# Patient Record
Sex: MALE | Race: WHITE | Employment: OTHER
[De-identification: names, ages, dates, MRNs, and addresses within clinical notes are randomized per-mention and may not be internally consistent; named-entity substitution may affect disease eponyms.]

---

## 2017-02-03 ENCOUNTER — CARE COORDINATION (OUTPATIENT)
Dept: CARE COORDINATION | Facility: CLINIC | Age: 47
End: 2017-02-03

## 2017-02-14 PROBLEM — H72.91 PERFORATION OF RIGHT TYMPANIC MEMBRANE: Status: ACTIVE | Noted: 2017-02-14

## 2017-03-22 ENCOUNTER — CARE COORDINATION (OUTPATIENT)
Dept: CARE COORDINATION | Age: 47
End: 2017-03-22

## 2017-05-02 ENCOUNTER — CARE COORDINATION (OUTPATIENT)
Dept: CARE COORDINATION | Age: 47
End: 2017-05-02

## 2017-05-23 ENCOUNTER — HOSPITAL ENCOUNTER (INPATIENT)
Age: 47
LOS: 2 days | Discharge: HOME | DRG: 639 | End: 2017-05-25
Attending: EMERGENCY MEDICINE | Admitting: FAMILY MEDICINE
Payer: COMMERCIAL

## 2017-05-23 ENCOUNTER — APPOINTMENT (OUTPATIENT)
Dept: GENERAL RADIOLOGY | Age: 47
DRG: 639 | End: 2017-05-23
Payer: COMMERCIAL

## 2017-05-23 ENCOUNTER — APPOINTMENT (OUTPATIENT)
Dept: CT IMAGING | Age: 47
DRG: 639 | End: 2017-05-23
Payer: COMMERCIAL

## 2017-05-23 DIAGNOSIS — E10.10 DIABETIC KETOACIDOSIS WITHOUT COMA ASSOCIATED WITH TYPE 1 DIABETES MELLITUS (HCC): Primary | ICD-10-CM

## 2017-05-23 DIAGNOSIS — K92.2 GASTROINTESTINAL HEMORRHAGE, UNSPECIFIED GASTROINTESTINAL HEMORRHAGE TYPE: ICD-10-CM

## 2017-05-23 LAB
ABO/RH: NORMAL
ABSOLUTE EOS #: 0.15 K/UL (ref 0–0.4)
ABSOLUTE LYMPH #: 0.45 K/UL (ref 1–4.8)
ABSOLUTE MONO #: 0 K/UL (ref 0.1–1.3)
ALBUMIN SERPL-MCNC: 4.1 G/DL (ref 3.5–5.2)
ALBUMIN/GLOBULIN RATIO: ABNORMAL (ref 1–2.5)
ALLEN TEST: ABNORMAL
ALP BLD-CCNC: 100 U/L (ref 40–129)
ALT SERPL-CCNC: 17 U/L (ref 5–41)
AMPHETAMINE SCREEN URINE: NEGATIVE
ANION GAP SERPL CALCULATED.3IONS-SCNC: 17 MMOL/L (ref 9–17)
ANION GAP SERPL CALCULATED.3IONS-SCNC: 27 MMOL/L (ref 9–17)
ANTIBODY SCREEN: NEGATIVE
ARM BAND NUMBER: NORMAL
AST SERPL-CCNC: 19 U/L
BARBITURATE SCREEN URINE: NEGATIVE
BASOPHILS # BLD: 0 %
BASOPHILS ABSOLUTE: 0 K/UL (ref 0–0.2)
BENZODIAZEPINE SCREEN, URINE: NEGATIVE
BILIRUB SERPL-MCNC: 0.77 MG/DL (ref 0.3–1.2)
BILIRUBIN URINE: NEGATIVE
BUN BLDV-MCNC: 12 MG/DL (ref 6–20)
BUN BLDV-MCNC: 12 MG/DL (ref 6–20)
BUN/CREAT BLD: ABNORMAL (ref 9–20)
BUN/CREAT BLD: ABNORMAL (ref 9–20)
BUPRENORPHINE URINE: ABNORMAL
C-REACTIVE PROTEIN: 0.5 MG/L (ref 0–5)
CALCIUM SERPL-MCNC: 8.3 MG/DL (ref 8.6–10.4)
CALCIUM SERPL-MCNC: 9.2 MG/DL (ref 8.6–10.4)
CANNABINOID SCREEN URINE: NEGATIVE
CARBOXYHEMOGLOBIN: 4.5 % (ref 0–5)
CHLORIDE BLD-SCNC: 103 MMOL/L (ref 98–107)
CHLORIDE BLD-SCNC: 94 MMOL/L (ref 98–107)
CO2: 15 MMOL/L (ref 20–31)
CO2: 16 MMOL/L (ref 20–31)
COCAINE METABOLITE, URINE: NEGATIVE
COLOR: YELLOW
COMMENT UA: ABNORMAL
CREAT SERPL-MCNC: 0.74 MG/DL (ref 0.7–1.2)
CREAT SERPL-MCNC: 0.83 MG/DL (ref 0.7–1.2)
DIFFERENTIAL TYPE: ABNORMAL
EOSINOPHILS RELATIVE PERCENT: 1 %
EXPIRATION DATE: NORMAL
FIO2: ABNORMAL
GFR AFRICAN AMERICAN: >60 ML/MIN
GFR AFRICAN AMERICAN: >60 ML/MIN
GFR NON-AFRICAN AMERICAN: >60 ML/MIN
GFR NON-AFRICAN AMERICAN: >60 ML/MIN
GFR SERPL CREATININE-BSD FRML MDRD: ABNORMAL ML/MIN/{1.73_M2}
GLUCOSE BLD-MCNC: 174 MG/DL (ref 75–110)
GLUCOSE BLD-MCNC: 191 MG/DL (ref 70–99)
GLUCOSE BLD-MCNC: 191 MG/DL (ref 75–110)
GLUCOSE BLD-MCNC: 196 MG/DL (ref 75–110)
GLUCOSE BLD-MCNC: 208 MG/DL (ref 75–110)
GLUCOSE BLD-MCNC: 271 MG/DL (ref 75–110)
GLUCOSE BLD-MCNC: 365 MG/DL (ref 75–110)
GLUCOSE BLD-MCNC: 419 MG/DL (ref 75–110)
GLUCOSE BLD-MCNC: 434 MG/DL (ref 70–99)
GLUCOSE URINE: ABNORMAL
HCO3 VENOUS: 15.4 MMOL/L (ref 24–30)
HCT VFR BLD CALC: 33.9 % (ref 41–53)
HEMOGLOBIN: 11.4 G/DL (ref 13.5–17.5)
KETONES, URINE: ABNORMAL
LACTIC ACID: 2.7 MMOL/L (ref 0.5–2.2)
LACTIC ACID: 4.5 MMOL/L (ref 0.5–2.2)
LEUKOCYTE ESTERASE, URINE: NEGATIVE
LIPASE: 8 U/L (ref 13–60)
LYMPHOCYTES # BLD: 3 %
MAGNESIUM: 1.8 MG/DL (ref 1.6–2.6)
MCH RBC QN AUTO: 31.8 PG (ref 26–34)
MCHC RBC AUTO-ENTMCNC: 33.7 G/DL (ref 31–37)
MCV RBC AUTO: 94.5 FL (ref 80–100)
MDMA URINE: ABNORMAL
METHADONE SCREEN, URINE: NEGATIVE
METHAMPHETAMINE, URINE: ABNORMAL
METHEMOGLOBIN: 0.4 % (ref 0–1.9)
MODE: ABNORMAL
MONOCYTES # BLD: 0 %
MORPHOLOGY: ABNORMAL
NEGATIVE BASE EXCESS, VEN: 10.2 MMOL/L (ref 0–2)
NITRITE, URINE: NEGATIVE
NOTIFICATION TIME: ABNORMAL
NOTIFICATION: ABNORMAL
O2 DEVICE/FLOW/%: ABNORMAL
O2 SAT, VEN: 53.4 % (ref 60–85)
OPIATES, URINE: POSITIVE
OXYCODONE SCREEN URINE: POSITIVE
OXYHEMOGLOBIN: ABNORMAL % (ref 95–98)
PATIENT TEMP: 37
PCO2, VEN, TEMP ADJ: ABNORMAL MMHG (ref 39–55)
PCO2, VEN: 28 (ref 39–55)
PDW BLD-RTO: 13.5 % (ref 11.5–14.9)
PEEP/CPAP: ABNORMAL
PH UA: 5.5 (ref 5–8)
PH VENOUS: 7.35 (ref 7.32–7.42)
PH, VEN, TEMP ADJ: ABNORMAL (ref 7.32–7.42)
PHENCYCLIDINE, URINE: NEGATIVE
PHOSPHORUS: 2.3 MG/DL (ref 2.5–4.5)
PLATELET # BLD: 239 K/UL (ref 150–450)
PLATELET ESTIMATE: ABNORMAL
PMV BLD AUTO: 8.2 FL (ref 6–12)
PO2, VEN, TEMP ADJ: ABNORMAL MMHG (ref 30–50)
PO2, VEN: 27.9 (ref 30–50)
POSITIVE BASE EXCESS, VEN: ABNORMAL MMOL/L (ref 0–2)
POTASSIUM SERPL-SCNC: 3.9 MMOL/L (ref 3.7–5.3)
POTASSIUM SERPL-SCNC: 4.2 MMOL/L (ref 3.7–5.3)
PROPOXYPHENE, URINE: ABNORMAL
PROTEIN UA: NEGATIVE
PSV: ABNORMAL
PT. POSITION: ABNORMAL
RBC # BLD: 3.59 M/UL (ref 4.5–5.9)
RBC # BLD: ABNORMAL 10*6/UL
RESPIRATORY RATE: ABNORMAL
SAMPLE SITE: ABNORMAL
SEG NEUTROPHILS: 96 %
SEGMENTED NEUTROPHILS ABSOLUTE COUNT: 14.5 K/UL (ref 1.3–9.1)
SET RATE: ABNORMAL
SODIUM BLD-SCNC: 136 MMOL/L (ref 135–144)
SODIUM BLD-SCNC: 136 MMOL/L (ref 135–144)
SPECIFIC GRAVITY UA: 1.03 (ref 1–1.03)
TEST INFORMATION: ABNORMAL
TEXT FOR RESPIRATORY: ABNORMAL
TOTAL HB: ABNORMAL G/DL (ref 12–16)
TOTAL PROTEIN: 6.4 G/DL (ref 6.4–8.3)
TOTAL RATE: ABNORMAL
TRICYCLIC ANTIDEPRESSANTS, UR: ABNORMAL
TURBIDITY: CLEAR
URINE HGB: NEGATIVE
UROBILINOGEN, URINE: NORMAL
VT: ABNORMAL
WBC # BLD: 15.1 K/UL (ref 3.5–11)
WBC # BLD: ABNORMAL 10*3/UL

## 2017-05-23 PROCEDURE — 86900 BLOOD TYPING SEROLOGIC ABO: CPT

## 2017-05-23 PROCEDURE — 82947 ASSAY GLUCOSE BLOOD QUANT: CPT

## 2017-05-23 PROCEDURE — 86901 BLOOD TYPING SEROLOGIC RH(D): CPT

## 2017-05-23 PROCEDURE — C9113 INJ PANTOPRAZOLE SODIUM, VIA: HCPCS | Performed by: EMERGENCY MEDICINE

## 2017-05-23 PROCEDURE — 80048 BASIC METABOLIC PNL TOTAL CA: CPT

## 2017-05-23 PROCEDURE — 2580000003 HC RX 258: Performed by: FAMILY MEDICINE

## 2017-05-23 PROCEDURE — 74000 XR ABDOMEN LIMITED (KUB): CPT

## 2017-05-23 PROCEDURE — 83735 ASSAY OF MAGNESIUM: CPT

## 2017-05-23 PROCEDURE — 96376 TX/PRO/DX INJ SAME DRUG ADON: CPT

## 2017-05-23 PROCEDURE — 36415 COLL VENOUS BLD VENIPUNCTURE: CPT

## 2017-05-23 PROCEDURE — 85025 COMPLETE CBC W/AUTO DIFF WBC: CPT

## 2017-05-23 PROCEDURE — 6360000004 HC RX CONTRAST MEDICATION: Performed by: EMERGENCY MEDICINE

## 2017-05-23 PROCEDURE — 6360000002 HC RX W HCPCS: Performed by: FAMILY MEDICINE

## 2017-05-23 PROCEDURE — 96366 THER/PROPH/DIAG IV INF ADDON: CPT

## 2017-05-23 PROCEDURE — 96375 TX/PRO/DX INJ NEW DRUG ADDON: CPT

## 2017-05-23 PROCEDURE — 81003 URINALYSIS AUTO W/O SCOPE: CPT

## 2017-05-23 PROCEDURE — 6370000000 HC RX 637 (ALT 250 FOR IP): Performed by: FAMILY MEDICINE

## 2017-05-23 PROCEDURE — 2580000003 HC RX 258: Performed by: EMERGENCY MEDICINE

## 2017-05-23 PROCEDURE — 86140 C-REACTIVE PROTEIN: CPT

## 2017-05-23 PROCEDURE — 74177 CT ABD & PELVIS W/CONTRAST: CPT

## 2017-05-23 PROCEDURE — 83605 ASSAY OF LACTIC ACID: CPT

## 2017-05-23 PROCEDURE — 6370000000 HC RX 637 (ALT 250 FOR IP): Performed by: EMERGENCY MEDICINE

## 2017-05-23 PROCEDURE — 2500000003 HC RX 250 WO HCPCS: Performed by: FAMILY MEDICINE

## 2017-05-23 PROCEDURE — 99285 EMERGENCY DEPT VISIT HI MDM: CPT

## 2017-05-23 PROCEDURE — 83690 ASSAY OF LIPASE: CPT

## 2017-05-23 PROCEDURE — 80307 DRUG TEST PRSMV CHEM ANLYZR: CPT

## 2017-05-23 PROCEDURE — 86850 RBC ANTIBODY SCREEN: CPT

## 2017-05-23 PROCEDURE — 6360000002 HC RX W HCPCS: Performed by: EMERGENCY MEDICINE

## 2017-05-23 PROCEDURE — 80053 COMPREHEN METABOLIC PANEL: CPT

## 2017-05-23 PROCEDURE — 82800 BLOOD PH: CPT

## 2017-05-23 PROCEDURE — 96365 THER/PROPH/DIAG IV INF INIT: CPT

## 2017-05-23 PROCEDURE — 2060000000 HC ICU INTERMEDIATE R&B

## 2017-05-23 PROCEDURE — 82805 BLOOD GASES W/O2 SATURATION: CPT

## 2017-05-23 PROCEDURE — 84100 ASSAY OF PHOSPHORUS: CPT

## 2017-05-23 RX ORDER — DEXTROSE MONOHYDRATE 50 MG/ML
100 INJECTION, SOLUTION INTRAVENOUS PRN
Status: DISCONTINUED | OUTPATIENT
Start: 2017-05-23 | End: 2017-05-23

## 2017-05-23 RX ORDER — PANTOPRAZOLE SODIUM 40 MG/10ML
40 INJECTION, POWDER, LYOPHILIZED, FOR SOLUTION INTRAVENOUS ONCE
Status: COMPLETED | OUTPATIENT
Start: 2017-05-23 | End: 2017-05-23

## 2017-05-23 RX ORDER — ONDANSETRON 2 MG/ML
4 INJECTION INTRAMUSCULAR; INTRAVENOUS EVERY 6 HOURS PRN
Status: DISCONTINUED | OUTPATIENT
Start: 2017-05-23 | End: 2017-05-23

## 2017-05-23 RX ORDER — OXYCODONE AND ACETAMINOPHEN 10; 325 MG/1; MG/1
1 TABLET ORAL EVERY 6 HOURS PRN
Status: DISCONTINUED | OUTPATIENT
Start: 2017-05-23 | End: 2017-05-23 | Stop reason: CLARIF

## 2017-05-23 RX ORDER — LISINOPRIL 5 MG/1
5 TABLET ORAL DAILY
Status: DISCONTINUED | OUTPATIENT
Start: 2017-05-23 | End: 2017-05-25 | Stop reason: HOSPADM

## 2017-05-23 RX ORDER — NICOTINE POLACRILEX 4 MG
15 LOZENGE BUCCAL PRN
Status: DISCONTINUED | OUTPATIENT
Start: 2017-05-23 | End: 2017-05-24 | Stop reason: ALTCHOICE

## 2017-05-23 RX ORDER — ACETAMINOPHEN 325 MG/1
650 TABLET ORAL EVERY 4 HOURS PRN
Status: DISCONTINUED | OUTPATIENT
Start: 2017-05-23 | End: 2017-05-23

## 2017-05-23 RX ORDER — SODIUM CHLORIDE 9 MG/ML
INJECTION, SOLUTION INTRAVENOUS CONTINUOUS
Status: DISCONTINUED | OUTPATIENT
Start: 2017-05-23 | End: 2017-05-23

## 2017-05-23 RX ORDER — POTASSIUM CHLORIDE 7.45 MG/ML
10 INJECTION INTRAVENOUS PRN
Status: DISCONTINUED | OUTPATIENT
Start: 2017-05-23 | End: 2017-05-24 | Stop reason: ALTCHOICE

## 2017-05-23 RX ORDER — DEXTROSE AND SODIUM CHLORIDE 5; .45 G/100ML; G/100ML
INJECTION, SOLUTION INTRAVENOUS CONTINUOUS PRN
Status: DISCONTINUED | OUTPATIENT
Start: 2017-05-23 | End: 2017-05-24 | Stop reason: ALTCHOICE

## 2017-05-23 RX ORDER — OXYCODONE HYDROCHLORIDE AND ACETAMINOPHEN 5; 325 MG/1; MG/1
1 TABLET ORAL EVERY 6 HOURS PRN
Status: DISCONTINUED | OUTPATIENT
Start: 2017-05-23 | End: 2017-05-25 | Stop reason: HOSPADM

## 2017-05-23 RX ORDER — DEXTROSE MONOHYDRATE 25 G/50ML
12.5 INJECTION, SOLUTION INTRAVENOUS PRN
Status: DISCONTINUED | OUTPATIENT
Start: 2017-05-23 | End: 2017-05-24 | Stop reason: ALTCHOICE

## 2017-05-23 RX ORDER — SODIUM CHLORIDE 0.9 % (FLUSH) 0.9 %
10 SYRINGE (ML) INJECTION PRN
Status: DISCONTINUED | OUTPATIENT
Start: 2017-05-23 | End: 2017-05-25 | Stop reason: HOSPADM

## 2017-05-23 RX ORDER — 0.9 % SODIUM CHLORIDE 0.9 %
1000 INTRAVENOUS SOLUTION INTRAVENOUS ONCE
Status: COMPLETED | OUTPATIENT
Start: 2017-05-23 | End: 2017-05-23

## 2017-05-23 RX ORDER — DEXTROSE MONOHYDRATE 25 G/50ML
12.5 INJECTION, SOLUTION INTRAVENOUS PRN
Status: DISCONTINUED | OUTPATIENT
Start: 2017-05-23 | End: 2017-05-23

## 2017-05-23 RX ORDER — ACETAMINOPHEN 325 MG/1
650 TABLET ORAL EVERY 4 HOURS PRN
Status: DISCONTINUED | OUTPATIENT
Start: 2017-05-23 | End: 2017-05-25 | Stop reason: HOSPADM

## 2017-05-23 RX ORDER — DEXTROSE AND SODIUM CHLORIDE 5; .45 G/100ML; G/100ML
INJECTION, SOLUTION INTRAVENOUS CONTINUOUS
Status: DISCONTINUED | OUTPATIENT
Start: 2017-05-23 | End: 2017-05-24 | Stop reason: ALTCHOICE

## 2017-05-23 RX ORDER — DEXTROSE MONOHYDRATE 25 G/50ML
12.5 INJECTION, SOLUTION INTRAVENOUS PRN
Status: DISCONTINUED | OUTPATIENT
Start: 2017-05-23 | End: 2017-05-23 | Stop reason: SDUPTHER

## 2017-05-23 RX ORDER — PRAVASTATIN SODIUM 20 MG
20 TABLET ORAL DAILY
Status: DISCONTINUED | OUTPATIENT
Start: 2017-05-23 | End: 2017-05-25 | Stop reason: HOSPADM

## 2017-05-23 RX ORDER — MORPHINE SULFATE 2 MG/ML
2 INJECTION, SOLUTION INTRAMUSCULAR; INTRAVENOUS
Status: DISCONTINUED | OUTPATIENT
Start: 2017-05-23 | End: 2017-05-23

## 2017-05-23 RX ORDER — ONDANSETRON 4 MG/1
4 TABLET, FILM COATED ORAL EVERY 6 HOURS PRN
Status: DISCONTINUED | OUTPATIENT
Start: 2017-05-23 | End: 2017-05-25 | Stop reason: HOSPADM

## 2017-05-23 RX ORDER — MAGNESIUM SULFATE 1 G/100ML
1 INJECTION INTRAVENOUS PRN
Status: DISCONTINUED | OUTPATIENT
Start: 2017-05-23 | End: 2017-05-24 | Stop reason: ALTCHOICE

## 2017-05-23 RX ORDER — 0.9 % SODIUM CHLORIDE 0.9 %
15 INTRAVENOUS SOLUTION INTRAVENOUS ONCE
Status: DISCONTINUED | OUTPATIENT
Start: 2017-05-23 | End: 2017-05-23

## 2017-05-23 RX ORDER — NICOTINE POLACRILEX 4 MG
15 LOZENGE BUCCAL PRN
Status: DISCONTINUED | OUTPATIENT
Start: 2017-05-23 | End: 2017-05-23

## 2017-05-23 RX ORDER — SODIUM CHLORIDE 0.9 % (FLUSH) 0.9 %
10 SYRINGE (ML) INJECTION PRN
Status: DISCONTINUED | OUTPATIENT
Start: 2017-05-23 | End: 2017-05-23

## 2017-05-23 RX ORDER — DEXTROSE MONOHYDRATE 50 MG/ML
100 INJECTION, SOLUTION INTRAVENOUS PRN
Status: DISCONTINUED | OUTPATIENT
Start: 2017-05-23 | End: 2017-05-24 | Stop reason: ALTCHOICE

## 2017-05-23 RX ORDER — ISOSORBIDE MONONITRATE 30 MG/1
30 TABLET, EXTENDED RELEASE ORAL DAILY
Status: DISCONTINUED | OUTPATIENT
Start: 2017-05-23 | End: 2017-05-25 | Stop reason: HOSPADM

## 2017-05-23 RX ORDER — ONDANSETRON 2 MG/ML
4 INJECTION INTRAMUSCULAR; INTRAVENOUS EVERY 30 MIN PRN
Status: COMPLETED | OUTPATIENT
Start: 2017-05-23 | End: 2017-05-23

## 2017-05-23 RX ORDER — PROPRANOLOL HYDROCHLORIDE 20 MG/1
20 TABLET ORAL 3 TIMES DAILY
Status: DISCONTINUED | OUTPATIENT
Start: 2017-05-23 | End: 2017-05-25 | Stop reason: HOSPADM

## 2017-05-23 RX ORDER — SODIUM CHLORIDE, SODIUM LACTATE, POTASSIUM CHLORIDE, AND CALCIUM CHLORIDE .6; .31; .03; .02 G/100ML; G/100ML; G/100ML; G/100ML
1000 INJECTION, SOLUTION INTRAVENOUS ONCE
Status: COMPLETED | OUTPATIENT
Start: 2017-05-23 | End: 2017-05-23

## 2017-05-23 RX ORDER — 0.9 % SODIUM CHLORIDE 0.9 %
100 INTRAVENOUS SOLUTION INTRAVENOUS ONCE
Status: COMPLETED | OUTPATIENT
Start: 2017-05-23 | End: 2017-05-23

## 2017-05-23 RX ORDER — OXYCODONE HYDROCHLORIDE 5 MG/1
5 TABLET ORAL EVERY 6 HOURS PRN
Status: DISCONTINUED | OUTPATIENT
Start: 2017-05-23 | End: 2017-05-25 | Stop reason: HOSPADM

## 2017-05-23 RX ORDER — SERTRALINE HYDROCHLORIDE 100 MG/1
100 TABLET, FILM COATED ORAL DAILY
Status: DISCONTINUED | OUTPATIENT
Start: 2017-05-23 | End: 2017-05-25 | Stop reason: HOSPADM

## 2017-05-23 RX ORDER — MORPHINE SULFATE 4 MG/ML
4 INJECTION, SOLUTION INTRAMUSCULAR; INTRAVENOUS
Status: DISCONTINUED | OUTPATIENT
Start: 2017-05-23 | End: 2017-05-23

## 2017-05-23 RX ORDER — SODIUM CHLORIDE 0.9 % (FLUSH) 0.9 %
10 SYRINGE (ML) INJECTION EVERY 12 HOURS SCHEDULED
Status: DISCONTINUED | OUTPATIENT
Start: 2017-05-23 | End: 2017-05-25 | Stop reason: HOSPADM

## 2017-05-23 RX ORDER — OXYCODONE HYDROCHLORIDE 5 MG/1
10 TABLET ORAL EVERY 6 HOURS PRN
Status: DISCONTINUED | OUTPATIENT
Start: 2017-05-23 | End: 2017-05-25 | Stop reason: HOSPADM

## 2017-05-23 RX ORDER — NITROGLYCERIN 0.4 MG/1
0.4 TABLET SUBLINGUAL EVERY 5 MIN PRN
Status: DISCONTINUED | OUTPATIENT
Start: 2017-05-23 | End: 2017-05-25 | Stop reason: HOSPADM

## 2017-05-23 RX ADMIN — SODIUM CHLORIDE 0.1 UNITS/KG/HR: 9 INJECTION, SOLUTION INTRAVENOUS at 15:06

## 2017-05-23 RX ADMIN — OXYCODONE AND ACETAMINOPHEN 1 TABLET: 5; 325 TABLET ORAL at 21:11

## 2017-05-23 RX ADMIN — Medication 10 ML: at 13:28

## 2017-05-23 RX ADMIN — IOVERSOL 130 ML: 741 INJECTION INTRA-ARTERIAL; INTRAVENOUS at 13:27

## 2017-05-23 RX ADMIN — SODIUM CHLORIDE: 9 INJECTION, SOLUTION INTRAVENOUS at 15:08

## 2017-05-23 RX ADMIN — ONDANSETRON 4 MG: 2 INJECTION INTRAMUSCULAR; INTRAVENOUS at 13:43

## 2017-05-23 RX ADMIN — MORPHINE SULFATE 4 MG: 4 INJECTION, SOLUTION INTRAMUSCULAR; INTRAVENOUS at 11:45

## 2017-05-23 RX ADMIN — Medication 4.08 UNITS/HR: at 18:37

## 2017-05-23 RX ADMIN — LISINOPRIL 5 MG: 5 TABLET ORAL at 21:08

## 2017-05-23 RX ADMIN — POTASSIUM CHLORIDE 10 MEQ: 7.46 INJECTION, SOLUTION INTRAVENOUS at 22:16

## 2017-05-23 RX ADMIN — IOHEXOL 50 ML: 240 INJECTION, SOLUTION INTRATHECAL; INTRAVASCULAR; INTRAVENOUS; ORAL at 13:28

## 2017-05-23 RX ADMIN — SODIUM CHLORIDE 100 ML: 9 INJECTION, SOLUTION INTRAVENOUS at 13:28

## 2017-05-23 RX ADMIN — ISOSORBIDE MONONITRATE 30 MG: 30 TABLET, EXTENDED RELEASE ORAL at 21:08

## 2017-05-23 RX ADMIN — PROPRANOLOL HYDROCHLORIDE 20 MG: 20 TABLET ORAL at 21:08

## 2017-05-23 RX ADMIN — SODIUM CHLORIDE 8 MG/HR: 9 INJECTION, SOLUTION INTRAVENOUS at 17:06

## 2017-05-23 RX ADMIN — SODIUM CHLORIDE 1000 ML: 9 INJECTION, SOLUTION INTRAVENOUS at 11:45

## 2017-05-23 RX ADMIN — SERTRALINE HYDROCHLORIDE 100 MG: 100 TABLET, FILM COATED ORAL at 21:08

## 2017-05-23 RX ADMIN — ONDANSETRON 4 MG: 2 INJECTION INTRAMUSCULAR; INTRAVENOUS at 11:45

## 2017-05-23 RX ADMIN — PANTOPRAZOLE SODIUM 40 MG: 40 INJECTION, POWDER, FOR SOLUTION INTRAVENOUS at 11:45

## 2017-05-23 RX ADMIN — POTASSIUM CHLORIDE 10 MEQ: 7.46 INJECTION, SOLUTION INTRAVENOUS at 23:17

## 2017-05-23 RX ADMIN — OXYCODONE HYDROCHLORIDE 5 MG: 5 TABLET ORAL at 21:15

## 2017-05-23 RX ADMIN — PRAVASTATIN SODIUM 20 MG: 20 TABLET ORAL at 21:07

## 2017-05-23 RX ADMIN — SODIUM PHOSPHATE, MONOBASIC, MONOHYDRATE AND SODIUM PHOSPHATE, DIBASIC, ANHYDROUS 10 MMOL: 276; 142 INJECTION, SOLUTION INTRAVENOUS at 21:58

## 2017-05-23 RX ADMIN — SODIUM CHLORIDE, POTASSIUM CHLORIDE, SODIUM LACTATE AND CALCIUM CHLORIDE 1000 ML: 600; 310; 30; 20 INJECTION, SOLUTION INTRAVENOUS at 13:42

## 2017-05-23 RX ADMIN — SODIUM CHLORIDE: 9 INJECTION, SOLUTION INTRAVENOUS at 18:33

## 2017-05-23 RX ADMIN — POTASSIUM CHLORIDE 10 MEQ: 7.46 INJECTION, SOLUTION INTRAVENOUS at 21:15

## 2017-05-23 RX ADMIN — DEXTROSE AND SODIUM CHLORIDE: 5; 450 INJECTION, SOLUTION INTRAVENOUS at 18:42

## 2017-05-23 ASSESSMENT — PAIN DESCRIPTION - FREQUENCY: FREQUENCY: CONTINUOUS

## 2017-05-23 ASSESSMENT — PAIN DESCRIPTION - LOCATION: LOCATION: SHOULDER

## 2017-05-23 ASSESSMENT — PAIN DESCRIPTION - ONSET: ONSET: ON-GOING

## 2017-05-23 ASSESSMENT — PAIN DESCRIPTION - DESCRIPTORS: DESCRIPTORS: ACHING

## 2017-05-23 ASSESSMENT — PAIN SCALES - GENERAL
PAINLEVEL_OUTOF10: 9
PAINLEVEL_OUTOF10: 9
PAINLEVEL_OUTOF10: 0
PAINLEVEL_OUTOF10: 7

## 2017-05-23 ASSESSMENT — PAIN DESCRIPTION - PAIN TYPE: TYPE: CHRONIC PAIN

## 2017-05-24 ENCOUNTER — ANESTHESIA (OUTPATIENT)
Dept: OPERATING ROOM | Age: 47
DRG: 639 | End: 2017-05-24
Payer: COMMERCIAL

## 2017-05-24 ENCOUNTER — ANESTHESIA EVENT (OUTPATIENT)
Dept: OPERATING ROOM | Age: 47
DRG: 639 | End: 2017-05-24
Payer: COMMERCIAL

## 2017-05-24 VITALS — SYSTOLIC BLOOD PRESSURE: 93 MMHG | DIASTOLIC BLOOD PRESSURE: 55 MMHG | OXYGEN SATURATION: 100 %

## 2017-05-24 LAB
ANION GAP SERPL CALCULATED.3IONS-SCNC: 11 MMOL/L (ref 9–17)
ANION GAP SERPL CALCULATED.3IONS-SCNC: 12 MMOL/L (ref 9–17)
ANION GAP SERPL CALCULATED.3IONS-SCNC: 12 MMOL/L (ref 9–17)
BUN BLDV-MCNC: 10 MG/DL (ref 6–20)
BUN BLDV-MCNC: 7 MG/DL (ref 6–20)
BUN BLDV-MCNC: 8 MG/DL (ref 6–20)
BUN/CREAT BLD: ABNORMAL (ref 9–20)
CALCIUM SERPL-MCNC: 8 MG/DL (ref 8.6–10.4)
CALCIUM SERPL-MCNC: 8.1 MG/DL (ref 8.6–10.4)
CALCIUM SERPL-MCNC: 8.1 MG/DL (ref 8.6–10.4)
CHLORIDE BLD-SCNC: 104 MMOL/L (ref 98–107)
CHLORIDE BLD-SCNC: 105 MMOL/L (ref 98–107)
CHLORIDE BLD-SCNC: 107 MMOL/L (ref 98–107)
CO2: 18 MMOL/L (ref 20–31)
CO2: 19 MMOL/L (ref 20–31)
CO2: 20 MMOL/L (ref 20–31)
CREAT SERPL-MCNC: 0.55 MG/DL (ref 0.7–1.2)
CREAT SERPL-MCNC: 0.55 MG/DL (ref 0.7–1.2)
CREAT SERPL-MCNC: 0.59 MG/DL (ref 0.7–1.2)
GFR AFRICAN AMERICAN: >60 ML/MIN
GFR NON-AFRICAN AMERICAN: >60 ML/MIN
GFR SERPL CREATININE-BSD FRML MDRD: ABNORMAL ML/MIN/{1.73_M2}
GLUCOSE BLD-MCNC: 117 MG/DL (ref 75–110)
GLUCOSE BLD-MCNC: 125 MG/DL (ref 75–110)
GLUCOSE BLD-MCNC: 135 MG/DL (ref 70–99)
GLUCOSE BLD-MCNC: 135 MG/DL (ref 75–110)
GLUCOSE BLD-MCNC: 139 MG/DL (ref 75–110)
GLUCOSE BLD-MCNC: 141 MG/DL (ref 75–110)
GLUCOSE BLD-MCNC: 145 MG/DL (ref 75–110)
GLUCOSE BLD-MCNC: 155 MG/DL (ref 75–110)
GLUCOSE BLD-MCNC: 158 MG/DL (ref 70–99)
GLUCOSE BLD-MCNC: 160 MG/DL (ref 75–110)
GLUCOSE BLD-MCNC: 160 MG/DL (ref 75–110)
GLUCOSE BLD-MCNC: 171 MG/DL (ref 75–110)
GLUCOSE BLD-MCNC: 174 MG/DL (ref 70–99)
GLUCOSE BLD-MCNC: 181 MG/DL (ref 75–110)
GLUCOSE BLD-MCNC: 208 MG/DL (ref 75–110)
GLUCOSE BLD-MCNC: 300 MG/DL (ref 75–110)
GLUCOSE BLD-MCNC: 98 MG/DL (ref 75–110)
HCT VFR BLD CALC: 25.8 % (ref 41–53)
HEMOGLOBIN: 8.8 G/DL (ref 13.5–17.5)
MAGNESIUM: 1.7 MG/DL (ref 1.6–2.6)
MAGNESIUM: 1.8 MG/DL (ref 1.6–2.6)
MAGNESIUM: 1.9 MG/DL (ref 1.6–2.6)
MCH RBC QN AUTO: 31.5 PG (ref 26–34)
MCHC RBC AUTO-ENTMCNC: 34 G/DL (ref 31–37)
MCV RBC AUTO: 92.4 FL (ref 80–100)
PDW BLD-RTO: 14.1 % (ref 11.5–14.9)
PHOSPHORUS: 2.3 MG/DL (ref 2.5–4.5)
PHOSPHORUS: 3.3 MG/DL (ref 2.5–4.5)
PHOSPHORUS: 3.6 MG/DL (ref 2.5–4.5)
PLATELET # BLD: 188 K/UL (ref 150–450)
PMV BLD AUTO: 7.9 FL (ref 6–12)
POTASSIUM SERPL-SCNC: 3.7 MMOL/L (ref 3.7–5.3)
POTASSIUM SERPL-SCNC: 4.2 MMOL/L (ref 3.7–5.3)
POTASSIUM SERPL-SCNC: 4.3 MMOL/L (ref 3.7–5.3)
RBC # BLD: 2.79 M/UL (ref 4.5–5.9)
SODIUM BLD-SCNC: 134 MMOL/L (ref 135–144)
SODIUM BLD-SCNC: 137 MMOL/L (ref 135–144)
SODIUM BLD-SCNC: 137 MMOL/L (ref 135–144)
WBC # BLD: 15.8 K/UL (ref 3.5–11)

## 2017-05-24 PROCEDURE — 2580000003 HC RX 258: Performed by: FAMILY MEDICINE

## 2017-05-24 PROCEDURE — 6370000000 HC RX 637 (ALT 250 FOR IP): Performed by: INTERNAL MEDICINE

## 2017-05-24 PROCEDURE — 85027 COMPLETE CBC AUTOMATED: CPT

## 2017-05-24 PROCEDURE — 3700000001 HC ADD 15 MINUTES (ANESTHESIA): Performed by: INTERNAL MEDICINE

## 2017-05-24 PROCEDURE — 6370000000 HC RX 637 (ALT 250 FOR IP): Performed by: FAMILY MEDICINE

## 2017-05-24 PROCEDURE — 2500000003 HC RX 250 WO HCPCS: Performed by: NURSE ANESTHETIST, CERTIFIED REGISTERED

## 2017-05-24 PROCEDURE — 2580000003 HC RX 258: Performed by: INTERNAL MEDICINE

## 2017-05-24 PROCEDURE — 80048 BASIC METABOLIC PNL TOTAL CA: CPT

## 2017-05-24 PROCEDURE — 82947 ASSAY GLUCOSE BLOOD QUANT: CPT

## 2017-05-24 PROCEDURE — 6360000002 HC RX W HCPCS: Performed by: NURSE ANESTHETIST, CERTIFIED REGISTERED

## 2017-05-24 PROCEDURE — 0DJ08ZZ INSPECTION OF UPPER INTESTINAL TRACT, VIA NATURAL OR ARTIFICIAL OPENING ENDOSCOPIC: ICD-10-PCS | Performed by: INTERNAL MEDICINE

## 2017-05-24 PROCEDURE — 3609017100 HC EGD: Performed by: INTERNAL MEDICINE

## 2017-05-24 PROCEDURE — 2060000000 HC ICU INTERMEDIATE R&B

## 2017-05-24 PROCEDURE — 6360000002 HC RX W HCPCS: Performed by: FAMILY MEDICINE

## 2017-05-24 PROCEDURE — 83735 ASSAY OF MAGNESIUM: CPT

## 2017-05-24 PROCEDURE — 6360000002 HC RX W HCPCS: Performed by: EMERGENCY MEDICINE

## 2017-05-24 PROCEDURE — C9113 INJ PANTOPRAZOLE SODIUM, VIA: HCPCS | Performed by: EMERGENCY MEDICINE

## 2017-05-24 PROCEDURE — 3700000000 HC ANESTHESIA ATTENDED CARE: Performed by: INTERNAL MEDICINE

## 2017-05-24 PROCEDURE — 99222 1ST HOSP IP/OBS MODERATE 55: CPT | Performed by: INTERNAL MEDICINE

## 2017-05-24 PROCEDURE — 84100 ASSAY OF PHOSPHORUS: CPT

## 2017-05-24 PROCEDURE — 36415 COLL VENOUS BLD VENIPUNCTURE: CPT

## 2017-05-24 PROCEDURE — 2580000003 HC RX 258: Performed by: EMERGENCY MEDICINE

## 2017-05-24 RX ORDER — FENTANYL CITRATE 50 UG/ML
25 INJECTION, SOLUTION INTRAMUSCULAR; INTRAVENOUS EVERY 5 MIN PRN
Status: DISCONTINUED | OUTPATIENT
Start: 2017-05-24 | End: 2017-05-24 | Stop reason: HOSPADM

## 2017-05-24 RX ORDER — LORAZEPAM 2 MG/ML
0.5 INJECTION INTRAMUSCULAR ONCE
Status: DISCONTINUED | OUTPATIENT
Start: 2017-05-24 | End: 2017-05-24 | Stop reason: HOSPADM

## 2017-05-24 RX ORDER — PANTOPRAZOLE SODIUM 40 MG/1
40 TABLET, DELAYED RELEASE ORAL
Status: DISCONTINUED | OUTPATIENT
Start: 2017-05-25 | End: 2017-05-25 | Stop reason: HOSPADM

## 2017-05-24 RX ORDER — LIDOCAINE HYDROCHLORIDE 20 MG/ML
INJECTION, SOLUTION EPIDURAL; INFILTRATION; INTRACAUDAL; PERINEURAL PRN
Status: DISCONTINUED | OUTPATIENT
Start: 2017-05-24 | End: 2017-05-24 | Stop reason: SDUPTHER

## 2017-05-24 RX ORDER — METOCLOPRAMIDE HYDROCHLORIDE 5 MG/ML
10 INJECTION INTRAMUSCULAR; INTRAVENOUS
Status: DISCONTINUED | OUTPATIENT
Start: 2017-05-24 | End: 2017-05-24 | Stop reason: HOSPADM

## 2017-05-24 RX ORDER — FENTANYL CITRATE 50 UG/ML
INJECTION, SOLUTION INTRAMUSCULAR; INTRAVENOUS PRN
Status: DISCONTINUED | OUTPATIENT
Start: 2017-05-24 | End: 2017-05-24 | Stop reason: SDUPTHER

## 2017-05-24 RX ORDER — NICOTINE 21 MG/24HR
1 PATCH, TRANSDERMAL 24 HOURS TRANSDERMAL DAILY
Status: DISCONTINUED | OUTPATIENT
Start: 2017-05-24 | End: 2017-05-25 | Stop reason: HOSPADM

## 2017-05-24 RX ORDER — MIDAZOLAM HYDROCHLORIDE 1 MG/ML
INJECTION INTRAMUSCULAR; INTRAVENOUS PRN
Status: DISCONTINUED | OUTPATIENT
Start: 2017-05-24 | End: 2017-05-24 | Stop reason: SDUPTHER

## 2017-05-24 RX ORDER — PROPOFOL 10 MG/ML
INJECTION, EMULSION INTRAVENOUS PRN
Status: DISCONTINUED | OUTPATIENT
Start: 2017-05-24 | End: 2017-05-24 | Stop reason: SDUPTHER

## 2017-05-24 RX ORDER — MEPERIDINE HYDROCHLORIDE 25 MG/ML
12.5 INJECTION INTRAMUSCULAR; INTRAVENOUS; SUBCUTANEOUS EVERY 5 MIN PRN
Status: DISCONTINUED | OUTPATIENT
Start: 2017-05-24 | End: 2017-05-24 | Stop reason: HOSPADM

## 2017-05-24 RX ORDER — DIPHENHYDRAMINE HYDROCHLORIDE 50 MG/ML
12.5 INJECTION INTRAMUSCULAR; INTRAVENOUS
Status: DISCONTINUED | OUTPATIENT
Start: 2017-05-24 | End: 2017-05-24 | Stop reason: HOSPADM

## 2017-05-24 RX ORDER — SODIUM CHLORIDE, SODIUM LACTATE, POTASSIUM CHLORIDE, CALCIUM CHLORIDE 600; 310; 30; 20 MG/100ML; MG/100ML; MG/100ML; MG/100ML
INJECTION, SOLUTION INTRAVENOUS CONTINUOUS
Status: DISCONTINUED | OUTPATIENT
Start: 2017-05-24 | End: 2017-05-24

## 2017-05-24 RX ORDER — LABETALOL HYDROCHLORIDE 5 MG/ML
5 INJECTION, SOLUTION INTRAVENOUS EVERY 10 MIN PRN
Status: DISCONTINUED | OUTPATIENT
Start: 2017-05-24 | End: 2017-05-24 | Stop reason: HOSPADM

## 2017-05-24 RX ORDER — LORAZEPAM 0.5 MG/1
0.5 TABLET ORAL EVERY 4 HOURS PRN
Status: DISCONTINUED | OUTPATIENT
Start: 2017-05-24 | End: 2017-05-25 | Stop reason: HOSPADM

## 2017-05-24 RX ORDER — ONDANSETRON 2 MG/ML
4 INJECTION INTRAMUSCULAR; INTRAVENOUS
Status: DISCONTINUED | OUTPATIENT
Start: 2017-05-24 | End: 2017-05-24 | Stop reason: HOSPADM

## 2017-05-24 RX ORDER — FENTANYL CITRATE 50 UG/ML
50 INJECTION, SOLUTION INTRAMUSCULAR; INTRAVENOUS EVERY 5 MIN PRN
Status: DISCONTINUED | OUTPATIENT
Start: 2017-05-24 | End: 2017-05-24 | Stop reason: HOSPADM

## 2017-05-24 RX ORDER — HYDRALAZINE HYDROCHLORIDE 20 MG/ML
5 INJECTION INTRAMUSCULAR; INTRAVENOUS EVERY 10 MIN PRN
Status: DISCONTINUED | OUTPATIENT
Start: 2017-05-24 | End: 2017-05-24 | Stop reason: HOSPADM

## 2017-05-24 RX ADMIN — FENTANYL CITRATE 50 MCG: 50 INJECTION, SOLUTION INTRAMUSCULAR; INTRAVENOUS at 12:13

## 2017-05-24 RX ADMIN — OXYCODONE HYDROCHLORIDE 5 MG: 5 TABLET ORAL at 04:32

## 2017-05-24 RX ADMIN — Medication 1.16 UNITS/HR: at 03:45

## 2017-05-24 RX ADMIN — DEXTROSE AND SODIUM CHLORIDE: 5; 450 INJECTION, SOLUTION INTRAVENOUS at 06:46

## 2017-05-24 RX ADMIN — OXYCODONE AND ACETAMINOPHEN 1 TABLET: 5; 325 TABLET ORAL at 17:19

## 2017-05-24 RX ADMIN — OXYCODONE HYDROCHLORIDE 5 MG: 5 TABLET ORAL at 10:47

## 2017-05-24 RX ADMIN — OXYCODONE HYDROCHLORIDE 10 MG: 5 TABLET ORAL at 22:54

## 2017-05-24 RX ADMIN — OXYCODONE HYDROCHLORIDE 5 MG: 5 TABLET ORAL at 17:19

## 2017-05-24 RX ADMIN — LIDOCAINE HYDROCHLORIDE 10 MG: 20 INJECTION, SOLUTION EPIDURAL; INFILTRATION; INTRACAUDAL; PERINEURAL at 12:12

## 2017-05-24 RX ADMIN — PROPOFOL 30 MG: 10 INJECTION, EMULSION INTRAVENOUS at 12:13

## 2017-05-24 RX ADMIN — INSULIN LISPRO 3 UNITS: 100 INJECTION, SOLUTION INTRAVENOUS; SUBCUTANEOUS at 19:50

## 2017-05-24 RX ADMIN — SODIUM CHLORIDE 8 MG/HR: 9 INJECTION, SOLUTION INTRAVENOUS at 00:41

## 2017-05-24 RX ADMIN — SODIUM CHLORIDE 8 MG/HR: 9 INJECTION, SOLUTION INTRAVENOUS at 11:19

## 2017-05-24 RX ADMIN — PROPOFOL 30 MG: 10 INJECTION, EMULSION INTRAVENOUS at 12:15

## 2017-05-24 RX ADMIN — SODIUM CHLORIDE, POTASSIUM CHLORIDE, SODIUM LACTATE AND CALCIUM CHLORIDE: 600; 310; 30; 20 INJECTION, SOLUTION INTRAVENOUS at 12:01

## 2017-05-24 RX ADMIN — OXYCODONE AND ACETAMINOPHEN 1 TABLET: 5; 325 TABLET ORAL at 10:47

## 2017-05-24 RX ADMIN — PROPOFOL 20 MG: 10 INJECTION, EMULSION INTRAVENOUS at 12:09

## 2017-05-24 RX ADMIN — PROPOFOL 20 MG: 10 INJECTION, EMULSION INTRAVENOUS at 12:14

## 2017-05-24 RX ADMIN — INSULIN LISPRO 12 UNITS: 100 INJECTION, SOLUTION INTRAVENOUS; SUBCUTANEOUS at 18:22

## 2017-05-24 RX ADMIN — PROPRANOLOL HYDROCHLORIDE 20 MG: 20 TABLET ORAL at 19:47

## 2017-05-24 RX ADMIN — LIDOCAINE HYDROCHLORIDE 10 MG: 20 INJECTION, SOLUTION EPIDURAL; INFILTRATION; INTRACAUDAL; PERINEURAL at 12:10

## 2017-05-24 RX ADMIN — POTASSIUM CHLORIDE 10 MEQ: 7.46 INJECTION, SOLUTION INTRAVENOUS at 04:51

## 2017-05-24 RX ADMIN — MIDAZOLAM HYDROCHLORIDE 2 MG: 1 INJECTION, SOLUTION INTRAMUSCULAR; INTRAVENOUS at 12:05

## 2017-05-24 RX ADMIN — POTASSIUM CHLORIDE 10 MEQ: 7.46 INJECTION, SOLUTION INTRAVENOUS at 02:51

## 2017-05-24 RX ADMIN — SODIUM CHLORIDE, POTASSIUM CHLORIDE, SODIUM LACTATE AND CALCIUM CHLORIDE: 600; 310; 30; 20 INJECTION, SOLUTION INTRAVENOUS at 12:05

## 2017-05-24 RX ADMIN — DEXTROSE AND SODIUM CHLORIDE: 5; 450 INJECTION, SOLUTION INTRAVENOUS at 00:40

## 2017-05-24 RX ADMIN — POTASSIUM CHLORIDE 10 MEQ: 7.46 INJECTION, SOLUTION INTRAVENOUS at 06:44

## 2017-05-24 RX ADMIN — POTASSIUM CHLORIDE 10 MEQ: 7.46 INJECTION, SOLUTION INTRAVENOUS at 07:46

## 2017-05-24 RX ADMIN — SERTRALINE HYDROCHLORIDE 100 MG: 100 TABLET, FILM COATED ORAL at 09:41

## 2017-05-24 RX ADMIN — LORAZEPAM 0.5 MG: 0.5 TABLET ORAL at 18:19

## 2017-05-24 RX ADMIN — POTASSIUM CHLORIDE 10 MEQ: 7.46 INJECTION, SOLUTION INTRAVENOUS at 03:52

## 2017-05-24 RX ADMIN — LIDOCAINE HYDROCHLORIDE 10 MG: 20 INJECTION, SOLUTION EPIDURAL; INFILTRATION; INTRACAUDAL; PERINEURAL at 12:09

## 2017-05-24 RX ADMIN — LIDOCAINE HYDROCHLORIDE 10 MG: 20 INJECTION, SOLUTION EPIDURAL; INFILTRATION; INTRACAUDAL; PERINEURAL at 12:11

## 2017-05-24 RX ADMIN — Medication 10 ML: at 09:41

## 2017-05-24 RX ADMIN — PROPRANOLOL HYDROCHLORIDE 20 MG: 20 TABLET ORAL at 14:23

## 2017-05-24 RX ADMIN — POLYETHYLENE GLYCOL-3350 AND ELECTROLYTES 4000 ML: 236; 6.74; 5.86; 2.97; 22.74 POWDER, FOR SOLUTION ORAL at 19:08

## 2017-05-24 RX ADMIN — OXYCODONE AND ACETAMINOPHEN 1 TABLET: 5; 325 TABLET ORAL at 04:32

## 2017-05-24 RX ADMIN — Medication 10 ML: at 19:47

## 2017-05-24 RX ADMIN — POTASSIUM CHLORIDE 10 MEQ: 7.46 INJECTION, SOLUTION INTRAVENOUS at 08:47

## 2017-05-24 ASSESSMENT — PAIN SCALES - GENERAL
PAINLEVEL_OUTOF10: 8
PAINLEVEL_OUTOF10: 0
PAINLEVEL_OUTOF10: 7
PAINLEVEL_OUTOF10: 0
PAINLEVEL_OUTOF10: 0
PAINLEVEL_OUTOF10: 7
PAINLEVEL_OUTOF10: 0
PAINLEVEL_OUTOF10: 0
PAINLEVEL_OUTOF10: 7
PAINLEVEL_OUTOF10: 10
PAINLEVEL_OUTOF10: 0
PAINLEVEL_OUTOF10: 0

## 2017-05-24 ASSESSMENT — PAIN DESCRIPTION - DESCRIPTORS: DESCRIPTORS: ACHING;DISCOMFORT

## 2017-05-24 ASSESSMENT — PAIN DESCRIPTION - ONSET: ONSET: ON-GOING

## 2017-05-24 ASSESSMENT — PAIN DESCRIPTION - PAIN TYPE
TYPE: CHRONIC PAIN

## 2017-05-24 ASSESSMENT — PAIN DESCRIPTION - LOCATION
LOCATION: BACK;SHOULDER
LOCATION: GENERALIZED
LOCATION: GENERALIZED

## 2017-05-24 ASSESSMENT — PAIN DESCRIPTION - FREQUENCY: FREQUENCY: CONTINUOUS

## 2017-05-25 ENCOUNTER — ANESTHESIA (OUTPATIENT)
Dept: OPERATING ROOM | Age: 47
DRG: 639 | End: 2017-05-25
Payer: COMMERCIAL

## 2017-05-25 VITALS
OXYGEN SATURATION: 97 % | WEIGHT: 140 LBS | HEIGHT: 68 IN | DIASTOLIC BLOOD PRESSURE: 61 MMHG | SYSTOLIC BLOOD PRESSURE: 96 MMHG | HEART RATE: 83 BPM | RESPIRATION RATE: 18 BRPM | BODY MASS INDEX: 21.22 KG/M2 | TEMPERATURE: 97.7 F

## 2017-05-25 VITALS — TEMPERATURE: 96.8 F | SYSTOLIC BLOOD PRESSURE: 74 MMHG | OXYGEN SATURATION: 100 % | DIASTOLIC BLOOD PRESSURE: 39 MMHG

## 2017-05-25 PROBLEM — E10.10 DIABETIC KETOACIDOSIS WITHOUT COMA ASSOCIATED WITH TYPE 1 DIABETES MELLITUS (HCC): Status: ACTIVE | Noted: 2017-05-25

## 2017-05-25 LAB
GLUCOSE BLD-MCNC: 168 MG/DL (ref 75–110)
GLUCOSE BLD-MCNC: 262 MG/DL (ref 75–110)
GLUCOSE BLD-MCNC: 367 MG/DL (ref 75–110)
HCT VFR BLD CALC: 27.9 % (ref 41–53)
HEMOGLOBIN: 9.4 G/DL (ref 13.5–17.5)
MCH RBC QN AUTO: 31.9 PG (ref 26–34)
MCHC RBC AUTO-ENTMCNC: 33.8 G/DL (ref 31–37)
MCV RBC AUTO: 94.3 FL (ref 80–100)
PDW BLD-RTO: 14.5 % (ref 11.5–14.9)
PLATELET # BLD: 184 K/UL (ref 150–450)
PMV BLD AUTO: 8.7 FL (ref 6–12)
RBC # BLD: 2.96 M/UL (ref 4.5–5.9)
WBC # BLD: 9.7 K/UL (ref 3.5–11)

## 2017-05-25 PROCEDURE — 85027 COMPLETE CBC AUTOMATED: CPT

## 2017-05-25 PROCEDURE — 6370000000 HC RX 637 (ALT 250 FOR IP): Performed by: FAMILY MEDICINE

## 2017-05-25 PROCEDURE — 82947 ASSAY GLUCOSE BLOOD QUANT: CPT

## 2017-05-25 PROCEDURE — 3609027000 HC COLONOSCOPY: Performed by: INTERNAL MEDICINE

## 2017-05-25 PROCEDURE — 99238 HOSP IP/OBS DSCHRG MGMT 30/<: CPT | Performed by: FAMILY MEDICINE

## 2017-05-25 PROCEDURE — 3700000000 HC ANESTHESIA ATTENDED CARE: Performed by: INTERNAL MEDICINE

## 2017-05-25 PROCEDURE — 2580000003 HC RX 258: Performed by: FAMILY MEDICINE

## 2017-05-25 PROCEDURE — 2500000003 HC RX 250 WO HCPCS: Performed by: NURSE ANESTHETIST, CERTIFIED REGISTERED

## 2017-05-25 PROCEDURE — 7100000001 HC PACU RECOVERY - ADDTL 15 MIN: Performed by: INTERNAL MEDICINE

## 2017-05-25 PROCEDURE — 3700000001 HC ADD 15 MINUTES (ANESTHESIA): Performed by: INTERNAL MEDICINE

## 2017-05-25 PROCEDURE — 2580000003 HC RX 258: Performed by: NURSE ANESTHETIST, CERTIFIED REGISTERED

## 2017-05-25 PROCEDURE — 6360000002 HC RX W HCPCS: Performed by: NURSE ANESTHETIST, CERTIFIED REGISTERED

## 2017-05-25 PROCEDURE — 7100000000 HC PACU RECOVERY - FIRST 15 MIN: Performed by: INTERNAL MEDICINE

## 2017-05-25 PROCEDURE — 6360000002 HC RX W HCPCS: Performed by: FAMILY MEDICINE

## 2017-05-25 PROCEDURE — 0DJD8ZZ INSPECTION OF LOWER INTESTINAL TRACT, VIA NATURAL OR ARTIFICIAL OPENING ENDOSCOPIC: ICD-10-PCS | Performed by: INTERNAL MEDICINE

## 2017-05-25 PROCEDURE — 36415 COLL VENOUS BLD VENIPUNCTURE: CPT

## 2017-05-25 PROCEDURE — 2580000003 HC RX 258: Performed by: INTERNAL MEDICINE

## 2017-05-25 RX ORDER — SODIUM CHLORIDE, SODIUM LACTATE, POTASSIUM CHLORIDE, CALCIUM CHLORIDE 600; 310; 30; 20 MG/100ML; MG/100ML; MG/100ML; MG/100ML
INJECTION, SOLUTION INTRAVENOUS CONTINUOUS PRN
Status: DISCONTINUED | OUTPATIENT
Start: 2017-05-25 | End: 2017-05-25 | Stop reason: SDUPTHER

## 2017-05-25 RX ORDER — LIDOCAINE HYDROCHLORIDE 10 MG/ML
INJECTION, SOLUTION EPIDURAL; INFILTRATION; INTRACAUDAL; PERINEURAL PRN
Status: DISCONTINUED | OUTPATIENT
Start: 2017-05-25 | End: 2017-05-25 | Stop reason: SDUPTHER

## 2017-05-25 RX ORDER — MIDAZOLAM HYDROCHLORIDE 1 MG/ML
INJECTION INTRAMUSCULAR; INTRAVENOUS PRN
Status: DISCONTINUED | OUTPATIENT
Start: 2017-05-25 | End: 2017-05-25 | Stop reason: SDUPTHER

## 2017-05-25 RX ORDER — PROPOFOL 10 MG/ML
INJECTION, EMULSION INTRAVENOUS PRN
Status: DISCONTINUED | OUTPATIENT
Start: 2017-05-25 | End: 2017-05-25 | Stop reason: SDUPTHER

## 2017-05-25 RX ORDER — FENTANYL CITRATE 50 UG/ML
INJECTION, SOLUTION INTRAMUSCULAR; INTRAVENOUS PRN
Status: DISCONTINUED | OUTPATIENT
Start: 2017-05-25 | End: 2017-05-25 | Stop reason: SDUPTHER

## 2017-05-25 RX ORDER — PROPOFOL 10 MG/ML
INJECTION, EMULSION INTRAVENOUS CONTINUOUS PRN
Status: DISCONTINUED | OUTPATIENT
Start: 2017-05-25 | End: 2017-05-25 | Stop reason: SDUPTHER

## 2017-05-25 RX ORDER — SODIUM CHLORIDE, SODIUM LACTATE, POTASSIUM CHLORIDE, CALCIUM CHLORIDE 600; 310; 30; 20 MG/100ML; MG/100ML; MG/100ML; MG/100ML
INJECTION, SOLUTION INTRAVENOUS CONTINUOUS
Status: DISCONTINUED | OUTPATIENT
Start: 2017-05-25 | End: 2017-05-25 | Stop reason: HOSPADM

## 2017-05-25 RX ADMIN — SODIUM CHLORIDE, POTASSIUM CHLORIDE, SODIUM LACTATE AND CALCIUM CHLORIDE: 600; 310; 30; 20 INJECTION, SOLUTION INTRAVENOUS at 10:11

## 2017-05-25 RX ADMIN — ONDANSETRON HYDROCHLORIDE 4 MG: 4 TABLET, FILM COATED ORAL at 08:26

## 2017-05-25 RX ADMIN — ISOSORBIDE MONONITRATE 30 MG: 30 TABLET, EXTENDED RELEASE ORAL at 08:07

## 2017-05-25 RX ADMIN — SODIUM CHLORIDE, POTASSIUM CHLORIDE, SODIUM LACTATE AND CALCIUM CHLORIDE: 600; 310; 30; 20 INJECTION, SOLUTION INTRAVENOUS at 10:52

## 2017-05-25 RX ADMIN — OXYCODONE HYDROCHLORIDE 10 MG: 5 TABLET ORAL at 14:22

## 2017-05-25 RX ADMIN — OXYCODONE HYDROCHLORIDE 10 MG: 5 TABLET ORAL at 08:07

## 2017-05-25 RX ADMIN — FENTANYL CITRATE 100 MCG: 50 INJECTION, SOLUTION INTRAMUSCULAR; INTRAVENOUS at 10:56

## 2017-05-25 RX ADMIN — LIDOCAINE HYDROCHLORIDE 50 MG: 10 INJECTION, SOLUTION EPIDURAL; INFILTRATION; INTRACAUDAL; PERINEURAL at 10:56

## 2017-05-25 RX ADMIN — SERTRALINE HYDROCHLORIDE 100 MG: 100 TABLET, FILM COATED ORAL at 08:07

## 2017-05-25 RX ADMIN — PROPOFOL 50 MG: 10 INJECTION, EMULSION INTRAVENOUS at 10:56

## 2017-05-25 RX ADMIN — INSULIN LISPRO 15 UNITS: 100 INJECTION, SOLUTION INTRAVENOUS; SUBCUTANEOUS at 08:10

## 2017-05-25 RX ADMIN — OXYCODONE HYDROCHLORIDE 5 MG: 5 TABLET ORAL at 02:20

## 2017-05-25 RX ADMIN — MIDAZOLAM HYDROCHLORIDE 2 MG: 1 INJECTION, SOLUTION INTRAMUSCULAR; INTRAVENOUS at 10:52

## 2017-05-25 RX ADMIN — LORAZEPAM 0.5 MG: 0.5 TABLET ORAL at 12:47

## 2017-05-25 RX ADMIN — Medication 10 ML: at 09:00

## 2017-05-25 RX ADMIN — LISINOPRIL 5 MG: 5 TABLET ORAL at 08:07

## 2017-05-25 RX ADMIN — PRAVASTATIN SODIUM 20 MG: 20 TABLET ORAL at 08:08

## 2017-05-25 RX ADMIN — LORAZEPAM 0.5 MG: 0.5 TABLET ORAL at 02:20

## 2017-05-25 RX ADMIN — PROPRANOLOL HYDROCHLORIDE 20 MG: 20 TABLET ORAL at 08:08

## 2017-05-25 RX ADMIN — OXYCODONE AND ACETAMINOPHEN 1 TABLET: 5; 325 TABLET ORAL at 02:20

## 2017-05-25 RX ADMIN — INSULIN LISPRO 3 UNITS: 100 INJECTION, SOLUTION INTRAVENOUS; SUBCUTANEOUS at 12:00

## 2017-05-25 RX ADMIN — PROPOFOL 100 MCG/KG/MIN: 10 INJECTION, EMULSION INTRAVENOUS at 11:00

## 2017-05-25 ASSESSMENT — PAIN DESCRIPTION - DESCRIPTORS: DESCRIPTORS: ACHING

## 2017-05-25 ASSESSMENT — PAIN SCALES - GENERAL
PAINLEVEL_OUTOF10: 8
PAINLEVEL_OUTOF10: 0
PAINLEVEL_OUTOF10: 4
PAINLEVEL_OUTOF10: 6
PAINLEVEL_OUTOF10: 7

## 2017-05-25 ASSESSMENT — PAIN DESCRIPTION - PAIN TYPE: TYPE: CHRONIC PAIN

## 2017-05-25 ASSESSMENT — PAIN - FUNCTIONAL ASSESSMENT: PAIN_FUNCTIONAL_ASSESSMENT: 0-10

## 2017-05-31 PROBLEM — R11.2 NON-INTRACTABLE VOMITING WITH NAUSEA: Status: ACTIVE | Noted: 2017-05-31

## 2017-06-07 ENCOUNTER — CARE COORDINATION (OUTPATIENT)
Dept: CARE COORDINATION | Age: 47
End: 2017-06-07

## 2017-07-07 ENCOUNTER — CARE COORDINATION (OUTPATIENT)
Dept: CARE COORDINATION | Age: 47
End: 2017-07-07

## 2017-07-11 ENCOUNTER — CARE COORDINATION (OUTPATIENT)
Dept: CARE COORDINATION | Age: 47
End: 2017-07-11

## 2017-08-01 ENCOUNTER — CARE COORDINATION (OUTPATIENT)
Dept: CARE COORDINATION | Age: 47
End: 2017-08-01

## 2017-08-02 ENCOUNTER — CARE COORDINATION (OUTPATIENT)
Dept: CARE COORDINATION | Age: 47
End: 2017-08-02

## 2017-08-16 ENCOUNTER — CARE COORDINATION (OUTPATIENT)
Dept: CARE COORDINATION | Age: 47
End: 2017-08-16

## 2017-08-29 ENCOUNTER — CARE COORDINATION (OUTPATIENT)
Dept: CARE COORDINATION | Age: 47
End: 2017-08-29

## 2017-09-13 ENCOUNTER — CARE COORDINATOR VISIT (OUTPATIENT)
Dept: CARE COORDINATION | Age: 47
End: 2017-09-13

## 2017-09-13 ENCOUNTER — CARE COORDINATION (OUTPATIENT)
Dept: CARE COORDINATION | Age: 47
End: 2017-09-13

## 2017-09-13 PROBLEM — G89.29 CHRONIC PAIN: Status: ACTIVE | Noted: 2017-09-13

## 2017-09-28 ENCOUNTER — CARE COORDINATION (OUTPATIENT)
Dept: CARE COORDINATION | Age: 47
End: 2017-09-28

## 2017-10-23 ENCOUNTER — CARE COORDINATION (OUTPATIENT)
Dept: CARE COORDINATION | Age: 47
End: 2017-10-23

## 2017-10-23 NOTE — CARE COORDINATION
Ambulatory Care Coordination Note  10/23/2017  CM Risk Score: 6  Matilda Mortality Risk Score:      ACC: Serena Alvarez RN    Summary Note: He had recent overnight stay at [facility] for DKA. His blood sugar was over 600, he had n/v/d, he called the squad himself. His a1c was 11.8. He is eating ok now, denied nausea. He believes some symptoms were related to withdrawal from his pain medication because he had been out of it for a couple of days prior. Has no teeth or dentures and has to eat soft food. He was given information about dental clinics to call but he hasn't yet, has no money for dentures right now since is getting a new insulin pump. He was in contact with [employer] today and hopefully will have the new pump next week. It will also monitor his blood sugar levels, insulin will be controlled by endocrinologist's office, he has an appt there this week and then after he receives the pump. Has all his medications right now, denied any needs. Diabetes Assessment    Medic Alert ID:  No  Meal Planning:  None, Avoidance of concentrated sweets   How often do you test your blood sugar?:  Meals, Bedtime (Comment:  )   Do you have barriers with adherence to non-pharmacologic self-management interventions?  (Nutrition/Exercise/Self-Monitoring):  No   Have you ever had to go to the ED for symptoms of low blood sugar?:  No       Other symptoms causing concern   Blood Sugar Trends:  Fluctuating (Comment: Very labile but mostly high)       and   General Assessment    Do you have any symptoms that are causing concern?:  No               Care Coordination Interventions    Program Enrollment:  Complex Care  Referral from Primary Care Provider:  Yes  Suggested Interventions and Community Resources  Behavorial Health:  Declined  Physical Therapy:  Declined  Other Therapy Services:  Not Started (Comment: Dental help for dentures (uppers))  Zone Management Tools:  Completed (Comment: diabetes)         Goals Addressed             Most Recent     Conditions and Symptoms   Improving (10/23/2017)             I will schedule office visits, as directed by my provider. I will keep my appointment or reschedule if I have to cancel. I will notify my provider of any barriers to my plan of care. I will follow my Zone Management tool to seek urgent or emergent care. I will notify my provider of any symptoms that indicate a worsening of my condition. Barriers: none  Plan for overcoming my barriers: N/A  Confidence: 9/10  Anticipated Goal Completion Date: one month                Prior to Admission medications    Medication Sig Start Date End Date Taking? Authorizing Provider   oxyCODONE-acetaminophen (PERCOCET)  MG per tablet Take 1 tablet by mouth every 4 hours as needed for Pain . 10/18/17 11/17/17  Britney Patterson MD   oxyCODONE (OXYCONTIN) 30 MG T12A extended release tablet Take 30 mg by mouth 2 times daily .  9/13/17   Britney Patterson MD   sertraline (ZOLOFT) 100 MG tablet Take 1.5 tablets by mouth daily 8/1/17   Britney Patterson MD   pantoprazole (PROTONIX) 40 MG tablet Take 1 tablet by mouth 2 times daily 5/31/17   Britney Patterson MD   lisinopril (PRINIVIL;ZESTRIL) 5 MG tablet Take 1 tablet by mouth daily 11/15/16   Britney Patterson MD   isosorbide mononitrate (IMDUR) 30 MG extended release tablet Take 1 tablet by mouth daily Indications: Disease of the Arteries of the Heart 11/15/16   Britney Patterson MD   clopidogrel (PLAVIX) 75 MG tablet Take 1 tablet by mouth daily Indications: Disease of the Arteries of the Heart 11/15/16   Britney Patterson MD   propranolol (INDERAL) 20 MG tablet TAKE 1 TABLET THREE TIMES A DAY 10/12/16   Britney Patterson MD   ondansetron (ZOFRAN) 4 MG tablet TAKE ONE TABLET BY MOUTH 4 TIMES DAILY AS NEEDED 8/22/16   Britney Patterson MD   ALPRAZolam Dayana Hendrix) 1 MG tablet Take 1 tablet by mouth 3 times daily as needed for Sleep 6/8/16   Britney Patterson MD   acetaminophen 650 MG TABS Take 650 mg by mouth every 4 hours as needed 6/2/16   Estelle Agudelo MD   potassium chloride SA (K-DUR;KLOR-CON M) 20 MEQ tablet Take 1 tablet by mouth daily (with breakfast) 6/2/16   Estelle Agudelo MD   nitroGLYCERIN (NITROSTAT) 0.4 MG SL tablet Place 1 tablet under the tongue every 5 minutes as needed for Chest pain Indications: Disease of the Arteries of the Heart Take 1 tablet every 5 minutes times three as needed for chest pain 2/8/16   Justice Gilman NP   pravastatin (PRAVACHOL) 20 MG tablet Take 1 tablet by mouth daily 11/4/15   Estelle Agudelo MD   Insulin Aspart (NOVOLOG SC) Inject  into the skin. Insulin pump    Historical Provider, MD   aspirin 81 MG tablet Take 81 mg by mouth daily. Historical Provider, MD       No future appointments.

## 2017-11-07 ENCOUNTER — CARE COORDINATOR VISIT (OUTPATIENT)
Dept: CARE COORDINATION | Age: 47
End: 2017-11-07

## 2017-11-07 NOTE — CARE COORDINATION
Recent     Care Coordination Self Management   On track (11/7/2017)             CC Self Management Goal  Patient Goal (What steps will patient take to achieve goal?): regular blood glucose monitoring, regular physician appts,   Patient is able to discuss self-management of condition(s): DM, gastroparesis  Pt demonstrates adherence to medications  Pt demonstrates understanding of self-monitoring of signs of hypoglycemia  Patient is able to identify Red Flags:  Alert to potential adverse drug reactions(s) or side effects and actions to take should they arise  Discuss target symptoms and actions to take should they arise (related to hypoglycemia)  Identify problems that require immediate PCP or specialist visit  Patient demonstrates understanding of access to PCP/Specialist:  Understands about scheduling routine Follow Up appointments   Understands about sick day appointment options for worsening of symptoms/progression (Same Day, E Visits)       Conditions and Symptoms   On track (11/7/2017)             I will schedule office visits, as directed by my provider. I will keep my appointment or reschedule if I have to cancel. I will notify my provider of any barriers to my plan of care. I will follow my Zone Management tool to seek urgent or emergent care. I will notify my provider of any symptoms that indicate a worsening of my condition. Barriers: none  Plan for overcoming my barriers: N/A  Confidence: 9/10  Anticipated Goal Completion Date: one month                Prior to Admission medications    Medication Sig Start Date End Date Taking?  Authorizing Provider   ferrous sulfate 325 (65 Fe) MG tablet Take 325 mg by mouth daily (with breakfast)    Historical Provider, MD   Potassium 99 MG TABS Take by mouth    Historical Provider, MD   propranolol (INDERAL) 20 MG tablet TAKE 1 TABLET THREE TIMES A DAY 11/7/17   Jessica Rivero MD   isosorbide mononitrate (IMDUR) 30 MG extended release tablet Take 1 tablet

## 2017-11-15 ENCOUNTER — CARE COORDINATION (OUTPATIENT)
Dept: CARE COORDINATION | Age: 47
End: 2017-11-15

## 2017-11-15 NOTE — CARE COORDINATION
interventions? (Nutrition/Exercise/Self-Monitoring):  No   Have you ever had to go to the ED for symptoms of low blood sugar?:  No       Increase or Decrease trend in Blood Sugars   Do you have hyperglycemia symptoms?:  Yes (Comment: muscle stiffness)    Per:  Day   Do you have hypoglycemia symptoms?:  No   Blood Sugar Monitoring Regimen:  Before Meals, At Bedtime   Blood Sugar Trends:  Steady Increase (Comment: 500-600 the last 4 days)                   Care Coordination Interventions    Program Enrollment:  Complex Care  Referral from Primary Care Provider:  Yes  Suggested Interventions and Community Resources  Behavorial Health:  Declined  Physical Therapy:  Declined  Other Therapy Services:  Not Started (Comment: Dental help for dentures (uppers))  Zone Management Tools:  Completed (Comment: diabetes)         Goals Addressed     None          Prior to Admission medications    Medication Sig Start Date End Date Taking? Authorizing Provider   HYDROcodone-acetaminophen (NORCO)  MG per tablet Take 1 tablet by mouth every 4 hours as needed for Pain . 11/13/17   Samantha Fontenot MD   ferrous sulfate 325 (65 Fe) MG tablet Take 325 mg by mouth daily (with breakfast)    Historical Provider, MD   Potassium 99 MG TABS Take by mouth    Historical Provider, MD   propranolol (INDERAL) 20 MG tablet TAKE 1 TABLET THREE TIMES A DAY 11/7/17   Samantha Fontenot MD   isosorbide mononitrate (IMDUR) 30 MG extended release tablet Take 1 tablet by mouth daily 11/7/17   Samantha Fontenot MD   oxyCODONE (OXYCONTIN) 30 MG T12A extended release tablet Take 30 mg by mouth 2 times daily .  9/13/17   Samantha Fontenot MD   sertraline (ZOLOFT) 100 MG tablet Take 1.5 tablets by mouth daily 8/1/17   Samantha Fontenot MD   pantoprazole (PROTONIX) 40 MG tablet Take 1 tablet by mouth 2 times daily 5/31/17   Samantha Fontenot MD   lisinopril (PRINIVIL;ZESTRIL) 5 MG tablet Take 1 tablet by mouth daily 11/15/16   Samantha Fontenot

## 2017-12-07 ENCOUNTER — CARE COORDINATION (OUTPATIENT)
Dept: CARE COORDINATION | Age: 47
End: 2017-12-07

## 2017-12-07 NOTE — CARE COORDINATION
mouth daily 11/7/17   Herlinda Ramos MD   oxyCODONE (OXYCONTIN) 30 MG T12A extended release tablet Take 30 mg by mouth 2 times daily . 9/13/17   Herlinda Ramos MD   sertraline (ZOLOFT) 100 MG tablet Take 1.5 tablets by mouth daily 8/1/17   Herlinda Ramos MD   pantoprazole (PROTONIX) 40 MG tablet Take 1 tablet by mouth 2 times daily 5/31/17   Herlinda Ramos MD   ondansetron (ZOFRAN) 4 MG tablet TAKE ONE TABLET BY MOUTH 4 TIMES DAILY AS NEEDED 8/22/16   Herlinda Ramos MD   ALPRAZolam [place] ) 1 MG tablet Take 1 tablet by mouth 3 times daily as needed for Sleep 6/8/16   Herlinda Ramos MD   acetaminophen 650 MG TABS Take 650 mg by mouth every 4 hours as needed 6/2/16   Herlinda Ramos MD   nitroGLYCERIN (NITROSTAT) 0.4 MG SL tablet Place 1 tablet under the tongue every 5 minutes as needed for Chest pain Indications: Disease of the Arteries of the Heart Take 1 tablet every 5 minutes times three as needed for chest pain 2/8/16   Milburn Nissen, NP   pravastatin (PRAVACHOL) 20 MG tablet Take 1 tablet by mouth daily 11/4/15   Herlinda Ramso MD   Insulin Aspart (NOVOLOG SC) Inject  into the skin. Insulin pump    Historical Provider, MD   aspirin 81 MG tablet Take 81 mg by mouth daily. Historical Provider, MD       No future appointments.

## 2018-01-02 ENCOUNTER — CARE COORDINATION (OUTPATIENT)
Dept: CARE COORDINATION | Age: 48
End: 2018-01-02

## 2018-01-02 NOTE — CARE COORDINATION
assistance. I left a voicemail message on [facility] asking for a return call to discuss Suboxone clinic. I received return call, [facility]'s pain management clinic decided not to administer Suboxone.     Future Appointments  Date Time Provider Alfonso Marques   1/4/2018 9:50 AM Melanie Abdi MD [facility] [address] [place] [address]

## 2018-01-04 ENCOUNTER — CARE COORDINATION (OUTPATIENT)
Dept: CARE COORDINATION | Age: 48
End: 2018-01-04

## 2018-01-05 ENCOUNTER — TELEPHONE (OUTPATIENT)
Dept: CARE COORDINATION | Age: 48
End: 2018-01-05

## 2018-01-05 NOTE — TELEPHONE ENCOUNTER
TC to RNCC/Brandy Horta who referred this pt to CHW. Information shared regarding this pt and his needs. RNCC expressed that pt has many financial needs and asking that CHW assist pt in any possible way. CHW will phone pt to assist.    TC to [phone], representative shared information with CHW for housing rehabilitation, and financial counseling and other resources. This information will be shared with pt. TC to pt, made introductions, pt stated that he was driving and asked if CHW could contact him after and hour or so. CHW will call the pt back later this afternoon. Phoned pt this afternoon and there was no answer, left a voicemail for the pt and included contact information. Pt returned the call and we discussed his present needs, pt mentioned that the most important thing that he needs assistance with at this time is dental.  Pt stated that he has no upper teeth, which doesn't allow him to chew. Pt is diabetic and needs good nutrition to aide him, who is a briddle diabetic. CHW will eork to find resources for the pt and get back in touch with him next week. CHW was also asking for the pt and his spouse to determine if they might be Medicaid eligible. Hopefully, that information will be forthcoming as the spouse didn't sound like she wanted to giver her information.

## 2018-01-08 ENCOUNTER — TELEPHONE (OUTPATIENT)
Dept: CARE COORDINATION | Age: 48
End: 2018-01-08

## 2018-01-08 NOTE — TELEPHONE ENCOUNTER
TC to [address] [place] [address], to research dental resources for pt in [place], who is in need of upper dentures to aid him in eating better for greater nutrition. [employer] rep gave CHW information to share with the pt from the [facility] in [facility]. CHW will share  this information with the pt. TC to pt to share the information for dental resources. Pt was appreciative of the information and will follow  up with the CHW. Verified that the pt had the contact information for CHW.

## 2018-01-18 ENCOUNTER — CARE COORDINATION (OUTPATIENT)
Dept: CARE COORDINATION | Age: 48
End: 2018-01-18

## 2018-01-18 NOTE — CARE COORDINATION
Called to check on suboxone clinics for patient. Per Dr. Arlette Toth , he sees limited number of patients, he has to approve the patient after reviewing their case and he is currently out of town. The  stated that [facility] that patient called before does not bill insurance and she suggested Dr. Luci Lagos in [place]. I called that office, they do bill through insurance but copays and deductibles would apply. They have to be in withdrawal when they come to first visit and then would get suboxone if their drug test is ok, then they are seen a few days later then weekly. They are required to go to weekly group sessions, also submitted through insurance or $10 a visit if not covered, and get a witnessed drug screen at every appt. I called Manoj De Leon, gave him phone number for Dr. David Villanueva office. He got an appt with addictionologist at [facility] but first available was in April and was $150, not covered by insurance. He was happy about new information with different clinic and will call there today to get information and make appt. I will follow.

## 2018-02-09 ENCOUNTER — CARE COORDINATION (OUTPATIENT)
Dept: CARE COORDINATION | Age: 48
End: 2018-02-09

## 2018-02-16 ENCOUNTER — CARE COORDINATION (OUTPATIENT)
Dept: CARE COORDINATION | Age: 48
End: 2018-02-16

## 2018-02-16 NOTE — CARE COORDINATION
CHW phoned pt to follow up with his progress with the resources that were given to him. Pt stated that he hasn't been successful with any of the resources yet. Pt stated that he was at the grocery store and would like to call the CHW when he gets home. CHW agreed to pt's plan.       CHW's Plan of Care    CHW will assist pt with resources that he needs

## 2018-02-21 ENCOUNTER — CARE COORDINATION (OUTPATIENT)
Dept: CARE COORDINATION | Age: 48
End: 2018-02-21

## 2018-02-21 NOTE — CARE COORDINATION
Ambulatory Care Coordination Note  2/21/2018  CM Risk Score: 2  Matilda Mortality Risk Score:      ACC: Matthew Cristina RN    Summary Note: Genia Le stated he hasn't felt good for over 3 weeks. He had flu symptoms a few weeks ago so took Tamiflu that was prescribed for him. Respiratory symptoms better but still has nausea and vomiting off and on, abdominal pain that he feels like he can't stand up straight. He has a constant headache also. No diarrhea, no fever. This morning his blood sugar was over 600, his muscles were all rigid. He canceled his endocrinology appt due to not feeling well. He is drinking a lot of Gatorade. He did not contact the new physician office regarding Suboxone treatment. He has appt with the addictionologist and will just keep that appt in April since he has been sick and doesn't know when he'll feel well enough to leave the house. Not able to review medications, he couldn't talk long. Stressed to him to make an appt or go to ED if symptoms don't improve or get worse. He stated he will call me back in a few days. Diabetes Assessment    Medic Alert ID:  No  Meal Planning:  None, Avoidance of concentrated sweets   How often do you test your blood sugar?:  Meals, Bedtime (Comment:  )   Do you have barriers with adherence to non-pharmacologic self-management interventions?  (Nutrition/Exercise/Self-Monitoring):  No   Have you ever had to go to the ED for symptoms of low blood sugar?:  No       Do you have hyperglycemia symptoms?:  Yes (Comment: muscle rigidity)   Frequency of Episodes:  2 Per:  Month   Do you have hypoglycemia symptoms?:  No   Blood Sugar Monitoring Regimen:  Before Meals, At Bedtime   Blood Sugar Trends:  Fluctuating       and   General Assessment                 Care Coordination Interventions    Program Enrollment:  Rising Risk  Referral from Primary Care Provider:  Yes  Suggested Interventions and [address]:  Declined  Disease Specific Clinic:  In [facility], MD   nitroGLYCERIN (NITROSTAT) 0.4 MG SL tablet Place 1 tablet under the tongue every 5 minutes as needed for Chest pain Indications: Disease of the Arteries of the Heart Take 1 tablet every 5 minutes times three as needed for chest pain 2/8/16   Shyam Melgar NP   pravastatin (PRAVACHOL) 20 MG tablet Take 1 tablet by mouth daily 11/4/15   Gaylia Romberg, MD   Insulin Aspart (NOVOLOG SC) Inject  into the skin. Insulin pump    Historical Provider, MD   aspirin 81 MG tablet Take 81 mg by mouth daily. Historical Provider, MD       No future appointments.

## 2018-03-07 ENCOUNTER — CARE COORDINATION (OUTPATIENT)
Dept: CARE COORDINATION | Age: 48
End: 2018-03-07

## 2018-04-02 ENCOUNTER — CARE COORDINATION (OUTPATIENT)
Dept: CARE COORDINATION | Age: 48
End: 2018-04-02

## 2018-04-11 ENCOUNTER — CARE COORDINATION (OUTPATIENT)
Dept: CARE COORDINATION | Age: 48
End: 2018-04-11

## 2018-05-22 ENCOUNTER — CARE COORDINATION (OUTPATIENT)
Dept: CARE COORDINATION | Age: 48
End: 2018-05-22

## 2018-05-24 ENCOUNTER — CARE COORDINATION (OUTPATIENT)
Dept: CARE COORDINATION | Age: 48
End: 2018-05-24

## 2018-05-29 ENCOUNTER — CARE COORDINATION (OUTPATIENT)
Dept: CARE COORDINATION | Age: 48
End: 2018-05-29

## 2018-06-06 ENCOUNTER — CARE COORDINATION (OUTPATIENT)
Dept: CARE COORDINATION | Age: 48
End: 2018-06-06

## 2018-06-11 ENCOUNTER — CARE COORDINATION (OUTPATIENT)
Dept: CARE COORDINATION | Age: 48
End: 2018-06-11

## 2018-07-03 ENCOUNTER — CARE COORDINATION (OUTPATIENT)
Dept: CARE COORDINATION | Age: 48
End: 2018-07-03

## 2018-07-03 NOTE — CARE COORDINATION
times daily 5/31/17   Altagracia Davies MD   ondansetron (ZOFRAN) 4 MG tablet TAKE ONE TABLET BY MOUTH 4 TIMES DAILY AS NEEDED 8/22/16   Altagracia Davies MD   nitroGLYCERIN (NITROSTAT) 0.4 MG SL tablet Place 1 tablet under the tongue every 5 minutes as needed for Chest pain Indications: Disease of the Arteries of the Heart Take 1 tablet every 5 minutes times three as needed for chest pain 2/8/16   CLYDE Dey - CNP   pravastatin (PRAVACHOL) 20 MG tablet Take 1 tablet by mouth daily 11/4/15   Altagracia Davies MD   Insulin Aspart (NOVOLOG SC) Inject  into the skin. Insulin pump    Historical Provider, MD       No future appointments.

## 2018-07-10 ENCOUNTER — HOSPITAL ENCOUNTER (OUTPATIENT)
Age: 48
Setting detail: OUTPATIENT SURGERY
Discharge: HOME | End: 2018-07-10
Attending: SURGERY | Admitting: SURGERY
Payer: COMMERCIAL

## 2018-07-10 ENCOUNTER — ANESTHESIA EVENT (OUTPATIENT)
Dept: OPERATING ROOM | Age: 48
End: 2018-07-10
Payer: COMMERCIAL

## 2018-07-10 ENCOUNTER — ANESTHESIA (OUTPATIENT)
Dept: OPERATING ROOM | Age: 48
End: 2018-07-10
Payer: COMMERCIAL

## 2018-07-10 VITALS — DIASTOLIC BLOOD PRESSURE: 64 MMHG | OXYGEN SATURATION: 99 % | SYSTOLIC BLOOD PRESSURE: 100 MMHG

## 2018-07-10 VITALS
RESPIRATION RATE: 14 BRPM | HEIGHT: 68 IN | OXYGEN SATURATION: 96 % | SYSTOLIC BLOOD PRESSURE: 117 MMHG | WEIGHT: 144 LBS | TEMPERATURE: 97.5 F | DIASTOLIC BLOOD PRESSURE: 71 MMHG | HEART RATE: 70 BPM | BODY MASS INDEX: 21.82 KG/M2

## 2018-07-10 LAB
GLUCOSE BLD-MCNC: 101 MG/DL (ref 75–110)
GLUCOSE BLD-MCNC: 121 MG/DL (ref 75–110)

## 2018-07-10 PROCEDURE — 3700000001 HC ADD 15 MINUTES (ANESTHESIA): Performed by: SURGERY

## 2018-07-10 PROCEDURE — 3700000000 HC ANESTHESIA ATTENDED CARE: Performed by: SURGERY

## 2018-07-10 PROCEDURE — 88305 TISSUE EXAM BY PATHOLOGIST: CPT

## 2018-07-10 PROCEDURE — 7100000001 HC PACU RECOVERY - ADDTL 15 MIN: Performed by: SURGERY

## 2018-07-10 PROCEDURE — 3609010400 HC COLONOSCOPY POLYPECTOMY HOT BIOPSY: Performed by: SURGERY

## 2018-07-10 PROCEDURE — C1773 RET DEV, INSERTABLE: HCPCS | Performed by: SURGERY

## 2018-07-10 PROCEDURE — 2580000003 HC RX 258: Performed by: ANESTHESIOLOGY

## 2018-07-10 PROCEDURE — 7100000030 HC ASPR PHASE II RECOVERY - FIRST 15 MIN: Performed by: SURGERY

## 2018-07-10 PROCEDURE — 82947 ASSAY GLUCOSE BLOOD QUANT: CPT

## 2018-07-10 PROCEDURE — 7100000031 HC ASPR PHASE II RECOVERY - ADDTL 15 MIN: Performed by: SURGERY

## 2018-07-10 PROCEDURE — 6370000000 HC RX 637 (ALT 250 FOR IP): Performed by: SURGERY

## 2018-07-10 PROCEDURE — 7100000000 HC PACU RECOVERY - FIRST 15 MIN: Performed by: SURGERY

## 2018-07-10 PROCEDURE — 3609012400 HC EGD TRANSORAL BIOPSY SINGLE/MULTIPLE: Performed by: SURGERY

## 2018-07-10 PROCEDURE — 2500000003 HC RX 250 WO HCPCS: Performed by: NURSE ANESTHETIST, CERTIFIED REGISTERED

## 2018-07-10 PROCEDURE — 6360000002 HC RX W HCPCS: Performed by: NURSE ANESTHETIST, CERTIFIED REGISTERED

## 2018-07-10 RX ORDER — PROPOFOL 10 MG/ML
INJECTION, EMULSION INTRAVENOUS PRN
Status: DISCONTINUED | OUTPATIENT
Start: 2018-07-10 | End: 2018-07-10 | Stop reason: SDUPTHER

## 2018-07-10 RX ORDER — DIPHENHYDRAMINE HYDROCHLORIDE 50 MG/ML
12.5 INJECTION INTRAMUSCULAR; INTRAVENOUS
Status: DISCONTINUED | OUTPATIENT
Start: 2018-07-10 | End: 2018-07-10 | Stop reason: HOSPADM

## 2018-07-10 RX ORDER — ONDANSETRON 2 MG/ML
INJECTION INTRAMUSCULAR; INTRAVENOUS PRN
Status: DISCONTINUED | OUTPATIENT
Start: 2018-07-10 | End: 2018-07-10 | Stop reason: SDUPTHER

## 2018-07-10 RX ORDER — LIDOCAINE HYDROCHLORIDE 20 MG/ML
INJECTION, SOLUTION EPIDURAL; INFILTRATION; INTRACAUDAL; PERINEURAL PRN
Status: DISCONTINUED | OUTPATIENT
Start: 2018-07-10 | End: 2018-07-10 | Stop reason: SDUPTHER

## 2018-07-10 RX ORDER — SODIUM CHLORIDE 9 MG/ML
INJECTION, SOLUTION INTRAVENOUS CONTINUOUS
Status: DISCONTINUED | OUTPATIENT
Start: 2018-07-10 | End: 2018-07-10 | Stop reason: HOSPADM

## 2018-07-10 RX ORDER — ONDANSETRON 2 MG/ML
4 INJECTION INTRAMUSCULAR; INTRAVENOUS
Status: DISCONTINUED | OUTPATIENT
Start: 2018-07-10 | End: 2018-07-10 | Stop reason: HOSPADM

## 2018-07-10 RX ORDER — LABETALOL HYDROCHLORIDE 5 MG/ML
5 INJECTION, SOLUTION INTRAVENOUS EVERY 10 MIN PRN
Status: DISCONTINUED | OUTPATIENT
Start: 2018-07-10 | End: 2018-07-10 | Stop reason: HOSPADM

## 2018-07-10 RX ORDER — PROPOFOL 10 MG/ML
INJECTION, EMULSION INTRAVENOUS CONTINUOUS PRN
Status: DISCONTINUED | OUTPATIENT
Start: 2018-07-10 | End: 2018-07-10 | Stop reason: SDUPTHER

## 2018-07-10 RX ORDER — DEXAMETHASONE SODIUM PHOSPHATE 4 MG/ML
INJECTION, SOLUTION INTRA-ARTICULAR; INTRALESIONAL; INTRAMUSCULAR; INTRAVENOUS; SOFT TISSUE PRN
Status: DISCONTINUED | OUTPATIENT
Start: 2018-07-10 | End: 2018-07-10 | Stop reason: SDUPTHER

## 2018-07-10 RX ORDER — MIDAZOLAM HYDROCHLORIDE 1 MG/ML
INJECTION INTRAMUSCULAR; INTRAVENOUS PRN
Status: DISCONTINUED | OUTPATIENT
Start: 2018-07-10 | End: 2018-07-10 | Stop reason: SDUPTHER

## 2018-07-10 RX ORDER — KETAMINE HYDROCHLORIDE 50 MG/ML
INJECTION, SOLUTION, CONCENTRATE INTRAMUSCULAR; INTRAVENOUS PRN
Status: DISCONTINUED | OUTPATIENT
Start: 2018-07-10 | End: 2018-07-10 | Stop reason: SDUPTHER

## 2018-07-10 RX ADMIN — PROPOFOL 150 MG: 10 INJECTION, EMULSION INTRAVENOUS at 10:30

## 2018-07-10 RX ADMIN — LIDOCAINE HYDROCHLORIDE 80 MG: 20 INJECTION, SOLUTION EPIDURAL; INFILTRATION; INTRACAUDAL; PERINEURAL at 10:08

## 2018-07-10 RX ADMIN — DEXAMETHASONE SODIUM PHOSPHATE 4 MG: 4 INJECTION, SOLUTION INTRAMUSCULAR; INTRAVENOUS at 10:17

## 2018-07-10 RX ADMIN — SODIUM CHLORIDE: 9 INJECTION, SOLUTION INTRAVENOUS at 09:56

## 2018-07-10 RX ADMIN — PROPOFOL 100 MCG/KG/MIN: 10 INJECTION, EMULSION INTRAVENOUS at 10:08

## 2018-07-10 RX ADMIN — SODIUM CHLORIDE: 9 INJECTION, SOLUTION INTRAVENOUS at 10:06

## 2018-07-10 RX ADMIN — MIDAZOLAM 2 MG: 1 INJECTION INTRAMUSCULAR; INTRAVENOUS at 10:06

## 2018-07-10 RX ADMIN — SODIUM CHLORIDE: 9 INJECTION, SOLUTION INTRAVENOUS at 10:55

## 2018-07-10 RX ADMIN — ONDANSETRON 4 MG: 2 INJECTION INTRAMUSCULAR; INTRAVENOUS at 10:17

## 2018-07-10 RX ADMIN — KETAMINE HYDROCHLORIDE 20 MG: 50 INJECTION, SOLUTION INTRAMUSCULAR; INTRAVENOUS at 10:08

## 2018-07-10 ASSESSMENT — PAIN SCALES - GENERAL
PAINLEVEL_OUTOF10: 0

## 2018-07-10 ASSESSMENT — PULMONARY FUNCTION TESTS
PIF_VALUE: 2
PIF_VALUE: 2
PIF_VALUE: 3
PIF_VALUE: 1
PIF_VALUE: 11
PIF_VALUE: 3
PIF_VALUE: 2
PIF_VALUE: 0
PIF_VALUE: 1
PIF_VALUE: 2
PIF_VALUE: 11
PIF_VALUE: 1
PIF_VALUE: 2
PIF_VALUE: 3
PIF_VALUE: 2
PIF_VALUE: 1
PIF_VALUE: 2
PIF_VALUE: 0
PIF_VALUE: 3
PIF_VALUE: 0
PIF_VALUE: 3
PIF_VALUE: 3
PIF_VALUE: 11
PIF_VALUE: 1
PIF_VALUE: 3
PIF_VALUE: 0
PIF_VALUE: 2
PIF_VALUE: 1
PIF_VALUE: 3
PIF_VALUE: 6
PIF_VALUE: 1
PIF_VALUE: 2
PIF_VALUE: 1
PIF_VALUE: 1
PIF_VALUE: 0
PIF_VALUE: 0
PIF_VALUE: 2
PIF_VALUE: 0
PIF_VALUE: 2
PIF_VALUE: 1
PIF_VALUE: 1
PIF_VALUE: 2
PIF_VALUE: 1
PIF_VALUE: 2
PIF_VALUE: 2
PIF_VALUE: 1
PIF_VALUE: 1
PIF_VALUE: 2

## 2018-07-10 ASSESSMENT — LIFESTYLE VARIABLES: SMOKING_STATUS: 1

## 2018-07-10 ASSESSMENT — PAIN - FUNCTIONAL ASSESSMENT: PAIN_FUNCTIONAL_ASSESSMENT: 0-10

## 2018-07-10 NOTE — H&P
Take 5 mLs by mouth 4 times daily 473 mL 1    butalbital-acetaminophen-caffeine (FIORICET, ESGIC) -40 MG per tablet Take 1 tablet by mouth every 4 hours as needed for Headaches 180 tablet 3    atorvastatin (LIPITOR) 80 MG tablet TAKE 1 TABLET DAILY 90 tablet 3    lisinopril (PRINIVIL;ZESTRIL) 5 MG tablet TAKE 1 TABLET DAILY 90 tablet 3    Potassium 99 MG TABS Take by mouth      propranolol (INDERAL) 20 MG tablet TAKE 1 TABLET THREE TIMES A  tablet 3    isosorbide mononitrate (IMDUR) 30 MG extended release tablet Take 1 tablet by mouth daily 90 tablet 3    pantoprazole (PROTONIX) 40 MG tablet Take 1 tablet by mouth 2 times daily 60 tablet 5    ondansetron (ZOFRAN) 4 MG tablet TAKE ONE TABLET BY MOUTH 4 TIMES DAILY AS NEEDED 90 tablet 0    pravastatin (PRAVACHOL) 20 MG tablet Take 1 tablet by mouth daily 90 tablet 3    Insulin Aspart (NOVOLOG SC) Inject  into the skin. Insulin pump      clopidogrel (PLAVIX) 75 MG tablet TAKE 1 TABLET DAILY FOR DISEASE OF THE ARTERIES OF THE HEART 90 tablet 3    nitroGLYCERIN (NITROSTAT) 0.4 MG SL tablet Place 1 tablet under the tongue every 5 minutes as needed for Chest pain Indications: Disease of the Arteries of the Heart Take 1 tablet every 5 minutes times three as needed for chest pain 25 tablet 2       Negative except for what is mentioned in the HPI. GENERAL PHYSICAL EXAM     Vitals: /86   Pulse 70   Temp 97.2 °F (36.2 °C) (Oral)   Resp 18   Ht 5' 8\" (1.727 m)   Wt 144 lb (65.3 kg)   SpO2 98%   BMI 21.90 kg/m²  Body mass index is 21.9 kg/m². GENERAL APPEARANCE:   Karuna Tejada is 52 y.o.,  male, thin, nourished, conscious, alert. Denies pain today                            SKIN:  Warm, dry, no rashes or lesions. [place]              HEAD:  Normocephalic, Face symmetrical.                  EYES:  SREE EOMI  Gaze conjugatge              NOSE:  Septum midline, No epistaxis.                   THROAT:  No erythema of pharynx and uvula is midline  Dentition not loose, No central incisors                  NECK: t,   Neck is supple, Trachea is midline,                   CHEST:  Symmetrical and equal on expansion. HEART:  Heart tones are regular S 1 S 2. No murmur                LUNGS:  Breath sounds equal, No wheezes            ABDOMEN:   Abdomen is non tender non distended soft on palpation. No guarding or rigidity. No palpable organomegaly. LYMPHATICS:  No palpable cervical lymphadenopathy. LOCOMOTOR, BACK AND SPINE:  No tenderness or deformities. EXTREMITIES:  Good range of motion of upper and lower extremities, No pedal edema. Grasp strength is 5/ 5    NEUROLOGIC:  The patient is conscious, alert, oriented, No apparent focal sensory or motor deficits.                       PROVISIONAL DIAGNOSES / SURGERY:      EGD and colonoscopy  Patient Active Problem List    Diagnosis Date Noted    Chronic pain 09/13/2017    Non-intractable vomiting with nausea 05/31/2017    Diabetic ketoacidosis without coma associated with type 1 diabetes mellitus ([address] 05/25/2017    Perforation of right tympanic membrane 02/14/2017    Intussusception of colon ([address] 05/31/2016    Esophagitis, acute 05/10/2016    Alcohol dependence in remission ([address] 11/03/2015    Carpal tunnel syndrome, bilateral 11/03/2015    Coronary artery disease 11/03/2015    Depression with anxiety 11/03/2015    Diabetic peripheral neuropathy ([address] 11/03/2015    Essential hypertension 11/03/2015    Gastroparesis 11/03/2015    History of long-term treatment with high-risk medication 11/03/2015    Hyperlipidemia 11/03/2015    Impingement syndrome of left shoulder 11/03/2015    Injury of right ulnar nerve 11/03/2015    Lymphadenopathy 11/03/2015    Migraine headache 11/03/2015    Type 1 diabetes mellitus with hyperglycemia ([address] 11/03/2015    Current every day smoker 11/03/2015    Rhomboid muscle pain, right  05/12/2015

## 2018-07-10 NOTE — OP NOTE
PROCEDURE NOTE    DATE OF PROCEDURE: 7/10/2018    SURGEON: Ivory Metcalf MD    ASSISTANT: None    PREOPERATIVE DIAGNOSIS: Change in bowel habits previous history of intussusception status post right hemicolectomy    POSTOPERATIVE DIAGNOSIS: Patent anastomosis status post right hemicolectomy/poor prep/grade 1 internal hemorrhoid/low rectal polyp removed and retrieved with hot snare polypectomy technique    OPERATION: Total colonoscopy to ileocolonic anastomosis with low rectal polypectomy with hot snare polypectomy technique    ANESTHESIA: Moderate Sedation    ESTIMATED BLOOD LOSS: None    COMPLICATIONS: None     SPECIMENS:  Was Obtained: Low rectal polyp    HISTORY: The patient is a 52y.o. year old male with history of above preop diagnosis. I recommended colonoscopy with possible biopsy or polypectomy and I explained the risk, benefits, expected outcome, and alternatives to the procedure. Risks included but are not limited to bleeding, infection, respiratory distress, hypotension, and perforation of the colon and possibility of missing a lesion. The patient understands and is in agreement. PROCEDURE: The patient was given IV conscious sedation. The patient's SPO2 remained above 90% throughout the procedure. Digital rectal exam was normal.  The colonoscope was inserted through the anus into the rectum and advanced under direct vision to the cecum without difficulty. Terminal ileum was examined for approximately 2 inches. The prep was poor. Findings:    Cecum/Ascending colon: Status post right hemicolectomy patent anastomosis poor prep    Transverse colon: Poor prep    Descending/Sigmoid colon: Grossly unremarkable but poor prep    Rectum/Anus: examined in normal and retroflexed positions and was low rectal polyp removed and retrieved with hot snare polypectomy technique/ grade 1 internal hemorrhoid    Withdrawal Time was (minutes): 14      Next screening colonoscopy: 3 years.   If screening is less than 10 years the recommended reason is due:Polyp and poor prep    The colon was decompressed. While withdrawing the scope the above findings were verified and the scope was removed. The patient tolerated the procedure and conscious sedation without unusual events. In the recovery room patient was examined and remains hemodynamically stable. Discharge home when criteria met. Recommendations/Plan:   1. F/U Biopsies  2. F/U In Office as instructed  3. Discussed with the family  4. High fiber diet   5.  Precautions to avoid constipation    Electronically signed by Darrel Kiran MD  on 7/10/2018 at 11:06 AM

## 2018-07-10 NOTE — ANESTHESIA PRE PROCEDURE
Department of Anesthesiology  Preprocedure Note       Name:  Brittanie Macias   Age:  52 y.o.  :  1970                                          MRN:  [id number]         Date:  7/10/2018      Surgeon: Meg Quinones):  Jostin Goncalves MD    Procedure: Procedure(s):  EGD ESOPHAGOGASTRODUODENOSCOPY  COLONOSCOPY    Medications prior to admission:   Prior to Admission medications    Medication Sig Start Date End Date Taking? Authorizing Provider   oxyCODONE-acetaminophen (PERCOCET)  MG per tablet Take 1 tablet by mouth every 4 hours as needed for Pain for up to 30 days. . 18 Yes China Wynne MD   OXYCONTIN 30 MG T12A extended release tablet Take 30 mg by mouth 2 times daily for 30 days. . 18  China Wynne MD   sertraline (ZOLOFT) 100 MG tablet Take 2 tablets by mouth daily 18   China Wynne MD   ALPRAZolam Clydie Ores) 0.5 MG tablet Take 1 tablet by mouth 3 times daily as needed for Anxiety for up to 30 doses. . 18  China Wynne MD   nystatin (MYCOSTATIN) 594508 UNIT/ML suspension Take 5 mLs by mouth 4 times daily 18   China Wynne MD   butalbital-acetaminophen-caffeine Annette Reach, [facility]) -43 MG per tablet Take 1 tablet by mouth every 4 hours as needed for Headaches 3/13/18   China Wynne MD   atorvastatin (LIPITOR) 80 MG tablet TAKE 1 TABLET DAILY 18   China Wynne MD   clopidogrel (PLAVIX) 75 MG tablet TAKE 1 TABLET DAILY FOR DISEASE OF THE ARTERIES OF THE HEART 17   China Wynne MD   lisinopril (PRINIVIL;ZESTRIL) 5 MG tablet TAKE 1 TABLET DAILY 17   China Wynne MD   Potassium 99 MG TABS Take by mouth    Historical Provider, MD   propranolol (INDERAL) 20 MG tablet TAKE 1 TABLET THREE TIMES A DAY 17   China Wynne MD   isosorbide mononitrate (IMDUR) 30 MG extended release tablet Take 1 tablet by mouth daily 17   China Wynne MD   pantoprazole (PROTONIX) 40 MG tablet Take 1 tablet by mouth 2 times daily 5/31/17   Vera Cheung MD   ondansetron (ZOFRAN) 4 MG tablet TAKE ONE TABLET BY MOUTH 4 TIMES DAILY AS NEEDED 8/22/16   Vera Cheung MD   nitroGLYCERIN (NITROSTAT) 0.4 MG SL tablet Place 1 tablet under the tongue every 5 minutes as needed for Chest pain Indications: Disease of the Arteries of the Heart Take 1 tablet every 5 minutes times three as needed for chest pain 2/8/16   CLYDE Rodríguez - CNP   pravastatin (PRAVACHOL) 20 MG tablet Take 1 tablet by mouth daily 11/4/15   Vera Cheung MD   Insulin Aspart (NOVOLOG SC) Inject  into the skin. Insulin pump    Historical Provider, MD       Current medications:    Current Facility-Administered Medications   Medication Dose Route Frequency Provider Last Rate Last Dose    0.9 % sodium chloride infusion   Intravenous Continuous Elli Harrison MD         Facility-Administered Medications Ordered in Other Encounters   Medication Dose Route Frequency Provider Last Rate Last Dose    lactated ringers infusion  75 mL/hr Intravenous Continuous Maurice Reyes MD           Allergies:     Allergies   Allergen Reactions    Avelox [Moxifloxacin]     Ciprofloxacin Other (See Comments)    Levofloxacin     Lyrica [Pregabalin]     Metoclopramide Other (See Comments)     Severe confusion and paranoid activity    Neurontin [Gabapentin]     Other      Can not have epidural injections, steroids, nerve blocks, or burning of nerves due to being diabetic    Tetanus Toxoids     Tramadol     Tramadol Hcl        Problem List:    Patient Active Problem List   Diagnosis Code    Ulnar nerve compression at multiple levels G56.20    Degenerative cervical disc M50.30    Arthropathy of cervical facet joint M12.88    Sprain or strain of cervical spine [id number]    Neural foraminal stenosis of cervical spine M99.81    Fracture, humerus, great tuberosity F61.377P    Cervical spondylosis M47.812    Rhomboid muscle MD at [address] ESOPHAGOGASTRODUODENOSCOPY TRANSORAL DIAGNOSTIC N/A 5/24/2017    EGD ESOPHAGOGASTRODUODENOSCOPY performed by Mary Jenkins MD at [address] [facility] [address]      fracture     TONSILLECTOMY      x2    TOOTH EXTRACTION      x4    ULNAR TUNNEL RELEASE      UPPER GASTROINTESTINAL ENDOSCOPY  05/23/2017    mild gastritis    WISDOM TOOTH EXTRACTION      x4       Social History:    Social History   Substance Use Topics    Smoking status: Current Every Day Smoker     Packs/day: 1.00     Years: 30.00    Smokeless tobacco: Never Used    Alcohol use No      Comment: previous heavy ETOH use; pt stopped around 2010                                Ready to quit: Not Answered  Counseling given: Not Answered      Vital Signs (Current): There were no vitals filed for this visit.                                            BP Readings from Last 3 Encounters:   06/13/18 122/80   04/24/18 (!) 140/98   03/13/18 136/84       NPO Status:                                                                                 BMI:   Wt Readings from Last 3 Encounters:   06/13/18 144 lb (65.3 kg)   04/24/18 147 lb (66.7 kg)   03/13/18 148 lb 6.4 oz (67.3 kg)     There is no height or weight on file to calculate BMI.    CBC:   Lab Results   Component Value Date    WBC 7.5 11/14/2017    RBC 5.69 11/14/2017    RBC 4.16 01/21/2012    HGB 15.5 11/14/2017    HCT 45.9 11/14/2017    MCV 81 11/14/2017    RDW 14.5 05/25/2017     11/14/2017     01/21/2012       CMP:   Lab Results   Component Value Date     11/14/2017    K 3.9 11/14/2017    CL 93 11/14/2017    CO2 25 11/14/2017    BUN 14 11/14/2017    CREATININE 0.90 11/14/2017    GFRAA >60 05/24/2017    LABGLOM >60 05/24/2017    GLUCOSE 561 11/14/2017    GLUCOSE 145 01/20/2012    PROT 6.4 05/23/2017    CALCIUM 9.6 11/14/2017    BILITOT 0.77 05/23/2017    ALKPHOS 100 05/23/2017    AST 19 05/23/2017    ALT 17 05/23/2017       POC Tests: No

## 2018-07-10 NOTE — OP NOTE
Biopsies  2. F/U In Office as instructed  3. Discussed with the family  4. To proceed with colonoscopy for change in bowel habits as scheduled.     Electronically signed by Channing Finney MD  on 7/10/2018 at 10:31 AM

## 2018-07-11 LAB — SURGICAL PATHOLOGY REPORT: NORMAL

## 2018-07-27 ENCOUNTER — HOSPITAL ENCOUNTER (OUTPATIENT)
Age: 48
Discharge: HOME | End: 2018-07-27
Payer: COMMERCIAL

## 2018-07-27 LAB
T3 FREE: 3.72 PG/ML (ref 2.02–4.43)
THYROXINE, FREE: 1.29 NG/DL (ref 0.93–1.7)
TSH SERPL DL<=0.05 MIU/L-ACNC: 0.43 MIU/L (ref 0.3–5)

## 2018-07-27 PROCEDURE — 36415 COLL VENOUS BLD VENIPUNCTURE: CPT

## 2018-07-27 PROCEDURE — 84439 ASSAY OF FREE THYROXINE: CPT

## 2018-07-27 PROCEDURE — 84443 ASSAY THYROID STIM HORMONE: CPT

## 2018-07-27 PROCEDURE — 84481 FREE ASSAY (FT-3): CPT

## 2018-07-30 ENCOUNTER — CARE COORDINATION (OUTPATIENT)
Dept: CARE COORDINATION | Age: 48
End: 2018-07-30

## 2018-07-30 NOTE — CARE COORDINATION
adherence to non-pharmacologic self-management interventions?  (Nutrition/Exercise/Self-Monitoring):  Yes   Have you ever had to go to the ED for symptoms of low blood sugar?:  No       Do you have hyperglycemia symptoms?:  Yes   Frequency of Episodes:  1 Per:  Week   Do you have hypoglycemia symptoms?:  Yes   Frequency of Episodes:  3 Per:  Week   Blood Sugar Monitoring Regimen:  Before Meals, At Bedtime   Blood Sugar Trends:  Fluctuating       and   General Assessment    Do you have any symptoms that are causing concern?:  Yes  Reported Symptoms:  Pain (Comment: left knee and right foot pain)               Care Coordination Interventions    Program Enrollment:  Rising Risk  Referral from Primary Care Provider:  Yes  Suggested Interventions and [address] [facility] [address]:  Declined  Diabetes Education:  Declined  Disease Specific Clinic:  Declined (Comment: Suboxone clinic)  Physical Therapy:  Declined  Registered Dietician:  [facility]  Social Work:  In Process (Comment: [facility] Coordination  and CHW)  Other Therapy Services:  Declined (Comment: Dental help for dentures (uppers))  Zone Management Tools:  Completed (Comment: diabetes)         Goals Addressed             Most Recent     COMPLETED: Care Coordination Self Management   On track (7/30/2018)             CC Self Management Goal  Patient Goal (What steps will patient take to achieve goal?): regular blood glucose monitoring, regular physician appts,   Patient is able to discuss self-management of condition(s): DM, gastroparesis  Pt demonstrates adherence to medications  Pt demonstrates understanding of self-monitoring of signs of hypoglycemia  Patient is able to identify Red Flags:  Alert to potential adverse drug reactions(s) or side effects and actions to take should they arise  Discuss target symptoms and actions to take should they arise (related to hypoglycemia)  Identify problems that require immediate PCP

## 2018-08-01 ENCOUNTER — CARE COORDINATION (OUTPATIENT)
Dept: CARE COORDINATION | Age: 48
End: 2018-08-01

## 2018-08-01 NOTE — CARE COORDINATION
CC an CHW spoke extensively about pt's issues and how to possibly assist him. Both CC and CHW have given pt information for vision care and dental assistance. Pt informed CC that he had called the numbers yet. CC phoned today to inquire if pt has followed up with the information that was shared with him. Pt stated that he's had so many health issues and other things going on that he hasn't taken the time to follow up with the phone calls. CHW encouraged pt to make the contacts and in the event that they can't provide him services ask for referral information from eye and dental personnel. Pt agreed that he could do that and would make the attempt to make the calls. He stated that he was headed to PCP's office to  samples of Glucerna to aid his intake of appropriate calories.     CHW's Plan of Care    CHW will follow up with pt regarding follow through on dental and eye referral information

## 2018-08-09 ENCOUNTER — CARE COORDINATION (OUTPATIENT)
Dept: CARE COORDINATION | Age: 48
End: 2018-08-09

## 2018-08-09 NOTE — CARE COORDINATION
Spoke with Aron Mortimer. He finally got his glucometer strips so now has all of his supplies from [employer] for his pump and sensor. Has to go to endocrinologist's office for calibration/syncing of the devices together. He has been drinking the Glucerna 1-2 cans a day. He is taking Percocet  about every 4 hours, requested a refill since will be out by Monday and that is the earliest he can get a refill. Sent refill request to PCP. He stated that is working pretty well, is not taking oxycontin any more, just the Percocet. I called and requested progress notes again from Dr. Tino Whitmore office, have requested them on 5/22/18 and 7/30/18 and have not received anything. No future appointments.

## 2018-08-24 ENCOUNTER — CARE COORDINATION (OUTPATIENT)
Dept: CARE COORDINATION | Age: 48
End: 2018-08-24

## 2018-08-31 ENCOUNTER — CARE COORDINATION (OUTPATIENT)
Dept: CARE COORDINATION | Age: 48
End: 2018-08-31

## 2018-09-18 ENCOUNTER — CARE COORDINATION (OUTPATIENT)
Dept: CARE COORDINATION | Age: 48
End: 2018-09-18

## 2018-09-18 NOTE — CARE COORDINATION
Called patient. He is currently admitted at [facility], had a blood sugar of 900 last night. He doesn't know why, he is not eating any different, his insulin pump has been working ok. He has been going to endocrinology appts every month. He blames it on stress related to his pain issues, he will discuss with Dr. Milli Crespo. I will follow next week.

## 2018-09-24 ENCOUNTER — CARE COORDINATION (OUTPATIENT)
Dept: CARE COORDINATION | Age: 48
End: 2018-09-24

## 2018-09-24 NOTE — CARE COORDINATION
CHW attempted to phone pt to follow up on his social needs. There was no answer, CHW left a message with contact information for a return call.       CHW's Plan of Care    CHW will follow up with pt regarding his social needs

## 2018-09-28 ENCOUNTER — CARE COORDINATION (OUTPATIENT)
Dept: CARE COORDINATION | Age: 48
End: 2018-09-28

## 2018-10-10 ENCOUNTER — CARE COORDINATION (OUTPATIENT)
Dept: CARE COORDINATION | Age: 48
End: 2018-10-10

## 2018-10-31 ENCOUNTER — CARE COORDINATION (OUTPATIENT)
Dept: CARE COORDINATION | Age: 48
End: 2018-10-31

## 2018-11-02 ENCOUNTER — CARE COORDINATION (OUTPATIENT)
Dept: CARE COORDINATION | Age: 48
End: 2018-11-02

## 2018-11-02 NOTE — CARE COORDINATION
Monitoring Regimen:  Before Meals, At Bedtime   Blood Sugar Trends:  Fluctuating       and   General Assessment    Do you have any symptoms that are causing concern?:  Yes  Reported Symptoms:  Other (Comment: stress from parents being ill)               Care Coordination Interventions    Program Enrollment:  Rising Risk  Referral from Primary Care Provider:  Yes  Suggested Interventions and Community Resources  [address] [facility] [address]:  Declined  Diabetes Education:  Declined  Fall Risk Prevention:  Completed  Disease Specific Clinic:  Declined (Comment: Suboxone clinic)  Physical Therapy:  Declined  Registered Dietician:  [facility]  Social Work:  In Process (Comment: [facility]  and CHW)  Other Therapy Services:  Declined (Comment: Dental help for dentures (uppers))  Zone Management Tools:  Completed (Comment: diabetes)         Goals Addressed             Most Recent     Conditions and Symptoms   On track (11/2/2018)             I will schedule office visits, as directed by my provider. I will keep my appointment or reschedule if I have to cancel. I will notify my provider of any barriers to my plan of care. I will follow my Zone Management tool to seek urgent or emergent care. I will notify my provider of any symptoms that indicate a worsening of my condition. Barriers: none  Plan for overcoming my barriers: N/A  Confidence: 9/10  Anticipated Goal Completion Date: one month           Self Monitoring                Self-Monitored Blood Glucose - I will check my blood sugar blood sugars ac & HS  I will notify my provider of any trends of increasing or decreasing blood sugars over a 1 month period. I will notify my provider if I have any blood sugar readings less than 70 more than 2 times a month.     None Recently Recorded    Barriers: lack of motivation  Plan for overcoming my barriers: [facility] calls, endocrinology visits  Confidence: 6/10  Anticipated Goal Completion Date:

## 2018-11-06 ENCOUNTER — HOSPITAL ENCOUNTER (OUTPATIENT)
Age: 48
Discharge: HOME | End: 2018-11-06
Payer: COMMERCIAL

## 2018-11-06 LAB
C-PEPTIDE: <0.1 NG/ML (ref 1.1–4.4)
GLUCOSE BLD-MCNC: 418 MG/DL (ref 70–99)

## 2018-11-06 PROCEDURE — 82947 ASSAY GLUCOSE BLOOD QUANT: CPT

## 2018-11-06 PROCEDURE — 84681 ASSAY OF C-PEPTIDE: CPT

## 2018-11-06 PROCEDURE — 36415 COLL VENOUS BLD VENIPUNCTURE: CPT

## 2018-11-06 PROCEDURE — 86341 ISLET CELL ANTIBODY: CPT

## 2018-11-09 ENCOUNTER — CARE COORDINATION (OUTPATIENT)
Dept: CARE COORDINATION | Age: 48
End: 2018-11-09

## 2018-11-09 LAB — ISLET CELL ANTIBODY: NORMAL

## 2018-12-04 ENCOUNTER — CARE COORDINATION (OUTPATIENT)
Dept: CARE COORDINATION | Age: 48
End: 2018-12-04

## 2018-12-10 ENCOUNTER — OFFICE VISIT (OUTPATIENT)
Dept: PRIMARY CARE CLINIC | Age: 48
End: 2018-12-10
Payer: COMMERCIAL

## 2018-12-10 ENCOUNTER — CARE COORDINATION (OUTPATIENT)
Dept: CARE COORDINATION | Age: 48
End: 2018-12-10
Payer: COMMERCIAL

## 2018-12-10 VITALS
SYSTOLIC BLOOD PRESSURE: 138 MMHG | HEIGHT: 68 IN | BODY MASS INDEX: 22.07 KG/M2 | DIASTOLIC BLOOD PRESSURE: 78 MMHG | OXYGEN SATURATION: 91 % | WEIGHT: 145.6 LBS | HEART RATE: 63 BPM

## 2018-12-10 DIAGNOSIS — E11.42 DIABETIC PERIPHERAL NEUROPATHY (HCC): Primary | ICD-10-CM

## 2018-12-10 DIAGNOSIS — M50.30 DEGENERATIVE CERVICAL DISC: ICD-10-CM

## 2018-12-10 DIAGNOSIS — M47.22 OSTEOARTHRITIS OF SPINE WITH RADICULOPATHY, CERVICAL REGION: ICD-10-CM

## 2018-12-10 PROCEDURE — G8598 ASA/ANTIPLAT THER USED: HCPCS | Performed by: FAMILY MEDICINE

## 2018-12-10 PROCEDURE — G0444 DEPRESSION SCREEN ANNUAL: HCPCS | Performed by: FAMILY MEDICINE

## 2018-12-10 PROCEDURE — G8427 DOCREV CUR MEDS BY ELIG CLIN: HCPCS | Performed by: FAMILY MEDICINE

## 2018-12-10 PROCEDURE — G8420 CALC BMI NORM PARAMETERS: HCPCS | Performed by: FAMILY MEDICINE

## 2018-12-10 PROCEDURE — 4004F PT TOBACCO SCREEN RCVD TLK: CPT | Performed by: FAMILY MEDICINE

## 2018-12-10 PROCEDURE — 2022F DILAT RTA XM EVC RTNOPTHY: CPT | Performed by: FAMILY MEDICINE

## 2018-12-10 PROCEDURE — G8482 FLU IMMUNIZE ORDER/ADMIN: HCPCS | Performed by: FAMILY MEDICINE

## 2018-12-10 PROCEDURE — 3046F HEMOGLOBIN A1C LEVEL >9.0%: CPT | Performed by: FAMILY MEDICINE

## 2018-12-10 PROCEDURE — 99214 OFFICE O/P EST MOD 30 MIN: CPT | Performed by: FAMILY MEDICINE

## 2018-12-10 RX ORDER — OXYCODONE HCL 40 MG/1
40 TABLET, FILM COATED, EXTENDED RELEASE ORAL EVERY 12 HOURS
Qty: 60 TABLET | Refills: 0 | Status: SHIPPED | OUTPATIENT
Start: 2018-12-10 | End: 2019-01-07 | Stop reason: SDUPTHER

## 2018-12-10 RX ORDER — VILAZODONE HYDROCHLORIDE 20 MG/1
20 TABLET ORAL DAILY
Qty: 30 TABLET | Refills: 5 | Status: SHIPPED | OUTPATIENT
Start: 2018-12-10 | End: 2019-01-11

## 2018-12-10 ASSESSMENT — PATIENT HEALTH QUESTIONNAIRE - PHQ9
SUM OF ALL RESPONSES TO PHQ9 QUESTIONS 1 & 2: 2
SUM OF ALL RESPONSES TO PHQ QUESTIONS 1-9: 2
1. LITTLE INTEREST OR PLEASURE IN DOING THINGS: 1
2. FEELING DOWN, DEPRESSED OR HOPELESS: 1
SUM OF ALL RESPONSES TO PHQ QUESTIONS 1-9: 4
SUM OF ALL RESPONSES TO PHQ QUESTIONS 1-9: 2

## 2018-12-10 ASSESSMENT — ENCOUNTER SYMPTOMS
WHEEZING: 0
EYE REDNESS: 0
NAUSEA: 0
SHORTNESS OF BREATH: 0
COUGH: 0
DIARRHEA: 0
EYE DISCHARGE: 0
VOMITING: 0
ABDOMINAL PAIN: 0
RHINORRHEA: 0
SORE THROAT: 0

## 2018-12-10 NOTE — CARE COORDINATION
needed for Chest pain Indications: Disease of the Arteries of the Heart Take 1 tablet every 5 minutes times three as needed for chest pain 2/8/16   CLYDE Michel - CNP   Insulin Aspart (NOVOLOG SC) Inject  into the skin. Insulin pump    Historical Provider, MD       No future appointments.

## 2018-12-18 DIAGNOSIS — M50.30 DEGENERATIVE CERVICAL DISC: ICD-10-CM

## 2018-12-18 RX ORDER — OXYCODONE AND ACETAMINOPHEN 10; 325 MG/1; MG/1
1 TABLET ORAL EVERY 4 HOURS PRN
Qty: 150 TABLET | Refills: 0 | Status: SHIPPED | OUTPATIENT
Start: 2018-12-18 | End: 2019-01-07 | Stop reason: SDUPTHER

## 2019-01-04 RX ORDER — BUTALBITAL, ACETAMINOPHEN AND CAFFEINE 50; 325; 40 MG/1; MG/1; MG/1
1 TABLET ORAL EVERY 4 HOURS PRN
Qty: 180 TABLET | Refills: 3 | Status: SHIPPED | OUTPATIENT
Start: 2019-01-04 | End: 2020-04-13 | Stop reason: SDUPTHER

## 2019-01-07 DIAGNOSIS — M50.30 DEGENERATIVE CERVICAL DISC: ICD-10-CM

## 2019-01-07 RX ORDER — OXYCODONE HCL 40 MG/1
40 TABLET, FILM COATED, EXTENDED RELEASE ORAL EVERY 12 HOURS
Qty: 60 TABLET | Refills: 0 | Status: SHIPPED | OUTPATIENT
Start: 2019-01-07 | End: 2019-01-23

## 2019-01-07 RX ORDER — OXYCODONE AND ACETAMINOPHEN 10; 325 MG/1; MG/1
1 TABLET ORAL EVERY 4 HOURS PRN
Qty: 150 TABLET | Refills: 0 | Status: SHIPPED | OUTPATIENT
Start: 2019-01-07 | End: 2019-01-23

## 2019-01-08 ENCOUNTER — TELEPHONE (OUTPATIENT)
Dept: PRIMARY CARE CLINIC | Age: 49
End: 2019-01-08

## 2019-01-11 ENCOUNTER — OFFICE VISIT (OUTPATIENT)
Dept: PRIMARY CARE CLINIC | Age: 49
End: 2019-01-11
Payer: COMMERCIAL

## 2019-01-11 ENCOUNTER — CARE COORDINATION (OUTPATIENT)
Dept: CARE COORDINATION | Age: 49
End: 2019-01-11

## 2019-01-11 VITALS
BODY MASS INDEX: 21.92 KG/M2 | SYSTOLIC BLOOD PRESSURE: 152 MMHG | DIASTOLIC BLOOD PRESSURE: 90 MMHG | HEART RATE: 82 BPM | OXYGEN SATURATION: 96 % | HEIGHT: 68 IN | WEIGHT: 144.6 LBS

## 2019-01-11 DIAGNOSIS — K08.9 POOR DENTITION: ICD-10-CM

## 2019-01-11 DIAGNOSIS — I10 ESSENTIAL HYPERTENSION: ICD-10-CM

## 2019-01-11 DIAGNOSIS — F10.21 ALCOHOL DEPENDENCE IN REMISSION (HCC): ICD-10-CM

## 2019-01-11 DIAGNOSIS — R11.2 NAUSEA AND VOMITING, INTRACTABILITY OF VOMITING NOT SPECIFIED, UNSPECIFIED VOMITING TYPE: ICD-10-CM

## 2019-01-11 DIAGNOSIS — F41.8 DEPRESSION WITH ANXIETY: ICD-10-CM

## 2019-01-11 DIAGNOSIS — E11.42 DIABETIC PERIPHERAL NEUROPATHY (HCC): ICD-10-CM

## 2019-01-11 DIAGNOSIS — E10.65 TYPE 1 DIABETES MELLITUS WITH HYPERGLYCEMIA (HCC): Primary | ICD-10-CM

## 2019-01-11 PROCEDURE — 3046F HEMOGLOBIN A1C LEVEL >9.0%: CPT | Performed by: FAMILY MEDICINE

## 2019-01-11 PROCEDURE — G8598 ASA/ANTIPLAT THER USED: HCPCS | Performed by: FAMILY MEDICINE

## 2019-01-11 PROCEDURE — G8427 DOCREV CUR MEDS BY ELIG CLIN: HCPCS | Performed by: FAMILY MEDICINE

## 2019-01-11 PROCEDURE — 99214 OFFICE O/P EST MOD 30 MIN: CPT | Performed by: FAMILY MEDICINE

## 2019-01-11 PROCEDURE — G8420 CALC BMI NORM PARAMETERS: HCPCS | Performed by: FAMILY MEDICINE

## 2019-01-11 PROCEDURE — G8482 FLU IMMUNIZE ORDER/ADMIN: HCPCS | Performed by: FAMILY MEDICINE

## 2019-01-11 PROCEDURE — 2022F DILAT RTA XM EVC RTNOPTHY: CPT | Performed by: FAMILY MEDICINE

## 2019-01-11 PROCEDURE — 4004F PT TOBACCO SCREEN RCVD TLK: CPT | Performed by: FAMILY MEDICINE

## 2019-01-11 RX ORDER — AMOXICILLIN 500 MG/1
1000 CAPSULE ORAL 3 TIMES DAILY
Qty: 60 CAPSULE | Refills: 0 | Status: SHIPPED | OUTPATIENT
Start: 2019-01-11 | End: 2019-01-31

## 2019-01-11 RX ORDER — SERTRALINE HYDROCHLORIDE 100 MG/1
100 TABLET, FILM COATED ORAL DAILY
Qty: 90 TABLET | Refills: 3
Start: 2019-01-11 | End: 2019-11-11

## 2019-01-11 RX ORDER — FLUCONAZOLE 100 MG/1
100 TABLET ORAL DAILY
Qty: 14 TABLET | Refills: 0 | Status: SHIPPED | OUTPATIENT
Start: 2019-01-11 | End: 2019-01-25

## 2019-01-11 ASSESSMENT — ENCOUNTER SYMPTOMS
ABDOMINAL PAIN: 0
VOMITING: 0
EYE REDNESS: 0
NAUSEA: 0
SHORTNESS OF BREATH: 0
RHINORRHEA: 0
COUGH: 0
EYE DISCHARGE: 0
SORE THROAT: 0
DIARRHEA: 0
WHEEZING: 0

## 2019-01-14 ENCOUNTER — CARE COORDINATION (OUTPATIENT)
Dept: CARE COORDINATION | Age: 49
End: 2019-01-14

## 2019-01-15 ENCOUNTER — TELEPHONE (OUTPATIENT)
Dept: PRIMARY CARE CLINIC | Age: 49
End: 2019-01-15

## 2019-01-15 DIAGNOSIS — K86.89 PANCREATIC INSUFFICIENCY: ICD-10-CM

## 2019-01-15 LAB
A/G RATIO: NORMAL
ALBUMIN SERPL-MCNC: NORMAL G/DL
ALP BLD-CCNC: NORMAL U/L
ALT SERPL-CCNC: NORMAL U/L
AST SERPL-CCNC: NORMAL U/L
BASOPHILS ABSOLUTE: NORMAL /ΜL
BASOPHILS RELATIVE PERCENT: NORMAL %
BILIRUB SERPL-MCNC: NORMAL MG/DL (ref 0.1–1.4)
BILIRUBIN DIRECT: NORMAL MG/DL
BILIRUBIN, INDIRECT: NORMAL
BUN BLDV-MCNC: NORMAL MG/DL
CALCIUM SERPL-MCNC: NORMAL MG/DL
CHLORIDE BLD-SCNC: NORMAL MMOL/L
CO2: NORMAL MMOL/L
CREAT SERPL-MCNC: NORMAL MG/DL
EOSINOPHILS ABSOLUTE: NORMAL /ΜL
EOSINOPHILS RELATIVE PERCENT: NORMAL %
GFR CALCULATED: NORMAL
GLOBULIN: NORMAL
GLUCOSE BLD-MCNC: NORMAL MG/DL
HCT VFR BLD CALC: NORMAL % (ref 41–53)
HEMOGLOBIN: NORMAL G/DL (ref 13.5–17.5)
LYMPHOCYTES ABSOLUTE: NORMAL /ΜL
LYMPHOCYTES RELATIVE PERCENT: NORMAL %
MCH RBC QN AUTO: NORMAL PG
MCHC RBC AUTO-ENTMCNC: NORMAL G/DL
MCV RBC AUTO: NORMAL FL
MONOCYTES ABSOLUTE: NORMAL /ΜL
MONOCYTES RELATIVE PERCENT: NORMAL %
NEUTROPHILS ABSOLUTE: NORMAL /ΜL
NEUTROPHILS RELATIVE PERCENT: NORMAL %
PDW BLD-RTO: NORMAL %
PLATELET # BLD: NORMAL K/ΜL
PMV BLD AUTO: NORMAL FL
POTASSIUM SERPL-SCNC: NORMAL MMOL/L
PROTEIN TOTAL: NORMAL G/DL
RBC # BLD: NORMAL 10^6/ΜL
SODIUM BLD-SCNC: NORMAL MMOL/L
WBC # BLD: NORMAL 10^3/ML

## 2019-01-16 DIAGNOSIS — E10.65 TYPE 1 DIABETES MELLITUS WITH HYPERGLYCEMIA (HCC): ICD-10-CM

## 2019-01-16 DIAGNOSIS — R11.2 NAUSEA AND VOMITING, INTRACTABILITY OF VOMITING NOT SPECIFIED, UNSPECIFIED VOMITING TYPE: ICD-10-CM

## 2019-01-22 ENCOUNTER — CARE COORDINATION (OUTPATIENT)
Dept: CARE COORDINATION | Age: 49
End: 2019-01-22

## 2019-01-23 ENCOUNTER — OFFICE VISIT (OUTPATIENT)
Dept: PRIMARY CARE CLINIC | Age: 49
End: 2019-01-23
Payer: COMMERCIAL

## 2019-01-23 VITALS
HEIGHT: 62 IN | SYSTOLIC BLOOD PRESSURE: 146 MMHG | HEART RATE: 114 BPM | DIASTOLIC BLOOD PRESSURE: 100 MMHG | WEIGHT: 137 LBS | BODY MASS INDEX: 25.21 KG/M2 | OXYGEN SATURATION: 95 %

## 2019-01-23 DIAGNOSIS — F41.8 DEPRESSION WITH ANXIETY: ICD-10-CM

## 2019-01-23 DIAGNOSIS — F11.93 OPIATE WITHDRAWAL (HCC): ICD-10-CM

## 2019-01-23 DIAGNOSIS — E11.42 DIABETIC PERIPHERAL NEUROPATHY (HCC): ICD-10-CM

## 2019-01-23 DIAGNOSIS — M47.22 OSTEOARTHRITIS OF SPINE WITH RADICULOPATHY, CERVICAL REGION: Primary | ICD-10-CM

## 2019-01-23 PROCEDURE — G8598 ASA/ANTIPLAT THER USED: HCPCS | Performed by: FAMILY MEDICINE

## 2019-01-23 PROCEDURE — 3046F HEMOGLOBIN A1C LEVEL >9.0%: CPT | Performed by: FAMILY MEDICINE

## 2019-01-23 PROCEDURE — 4004F PT TOBACCO SCREEN RCVD TLK: CPT | Performed by: FAMILY MEDICINE

## 2019-01-23 PROCEDURE — 2022F DILAT RTA XM EVC RTNOPTHY: CPT | Performed by: FAMILY MEDICINE

## 2019-01-23 PROCEDURE — 99214 OFFICE O/P EST MOD 30 MIN: CPT | Performed by: FAMILY MEDICINE

## 2019-01-23 PROCEDURE — G8427 DOCREV CUR MEDS BY ELIG CLIN: HCPCS | Performed by: FAMILY MEDICINE

## 2019-01-23 PROCEDURE — G8482 FLU IMMUNIZE ORDER/ADMIN: HCPCS | Performed by: FAMILY MEDICINE

## 2019-01-23 PROCEDURE — G8419 CALC BMI OUT NRM PARAM NOF/U: HCPCS | Performed by: FAMILY MEDICINE

## 2019-01-23 RX ORDER — METHADONE HYDROCHLORIDE 10 MG/1
20 TABLET ORAL EVERY 4 HOURS
Qty: 84 TABLET | Refills: 0 | Status: SHIPPED | OUTPATIENT
Start: 2019-01-23 | End: 2019-01-31 | Stop reason: SDUPTHER

## 2019-01-23 ASSESSMENT — ENCOUNTER SYMPTOMS
SHORTNESS OF BREATH: 0
VOMITING: 0
RHINORRHEA: 0
NAUSEA: 0
ABDOMINAL PAIN: 0
DIARRHEA: 0
EYE DISCHARGE: 0
EYE REDNESS: 0
WHEEZING: 0
COUGH: 0
SORE THROAT: 0

## 2019-01-29 LAB
AVERAGE GLUCOSE: NORMAL
HBA1C MFR BLD: 11.1 %

## 2019-01-31 DIAGNOSIS — M47.22 OSTEOARTHRITIS OF SPINE WITH RADICULOPATHY, CERVICAL REGION: ICD-10-CM

## 2019-01-31 RX ORDER — METHADONE HYDROCHLORIDE 10 MG/1
20 TABLET ORAL EVERY 4 HOURS
Qty: 84 TABLET | Refills: 0 | Status: SHIPPED | OUTPATIENT
Start: 2019-01-31 | End: 2021-09-15

## 2019-02-01 ENCOUNTER — CARE COORDINATION (OUTPATIENT)
Dept: CARE COORDINATION | Age: 49
End: 2019-02-01

## 2019-02-11 ENCOUNTER — CARE COORDINATION (OUTPATIENT)
Dept: CARE COORDINATION | Age: 49
End: 2019-02-11

## 2019-02-11 ENCOUNTER — TELEPHONE (OUTPATIENT)
Dept: PRIMARY CARE CLINIC | Age: 49
End: 2019-02-11

## 2019-02-11 DIAGNOSIS — M50.30 DEGENERATIVE CERVICAL DISC: ICD-10-CM

## 2019-02-14 ENCOUNTER — CARE COORDINATION (OUTPATIENT)
Dept: CARE COORDINATION | Age: 49
End: 2019-02-14

## 2019-02-19 RX ORDER — OXYCODONE HCL 40 MG/1
40 TABLET, FILM COATED, EXTENDED RELEASE ORAL EVERY 12 HOURS
Qty: 60 TABLET | Refills: 0 | Status: SHIPPED | OUTPATIENT
Start: 2019-02-19 | End: 2019-02-21 | Stop reason: SDUPTHER

## 2019-02-19 RX ORDER — OXYCODONE AND ACETAMINOPHEN 10; 325 MG/1; MG/1
1 TABLET ORAL EVERY 4 HOURS PRN
Qty: 150 TABLET | Refills: 0 | Status: SHIPPED | OUTPATIENT
Start: 2019-02-19 | End: 2019-03-04

## 2019-02-20 ENCOUNTER — TELEPHONE (OUTPATIENT)
Dept: PRIMARY CARE CLINIC | Age: 49
End: 2019-02-20

## 2019-02-20 DIAGNOSIS — F41.8 DEPRESSION WITH ANXIETY: ICD-10-CM

## 2019-02-20 RX ORDER — SERTRALINE HYDROCHLORIDE 100 MG/1
TABLET, FILM COATED ORAL
Qty: 180 TABLET | Refills: 3 | Status: SHIPPED | OUTPATIENT
Start: 2019-02-20 | End: 2019-04-10

## 2019-02-21 ENCOUNTER — TELEPHONE (OUTPATIENT)
Dept: PRIMARY CARE CLINIC | Age: 49
End: 2019-02-21

## 2019-02-21 ENCOUNTER — CARE COORDINATION (OUTPATIENT)
Dept: CARE COORDINATION | Age: 49
End: 2019-02-21

## 2019-02-21 DIAGNOSIS — M50.30 DEGENERATIVE CERVICAL DISC: ICD-10-CM

## 2019-02-21 RX ORDER — OXYCODONE HCL 40 MG/1
40 TABLET, FILM COATED, EXTENDED RELEASE ORAL EVERY 12 HOURS
Qty: 60 TABLET | Refills: 0 | Status: SHIPPED | OUTPATIENT
Start: 2019-02-21 | End: 2019-03-18 | Stop reason: SDUPTHER

## 2019-02-22 ENCOUNTER — CARE COORDINATION (OUTPATIENT)
Dept: CARE COORDINATION | Age: 49
End: 2019-02-22

## 2019-02-25 ENCOUNTER — CARE COORDINATION (OUTPATIENT)
Dept: CARE COORDINATION | Age: 49
End: 2019-02-25

## 2019-02-25 RX ORDER — PROMETHAZINE HYDROCHLORIDE 25 MG/1
25 TABLET ORAL EVERY 6 HOURS PRN
Qty: 100 TABLET | Refills: 2 | Status: SHIPPED | OUTPATIENT
Start: 2019-02-25 | End: 2020-01-15

## 2019-02-26 ENCOUNTER — CARE COORDINATION (OUTPATIENT)
Dept: CARE COORDINATION | Age: 49
End: 2019-02-26

## 2019-03-01 ENCOUNTER — CARE COORDINATION (OUTPATIENT)
Dept: CARE COORDINATION | Age: 49
End: 2019-03-01

## 2019-03-04 ENCOUNTER — OFFICE VISIT (OUTPATIENT)
Dept: PRIMARY CARE CLINIC | Age: 49
End: 2019-03-04
Payer: COMMERCIAL

## 2019-03-04 ENCOUNTER — CARE COORDINATION (OUTPATIENT)
Dept: CARE COORDINATION | Age: 49
End: 2019-03-04

## 2019-03-04 VITALS
WEIGHT: 145.8 LBS | SYSTOLIC BLOOD PRESSURE: 136 MMHG | BODY MASS INDEX: 26.83 KG/M2 | HEIGHT: 62 IN | DIASTOLIC BLOOD PRESSURE: 88 MMHG | HEART RATE: 82 BPM | OXYGEN SATURATION: 94 %

## 2019-03-04 DIAGNOSIS — M47.22 OSTEOARTHRITIS OF SPINE WITH RADICULOPATHY, CERVICAL REGION: ICD-10-CM

## 2019-03-04 DIAGNOSIS — E10.65 TYPE 1 DIABETES MELLITUS WITH HYPERGLYCEMIA (HCC): ICD-10-CM

## 2019-03-04 DIAGNOSIS — F41.8 DEPRESSION WITH ANXIETY: Primary | ICD-10-CM

## 2019-03-04 DIAGNOSIS — M48.02 NEURAL FORAMINAL STENOSIS OF CERVICAL SPINE: ICD-10-CM

## 2019-03-04 DIAGNOSIS — M50.30 DEGENERATIVE CERVICAL DISC: ICD-10-CM

## 2019-03-04 PROCEDURE — G8598 ASA/ANTIPLAT THER USED: HCPCS | Performed by: FAMILY MEDICINE

## 2019-03-04 PROCEDURE — 99214 OFFICE O/P EST MOD 30 MIN: CPT | Performed by: FAMILY MEDICINE

## 2019-03-04 PROCEDURE — 2022F DILAT RTA XM EVC RTNOPTHY: CPT | Performed by: FAMILY MEDICINE

## 2019-03-04 PROCEDURE — 3046F HEMOGLOBIN A1C LEVEL >9.0%: CPT | Performed by: FAMILY MEDICINE

## 2019-03-04 PROCEDURE — G8419 CALC BMI OUT NRM PARAM NOF/U: HCPCS | Performed by: FAMILY MEDICINE

## 2019-03-04 PROCEDURE — G0444 DEPRESSION SCREEN ANNUAL: HCPCS | Performed by: FAMILY MEDICINE

## 2019-03-04 PROCEDURE — 4004F PT TOBACCO SCREEN RCVD TLK: CPT | Performed by: FAMILY MEDICINE

## 2019-03-04 PROCEDURE — G8427 DOCREV CUR MEDS BY ELIG CLIN: HCPCS | Performed by: FAMILY MEDICINE

## 2019-03-04 PROCEDURE — G8482 FLU IMMUNIZE ORDER/ADMIN: HCPCS | Performed by: FAMILY MEDICINE

## 2019-03-04 RX ORDER — OXYCODONE AND ACETAMINOPHEN 10; 325 MG/1; MG/1
1 TABLET ORAL EVERY 4 HOURS PRN
Qty: 180 TABLET | Refills: 0
Start: 2019-03-04 | End: 2019-03-12 | Stop reason: SDUPTHER

## 2019-03-04 RX ORDER — SUCRALFATE 1 G/1
1 TABLET ORAL 4 TIMES DAILY
Qty: 120 TABLET | Refills: 3 | Status: SHIPPED | OUTPATIENT
Start: 2019-03-04 | End: 2021-02-11

## 2019-03-04 ASSESSMENT — ENCOUNTER SYMPTOMS
COUGH: 0
NAUSEA: 0
BACK PAIN: 1
ABDOMINAL PAIN: 0
SORE THROAT: 0
EYE REDNESS: 0
DIARRHEA: 0
VOMITING: 0
SHORTNESS OF BREATH: 0
EYE DISCHARGE: 0
WHEEZING: 0
RHINORRHEA: 0

## 2019-03-04 ASSESSMENT — PATIENT HEALTH QUESTIONNAIRE - PHQ9
SUM OF ALL RESPONSES TO PHQ QUESTIONS 1-9: 2
2. FEELING DOWN, DEPRESSED OR HOPELESS: 1
1. LITTLE INTEREST OR PLEASURE IN DOING THINGS: 1
SUM OF ALL RESPONSES TO PHQ9 QUESTIONS 1 & 2: 2
SUM OF ALL RESPONSES TO PHQ QUESTIONS 1-9: 2

## 2019-03-10 ENCOUNTER — TELEPHONE (OUTPATIENT)
Dept: PRIMARY CARE CLINIC | Age: 49
End: 2019-03-10

## 2019-03-10 DIAGNOSIS — F41.9 ANXIETY: Primary | ICD-10-CM

## 2019-03-10 DIAGNOSIS — F41.8 DEPRESSION WITH ANXIETY: ICD-10-CM

## 2019-03-10 RX ORDER — HYDROXYZINE PAMOATE 50 MG/1
50 CAPSULE ORAL EVERY 6 HOURS PRN
Qty: 60 CAPSULE | Refills: 0 | Status: SHIPPED | OUTPATIENT
Start: 2019-03-10 | End: 2019-03-24

## 2019-03-11 ENCOUNTER — TELEPHONE (OUTPATIENT)
Dept: PRIMARY CARE CLINIC | Age: 49
End: 2019-03-11

## 2019-03-11 DIAGNOSIS — M50.30 DEGENERATIVE CERVICAL DISC: ICD-10-CM

## 2019-03-11 RX ORDER — ALPRAZOLAM 0.5 MG/1
0.5 TABLET ORAL 3 TIMES DAILY PRN
Qty: 90 TABLET | Refills: 1 | Status: SHIPPED | OUTPATIENT
Start: 2019-03-11 | End: 2019-05-17 | Stop reason: SDUPTHER

## 2019-03-12 DIAGNOSIS — M50.30 DEGENERATIVE CERVICAL DISC: ICD-10-CM

## 2019-03-12 RX ORDER — OXYCODONE AND ACETAMINOPHEN 10; 325 MG/1; MG/1
1 TABLET ORAL EVERY 4 HOURS PRN
Qty: 180 TABLET | Refills: 0 | Status: SHIPPED | OUTPATIENT
Start: 2019-03-12 | End: 2019-03-12 | Stop reason: SDUPTHER

## 2019-03-12 RX ORDER — OXYCODONE AND ACETAMINOPHEN 10; 325 MG/1; MG/1
1 TABLET ORAL EVERY 4 HOURS PRN
Qty: 180 TABLET | Refills: 0 | Status: SHIPPED | OUTPATIENT
Start: 2019-03-12 | End: 2019-04-10 | Stop reason: SDUPTHER

## 2019-03-12 NOTE — TELEPHONE ENCOUNTER
Last appt 3/4/19. [facility]. Pt needs this to go to [facility], listed

## 2019-03-18 DIAGNOSIS — M50.30 DEGENERATIVE CERVICAL DISC: ICD-10-CM

## 2019-03-18 RX ORDER — OXYCODONE HCL 40 MG/1
40 TABLET, FILM COATED, EXTENDED RELEASE ORAL EVERY 12 HOURS
Qty: 60 TABLET | Refills: 0 | Status: SHIPPED | OUTPATIENT
Start: 2019-03-18 | End: 2019-03-19 | Stop reason: SDUPTHER

## 2019-03-19 DIAGNOSIS — M50.30 DEGENERATIVE CERVICAL DISC: ICD-10-CM

## 2019-03-19 RX ORDER — OXYCODONE HCL 40 MG/1
40 TABLET, FILM COATED, EXTENDED RELEASE ORAL EVERY 12 HOURS
Qty: 60 TABLET | Refills: 0 | Status: SHIPPED | OUTPATIENT
Start: 2019-03-19 | End: 2019-04-15 | Stop reason: SDUPTHER

## 2019-03-20 ENCOUNTER — CARE COORDINATION (OUTPATIENT)
Dept: CARE COORDINATION | Age: 49
End: 2019-03-20

## 2019-04-01 DIAGNOSIS — M50.30 DEGENERATIVE CERVICAL DISC: ICD-10-CM

## 2019-04-01 RX ORDER — OXYCODONE AND ACETAMINOPHEN 10; 325 MG/1; MG/1
1 TABLET ORAL EVERY 4 HOURS PRN
Qty: 180 TABLET | Refills: 0 | OUTPATIENT
Start: 2019-04-01 | End: 2019-05-01

## 2019-04-03 ENCOUNTER — TELEPHONE (OUTPATIENT)
Dept: PRIMARY CARE CLINIC | Age: 49
End: 2019-04-03

## 2019-04-03 DIAGNOSIS — M50.30 DEGENERATIVE CERVICAL DISC: Primary | ICD-10-CM

## 2019-04-03 RX ORDER — HYDROCODONE BITARTRATE AND ACETAMINOPHEN 10; 325 MG/1; MG/1
1 TABLET ORAL EVERY 6 HOURS PRN
Qty: 28 TABLET | Refills: 0 | Status: SHIPPED | OUTPATIENT
Start: 2019-04-03 | End: 2019-04-10

## 2019-04-03 NOTE — TELEPHONE ENCOUNTER
Pt calling to ask if he could get a short term script of Vicodin could be called into the pharmacy. States that he only has enough percocet to last him through today. Pt no longer has bottle to see what it says on the bottle.   Please advise       [facility]

## 2019-04-03 NOTE — TELEPHONE ENCOUNTER
Pt wants you to know that he called for Percocet early, as his last script was filled for #180, a 30 day supply. He normally receives a 25 day supply. He states he didn't realize, and called early.  REENA

## 2019-04-10 ENCOUNTER — OFFICE VISIT (OUTPATIENT)
Dept: PRIMARY CARE CLINIC | Age: 49
End: 2019-04-10
Payer: COMMERCIAL

## 2019-04-10 ENCOUNTER — HOSPITAL ENCOUNTER (OUTPATIENT)
Age: 49
Setting detail: SPECIMEN
Discharge: HOME | End: 2019-04-10
Payer: COMMERCIAL

## 2019-04-10 VITALS
BODY MASS INDEX: 26.09 KG/M2 | HEIGHT: 62 IN | DIASTOLIC BLOOD PRESSURE: 86 MMHG | SYSTOLIC BLOOD PRESSURE: 136 MMHG | OXYGEN SATURATION: 93 % | HEART RATE: 71 BPM | WEIGHT: 141.8 LBS

## 2019-04-10 DIAGNOSIS — E10.65 TYPE 1 DIABETES MELLITUS WITH HYPERGLYCEMIA (HCC): ICD-10-CM

## 2019-04-10 DIAGNOSIS — R53.83 FATIGUE, UNSPECIFIED TYPE: Primary | ICD-10-CM

## 2019-04-10 DIAGNOSIS — Z13.6 ENCOUNTER FOR LIPID SCREENING FOR CARDIOVASCULAR DISEASE: ICD-10-CM

## 2019-04-10 DIAGNOSIS — M50.30 DEGENERATIVE CERVICAL DISC: ICD-10-CM

## 2019-04-10 DIAGNOSIS — Z13.220 ENCOUNTER FOR LIPID SCREENING FOR CARDIOVASCULAR DISEASE: ICD-10-CM

## 2019-04-10 LAB
CREATININE URINE: 44.3 MG/DL (ref 39–259)
MICROALBUMIN/CREAT 24H UR: <12 MG/L
MICROALBUMIN/CREAT UR-RTO: NORMAL MCG/MG CREAT

## 2019-04-10 PROCEDURE — 3046F HEMOGLOBIN A1C LEVEL >9.0%: CPT | Performed by: FAMILY MEDICINE

## 2019-04-10 PROCEDURE — 4004F PT TOBACCO SCREEN RCVD TLK: CPT | Performed by: FAMILY MEDICINE

## 2019-04-10 PROCEDURE — 2022F DILAT RTA XM EVC RTNOPTHY: CPT | Performed by: FAMILY MEDICINE

## 2019-04-10 PROCEDURE — G8598 ASA/ANTIPLAT THER USED: HCPCS | Performed by: FAMILY MEDICINE

## 2019-04-10 PROCEDURE — 99214 OFFICE O/P EST MOD 30 MIN: CPT | Performed by: FAMILY MEDICINE

## 2019-04-10 PROCEDURE — G8419 CALC BMI OUT NRM PARAM NOF/U: HCPCS | Performed by: FAMILY MEDICINE

## 2019-04-10 PROCEDURE — G8427 DOCREV CUR MEDS BY ELIG CLIN: HCPCS | Performed by: FAMILY MEDICINE

## 2019-04-10 RX ORDER — OXYCODONE AND ACETAMINOPHEN 10; 325 MG/1; MG/1
1 TABLET ORAL EVERY 4 HOURS PRN
Qty: 180 TABLET | Refills: 0 | Status: SHIPPED | OUTPATIENT
Start: 2019-04-10 | End: 2019-05-03 | Stop reason: SDUPTHER

## 2019-04-10 ASSESSMENT — ENCOUNTER SYMPTOMS
DIARRHEA: 0
RHINORRHEA: 0
VOMITING: 0
SORE THROAT: 0
EYE REDNESS: 0
SHORTNESS OF BREATH: 0
WHEEZING: 0
NAUSEA: 0
ABDOMINAL PAIN: 0
EYE DISCHARGE: 0
COUGH: 0

## 2019-04-10 NOTE — PROGRESS NOTES
[address] [place] [address] PRIMARY CARE  [id number] [facility]  Dept: [phone]    Cristobal Padron is a 50 y.o. male who presents today for his medical conditions/complaintsas noted below. Chief Complaint   Patient presents with    Medication Check       HPI:     HPI   Pt states not sure how he went through 180 pills. States did not increase the amount he used. Pt has been having marked fatigue. Requesting blood work to look at it. Continues with severe neck pain. States pain management wont do any injections until a1c down for 6 months. Have tried methadone and suboxone clinic before. Methadone not effective, suboxone clinic stated \"had nothing to offer\". LDL Calculated (mg/dL)   Date Value   05/21/2018 66   01/05/2018 66       (goal LDL is <100)   AST (U/L)   Date Value   05/23/2017 19     ALT (U/L)   Date Value   05/23/2017 17     BUN (mg/dL)   Date Value   11/14/2017 14     BP Readings from Last 3 Encounters:   04/10/19 136/86   03/04/19 136/88   01/23/19 (!) 146/100          (goal 120/80)    Past Medical History:   Diagnosis Date    Anxiety     Arthritis     CAD (coronary artery disease)     Calcaneal apophysitis     Corns     Depression     Diabetes type I ([address]     Gastroparesis     GERD (gastroesophageal reflux disease)     Headache(784.0)     Hearing loss     Hyperglycemia     Hyperlipidemia     Hypertension     Intussusception ([address]     MI (myocardial infarction) ([address] jan 2014    Neuropathy     Osteoarthritis     Panic attacks     Temporomandibular joint disorder       Past Surgical History:   Procedure Laterality Date    ABDOMEN SURGERY      insulin pump    ABDOMINAL EXPLORATION SURGERY  05/28/2016    bowel resection Rt. colon &terminal ileum, intussuseption.      CARDIAC CATHETERIZATION  2014    CARPAL TUNNEL RELEASE      bilateral    COLONOSCOPY  05/23/2017    poor prep; diverticulosis    COLONOSCOPY N/A 7/10/2018 COLONOSCOPY POLYPECTOMY HOT BIOPSY performed by Salud Huang MD at [address] [facility]      right    HERNIA REPAIR      inguinal    KNEE SURGERY      [place] COLON CA SCRN NOT  [address] IND N/A 5/25/2017    COLONOSCOPY performed by Neva Lynn MD at [address] ESOPHAGOGASTRODUODENOSCOPY TRANSORAL DIAGNOSTIC N/A 5/24/2017    EGD ESOPHAGOGASTRODUODENOSCOPY performed by Neva Lynn MD at [address] [facility] [address]      fracture     TONSILLECTOMY      x2    TOOTH EXTRACTION      x4    ULNAR TUNNEL RELEASE      UPPER GASTROINTESTINAL ENDOSCOPY  05/23/2017    mild gastritis    UPPER GASTROINTESTINAL ENDOSCOPY N/A 7/10/2018    EGD BIOPSY performed by Salud Huang MD at [address] EXTRACTION      x4       Family History   Problem Relation Age of Onset    Diabetes Mother     High Blood Pressure Mother     Heart Disease Mother    Gomez Migraines Mother     Other Mother         diabetic neuropathy    High Blood Pressure Father        Social History     Tobacco Use    Smoking status: Current Every Day Smoker     Packs/day: 1.00     Years: 30.00     Pack years: 30.00    Smokeless tobacco: Never Used   Substance Use Topics    Alcohol use: No     Comment: previous heavy ETOH use; pt stopped around 2010      Current Outpatient Medications   Medication Sig Dispense Refill    oxyCODONE-acetaminophen (PERCOCET)  MG per tablet Take 1 tablet by mouth every 4 hours as needed for Pain for up to 30 days. No more than 6 per day 180 tablet 0    oxyCODONE (OXYCONTIN) 40 MG extended release tablet Take 1 tablet by mouth every 12 hours for 30 days. 60 tablet 0    ALPRAZolam (XANAX) 0.5 MG tablet Take 1 tablet by mouth 3 times daily as needed for Anxiety for up to 30 doses.  90 tablet 1    sucralfate (CARAFATE) 1 GM tablet Take 1 tablet by mouth 4 times daily 120 tablet 3    promethazine (PHENERGAN) 25 MG tablet Take 1 tablet by mouth every 6 hours as  Tramadol Hcl        Health Maintenance   Topic Date Due    HIV screen  12/02/1985    Hepatitis B Vaccine (1 of 3 - Risk 3-dose series) 12/02/1989    Diabetic microalbuminuria test  01/05/2019    Colon cancer screen colonoscopy  01/10/2019    A1C test (Diabetic or Prediabetic)  04/29/2019    Lipid screen  05/21/2019    Diabetic retinal exam  11/28/2019    Potassium monitoring  01/15/2020    Creatinine monitoring  01/15/2020    Diabetic foot exam  04/02/2020    Flu vaccine  Completed    Pneumococcal 0-64 years Vaccine  Completed       Subjective:      Review of Systems   Constitutional: Negative for chills and fever. HENT: Negative for rhinorrhea and sore throat. Eyes: Negative for discharge and redness. Respiratory: Negative for cough, shortness of breath and wheezing. Cardiovascular: Negative for chest pain and palpitations. Gastrointestinal: Negative for abdominal pain, diarrhea, nausea and vomiting. Genitourinary: Negative for dysuria and frequency. Musculoskeletal: Negative for arthralgias and myalgias. Neurological: Negative for dizziness, light-headedness and headaches. Psychiatric/Behavioral: Negative for sleep disturbance. Objective:     /86   Pulse 71   Ht 5' 2.04\" (1.576 m)   Wt 141 lb 12.8 oz (64.3 kg)   SpO2 93%   BMI 25.90 kg/m²   Physical Exam   Constitutional: He is oriented to person, place, and time. He appears well-developed and well-nourished. No distress. HENT:   Head: Normocephalic and atraumatic. Mouth/Throat: Oropharynx is clear and moist.   Eyes: Pupils are equal, round, and reactive to light. Conjunctivae are normal. Right eye exhibits no discharge. Left eye exhibits no discharge. No scleral icterus. Neck: No tracheal deviation present. No thyromegaly present. Cardiovascular: Normal rate, regular rhythm and normal heart sounds. No carotid bruits   Pulmonary/Chest: Effort normal and breath sounds normal. No respiratory distress.  He has no wheezes. Musculoskeletal: He exhibits no edema. Lymphadenopathy:     He has no cervical adenopathy. Neurological: He is alert and oriented to person, place, and time. Skin: Skin is warm. No rash noted. Psychiatric: He has a normal mood and affect. His behavior is normal. Thought content normal.   Nursing note and vitals reviewed. Assessment:       Diagnosis Orders   1. Fatigue, unspecified type  Hepatic Function Panel    TSH    T4, Free    Testosterone    CBC Auto Differential   2. Encounter for lipid screening for cardiovascular disease  Lipid, Fasting   3. Type 1 diabetes mellitus with hyperglycemia (HCC)  Basic Metabolic Panel, Fasting    Hemoglobin A1C    Microalbumin, Ur   4. Degenerative cervical disc  oxyCODONE-acetaminophen (PERCOCET)  MG per tablet        Plan:    labs ordered  Pt advised needs to keep better track of medication    Return in about 3 months (around 7/10/2019).     Orders Placed This Encounter   Procedures    Lipid, Fasting     Standing Status:   Future     Standing Expiration Date:   4/10/2020    Hepatic Function Panel     Standing Status:   Future     Standing Expiration Date:   4/10/2020    Basic Metabolic Panel, Fasting     Standing Status:   Future     Standing Expiration Date:   4/10/2020    TSH     Standing Status:   Future     Standing Expiration Date:   4/10/2020    T4, Free     Standing Status:   Future     Standing Expiration Date:   4/10/2020    Testosterone     Standing Status:   Future     Standing Expiration Date:   4/10/2020    Hemoglobin A1C     Standing Status:   Future     Standing Expiration Date:   4/10/2020    CBC Auto Differential     Standing Status:   Future     Standing Expiration Date:   4/10/2020    Microalbumin, Ur     Standing Status:   Future     Standing Expiration Date:   4/10/2020     Orders Placed This Encounter   Medications    oxyCODONE-acetaminophen (PERCOCET)  MG per tablet     Sig: Take 1 tablet by mouth every 4 hours as needed for Pain for up to 30 days. No more than 6 per day     Dispense:  180 tablet     Refill:  0     Reduce doses taken as pain becomes manageable       Patient given educationalmaterials - see patient instructions. Discussed use, benefit, and side effectsof prescribed medications. All patient questions answered. Pt voiced understanding. Reviewed health maintenance. Instructed to continue current medications, diet andexercise. Patient agreed with treatment plan. Follow up as directed.      Electronicallysigned by Jazmin Jain MD on 4/10/2019 at 12:35 PM

## 2019-04-15 DIAGNOSIS — M50.30 DEGENERATIVE CERVICAL DISC: ICD-10-CM

## 2019-04-15 RX ORDER — OXYCODONE HCL 40 MG/1
40 TABLET, FILM COATED, EXTENDED RELEASE ORAL EVERY 12 HOURS
Qty: 60 TABLET | Refills: 0 | Status: SHIPPED | OUTPATIENT
Start: 2019-04-15 | End: 2019-05-10 | Stop reason: SDUPTHER

## 2019-04-16 LAB
A/G RATIO: NORMAL
ALBUMIN SERPL-MCNC: NORMAL G/DL
ALP BLD-CCNC: NORMAL U/L
ALT SERPL-CCNC: NORMAL U/L
ANION GAP SERPL CALCULATED.3IONS-SCNC: NORMAL MMOL/L
AST SERPL-CCNC: NORMAL U/L
AVERAGE GLUCOSE: 235
BASOPHILS ABSOLUTE: NORMAL /ΜL
BASOPHILS RELATIVE PERCENT: NORMAL %
BILIRUB SERPL-MCNC: NORMAL MG/DL (ref 0.1–1.4)
BILIRUBIN DIRECT: NORMAL MG/DL
BILIRUBIN, INDIRECT: NORMAL
BUN BLDV-MCNC: NORMAL MG/DL
BUN/CREAT BLD: NORMAL
CALCIUM SERPL-MCNC: NORMAL MG/DL
CHLORIDE BLD-SCNC: NORMAL MMOL/L
CHOLESTEROL, FASTING: 173
CO2: NORMAL MMOL/L
CREAT SERPL-MCNC: NORMAL MG/DL
EOSINOPHILS ABSOLUTE: NORMAL /ΜL
EOSINOPHILS RELATIVE PERCENT: NORMAL %
GFR AFRICAN AMERICAN: NORMAL
GFR NON-AFRICAN AMERICAN: NORMAL
GLOBULIN: NORMAL
GLUCOSE FASTING: 204 MG/DL
HBA1C MFR BLD: 9.8 %
HCT VFR BLD CALC: NORMAL % (ref 41–53)
HDLC SERPL-MCNC: 52 MG/DL (ref 35–70)
HEMOGLOBIN: NORMAL G/DL (ref 13.5–17.5)
LDL CHOLESTEROL CALCULATED: 97 MG/DL (ref 0–160)
LYMPHOCYTES ABSOLUTE: NORMAL /ΜL
LYMPHOCYTES RELATIVE PERCENT: NORMAL %
MCH RBC QN AUTO: NORMAL PG
MCHC RBC AUTO-ENTMCNC: NORMAL G/DL
MCV RBC AUTO: NORMAL FL
MONOCYTES ABSOLUTE: NORMAL /ΜL
MONOCYTES RELATIVE PERCENT: NORMAL %
NEUTROPHILS ABSOLUTE: NORMAL /ΜL
NEUTROPHILS RELATIVE PERCENT: NORMAL %
PDW BLD-RTO: NORMAL %
PLATELET # BLD: NORMAL K/ΜL
PMV BLD AUTO: NORMAL FL
POTASSIUM SERPL-SCNC: NORMAL MMOL/L
PROTEIN TOTAL: NORMAL G/DL
RBC # BLD: NORMAL 10^6/ΜL
SODIUM BLD-SCNC: NORMAL MMOL/L
T4 FREE: NORMAL
TESTOSTERONE TOTAL: NORMAL
TRIGLYCERIDE, FASTING: 120
TSH SERPL DL<=0.05 MIU/L-ACNC: NORMAL UIU/ML
WBC # BLD: NORMAL 10^3/ML

## 2019-04-19 DIAGNOSIS — Z13.6 ENCOUNTER FOR LIPID SCREENING FOR CARDIOVASCULAR DISEASE: ICD-10-CM

## 2019-04-19 DIAGNOSIS — E10.65 TYPE 1 DIABETES MELLITUS WITH HYPERGLYCEMIA (HCC): ICD-10-CM

## 2019-04-19 DIAGNOSIS — R53.83 FATIGUE, UNSPECIFIED TYPE: ICD-10-CM

## 2019-04-19 DIAGNOSIS — Z13.220 ENCOUNTER FOR LIPID SCREENING FOR CARDIOVASCULAR DISEASE: ICD-10-CM

## 2019-05-03 DIAGNOSIS — M50.30 DEGENERATIVE CERVICAL DISC: ICD-10-CM

## 2019-05-03 LAB
AVERAGE GLUCOSE: NORMAL
HBA1C MFR BLD: 9.3 %

## 2019-05-03 RX ORDER — OXYCODONE AND ACETAMINOPHEN 10; 325 MG/1; MG/1
1 TABLET ORAL EVERY 4 HOURS PRN
Qty: 180 TABLET | Refills: 0 | Status: SHIPPED | OUTPATIENT
Start: 2019-05-03 | End: 2019-05-30 | Stop reason: SDUPTHER

## 2019-05-03 NOTE — TELEPHONE ENCOUNTER
Last visit: 4/10/19  Last Med refill: 3/12/19  Does patient have enough medication for 72 hours: Pt wife saying that Pt will be out of medication this weekend     Next Visit Date:  No future appointments. Health Maintenance   Topic Date Due    HIV screen  12/02/1985    Hepatitis B Vaccine (1 of 3 - Risk 3-dose series) 12/02/1989    Colon cancer screen colonoscopy  01/10/2019    A1C test (Diabetic or Prediabetic)  07/16/2019    Diabetic retinal exam  11/28/2019    Diabetic foot exam  04/02/2020    Diabetic microalbuminuria test  04/10/2020    Lipid screen  04/16/2020    Potassium monitoring  04/16/2020    Creatinine monitoring  04/16/2020    Flu vaccine  Completed    Pneumococcal 0-64 years Vaccine  Completed       Hemoglobin A1C (%)   Date Value   04/16/2019 9.8   01/29/2019 11.1   10/25/2018 12.1             ( goal A1C is < 7)   Microalb/Crt.  Ratio (mcg/mg creat)   Date Value   04/10/2019 CANNOT BE CALCULATED     LDL Calculated (mg/dL)   Date Value   04/16/2019 97   05/21/2018 66       (goal LDL is <100)   AST (U/L)   Date Value   05/23/2017 19     ALT (U/L)   Date Value   05/23/2017 17     BUN (mg/dL)   Date Value   11/14/2017 14     BP Readings from Last 3 Encounters:   04/10/19 136/86   03/04/19 136/88   01/23/19 (!) 146/100          (goal 120/80)    All Future Testing planned in [facility]  Lab Frequency Next Occurrence   XR FOOT RIGHT (2 VIEWS) Once 02/26/2019               Patient Active Problem List:     Ulnar nerve compression at multiple levels     Degenerative cervical disc     Arthropathy of cervical facet joint     Sprain or strain of cervical spine     Neural foraminal stenosis of cervical spine     Cervical spondylosis     Rhomboid muscle pain, right      Alcohol dependence in remission (HCC)     Carpal tunnel syndrome, bilateral     Coronary artery disease     Depression with anxiety     Diabetic peripheral neuropathy (HCC)     Essential hypertension     Gastroparesis     History of long-term treatment with high-risk medication     Hyperlipidemia     Impingement syndrome of left shoulder     Injury of right ulnar nerve     Migraine headache     Type 1 diabetes mellitus with hyperglycemia (HCC)     Current every day smoker     Perforation of right tympanic membrane     Non-intractable vomiting with nausea     Chronic pain     Pancreatic insufficiency

## 2019-05-09 DIAGNOSIS — M50.30 DEGENERATIVE CERVICAL DISC: ICD-10-CM

## 2019-05-09 NOTE — TELEPHONE ENCOUNTER
Last visit: 4/10/19  Last Med refill: 4/15/19  Does patient have enough medication for 72 hours: yes    Next Visit Date:  No future appointments. Health Maintenance   Topic Date Due    HIV screen  12/02/1985    Hepatitis B Vaccine (1 of 3 - Risk 3-dose series) 12/02/1989    Colon cancer screen colonoscopy  01/10/2019    A1C test (Diabetic or Prediabetic)  08/03/2019    Diabetic retinal exam  11/28/2019    Diabetic foot exam  04/02/2020    Diabetic microalbuminuria test  04/10/2020    Lipid screen  04/16/2020    Potassium monitoring  04/16/2020    Creatinine monitoring  04/16/2020    Flu vaccine  Completed    Pneumococcal 0-64 years Vaccine  Completed       Hemoglobin A1C (%)   Date Value   05/03/2019 9.3   04/16/2019 9.8   01/29/2019 11.1             ( goal A1C is < 7)   Microalb/Crt.  Ratio (mcg/mg creat)   Date Value   04/10/2019 CANNOT BE CALCULATED     LDL Calculated (mg/dL)   Date Value   04/16/2019 97   05/21/2018 66       (goal LDL is <100)   AST (U/L)   Date Value   05/23/2017 19     ALT (U/L)   Date Value   05/23/2017 17     BUN (mg/dL)   Date Value   11/14/2017 14     BP Readings from Last 3 Encounters:   04/10/19 136/86   03/04/19 136/88   01/23/19 (!) 146/100          (goal 120/80)    All Future Testing planned in [facility]  Lab Frequency Next Occurrence   XR FOOT RIGHT (2 VIEWS) Once 02/26/2019               Patient Active Problem List:     Ulnar nerve compression at multiple levels     Degenerative cervical disc     Arthropathy of cervical facet joint     Sprain or strain of cervical spine     Neural foraminal stenosis of cervical spine     Cervical spondylosis     Rhomboid muscle pain, right      Alcohol dependence in remission (HCC)     Carpal tunnel syndrome, bilateral     Coronary artery disease     Depression with anxiety     Diabetic peripheral neuropathy (HCC)     Essential hypertension     Gastroparesis     History of long-term treatment with high-risk medication Hyperlipidemia     Impingement syndrome of left shoulder     Injury of right ulnar nerve     Migraine headache     Type 1 diabetes mellitus with hyperglycemia (HCC)     Current every day smoker     Perforation of right tympanic membrane     Non-intractable vomiting with nausea     Chronic pain     Pancreatic insufficiency

## 2019-05-10 RX ORDER — OXYCODONE HCL 40 MG/1
40 TABLET, FILM COATED, EXTENDED RELEASE ORAL EVERY 12 HOURS
Qty: 60 TABLET | Refills: 0 | Status: SHIPPED | OUTPATIENT
Start: 2019-05-10 | End: 2019-06-06 | Stop reason: SDUPTHER

## 2019-05-16 DIAGNOSIS — F41.8 DEPRESSION WITH ANXIETY: ICD-10-CM

## 2019-05-17 RX ORDER — ALPRAZOLAM 0.5 MG/1
0.5 TABLET ORAL 3 TIMES DAILY PRN
Qty: 90 TABLET | Refills: 1 | Status: SHIPPED | OUTPATIENT
Start: 2019-05-17 | End: 2019-07-15 | Stop reason: SDUPTHER

## 2019-05-30 DIAGNOSIS — M50.30 DEGENERATIVE CERVICAL DISC: ICD-10-CM

## 2019-05-30 RX ORDER — OXYCODONE AND ACETAMINOPHEN 10; 325 MG/1; MG/1
1 TABLET ORAL EVERY 4 HOURS PRN
Qty: 180 TABLET | Refills: 0 | Status: SHIPPED | OUTPATIENT
Start: 2019-05-30 | End: 2019-06-27 | Stop reason: SDUPTHER

## 2019-06-04 ENCOUNTER — TELEPHONE (OUTPATIENT)
Dept: PRIMARY CARE CLINIC | Age: 49
End: 2019-06-04

## 2019-06-06 DIAGNOSIS — M50.30 DEGENERATIVE CERVICAL DISC: ICD-10-CM

## 2019-06-06 RX ORDER — OXYCODONE HCL 40 MG/1
40 TABLET, FILM COATED, EXTENDED RELEASE ORAL EVERY 12 HOURS
Qty: 60 TABLET | Refills: 0 | Status: SHIPPED | OUTPATIENT
Start: 2019-06-06 | End: 2019-06-12 | Stop reason: SDUPTHER

## 2019-06-12 DIAGNOSIS — M50.30 DEGENERATIVE CERVICAL DISC: ICD-10-CM

## 2019-06-12 RX ORDER — OXYCODONE HCL 40 MG/1
40 TABLET, FILM COATED, EXTENDED RELEASE ORAL EVERY 12 HOURS
Qty: 60 TABLET | Refills: 0 | Status: SHIPPED | OUTPATIENT
Start: 2019-06-12 | End: 2019-06-21

## 2019-06-12 NOTE — TELEPHONE ENCOUNTER
[facility] Oxycontin 40mg ER is on back order, [facility], asking for script to be sent there. Pt awaiting, as he is out today.

## 2019-06-21 ENCOUNTER — OFFICE VISIT (OUTPATIENT)
Dept: PRIMARY CARE CLINIC | Age: 49
End: 2019-06-21
Payer: COMMERCIAL

## 2019-06-21 VITALS
BODY MASS INDEX: 25.3 KG/M2 | WEIGHT: 137.5 LBS | SYSTOLIC BLOOD PRESSURE: 134 MMHG | DIASTOLIC BLOOD PRESSURE: 82 MMHG | HEIGHT: 62 IN | OXYGEN SATURATION: 97 % | HEART RATE: 91 BPM

## 2019-06-21 DIAGNOSIS — M47.22 OSTEOARTHRITIS OF SPINE WITH RADICULOPATHY, CERVICAL REGION: Primary | ICD-10-CM

## 2019-06-21 PROCEDURE — G8427 DOCREV CUR MEDS BY ELIG CLIN: HCPCS | Performed by: FAMILY MEDICINE

## 2019-06-21 PROCEDURE — 4004F PT TOBACCO SCREEN RCVD TLK: CPT | Performed by: FAMILY MEDICINE

## 2019-06-21 PROCEDURE — G8419 CALC BMI OUT NRM PARAM NOF/U: HCPCS | Performed by: FAMILY MEDICINE

## 2019-06-21 PROCEDURE — G8598 ASA/ANTIPLAT THER USED: HCPCS | Performed by: FAMILY MEDICINE

## 2019-06-21 PROCEDURE — 99213 OFFICE O/P EST LOW 20 MIN: CPT | Performed by: FAMILY MEDICINE

## 2019-06-21 ASSESSMENT — ENCOUNTER SYMPTOMS
EYE DISCHARGE: 0
SHORTNESS OF BREATH: 0
COUGH: 0
RHINORRHEA: 0
NAUSEA: 0
DIARRHEA: 0
WHEEZING: 0
EYE REDNESS: 0
ABDOMINAL PAIN: 0
SORE THROAT: 0
VOMITING: 0

## 2019-06-21 NOTE — PROGRESS NOTES
[id number] [address] PRIMARY CARE  [address] [place] [id number]  Dept: [phone]    Marita Schwartz is a 50 y.o. male who presents today for his medical conditions/complaintsas noted below. Chief Complaint   Patient presents with    Medication Check       HPI:     HPI  Pt states generic is no longer available on oxycontin 40mg due to trade name being pulled. No fever or chills. Advised by pharmacist advised to go to [address] [place] [address]. Wife is monitoring short acting percocet. No street drugs. LDL Calculated (mg/dL)   Date Value   04/16/2019 97   05/21/2018 66   01/05/2018 66       (goal LDL is <100)   AST (U/L)   Date Value   05/23/2017 19     ALT (U/L)   Date Value   05/23/2017 17     BUN (mg/dL)   Date Value   11/14/2017 14     BP Readings from Last 3 Encounters:   06/21/19 134/82   04/10/19 136/86   03/04/19 136/88          (goal 120/80)    Past Medical History:   Diagnosis Date    Anxiety     Arthritis     CAD (coronary artery disease)     Calcaneal apophysitis     Corns     Depression     Diabetes type I ([address]     Gastroparesis     GERD (gastroesophageal reflux disease)     Headache(784.0)     Hearing loss     Hyperglycemia     Hyperlipidemia     Hypertension     Intussusception ([address]     MI (myocardial infarction) ([address] jan 2014    Neuropathy     Osteoarthritis     Panic attacks     Temporomandibular joint disorder       Past Surgical History:   Procedure Laterality Date    ABDOMEN SURGERY      insulin pump    ABDOMINAL EXPLORATION SURGERY  05/28/2016    bowel resection Rt. colon &terminal ileum, intussuseption.      CARDIAC CATHETERIZATION  2014    CARPAL TUNNEL RELEASE      bilateral    COLONOSCOPY  05/23/2017    poor prep; diverticulosis    COLONOSCOPY N/A 7/10/2018    COLONOSCOPY POLYPECTOMY HOT BIOPSY performed by Salud Huang MD at [facility]      right    HERNIA REPAIR      inguinal  KNEE SURGERY      MO COLON CA SCRN NOT  [address] IND N/A 5/25/2017    COLONOSCOPY performed by Marilee Helm MD at [address] ESOPHAGOGASTRODUODENOSCOPY TRANSORAL DIAGNOSTIC N/A 5/24/2017    EGD ESOPHAGOGASTRODUODENOSCOPY performed by Marilee Helm MD at [address]      fracture     TONSILLECTOMY      x2    TOOTH EXTRACTION      x4    ULNAR TUNNEL RELEASE      UPPER GASTROINTESTINAL ENDOSCOPY  05/23/2017    mild gastritis    UPPER GASTROINTESTINAL ENDOSCOPY N/A 7/10/2018    EGD BIOPSY performed by Jamar Chavarria MD at [address] EXTRACTION      x4       Family History   Problem Relation Age of Onset    Diabetes Mother     High Blood Pressure Mother     Heart Disease Mother    [facility] Migraines Mother     Other Mother         diabetic neuropathy    High Blood Pressure Father        Social History     Tobacco Use    Smoking status: Current Every Day Smoker     Packs/day: 1.00     Years: 30.00     Pack years: 30.00    Smokeless tobacco: Never Used   Substance Use Topics    Alcohol use: No     Comment: previous heavy ETOH use; pt stopped around 2010      Current Outpatient Medications   Medication Sig Dispense Refill    OXYCONTIN 40 MG extended release tablet Take 1 tablet by mouth every 12 hours for 30 days. 60 each 0    oxyCODONE-acetaminophen (PERCOCET)  MG per tablet Take 1 tablet by mouth every 4 hours as needed for Pain for up to 30 days. No more than 6 per day 180 tablet 0    ALPRAZolam (XANAX) 0.5 MG tablet Take 1 tablet by mouth 3 times daily as needed for Anxiety for up to 30 doses.  90 tablet 1    sucralfate (CARAFATE) 1 GM tablet Take 1 tablet by mouth 4 times daily 120 tablet 3    promethazine (PHENERGAN) 25 MG tablet Take 1 tablet by mouth every 6 hours as needed for Nausea 100 tablet 2    lipase-protease-amylase (CREON) 47186-20265 units delayed release capsule Take 1 capsule by mouth 3 times daily (with meals) 180 capsule 3    sertraline (ZOLOFT) 100 MG tablet Take 1 tablet by mouth daily 90 tablet 3    butalbital-acetaminophen-caffeine (FIORICET, ESGIC) -40 MG per tablet Take 1 tablet by mouth every 4 hours as needed for Headaches 180 tablet 3    lisinopril (PRINIVIL;ZESTRIL) 5 MG tablet TAKE 1 TABLET DAILY 90 tablet 3    isosorbide mononitrate (IMDUR) 30 MG extended release tablet TAKE 1 TABLET DAILY 90 tablet 3    pantoprazole (PROTONIX) 40 MG tablet TAKE ONE TABLET BY MOUTH TWICE DAILY 60 tablet 3    atorvastatin (LIPITOR) 80 MG tablet TAKE 1 TABLET DAILY 90 tablet 3    clopidogrel (PLAVIX) 75 MG tablet TAKE 1 TABLET DAILY FOR DISEASE OF THE ARTERIES OF THE HEART 90 tablet 3    propranolol (INDERAL) 20 MG tablet TAKE 1 TABLET THREE TIMES A  tablet 3    nitroGLYCERIN (NITROSTAT) 0.4 MG SL tablet Place 1 tablet under the tongue every 5 minutes as needed for Chest pain Indications: Disease of the Arteries of the Heart Take 1 tablet every 5 minutes times three as needed for chest pain 25 tablet 2    Insulin Aspart (NOVOLOG SC) Inject  into the skin. Insulin pump       No current facility-administered medications for this visit.       Facility-Administered Medications Ordered in Other Visits   Medication Dose Route Frequency Provider Last Rate Last Dose    lactated ringers infusion  75 mL/hr Intravenous Continuous Mikal Thurman MD         Allergies   Allergen Reactions    Avelox [Moxifloxacin]     Ciprofloxacin Other (See Comments)    Levofloxacin     Lyrica [Pregabalin]     Metoclopramide Other (See Comments)     Severe confusion and paranoid activity    Neurontin [Gabapentin]     Other      Can not have epidural injections, steroids, nerve blocks, or burning of nerves due to being diabetic    Tetanus Toxoids     Tramadol     Tramadol Hcl        Health Maintenance   Topic Date Due    HIV screen  12/02/1985    Hepatitis B Vaccine (1 of 3 - Risk 3-dose series) 12/02/1989    Colon cancer screen colonoscopy  01/10/2019    A1C test (Diabetic or Prediabetic)  08/03/2019    Diabetic retinal exam  11/28/2019    Diabetic foot exam  04/02/2020    Diabetic microalbuminuria test  04/10/2020    Lipid screen  04/16/2020    Potassium monitoring  04/16/2020    Creatinine monitoring  04/16/2020    Flu vaccine  Completed    Pneumococcal 0-64 years Vaccine  Completed       Subjective:      Review of Systems   Constitutional: Negative for chills and fever. HENT: Negative for rhinorrhea and sore throat. Eyes: Negative for discharge and redness. Respiratory: Negative for cough, shortness of breath and wheezing. Cardiovascular: Negative for chest pain and palpitations. Gastrointestinal: Negative for abdominal pain, diarrhea, nausea and vomiting. Genitourinary: Negative for dysuria and frequency. Musculoskeletal: Negative for arthralgias and myalgias. Neurological: Negative for dizziness, light-headedness and headaches. Psychiatric/Behavioral: Negative for sleep disturbance. Objective:     /82   Pulse 91   Ht 5' 2.04\" (1.576 m)   Wt 137 lb 8 oz (62.4 kg)   SpO2 97%   BMI 25.12 kg/m²   Physical Exam   Constitutional: He is oriented to person, place, and time. He appears well-developed and well-nourished. No distress. HENT:   Head: Normocephalic and atraumatic. Mouth/Throat: Oropharynx is clear and moist.   Eyes: Pupils are equal, round, and reactive to light. Conjunctivae are normal. Right eye exhibits no discharge. Left eye exhibits no discharge. No scleral icterus. Neck: No tracheal deviation present. No thyromegaly present. Cardiovascular: Normal rate, regular rhythm and normal heart sounds. No carotid bruits   Pulmonary/Chest: Effort normal and breath sounds normal. No respiratory distress. He has no wheezes. Musculoskeletal: He exhibits no edema. Lymphadenopathy:     He has no cervical adenopathy.    Neurological: He is alert and oriented to person, place, and time. Skin: Skin is warm. No rash noted. Psychiatric: He has a normal mood and affect. His behavior is normal. Thought content normal.   Nursing note and vitals reviewed. Assessment:       Diagnosis Orders   1. Osteoarthritis of spine with radiculopathy, cervical region  OXYCONTIN 40 MG extended release tablet        Plan:    staff tp see of trade name Oxycontin 40mg bid or generic oxycontin 20mg, two bid would be covered  Continue other meds as is   Believe if pt is taken off of pain meds, would be high risk for suicide. Return in about 3 months (around 9/21/2019). No orders of the defined types were placed in this encounter. Orders Placed This Encounter   Medications    OXYCONTIN 40 MG extended release tablet     Sig: Take 1 tablet by mouth every 12 hours for 30 days. Dispense:  60 each     Refill:  0     Reduce doses taken as pain becomes manageable       Patient given educationalmaterials - see patient instructions. Discussed use, benefit, and side effectsof prescribed medications. All patient questions answered. Pt voiced understanding. Reviewed health maintenance. Instructed to continue current medications, diet andexercise. Patient agreed with treatment plan. Follow up as directed.      Electronicallysigned by Ha Clarke MD on 6/21/2019 at 9:51 AM

## 2019-06-24 ENCOUNTER — TELEPHONE (OUTPATIENT)
Dept: PRIMARY CARE CLINIC | Age: 49
End: 2019-06-24

## 2019-06-24 NOTE — TELEPHONE ENCOUNTER
[facility]ed [facility] to see if Oxycontin would be covered for 40 MG. They said it would require a prior authorization, after speaking to the prior authorization dept. They said patient could still get oxycodone and wouldn't need a prior authorization he would need to find somewhere they will have oxycodone. [facility] said they have enough Oxycodone for one more fill but after that they will not have oxycodone due to the manufactor D/C Oxycodone. He is not sure who will have the Oxycodone and [facility] was not able to tell me either.

## 2019-06-27 DIAGNOSIS — M50.30 DEGENERATIVE CERVICAL DISC: ICD-10-CM

## 2019-06-27 RX ORDER — OXYCODONE AND ACETAMINOPHEN 10; 325 MG/1; MG/1
1 TABLET ORAL EVERY 4 HOURS PRN
Qty: 180 TABLET | Refills: 0 | Status: SHIPPED | OUTPATIENT
Start: 2019-06-27 | End: 2019-07-29 | Stop reason: SDUPTHER

## 2019-07-02 ENCOUNTER — TELEPHONE (OUTPATIENT)
Dept: PRIMARY CARE CLINIC | Age: 49
End: 2019-07-02

## 2019-07-02 NOTE — TELEPHONE ENCOUNTER
Patient called back, he said that valerio alas said that if we call and give them the ok to fill today since there was a mix up with his oxycontin meds.

## 2019-07-08 DIAGNOSIS — M47.22 OSTEOARTHRITIS OF SPINE WITH RADICULOPATHY, CERVICAL REGION: ICD-10-CM

## 2019-07-15 DIAGNOSIS — F41.8 DEPRESSION WITH ANXIETY: ICD-10-CM

## 2019-07-15 RX ORDER — ALPRAZOLAM 0.5 MG/1
0.5 TABLET ORAL 3 TIMES DAILY PRN
Qty: 90 TABLET | Refills: 1 | Status: SHIPPED | OUTPATIENT
Start: 2019-07-15 | End: 2019-09-19 | Stop reason: SDUPTHER

## 2019-07-15 NOTE — TELEPHONE ENCOUNTER
Gastroparesis     History of long-term treatment with high-risk medication     Hyperlipidemia     Impingement syndrome of left shoulder     Injury of right ulnar nerve     Migraine headache     Type 1 diabetes mellitus with hyperglycemia (HCC)     Current every day smoker     Perforation of right tympanic membrane     Non-intractable vomiting with nausea     Chronic pain     Pancreatic insufficiency

## 2019-07-29 DIAGNOSIS — M50.30 DEGENERATIVE CERVICAL DISC: ICD-10-CM

## 2019-07-29 RX ORDER — OXYCODONE AND ACETAMINOPHEN 10; 325 MG/1; MG/1
1 TABLET ORAL EVERY 4 HOURS PRN
Qty: 180 TABLET | Refills: 0 | Status: SHIPPED | OUTPATIENT
Start: 2019-07-29 | End: 2019-08-20 | Stop reason: SDUPTHER

## 2019-07-29 NOTE — TELEPHONE ENCOUNTER
Last visit: 6/21/19  Last Med refill: 6/27/19  Does patient have enough medication for     Next Visit Date:  Future Appointments   Date Time Provider Alfonso Marques   7/30/2019  2:20 PM [facility] [address] Maintenance   Topic Date Due    HIV screen  12/02/1985    Hepatitis B Vaccine (1 of 3 - Risk 3-dose series) 12/02/1989    Colon cancer screen colonoscopy  01/10/2019    A1C test (Diabetic or Prediabetic)  08/03/2019    Flu vaccine (1) 09/01/2019    Diabetic retinal exam  11/28/2019    Diabetic foot exam  04/02/2020    Diabetic microalbuminuria test  04/10/2020    Lipid screen  04/16/2020    Potassium monitoring  04/16/2020    Creatinine monitoring  04/16/2020    Pneumococcal 0-64 years Vaccine  Completed       Hemoglobin A1C (%)   Date Value   05/03/2019 9.3   04/16/2019 9.8   01/29/2019 11.1             ( goal A1C is < 7)   Microalb/Crt.  Ratio (mcg/mg creat)   Date Value   04/10/2019 CANNOT BE CALCULATED     LDL Calculated (mg/dL)   Date Value   04/16/2019 97   05/21/2018 66       (goal LDL is <100)   AST (U/L)   Date Value   05/23/2017 19     ALT (U/L)   Date Value   05/23/2017 17     BUN (mg/dL)   Date Value   11/14/2017 14     BP Readings from Last 3 Encounters:   06/21/19 134/82   04/10/19 136/86   03/04/19 136/88          (goal 120/80)    All Future Testing planned in [facility]  Lab Frequency Next Occurrence               Patient Active Problem List:     Ulnar nerve compression at multiple levels     Degenerative cervical disc     Arthropathy of cervical facet joint     Sprain or strain of cervical spine     Neural foraminal stenosis of cervical spine     Cervical spondylosis     Rhomboid muscle pain, right      Alcohol dependence in remission (HCC)     Carpal tunnel syndrome, bilateral     Coronary artery disease     Depression with anxiety     Diabetic peripheral neuropathy (HCC)     Essential hypertension     Gastroparesis     History of long-term treatment

## 2019-08-07 DIAGNOSIS — M47.22 OSTEOARTHRITIS OF SPINE WITH RADICULOPATHY, CERVICAL REGION: ICD-10-CM

## 2019-08-13 ENCOUNTER — OFFICE VISIT (OUTPATIENT)
Dept: PRIMARY CARE CLINIC | Age: 49
End: 2019-08-13
Payer: COMMERCIAL

## 2019-08-13 VITALS
HEIGHT: 62 IN | SYSTOLIC BLOOD PRESSURE: 136 MMHG | HEART RATE: 87 BPM | WEIGHT: 136.2 LBS | OXYGEN SATURATION: 91 % | BODY MASS INDEX: 25.06 KG/M2 | DIASTOLIC BLOOD PRESSURE: 78 MMHG

## 2019-08-13 DIAGNOSIS — Z79.899 HIGH RISK MEDICATION USE: Primary | ICD-10-CM

## 2019-08-13 DIAGNOSIS — S39.012A STRAIN OF LUMBAR REGION, INITIAL ENCOUNTER: ICD-10-CM

## 2019-08-13 DIAGNOSIS — E10.65 TYPE 1 DIABETES MELLITUS WITH HYPERGLYCEMIA (HCC): ICD-10-CM

## 2019-08-13 PROCEDURE — 3046F HEMOGLOBIN A1C LEVEL >9.0%: CPT | Performed by: FAMILY MEDICINE

## 2019-08-13 PROCEDURE — 4004F PT TOBACCO SCREEN RCVD TLK: CPT | Performed by: FAMILY MEDICINE

## 2019-08-13 PROCEDURE — G8420 CALC BMI NORM PARAMETERS: HCPCS | Performed by: FAMILY MEDICINE

## 2019-08-13 PROCEDURE — G8598 ASA/ANTIPLAT THER USED: HCPCS | Performed by: FAMILY MEDICINE

## 2019-08-13 PROCEDURE — G8427 DOCREV CUR MEDS BY ELIG CLIN: HCPCS | Performed by: FAMILY MEDICINE

## 2019-08-13 PROCEDURE — 2022F DILAT RTA XM EVC RTNOPTHY: CPT | Performed by: FAMILY MEDICINE

## 2019-08-13 PROCEDURE — 99214 OFFICE O/P EST MOD 30 MIN: CPT | Performed by: FAMILY MEDICINE

## 2019-08-13 RX ORDER — CYCLOBENZAPRINE HCL 10 MG
10 TABLET ORAL NIGHTLY PRN
Qty: 30 TABLET | Refills: 0 | Status: SHIPPED | OUTPATIENT
Start: 2019-08-13 | End: 2019-08-23

## 2019-08-13 ASSESSMENT — ENCOUNTER SYMPTOMS
VOMITING: 0
DIARRHEA: 0
SHORTNESS OF BREATH: 0
NAUSEA: 0
WHEEZING: 0
SORE THROAT: 0
EYE DISCHARGE: 0
ABDOMINAL PAIN: 0
EYE REDNESS: 0
COUGH: 0
BACK PAIN: 1
RHINORRHEA: 0

## 2019-08-13 NOTE — PROGRESS NOTES
[facility] PRIMARY CARE  [address] [address]  Dept: [phone]    Roseanna Dupree is a 50 y.o. male who presents today for his medical conditions/complaintsas noted below. Chief Complaint   Patient presents with    Medication Check    Lower Back Pain       HPI:     HPI  Pt states pulled his back on right side, lifting up father in law. No chest pain sob. This occurred 8/7. States pain became severe that night. Medication filled. Still using oxocontin and percocet as prescribed. Has had therapy in the past to the back. NO street drugs, some marijauana. Last used pain meds this am.       LDL Calculated (mg/dL)   Date Value   04/16/2019 97   05/21/2018 66   01/05/2018 66       (goal LDL is <100)   AST (U/L)   Date Value   05/23/2017 19     ALT (U/L)   Date Value   05/23/2017 17     BUN (mg/dL)   Date Value   11/14/2017 14     BP Readings from Last 3 Encounters:   08/13/19 136/78   06/21/19 134/82   04/10/19 136/86          (goal 120/80)    Past Medical History:   Diagnosis Date    Anxiety     Arthritis     CAD (coronary artery disease)     Calcaneal apophysitis     Corns     Depression     Diabetes type I ([address]     Gastroparesis     GERD (gastroesophageal reflux disease)     Headache(784.0)     Hearing loss     Hyperglycemia     Hyperlipidemia     Hypertension     Intussusception ([address]     MI (myocardial infarction) ([address] jan 2014    Neuropathy     Osteoarthritis     Panic attacks     Temporomandibular joint disorder       Past Surgical History:   Procedure Laterality Date    ABDOMEN SURGERY      insulin pump    ABDOMINAL EXPLORATION SURGERY  05/28/2016    bowel resection Rt. colon &terminal ileum, intussuseption.      CARDIAC CATHETERIZATION  2014    CARPAL TUNNEL RELEASE      bilateral    COLONOSCOPY  05/23/2017    poor prep; diverticulosis    COLONOSCOPY N/A 7/10/2018    COLONOSCOPY POLYPECTOMY HOT BIOPSY nerves due to being diabetic    Tetanus Toxoids     Tramadol     Tramadol Hcl        Health Maintenance   Topic Date Due    HIV screen  12/02/1985    Hepatitis B Vaccine (1 of 3 - Risk 3-dose series) 12/02/1989    Colon cancer screen colonoscopy  01/10/2019    A1C test (Diabetic or Prediabetic)  08/03/2019    Flu vaccine (1) 09/01/2019    Diabetic retinal exam  11/28/2019    Diabetic foot exam  04/02/2020    Diabetic microalbuminuria test  04/10/2020    Lipid screen  04/16/2020    Potassium monitoring  04/16/2020    Creatinine monitoring  04/16/2020    Pneumococcal 0-64 years Vaccine  Completed       Subjective:      Review of Systems   Constitutional: Negative for chills and fever. HENT: Negative for rhinorrhea and sore throat. Eyes: Negative for discharge and redness. Respiratory: Negative for cough, shortness of breath and wheezing. Cardiovascular: Negative for chest pain and palpitations. Gastrointestinal: Negative for abdominal pain, diarrhea, nausea and vomiting. Genitourinary: Negative for dysuria and frequency. Musculoskeletal: Positive for back pain. Negative for arthralgias and myalgias. Neurological: Negative for dizziness, light-headedness and headaches. Psychiatric/Behavioral: Negative for sleep disturbance. Objective:     /78   Pulse 87   Ht 5' 2.04\" (1.576 m)   Wt 136 lb 3.2 oz (61.8 kg)   SpO2 91%   BMI 24.88 kg/m²   Physical Exam   Constitutional: He is oriented to person, place, and time. He appears well-developed and well-nourished. No distress. HENT:   Head: Normocephalic and atraumatic. Mouth/Throat: Oropharynx is clear and moist.   Eyes: Pupils are equal, round, and reactive to light. Conjunctivae are normal. Right eye exhibits no discharge. Left eye exhibits no discharge. No scleral icterus. Neck: No tracheal deviation present. No thyromegaly present. Cardiovascular: Normal rate, regular rhythm and normal heart sounds.    No carotid bruits   Pulmonary/Chest: Effort normal and breath sounds normal. No respiratory distress. He has no wheezes. Abdominal: He exhibits no distension. There is no tenderness. Musculoskeletal: He exhibits no edema. Pain in right upper lumbar region    Lymphadenopathy:     He has no cervical adenopathy. Neurological: He is alert and oriented to person, place, and time. Skin: Skin is warm. No rash noted. Psychiatric: He has a normal mood and affect. His behavior is normal. Thought content normal.   Nursing note and vitals reviewed. Controlled Substance Monitoring:    Acute and Chronic Pain Monitoring:   RX Monitoring 8/13/2019   Attestation -   Acute Pain Prescriptions -   Periodic Controlled Substance Monitoring Possible medication side effects, risk of tolerance/dependence & alternative treatments discussed. ;No signs of potential drug abuse or diversion identified. ;Random urine drug screen sent today. Chronic Pain > 80 MEDD Obtained or confirmed \"Medication Contract\" on file. Chronic Pain > 120 MEDD Engaged a pain medicine specialist as a prescriber or consultant.;Obtained or confirmed \"Medication Contract\" on file. Assessment:       Diagnosis Orders   1. High risk medication use  Urine Drug Screen   2. Type 1 diabetes mellitus with hyperglycemia (HCC)     3. Strain of lumbar region, initial encounter          Plan:    urine drug screen  Flexeril at night for back pain  If no better, would order physical therpay    Return in about 3 months (around 11/13/2019). Orders Placed This Encounter   Procedures    Urine Drug Screen     Standing Status:   Future     Standing Expiration Date:   8/13/2020     Orders Placed This Encounter   Medications    cyclobenzaprine (FLEXERIL) 10 MG tablet     Sig: Take 1 tablet by mouth nightly as needed for Muscle spasms     Dispense:  30 tablet     Refill:  0       Patient given educationalmaterials - see patient instructions.   Discussed use, benefit, and side

## 2019-08-16 LAB
AVERAGE GLUCOSE: NORMAL
HBA1C MFR BLD: 8.8 %

## 2019-08-19 DIAGNOSIS — Z79.899 HIGH RISK MEDICATION USE: ICD-10-CM

## 2019-08-20 DIAGNOSIS — M50.30 DEGENERATIVE CERVICAL DISC: ICD-10-CM

## 2019-08-20 RX ORDER — OXYCODONE AND ACETAMINOPHEN 10; 325 MG/1; MG/1
1 TABLET ORAL EVERY 4 HOURS PRN
Qty: 180 TABLET | Refills: 0 | Status: SHIPPED | OUTPATIENT
Start: 2019-08-27 | End: 2019-09-19 | Stop reason: SDUPTHER

## 2019-08-20 NOTE — TELEPHONE ENCOUNTER
OV: 8/13/19, LF: 7/29/19. Requesting now and will not be picking up until due next week. Recent personal/family issues and reports will not be home much-doesn't want to forget to call. Please approve or deny.

## 2019-09-03 DIAGNOSIS — M47.22 OSTEOARTHRITIS OF SPINE WITH RADICULOPATHY, CERVICAL REGION: ICD-10-CM

## 2019-09-19 DIAGNOSIS — M50.30 DEGENERATIVE CERVICAL DISC: ICD-10-CM

## 2019-09-19 DIAGNOSIS — F41.8 DEPRESSION WITH ANXIETY: ICD-10-CM

## 2019-09-19 RX ORDER — ALPRAZOLAM 0.5 MG/1
0.5 TABLET ORAL 3 TIMES DAILY PRN
Qty: 90 TABLET | Refills: 1 | Status: SHIPPED | OUTPATIENT
Start: 2019-09-19 | End: 2019-11-11

## 2019-09-19 RX ORDER — OXYCODONE AND ACETAMINOPHEN 10; 325 MG/1; MG/1
1 TABLET ORAL EVERY 4 HOURS PRN
Qty: 180 TABLET | Refills: 0 | Status: SHIPPED | OUTPATIENT
Start: 2019-09-19 | End: 2019-10-18 | Stop reason: SDUPTHER

## 2019-09-30 DIAGNOSIS — M47.22 OSTEOARTHRITIS OF SPINE WITH RADICULOPATHY, CERVICAL REGION: ICD-10-CM

## 2019-10-18 ENCOUNTER — TELEPHONE (OUTPATIENT)
Dept: PRIMARY CARE CLINIC | Age: 49
End: 2019-10-18

## 2019-10-18 DIAGNOSIS — M50.30 DEGENERATIVE CERVICAL DISC: ICD-10-CM

## 2019-10-18 DIAGNOSIS — E11.42 DIABETIC PERIPHERAL NEUROPATHY (HCC): ICD-10-CM

## 2019-10-18 RX ORDER — LISINOPRIL 10 MG/1
10 TABLET ORAL DAILY
Qty: 90 TABLET | Refills: 3 | Status: SHIPPED | OUTPATIENT
Start: 2019-10-18 | End: 2019-11-11

## 2019-10-18 RX ORDER — OXYCODONE AND ACETAMINOPHEN 10; 325 MG/1; MG/1
1 TABLET ORAL EVERY 4 HOURS PRN
Qty: 180 TABLET | Refills: 0 | Status: SHIPPED | OUTPATIENT
Start: 2019-10-18 | End: 2019-11-11

## 2019-10-21 ENCOUNTER — TELEPHONE (OUTPATIENT)
Dept: PRIMARY CARE CLINIC | Age: 49
End: 2019-10-21

## 2019-10-28 DIAGNOSIS — M47.22 OSTEOARTHRITIS OF SPINE WITH RADICULOPATHY, CERVICAL REGION: ICD-10-CM

## 2019-11-11 ENCOUNTER — OFFICE VISIT (OUTPATIENT)
Dept: PRIMARY CARE CLINIC | Age: 49
End: 2019-11-11
Payer: COMMERCIAL

## 2019-11-11 VITALS
DIASTOLIC BLOOD PRESSURE: 94 MMHG | HEIGHT: 62 IN | OXYGEN SATURATION: 91 % | BODY MASS INDEX: 27.16 KG/M2 | SYSTOLIC BLOOD PRESSURE: 148 MMHG | HEART RATE: 90 BPM | WEIGHT: 147.6 LBS

## 2019-11-11 DIAGNOSIS — M50.30 DEGENERATIVE CERVICAL DISC: ICD-10-CM

## 2019-11-11 DIAGNOSIS — E11.42 DIABETIC PERIPHERAL NEUROPATHY (HCC): ICD-10-CM

## 2019-11-11 DIAGNOSIS — Z79.899 HIGH RISK MEDICATION USE: Primary | ICD-10-CM

## 2019-11-11 DIAGNOSIS — F41.8 DEPRESSION WITH ANXIETY: ICD-10-CM

## 2019-11-11 LAB
AMPHETAMINE SCREEN, URINE: NEGATIVE
BARBITURATE SCREEN, URINE: NEGATIVE
BENZODIAZEPINE SCREEN, URINE: POSITIVE
BUPRENORPHINE URINE: NEGATIVE
COCAINE METABOLITE SCREEN URINE: NEGATIVE
GABAPENTIN SCREEN, URINE: NEGATIVE
MDMA URINE: NEGATIVE
METHADONE SCREEN, URINE: NEGATIVE
METHAMPHETAMINE, URINE: NEGATIVE
OPIATE SCREEN URINE: NEGATIVE
OXYCODONE SCREEN URINE: POSITIVE
PHENCYCLIDINE SCREEN URINE: NEGATIVE
PROPOXYPHENE SCREEN, URINE: NEGATIVE
THC SCREEN, URINE: POSITIVE
TRICYCLIC ANTIDEPRESSANTS, UR: NEGATIVE

## 2019-11-11 PROCEDURE — G8419 CALC BMI OUT NRM PARAM NOF/U: HCPCS | Performed by: FAMILY MEDICINE

## 2019-11-11 PROCEDURE — G8484 FLU IMMUNIZE NO ADMIN: HCPCS | Performed by: FAMILY MEDICINE

## 2019-11-11 PROCEDURE — 99214 OFFICE O/P EST MOD 30 MIN: CPT | Performed by: FAMILY MEDICINE

## 2019-11-11 PROCEDURE — 2022F DILAT RTA XM EVC RTNOPTHY: CPT | Performed by: FAMILY MEDICINE

## 2019-11-11 PROCEDURE — 80305 DRUG TEST PRSMV DIR OPT OBS: CPT | Performed by: FAMILY MEDICINE

## 2019-11-11 PROCEDURE — 3051F HG A1C>EQUAL 7.0%<8.0%: CPT | Performed by: FAMILY MEDICINE

## 2019-11-11 PROCEDURE — G8427 DOCREV CUR MEDS BY ELIG CLIN: HCPCS | Performed by: FAMILY MEDICINE

## 2019-11-11 PROCEDURE — G8598 ASA/ANTIPLAT THER USED: HCPCS | Performed by: FAMILY MEDICINE

## 2019-11-11 PROCEDURE — 4004F PT TOBACCO SCREEN RCVD TLK: CPT | Performed by: FAMILY MEDICINE

## 2019-11-11 RX ORDER — LISINOPRIL 20 MG/1
20 TABLET ORAL DAILY
Qty: 90 TABLET | Refills: 3 | Status: SHIPPED | OUTPATIENT
Start: 2019-11-11 | End: 2019-12-11

## 2019-11-11 RX ORDER — LORAZEPAM 0.5 MG/1
0.5 TABLET ORAL EVERY 8 HOURS PRN
Qty: 90 TABLET | Refills: 2 | Status: SHIPPED | OUTPATIENT
Start: 2019-11-11 | End: 2020-02-05 | Stop reason: SDUPTHER

## 2019-11-11 RX ORDER — SERTRALINE HYDROCHLORIDE 100 MG/1
150 TABLET, FILM COATED ORAL DAILY
Qty: 135 TABLET | Refills: 3 | Status: SHIPPED | OUTPATIENT
Start: 2019-11-11 | End: 2020-01-15

## 2019-11-11 RX ORDER — OXYCODONE AND ACETAMINOPHEN 10; 325 MG/1; MG/1
1 TABLET ORAL EVERY 4 HOURS PRN
Qty: 180 TABLET | Refills: 0 | Status: SHIPPED | OUTPATIENT
Start: 2019-11-11 | End: 2019-12-11 | Stop reason: SDUPTHER

## 2019-11-11 ASSESSMENT — ENCOUNTER SYMPTOMS
VOMITING: 0
NAUSEA: 0
COUGH: 0
EYE DISCHARGE: 0
EYE REDNESS: 0
WHEEZING: 0
RHINORRHEA: 0
SORE THROAT: 0
DIARRHEA: 0
ABDOMINAL PAIN: 0
SHORTNESS OF BREATH: 0

## 2019-11-14 ENCOUNTER — TELEPHONE (OUTPATIENT)
Dept: PRIMARY CARE CLINIC | Age: 49
End: 2019-11-14

## 2019-11-14 NOTE — TELEPHONE ENCOUNTER
Pt states his medication-Oxycodone 10-325mg  was sent in on 11/11/19 and the pharmacy will not let him pick it up because its too early. Pt states he is leaving out of town and wants to know if he can  his medication on 11/16/19. Pt is leave on 11/17/19 to go to [place]. Pt needs us to call the pharmacy and give the \"okay\" to  the medication on 11/16/19. Please advise?

## 2019-11-25 DIAGNOSIS — M47.22 OSTEOARTHRITIS OF SPINE WITH RADICULOPATHY, CERVICAL REGION: ICD-10-CM

## 2019-12-11 ENCOUNTER — OFFICE VISIT (OUTPATIENT)
Dept: PRIMARY CARE CLINIC | Age: 49
End: 2019-12-11
Payer: COMMERCIAL

## 2019-12-11 VITALS
BODY MASS INDEX: 25.54 KG/M2 | DIASTOLIC BLOOD PRESSURE: 92 MMHG | OXYGEN SATURATION: 97 % | HEART RATE: 86 BPM | WEIGHT: 138.8 LBS | HEIGHT: 62 IN | SYSTOLIC BLOOD PRESSURE: 150 MMHG

## 2019-12-11 DIAGNOSIS — M50.30 DEGENERATIVE CERVICAL DISC: ICD-10-CM

## 2019-12-11 DIAGNOSIS — M47.22 OSTEOARTHRITIS OF SPINE WITH RADICULOPATHY, CERVICAL REGION: ICD-10-CM

## 2019-12-11 DIAGNOSIS — E11.42 DIABETIC PERIPHERAL NEUROPATHY (HCC): ICD-10-CM

## 2019-12-11 PROBLEM — E10.10 DIABETIC KETOACIDOSIS WITHOUT COMA ASSOCIATED WITH TYPE 1 DIABETES MELLITUS (HCC): Status: RESOLVED | Noted: 2017-10-21 | Resolved: 2019-12-11

## 2019-12-11 PROBLEM — G89.29 CHRONIC PAIN: Status: RESOLVED | Noted: 2017-09-13 | Resolved: 2019-12-11

## 2019-12-11 PROBLEM — E10.10 DIABETIC KETOACIDOSIS WITHOUT COMA ASSOCIATED WITH TYPE 1 DIABETES MELLITUS (HCC): Status: ACTIVE | Noted: 2017-10-21

## 2019-12-11 PROBLEM — R11.2 NON-INTRACTABLE VOMITING WITH NAUSEA: Status: RESOLVED | Noted: 2017-05-31 | Resolved: 2019-12-11

## 2019-12-11 PROBLEM — M47.812 CERVICAL SPONDYLOSIS: Status: ACTIVE | Noted: 2017-10-21

## 2019-12-11 PROCEDURE — G8419 CALC BMI OUT NRM PARAM NOF/U: HCPCS | Performed by: FAMILY MEDICINE

## 2019-12-11 PROCEDURE — G8427 DOCREV CUR MEDS BY ELIG CLIN: HCPCS | Performed by: FAMILY MEDICINE

## 2019-12-11 PROCEDURE — 2022F DILAT RTA XM EVC RTNOPTHY: CPT | Performed by: FAMILY MEDICINE

## 2019-12-11 PROCEDURE — 3045F PR MOST RECENT HEMOGLOBIN A1C LEVEL 7.0-9.0%: CPT | Performed by: FAMILY MEDICINE

## 2019-12-11 PROCEDURE — 99213 OFFICE O/P EST LOW 20 MIN: CPT | Performed by: FAMILY MEDICINE

## 2019-12-11 PROCEDURE — 4004F PT TOBACCO SCREEN RCVD TLK: CPT | Performed by: FAMILY MEDICINE

## 2019-12-11 PROCEDURE — G8598 ASA/ANTIPLAT THER USED: HCPCS | Performed by: FAMILY MEDICINE

## 2019-12-11 PROCEDURE — G8484 FLU IMMUNIZE NO ADMIN: HCPCS | Performed by: FAMILY MEDICINE

## 2019-12-11 RX ORDER — LISINOPRIL 30 MG/1
30 TABLET ORAL DAILY
Qty: 90 TABLET | Refills: 3 | Status: SHIPPED | OUTPATIENT
Start: 2019-12-11 | End: 2021-02-11

## 2019-12-11 RX ORDER — OXYCODONE AND ACETAMINOPHEN 10; 325 MG/1; MG/1
1 TABLET ORAL EVERY 4 HOURS PRN
Qty: 180 TABLET | Refills: 0 | Status: SHIPPED | OUTPATIENT
Start: 2019-12-11 | End: 2020-01-06 | Stop reason: SDUPTHER

## 2019-12-11 RX ORDER — NALOXONE HYDROCHLORIDE 4 MG/.1ML
1 SPRAY NASAL PRN
Qty: 1 EACH | Refills: 5 | Status: SHIPPED | OUTPATIENT
Start: 2019-12-11 | End: 2021-09-15

## 2019-12-11 RX ORDER — OXYCODONE HYDROCHLORIDE 30 MG/1
30 TABLET, FILM COATED, EXTENDED RELEASE ORAL 2 TIMES DAILY
Qty: 30 EACH | Refills: 0 | Status: SHIPPED | OUTPATIENT
Start: 2019-12-11 | End: 2019-12-26

## 2019-12-11 ASSESSMENT — ENCOUNTER SYMPTOMS
VOMITING: 0
EYE DISCHARGE: 0
NAUSEA: 0
RHINORRHEA: 0
EYE REDNESS: 0
COUGH: 0
DIARRHEA: 0
SHORTNESS OF BREATH: 0
ABDOMINAL PAIN: 0
WHEEZING: 0
SORE THROAT: 0

## 2019-12-20 ENCOUNTER — TELEPHONE (OUTPATIENT)
Dept: PRIMARY CARE CLINIC | Age: 49
End: 2019-12-20

## 2019-12-22 ENCOUNTER — TELEPHONE (OUTPATIENT)
Dept: PRIMARY CARE CLINIC | Age: 49
End: 2019-12-22

## 2019-12-24 ENCOUNTER — TELEPHONE (OUTPATIENT)
Dept: PRIMARY CARE CLINIC | Age: 49
End: 2019-12-24

## 2019-12-24 DIAGNOSIS — R11.2 NAUSEA AND VOMITING, INTRACTABILITY OF VOMITING NOT SPECIFIED, UNSPECIFIED VOMITING TYPE: Primary | ICD-10-CM

## 2019-12-24 RX ORDER — PROMETHAZINE HYDROCHLORIDE 25 MG/1
25 SUPPOSITORY RECTAL EVERY 6 HOURS PRN
Qty: 30 SUPPOSITORY | Refills: 1 | Status: SHIPPED | OUTPATIENT
Start: 2019-12-24 | End: 2020-01-15

## 2019-12-26 DIAGNOSIS — M47.22 OSTEOARTHRITIS OF SPINE WITH RADICULOPATHY, CERVICAL REGION: ICD-10-CM

## 2019-12-26 DIAGNOSIS — M47.812 ARTHROPATHY OF CERVICAL FACET JOINT: Primary | ICD-10-CM

## 2019-12-26 DIAGNOSIS — M50.30 DEGENERATIVE CERVICAL DISC: ICD-10-CM

## 2019-12-26 RX ORDER — OXYCODONE HCL 20 MG/1
20 TABLET, FILM COATED, EXTENDED RELEASE ORAL 2 TIMES DAILY
Qty: 30 TABLET | Refills: 0 | Status: SHIPPED | OUTPATIENT
Start: 2019-12-26 | End: 2020-01-06 | Stop reason: SDUPTHER

## 2019-12-26 RX ORDER — OXYCODONE HYDROCHLORIDE 30 MG/1
30 TABLET, FILM COATED, EXTENDED RELEASE ORAL 2 TIMES DAILY
Qty: 30 EACH | Refills: 0 | Status: CANCELLED | OUTPATIENT
Start: 2019-12-26 | End: 2020-01-10

## 2020-01-02 ENCOUNTER — TELEPHONE (OUTPATIENT)
Dept: PRIMARY CARE CLINIC | Age: 50
End: 2020-01-02

## 2020-01-02 NOTE — TELEPHONE ENCOUNTER
Pt's wife Sacha Bella calling. Pt was in [facility] for being dehydrated and vomiting. Pt has not had anything to eat for the past 3 days and can't keep anything down. Pt fell due to feeling so weak. Asking if you would direct admit him to get things moving faster? Pt stated that Dr. Evan Tomlin wanted to treat his as if it was an ulcer and gastritis.  Please let Mattyne Shayne know at [phone]

## 2020-01-06 ENCOUNTER — TELEPHONE (OUTPATIENT)
Dept: PRIMARY CARE CLINIC | Age: 50
End: 2020-01-06

## 2020-01-06 RX ORDER — OXYCODONE AND ACETAMINOPHEN 10; 325 MG/1; MG/1
1 TABLET ORAL EVERY 4 HOURS PRN
Qty: 180 TABLET | Refills: 0 | Status: SHIPPED | OUTPATIENT
Start: 2020-01-06 | End: 2020-02-05 | Stop reason: SDUPTHER

## 2020-01-06 RX ORDER — OXYCODONE HCL 20 MG/1
20 TABLET, FILM COATED, EXTENDED RELEASE ORAL 2 TIMES DAILY
Qty: 30 TABLET | Refills: 0 | Status: SHIPPED | OUTPATIENT
Start: 2020-01-06 | End: 2020-01-08 | Stop reason: SDUPTHER

## 2020-01-06 NOTE — TELEPHONE ENCOUNTER
Pt last seen 12/11/2019  Next appt 1/15/20 Hospital F/U    [facility] [place]  [id number] mg [facility] [place]

## 2020-01-06 NOTE — TELEPHONE ENCOUNTER
[facility] Transitions Initial Follow Up Call    Outreach made within 2 business days of discharge: Yes    Patient: Amari Valdivia Patient : 1970   MRN: [id number]  Reason for Admission: There are no discharge diagnoses documented for the most recent discharge. Discharge Date: 7/10/18       Spoke with: Rogelio Martinez     Discharge department/facility: [facility]     TCM Interactive Patient Contact:  Was patient able to fill all prescriptions: Yes  Was patient instructed to bring all medications to the follow-up visit: Yes  Is patient taking all medications as directed in the discharge summary?  Yes  Does patient understand their discharge instructions: Yes  Does patient have questions or concerns that need addressed prior to 7-14 day follow up office visit: no    Scheduled appointment with PCP within 7-14 days    Follow Up  Future Appointments   Date Time Provider Alfonso Marques   1/15/2020 11:30 AM MD KASSIDY Najera MA

## 2020-01-08 RX ORDER — OXYCODONE HCL 20 MG/1
20 TABLET, FILM COATED, EXTENDED RELEASE ORAL 2 TIMES DAILY
Qty: 30 TABLET | Refills: 0 | Status: SHIPPED | OUTPATIENT
Start: 2020-01-08 | End: 2020-01-21 | Stop reason: SDUPTHER

## 2020-01-15 ENCOUNTER — OFFICE VISIT (OUTPATIENT)
Dept: PRIMARY CARE CLINIC | Age: 50
End: 2020-01-15
Payer: COMMERCIAL

## 2020-01-15 VITALS
HEIGHT: 62 IN | DIASTOLIC BLOOD PRESSURE: 76 MMHG | BODY MASS INDEX: 25.91 KG/M2 | SYSTOLIC BLOOD PRESSURE: 128 MMHG | HEART RATE: 76 BPM | WEIGHT: 140.8 LBS

## 2020-01-15 PROCEDURE — 99495 TRANSJ CARE MGMT MOD F2F 14D: CPT | Performed by: FAMILY MEDICINE

## 2020-01-15 PROCEDURE — 1111F DSCHRG MED/CURRENT MED MERGE: CPT | Performed by: FAMILY MEDICINE

## 2020-01-15 RX ORDER — ONDANSETRON 4 MG/1
4 TABLET, ORALLY DISINTEGRATING ORAL PRN
COMMUNITY
Start: 2020-01-04 | End: 2020-01-15 | Stop reason: SDUPTHER

## 2020-01-15 RX ORDER — ARIPIPRAZOLE 2 MG/1
2 TABLET ORAL DAILY
Qty: 30 TABLET | Refills: 5 | Status: SHIPPED | OUTPATIENT
Start: 2020-01-15 | End: 2020-01-15

## 2020-01-15 RX ORDER — ARIPIPRAZOLE 2 MG/1
2 TABLET ORAL DAILY
Qty: 90 TABLET | Refills: 3 | Status: SHIPPED | OUTPATIENT
Start: 2020-01-15 | End: 2020-08-04

## 2020-01-15 RX ORDER — VORTIOXETINE 10 MG/1
10 TABLET, FILM COATED ORAL DAILY
COMMUNITY
Start: 2020-01-04 | End: 2020-01-15 | Stop reason: SDUPTHER

## 2020-01-15 RX ORDER — SUCRALFATE ORAL 1 G/10ML
1000 SUSPENSION ORAL 4 TIMES DAILY
COMMUNITY
Start: 2020-01-04 | End: 2020-01-20 | Stop reason: SDUPTHER

## 2020-01-15 RX ORDER — ARIPIPRAZOLE 2 MG/1
2 TABLET ORAL DAILY
COMMUNITY
Start: 2020-01-04 | End: 2020-01-15 | Stop reason: SDUPTHER

## 2020-01-15 RX ORDER — VORTIOXETINE 10 MG/1
10 TABLET, FILM COATED ORAL DAILY
Qty: 30 TABLET | Refills: 5 | Status: SHIPPED | OUTPATIENT
Start: 2020-01-15 | End: 2020-01-15

## 2020-01-15 RX ORDER — ONDANSETRON 4 MG/1
4 TABLET, ORALLY DISINTEGRATING ORAL EVERY 8 HOURS PRN
Qty: 60 TABLET | Refills: 2 | Status: SHIPPED | OUTPATIENT
Start: 2020-01-15 | End: 2020-08-26 | Stop reason: SDUPTHER

## 2020-01-15 ASSESSMENT — ENCOUNTER SYMPTOMS
EYE DISCHARGE: 0
WHEEZING: 0
COUGH: 0
NAUSEA: 1
EYE REDNESS: 0
ABDOMINAL PAIN: 0
RHINORRHEA: 0
SHORTNESS OF BREATH: 0
SORE THROAT: 0
DIARRHEA: 0
VOMITING: 0

## 2020-01-15 NOTE — PROGRESS NOTES
Transition Care Office Visit    Date of Face-to-Face: 1/15/2020    Persons at visit: spouse    Here for follow after hospitalization for: esophagitis      Activity:activity as tolerated    Any medication changes since post-hospitalization phone call? No    Any treatment changes since post-hospitalization phone call? No    Anyfurther education needed on medications/treatment plan? No      All Active Meds in Chart - may or may not be currentlytaking post-hospital  Current Outpatient Medications   Medication Sig Dispense Refill    sucralfate (CARAFATE) 1 GM/10ML suspension Take 1,000 mg by mouth 4 times daily      ondansetron (ZOFRAN-ODT) 4 MG disintegrating tablet Place 1 tablet under the tongue every 8 hours as needed for Nausea 60 tablet 2    VORTIoxetine (TRINTELLIX) 10 MG TABS tablet Take 1 tablet by mouth daily 90 tablet 3    ARIPiprazole (ABILIFY) 2 MG tablet Take 1 tablet by mouth daily 90 tablet 3    oxyCODONE (OXYCONTIN) 20 MG extended release tablet Take 1 tablet by mouth 2 times daily for 15 days. Intended supply: 30 days 30 tablet 0    oxyCODONE-acetaminophen (PERCOCET)  MG per tablet Take 1 tablet by mouth every 4 hours as needed for Pain for up to 30 days.  No more than 6 per day 180 tablet 0    nystatin (MYCOSTATIN) 382006 UNIT/ML suspension Take 10 mLs by mouth 4 times daily 120 mL 1    lisinopril (PRINIVIL;ZESTRIL) 30 MG tablet Take 1 tablet by mouth daily 90 tablet 3    naloxone 4 MG/0.1ML LIQD nasal spray 1 spray by Nasal route as needed for Opioid Reversal 1 each 5    sucralfate (CARAFATE) 1 GM tablet Take 1 tablet by mouth 4 times daily 120 tablet 3    lipase-protease-amylase (CREON) 10593-85175 units delayed release capsule Take 1 capsule by mouth 3 times daily (with meals) 180 capsule 3    butalbital-acetaminophen-caffeine (FIORICET, ESGIC) -40 MG per tablet Take 1 tablet by mouth every 4 hours as needed for Headaches 180 tablet 3    isosorbide mononitrate (IMDUR) 30 MG neuropathy    High Blood Pressure Father        Updated and reviewed pertinent [facility]    HPI (location, quality, severity, duration, timing, modifying factors, associated sign & symptoms - need >4 documented)    Pt admitted with recurrent vomiting. Underwent egd, which showed mild gastritis and esophagitis. Given protonix and carafate. Pt tolerated well, with no vomiting at DC.     ROS (2-9 systems - moderate, >10 systems complete)  Review of Systems   Constitutional: Negative for chills and fever. HENT: Negative for rhinorrhea and sore throat. Eyes: Negative for discharge and redness. Respiratory: Negative for cough, shortness of breath and wheezing. Cardiovascular: Negative for chest pain and palpitations. Gastrointestinal: Positive for nausea. Negative for abdominal pain, diarrhea and vomiting. Genitourinary: Negative for dysuria and frequency. Musculoskeletal: Negative for arthralgias and myalgias. Neurological: Negative for dizziness, light-headedness and headaches. Psychiatric/Behavioral: Negative for sleep disturbance. Vitals   /76   Pulse 76   Ht 5' 2.04\" (1.576 m)   Wt 140 lb 12.8 oz (63.9 kg)   BMI 25.72 kg/m²     Exam (moderate - affected and related systems, comprehensive 8 or more systems)  Physical Exam  Vitals signs and nursing note reviewed. Constitutional:       General: He is not in acute distress. Appearance: He is well-developed. HENT:      Head: Normocephalic and atraumatic. Eyes:      General: No scleral icterus. Right eye: No discharge. Left eye: No discharge. Conjunctiva/sclera: Conjunctivae normal.      Pupils: Pupils are equal, round, and reactive to light. Neck:      Thyroid: No thyromegaly. Trachea: No tracheal deviation. Cardiovascular:      Rate and Rhythm: Normal rate and regular rhythm. Heart sounds: Normal heart sounds.       Comments: No carotid bruits  Pulmonary:      Effort: Pulmonary effort is

## 2020-01-20 RX ORDER — SUCRALFATE ORAL 1 G/10ML
1 SUSPENSION ORAL 4 TIMES DAILY
Qty: 1200 ML | Refills: 0 | Status: SHIPPED | OUTPATIENT
Start: 2020-01-20 | End: 2020-01-24

## 2020-01-21 RX ORDER — OXYCODONE HCL 20 MG/1
20 TABLET, FILM COATED, EXTENDED RELEASE ORAL 2 TIMES DAILY
Qty: 30 TABLET | Refills: 0 | Status: SHIPPED | OUTPATIENT
Start: 2020-01-21 | End: 2020-01-28

## 2020-01-22 LAB
AVERAGE GLUCOSE: 119
HBA1C MFR BLD: 9.5 %

## 2020-01-23 ENCOUNTER — TELEPHONE (OUTPATIENT)
Dept: PRIMARY CARE CLINIC | Age: 50
End: 2020-01-23

## 2020-01-24 ENCOUNTER — TELEPHONE (OUTPATIENT)
Dept: PRIMARY CARE CLINIC | Age: 50
End: 2020-01-24

## 2020-01-24 RX ORDER — SUCRALFATE ORAL 1 G/10ML
2 SUSPENSION ORAL 4 TIMES DAILY
Qty: 2400 ML | Refills: 0 | Status: SHIPPED | OUTPATIENT
Start: 2020-01-24 | End: 2021-02-11

## 2020-01-28 ENCOUNTER — TELEPHONE (OUTPATIENT)
Dept: PRIMARY CARE CLINIC | Age: 50
End: 2020-01-28

## 2020-01-28 RX ORDER — OXYCODONE HCL 10 MG/1
10 TABLET, FILM COATED, EXTENDED RELEASE ORAL EVERY 8 HOURS
Qty: 90 TABLET | Refills: 0 | Status: SHIPPED | OUTPATIENT
Start: 2020-01-28 | End: 2020-01-28

## 2020-01-28 RX ORDER — OXYCODONE HCL 10 MG/1
10 TABLET, FILM COATED, EXTENDED RELEASE ORAL EVERY 8 HOURS
Qty: 90 TABLET | Refills: 0 | Status: SHIPPED | OUTPATIENT
Start: 2020-01-28 | End: 2020-01-29

## 2020-01-28 NOTE — TELEPHONE ENCOUNTER
Patient called back about this. He states that medication was sent to [facility].  [facility]     I called to cancel script at [facility]

## 2020-01-28 NOTE — TELEPHONE ENCOUNTER
Pt states he is ready to wean down to Oxycodone 10mg XR . Currently on 20mg XR, he has about one week left on these. Do you want to call in new script, or wait until these are done?   [facility] [place]

## 2020-01-29 ENCOUNTER — TELEPHONE (OUTPATIENT)
Dept: PRIMARY CARE CLINIC | Age: 50
End: 2020-01-29

## 2020-01-29 RX ORDER — OXYCODONE HCL 10 MG/1
10 TABLET, FILM COATED, EXTENDED RELEASE ORAL EVERY 12 HOURS
Qty: 60 TABLET | Refills: 0 | Status: SHIPPED | OUTPATIENT
Start: 2020-01-29 | End: 2020-02-24 | Stop reason: SDUPTHER

## 2020-01-29 NOTE — TELEPHONE ENCOUNTER
Pt asking if you can resend the Oxycontin 10mg as taking it bid, am and pm? Ins will not cover it with the way it was written, per pt. [facility] [address] [facility]

## 2020-02-05 ENCOUNTER — TELEPHONE (OUTPATIENT)
Dept: PRIMARY CARE CLINIC | Age: 50
End: 2020-02-05

## 2020-02-05 RX ORDER — OXYCODONE AND ACETAMINOPHEN 10; 325 MG/1; MG/1
1 TABLET ORAL EVERY 4 HOURS PRN
Qty: 180 TABLET | Refills: 0 | Status: SHIPPED | OUTPATIENT
Start: 2020-02-05 | End: 2020-03-02 | Stop reason: SDUPTHER

## 2020-02-05 RX ORDER — LORAZEPAM 0.5 MG/1
0.5 TABLET ORAL EVERY 8 HOURS PRN
Qty: 90 TABLET | Refills: 2 | Status: SHIPPED | OUTPATIENT
Start: 2020-02-05 | End: 2020-05-04

## 2020-02-05 NOTE — TELEPHONE ENCOUNTER
Pt asking for a refill on his Ativan 0.5 mg to go to [facility] [address] listed. Not allowing me to pend that one. Not sure why.

## 2020-02-24 RX ORDER — OXYCODONE HCL 10 MG/1
10 TABLET, FILM COATED, EXTENDED RELEASE ORAL EVERY 12 HOURS
Qty: 60 TABLET | Refills: 0 | Status: SHIPPED | OUTPATIENT
Start: 2020-02-24 | End: 2020-03-20 | Stop reason: SDUPTHER

## 2020-03-02 RX ORDER — OXYCODONE AND ACETAMINOPHEN 10; 325 MG/1; MG/1
1 TABLET ORAL EVERY 4 HOURS PRN
Qty: 180 TABLET | Refills: 0 | Status: SHIPPED | OUTPATIENT
Start: 2020-03-02 | End: 2020-03-30 | Stop reason: SDUPTHER

## 2020-03-04 ENCOUNTER — TELEPHONE (OUTPATIENT)
Dept: PRIMARY CARE CLINIC | Age: 50
End: 2020-03-04

## 2020-03-20 RX ORDER — OXYCODONE HCL 10 MG/1
10 TABLET, FILM COATED, EXTENDED RELEASE ORAL EVERY 12 HOURS
Qty: 60 TABLET | Refills: 0 | Status: SHIPPED | OUTPATIENT
Start: 2020-03-20 | End: 2020-04-20 | Stop reason: SDUPTHER

## 2020-03-20 NOTE — TELEPHONE ENCOUNTER
Patient called asking for a refill. He is trying to wean off and doesn't want to come into the office. Last seen 1/15/20    [facility] on [place].

## 2020-03-23 ENCOUNTER — TELEPHONE (OUTPATIENT)
Dept: PRIMARY CARE CLINIC | Age: 50
End: 2020-03-23

## 2020-03-23 NOTE — TELEPHONE ENCOUNTER
Pt having a dry cough, something different with sense of smell and taste x8 days. Is concerned because he heard this is COVID sx per TV. No fever. Pharm [facility].  Please advise

## 2020-03-30 RX ORDER — PANTOPRAZOLE SODIUM 40 MG/1
TABLET, DELAYED RELEASE ORAL
Qty: 60 TABLET | Refills: 3 | Status: SHIPPED | OUTPATIENT
Start: 2020-03-30 | End: 2021-02-11 | Stop reason: SDUPTHER

## 2020-03-30 RX ORDER — OXYCODONE AND ACETAMINOPHEN 10; 325 MG/1; MG/1
1 TABLET ORAL EVERY 4 HOURS PRN
Qty: 180 TABLET | Refills: 0 | Status: SHIPPED | OUTPATIENT
Start: 2020-03-30 | End: 2020-04-27 | Stop reason: SDUPTHER

## 2020-03-30 NOTE — TELEPHONE ENCOUNTER
Last seen 1/15/20  Last fill 3/2/20  Pt knows its a few days early, but states he will be busy this week to call, just wants to ready at pharm when he can  on Friday.        Please approve or deny

## 2020-04-13 RX ORDER — BUTALBITAL, ACETAMINOPHEN AND CAFFEINE 50; 325; 40 MG/1; MG/1; MG/1
1 TABLET ORAL EVERY 4 HOURS PRN
Qty: 180 TABLET | Refills: 3 | Status: SHIPPED | OUTPATIENT
Start: 2020-04-13 | End: 2020-09-16 | Stop reason: SDUPTHER

## 2020-04-14 ENCOUNTER — NURSE ONLY (OUTPATIENT)
Dept: PRIMARY CARE CLINIC | Age: 50
End: 2020-04-14
Payer: COMMERCIAL

## 2020-04-14 ENCOUNTER — TELEPHONE (OUTPATIENT)
Dept: PRIMARY CARE CLINIC | Age: 50
End: 2020-04-14

## 2020-04-14 PROCEDURE — 96372 THER/PROPH/DIAG INJ SC/IM: CPT | Performed by: FAMILY MEDICINE

## 2020-04-14 RX ORDER — PROMETHAZINE HYDROCHLORIDE 50 MG/ML
50 INJECTION, SOLUTION INTRAMUSCULAR; INTRAVENOUS ONCE
Status: COMPLETED | OUTPATIENT
Start: 2020-04-14 | End: 2020-04-14

## 2020-04-14 RX ORDER — PROMETHAZINE HYDROCHLORIDE 25 MG/1
25 TABLET ORAL EVERY 6 HOURS PRN
Qty: 56 TABLET | Refills: 2 | Status: SHIPPED | OUTPATIENT
Start: 2020-04-14 | End: 2020-04-28

## 2020-04-14 RX ADMIN — PROMETHAZINE HYDROCHLORIDE 50 MG: 50 INJECTION, SOLUTION INTRAMUSCULAR; INTRAVENOUS at 15:12

## 2020-04-20 RX ORDER — OXYCODONE HCL 10 MG/1
10 TABLET, FILM COATED, EXTENDED RELEASE ORAL EVERY 12 HOURS
Qty: 60 TABLET | Refills: 0 | Status: SHIPPED | OUTPATIENT
Start: 2020-04-20 | End: 2020-05-15 | Stop reason: SDUPTHER

## 2020-04-27 RX ORDER — OXYCODONE AND ACETAMINOPHEN 10; 325 MG/1; MG/1
1 TABLET ORAL EVERY 4 HOURS PRN
Qty: 180 TABLET | Refills: 0 | Status: SHIPPED | OUTPATIENT
Start: 2020-04-27 | End: 2020-05-27 | Stop reason: SDUPTHER

## 2020-04-27 RX ORDER — PROPRANOLOL HYDROCHLORIDE 20 MG/1
TABLET ORAL
Qty: 270 TABLET | Refills: 3 | Status: SHIPPED | OUTPATIENT
Start: 2020-04-27 | End: 2020-04-28 | Stop reason: SDUPTHER

## 2020-04-28 RX ORDER — PROPRANOLOL HYDROCHLORIDE 20 MG/1
TABLET ORAL
Qty: 90 TABLET | Refills: 0 | Status: SHIPPED | OUTPATIENT
Start: 2020-04-28 | End: 2021-01-22

## 2020-05-04 ENCOUNTER — OFFICE VISIT (OUTPATIENT)
Dept: PRIMARY CARE CLINIC | Age: 50
End: 2020-05-04
Payer: COMMERCIAL

## 2020-05-04 VITALS
HEIGHT: 62 IN | TEMPERATURE: 97.5 F | BODY MASS INDEX: 25.36 KG/M2 | HEART RATE: 66 BPM | DIASTOLIC BLOOD PRESSURE: 84 MMHG | SYSTOLIC BLOOD PRESSURE: 136 MMHG | OXYGEN SATURATION: 94 % | WEIGHT: 137.8 LBS

## 2020-05-04 LAB
AMPHETAMINE SCREEN, URINE: NEGATIVE
BARBITURATE SCREEN, URINE: NEGATIVE
BENZODIAZEPINE SCREEN, URINE: NEGATIVE
BUPRENORPHINE URINE: NEGATIVE
COCAINE METABOLITE SCREEN URINE: NEGATIVE
CREATININE URINE POCT: NORMAL
GABAPENTIN SCREEN, URINE: NEGATIVE
MDMA URINE: NEGATIVE
METHADONE SCREEN, URINE: NEGATIVE
METHAMPHETAMINE, URINE: NEGATIVE
MICROALBUMIN/CREAT 24H UR: NORMAL MG/G{CREAT}
MICROALBUMIN/CREAT UR-RTO: NORMAL
OPIATE SCREEN URINE: NEGATIVE
OXYCODONE SCREEN URINE: POSITIVE
PHENCYCLIDINE SCREEN URINE: NEGATIVE
PROPOXYPHENE SCREEN, URINE: NEGATIVE
THC SCREEN, URINE: POSITIVE
TRICYCLIC ANTIDEPRESSANTS, UR: NEGATIVE

## 2020-05-04 PROCEDURE — 4004F PT TOBACCO SCREEN RCVD TLK: CPT | Performed by: FAMILY MEDICINE

## 2020-05-04 PROCEDURE — 82044 UR ALBUMIN SEMIQUANTITATIVE: CPT | Performed by: FAMILY MEDICINE

## 2020-05-04 PROCEDURE — G8427 DOCREV CUR MEDS BY ELIG CLIN: HCPCS | Performed by: FAMILY MEDICINE

## 2020-05-04 PROCEDURE — 3046F HEMOGLOBIN A1C LEVEL >9.0%: CPT | Performed by: FAMILY MEDICINE

## 2020-05-04 PROCEDURE — 2022F DILAT RTA XM EVC RTNOPTHY: CPT | Performed by: FAMILY MEDICINE

## 2020-05-04 PROCEDURE — 80305 DRUG TEST PRSMV DIR OPT OBS: CPT | Performed by: FAMILY MEDICINE

## 2020-05-04 PROCEDURE — 99214 OFFICE O/P EST MOD 30 MIN: CPT | Performed by: FAMILY MEDICINE

## 2020-05-04 PROCEDURE — G8419 CALC BMI OUT NRM PARAM NOF/U: HCPCS | Performed by: FAMILY MEDICINE

## 2020-05-04 RX ORDER — LORAZEPAM 0.5 MG/1
0.5 TABLET ORAL EVERY 8 HOURS PRN
Qty: 90 TABLET | Refills: 2 | Status: SHIPPED | OUTPATIENT
Start: 2020-05-04 | End: 2020-07-29 | Stop reason: SDUPTHER

## 2020-05-04 ASSESSMENT — ENCOUNTER SYMPTOMS
SHORTNESS OF BREATH: 0
NAUSEA: 0
RHINORRHEA: 0
WHEEZING: 0
DIARRHEA: 0
EYE REDNESS: 0
VOMITING: 0
COUGH: 0
ABDOMINAL PAIN: 0
EYE DISCHARGE: 0
SORE THROAT: 0

## 2020-05-04 NOTE — PROGRESS NOTES
[address] [address]  Dept: [phone]    Juan Manuel Perez is a 52 y.o. male who presents today for his medical conditions/complaintsas noted below. Chief Complaint   Patient presents with    Medication Check       HPI:     HPI  Pt states having issues with anxiety. States feels like being backed in to a corner. No thoughts of hurting self or others. No change in pain medications. No light headed or dizziness. Pt states feels like he has no self work. States severe pain in both shoulders, up into neck. Pt states esophagus doing better, still using carafate. Pt has been trying to decrease his oxycontin. Having issues going lower than the 10mg. Patient states pain management will not do the injections until his hemoglobin A1c is less than 8. Patient states his last A1c was 8.9.       LDL Calculated (mg/dL)   Date Value   04/16/2019 97   05/21/2018 66   01/05/2018 66       (goal LDL is <100)   AST (U/L)   Date Value   05/23/2017 19     ALT (U/L)   Date Value   05/23/2017 17     BUN (mg/dL)   Date Value   11/14/2017 14     BP Readings from Last 3 Encounters:   05/04/20 136/84   01/15/20 128/76   12/11/19 (!) 150/92          (goal 120/80)    Past Medical History:   Diagnosis Date    Anxiety     Arthritis     CAD (coronary artery disease)     Calcaneal apophysitis     Corns     Depression     Diabetes type I ([address]     Gastroparesis     GERD (gastroesophageal reflux disease)     Headache(784.0)     Hearing loss     Hyperglycemia     Hyperlipidemia     Hypertension     Intussusception ([address]     MI (myocardial infarction) ([address] jan 2014    Neuropathy     Osteoarthritis     Panic attacks     Temporomandibular joint disorder       Past Surgical History:   Procedure Laterality Date    ABDOMEN SURGERY      insulin pump    ABDOMINAL EXPLORATION SURGERY  05/28/2016    bowel resection Rt. colon &terminal needed for Pain for up to 30 days. No more than 6 per day 180 tablet 0    oxyCODONE (OXYCONTIN) 10 MG extended release tablet Take 1 tablet by mouth every 12 hours for 30 days. Intended supply: 30 days 60 tablet 0    butalbital-acetaminophen-caffeine (FIORICET, ESGIC) -40 MG per tablet Take 1 tablet by mouth every 4 hours as needed for Headaches 180 tablet 3    pantoprazole (PROTONIX) 40 MG tablet TAKE ONE TABLET BY MOUTH TWICE DAILY 60 tablet 3    ARIPiprazole (ABILIFY) 2 MG tablet Take 1 tablet by mouth daily 90 tablet 3    nystatin (MYCOSTATIN) 845188 UNIT/ML suspension Take 10 mLs by mouth 4 times daily 120 mL 1    lisinopril (PRINIVIL;ZESTRIL) 30 MG tablet Take 1 tablet by mouth daily 90 tablet 3    naloxone 4 MG/0.1ML LIQD nasal spray 1 spray by Nasal route as needed for Opioid Reversal 1 each 5    sucralfate (CARAFATE) 1 GM tablet Take 1 tablet by mouth 4 times daily 120 tablet 3    lipase-protease-amylase (CREON) 75864-20319 units delayed release capsule Take 1 capsule by mouth 3 times daily (with meals) 180 capsule 3    isosorbide mononitrate (IMDUR) 30 MG extended release tablet TAKE 1 TABLET DAILY 90 tablet 3    atorvastatin (LIPITOR) 80 MG tablet TAKE 1 TABLET DAILY 90 tablet 3    clopidogrel (PLAVIX) 75 MG tablet TAKE 1 TABLET DAILY FOR DISEASE OF THE ARTERIES OF THE HEART 90 tablet 3    nitroGLYCERIN (NITROSTAT) 0.4 MG SL tablet Place 1 tablet under the tongue every 5 minutes as needed for Chest pain Indications: Disease of the Arteries of the Heart Take 1 tablet every 5 minutes times three as needed for chest pain 25 tablet 2    Insulin Aspart (NOVOLOG SC) Inject  into the skin. Insulin pump      sucralfate (CARAFATE) 1 GM/10ML suspension Take 20 mLs by mouth 4 times daily 2400 mL 0     No current facility-administered medications for this visit.       Facility-Administered Medications Ordered in Other Visits   Medication Dose Route Frequency Provider Last Rate Last Dose   

## 2020-05-05 ENCOUNTER — TELEPHONE (OUTPATIENT)
Dept: PRIMARY CARE CLINIC | Age: 50
End: 2020-05-05

## 2020-05-05 NOTE — TELEPHONE ENCOUNTER
Patient called and states his VORTIoxetine HBr (TRINTELLIX) 20 MG TABS tablet was sent to [facility] in [place] and he will need this to go to [facility].  Please resend to [facility])

## 2020-05-15 RX ORDER — OXYCODONE HCL 10 MG/1
10 TABLET, FILM COATED, EXTENDED RELEASE ORAL EVERY 12 HOURS
Qty: 60 TABLET | Refills: 0 | Status: SHIPPED | OUTPATIENT
Start: 2020-05-15 | End: 2020-06-11 | Stop reason: SDUPTHER

## 2020-05-27 RX ORDER — OXYCODONE AND ACETAMINOPHEN 10; 325 MG/1; MG/1
1 TABLET ORAL EVERY 4 HOURS PRN
Qty: 180 TABLET | Refills: 0 | Status: SHIPPED | OUTPATIENT
Start: 2020-05-27 | End: 2020-06-24 | Stop reason: SDUPTHER

## 2020-06-11 RX ORDER — OXYCODONE HCL 10 MG/1
10 TABLET, FILM COATED, EXTENDED RELEASE ORAL EVERY 12 HOURS
Qty: 60 TABLET | Refills: 0 | Status: SHIPPED | OUTPATIENT
Start: 2020-06-11 | End: 2020-07-09 | Stop reason: SDUPTHER

## 2020-06-24 RX ORDER — OXYCODONE AND ACETAMINOPHEN 10; 325 MG/1; MG/1
1 TABLET ORAL EVERY 4 HOURS PRN
Qty: 180 TABLET | Refills: 0 | Status: SHIPPED | OUTPATIENT
Start: 2020-06-24 | End: 2020-07-22 | Stop reason: SDUPTHER

## 2020-07-09 RX ORDER — OXYCODONE HCL 10 MG/1
10 TABLET, FILM COATED, EXTENDED RELEASE ORAL EVERY 12 HOURS
Qty: 60 TABLET | Refills: 0 | Status: SHIPPED | OUTPATIENT
Start: 2020-07-09 | End: 2020-08-10 | Stop reason: SDUPTHER

## 2020-07-22 RX ORDER — OXYCODONE AND ACETAMINOPHEN 10; 325 MG/1; MG/1
1 TABLET ORAL EVERY 4 HOURS PRN
Qty: 180 TABLET | Refills: 0 | Status: SHIPPED | OUTPATIENT
Start: 2020-07-22 | End: 2020-08-20 | Stop reason: SDUPTHER

## 2020-07-29 RX ORDER — LORAZEPAM 0.5 MG/1
0.5 TABLET ORAL EVERY 8 HOURS PRN
Qty: 90 TABLET | Refills: 2 | Status: SHIPPED | OUTPATIENT
Start: 2020-07-29 | End: 2020-10-21 | Stop reason: SDUPTHER

## 2020-08-04 ENCOUNTER — OFFICE VISIT (OUTPATIENT)
Dept: PRIMARY CARE CLINIC | Age: 50
End: 2020-08-04
Payer: COMMERCIAL

## 2020-08-04 VITALS
DIASTOLIC BLOOD PRESSURE: 84 MMHG | HEIGHT: 62 IN | OXYGEN SATURATION: 98 % | HEART RATE: 116 BPM | WEIGHT: 138 LBS | BODY MASS INDEX: 25.4 KG/M2 | SYSTOLIC BLOOD PRESSURE: 132 MMHG | TEMPERATURE: 97.5 F

## 2020-08-04 PROCEDURE — 99213 OFFICE O/P EST LOW 20 MIN: CPT | Performed by: FAMILY MEDICINE

## 2020-08-04 PROCEDURE — G8427 DOCREV CUR MEDS BY ELIG CLIN: HCPCS | Performed by: FAMILY MEDICINE

## 2020-08-04 PROCEDURE — 2022F DILAT RTA XM EVC RTNOPTHY: CPT | Performed by: FAMILY MEDICINE

## 2020-08-04 PROCEDURE — 3046F HEMOGLOBIN A1C LEVEL >9.0%: CPT | Performed by: FAMILY MEDICINE

## 2020-08-04 PROCEDURE — G8419 CALC BMI OUT NRM PARAM NOF/U: HCPCS | Performed by: FAMILY MEDICINE

## 2020-08-04 PROCEDURE — 4004F PT TOBACCO SCREEN RCVD TLK: CPT | Performed by: FAMILY MEDICINE

## 2020-08-04 RX ORDER — ARIPIPRAZOLE 5 MG/1
5 TABLET ORAL DAILY
Qty: 30 TABLET | Refills: 5 | Status: SHIPPED | OUTPATIENT
Start: 2020-08-04 | End: 2020-08-10 | Stop reason: SINTOL

## 2020-08-04 ASSESSMENT — ENCOUNTER SYMPTOMS
VOMITING: 0
SHORTNESS OF BREATH: 0
EYE DISCHARGE: 0
DIARRHEA: 0
COUGH: 0
RHINORRHEA: 0
NAUSEA: 0
WHEEZING: 0
ABDOMINAL PAIN: 0
EYE REDNESS: 0
SORE THROAT: 0

## 2020-08-04 ASSESSMENT — PATIENT HEALTH QUESTIONNAIRE - PHQ9
1. LITTLE INTEREST OR PLEASURE IN DOING THINGS: 1
SUM OF ALL RESPONSES TO PHQ QUESTIONS 1-9: 2
2. FEELING DOWN, DEPRESSED OR HOPELESS: 1
SUM OF ALL RESPONSES TO PHQ QUESTIONS 1-9: 2
SUM OF ALL RESPONSES TO PHQ9 QUESTIONS 1 & 2: 2

## 2020-08-04 NOTE — PROGRESS NOTES
[address] [address]  Dept: [phone]    Reese Robles is a 52 y.o. male who presents today for his medical conditions/complaintsas noted below. Chief Complaint   Patient presents with    Medication Check       HPI:     HPI  Patient here for follow-up. States his hemoglobin A1c still running high. Follows with endocrinology. Patient states still feeling very anxious and upset. Patient states upset that he cannot go to work. He sees everyone else going to work. Still having severe pain in the arms. States feels getting worse. Patient is seen pain management in the past will not prescribe medication. They will not do injections unless he has to A1c is under 8. Patient denies any thoughts of hurting himself or others. Patient states not checking blood pressure outside the office. Still using occasional marijuana but no other street drugs. Last drug screen reviewed.     LDL Calculated (mg/dL)   Date Value   04/16/2019 97   05/21/2018 66   01/05/2018 66       (goal LDL is <100)   AST (U/L)   Date Value   05/23/2017 19     ALT (U/L)   Date Value   05/23/2017 17     BUN (mg/dL)   Date Value   11/14/2017 14     BP Readings from Last 3 Encounters:   08/04/20 132/84   05/04/20 136/84   01/15/20 128/76          (goal 120/80)    Past Medical History:   Diagnosis Date    Anxiety     Arthritis     CAD (coronary artery disease)     Calcaneal apophysitis     Corns     Depression     Diabetes type I ([address]     Gastroparesis     GERD (gastroesophageal reflux disease)     Headache(784.0)     Hearing loss     Hyperglycemia     Hyperlipidemia     Hypertension     Intussusception ([address]     MI (myocardial infarction) ([address] jan 2014    Neuropathy     Osteoarthritis     Panic attacks     Temporomandibular joint disorder       Past Surgical History:   Procedure Laterality Date    ABDOMEN SURGERY      insulin pump    ABDOMINAL EXPLORATION SURGERY  05/28/2016    bowel resection Rt. colon &terminal ileum, intuss[address]ption. [address]  2014    CARPAL TUNNEL RELEASE      bilateral    COLONOSCOPY  05/23/2017    poor prep; diverticulosis    COLONOSCOPY N/A 7/10/2018    COLONOSCOPY POLYPECTOMY HOT BIOPSY performed by Devin Romeo MD at [facility]      right    HERNIA REPAIR      inguinal    KNEE SURGERY      IL COLON CA SCRN NOT  [address]  IND N/A 5/25/2017    COLONOSCOPY performed by Sohail Andersen MD at [address] ESOPHAGOGASTRODUODENOSCOPY TRANSORAL DIAGNOSTIC N/A 5/24/2017    EGD ESOPHAGOGASTRODUODENOSCOPY [facility]      fracture     TONSILLECTOMY      x2    TOOTH EXTRACTION      x4    ULNAR TUNNEL RELEASE      UPPER GASTROINTESTINAL ENDOSCOPY  05/23/2017    mild gastritis    UPPER GASTROINTESTINAL ENDOSCOPY N/A 7/10/2018    EGD BIOPSY performed by Devin Romeo MD at [facility]      x4       Family History   Problem Relation Age of Onset    Diabetes Mother     High Blood Pressure Mother     Heart Disease Mother     Migraines Mother     Other Mother         diabetic neuropathy    High Blood Pressure Father        Social History     Tobacco Use    Smoking status: Current Every Day Smoker     Packs/day: 1.00     Years: 30.00     Pack years: 30.00    Smokeless tobacco: Never Used   Substance Use Topics    Alcohol use: No     Comment: previous heavy ETOH use; pt stopped around 2010      Current Outpatient Medications   Medication Sig Dispense Refill    ARIPiprazole (ABILIFY) 5 MG tablet Take 1 tablet by mouth daily 30 tablet 5    LORazepam (ATIVAN) 0.5 MG tablet Take 1 tablet by mouth every 8 hours as needed for Anxiety for up to 30 days.  90 tablet 2    oxyCODONE-acetaminophen (PERCOCET)  MG per tablet Take 1 tablet by mouth every 4 hours as needed for Pain for up to 30 days. No more than 6 per day 180 tablet 0    oxyCODONE (OXYCONTIN) 10 MG extended release tablet Take 1 tablet by mouth every 12 hours for 30 days. Intended supply: 30 days 60 tablet 0    VORTIoxetine HBr (TRINTELLIX) 20 MG TABS tablet Take 1 tablet by mouth daily 90 tablet 3    propranolol (INDERAL) 20 MG tablet TAKE 1 TABLET THREE TIMES A DAY 90 tablet 0    butalbital-acetaminophen-caffeine (FIORICET, ESGIC) -40 MG per tablet Take 1 tablet by mouth every 4 hours as needed for Headaches 180 tablet 3    pantoprazole (PROTONIX) 40 MG tablet TAKE ONE TABLET BY MOUTH TWICE DAILY 60 tablet 3    nystatin (MYCOSTATIN) 875153 UNIT/ML suspension Take 10 mLs by mouth 4 times daily 120 mL 1    lisinopril (PRINIVIL;ZESTRIL) 30 MG tablet Take 1 tablet by mouth daily 90 tablet 3    naloxone 4 MG/0.1ML LIQD nasal spray 1 spray by Nasal route as needed for Opioid Reversal 1 each 5    sucralfate (CARAFATE) 1 GM tablet Take 1 tablet by mouth 4 times daily 120 tablet 3    lipase-protease-amylase (CREON) 91627-69001 units delayed release capsule Take 1 capsule by mouth 3 times daily (with meals) 180 capsule 3    isosorbide mononitrate (IMDUR) 30 MG extended release tablet TAKE 1 TABLET DAILY 90 tablet 3    atorvastatin (LIPITOR) 80 MG tablet TAKE 1 TABLET DAILY 90 tablet 3    clopidogrel (PLAVIX) 75 MG tablet TAKE 1 TABLET DAILY FOR DISEASE OF THE ARTERIES OF THE HEART 90 tablet 3    nitroGLYCERIN (NITROSTAT) 0.4 MG SL tablet Place 1 tablet under the tongue every 5 minutes as needed for Chest pain Indications: Disease of the Arteries of the Heart Take 1 tablet every 5 minutes times three as needed for chest pain 25 tablet 2    Insulin Aspart (NOVOLOG SC) Inject  into the skin. Insulin pump      sucralfate (CARAFATE) 1 GM/10ML suspension Take 20 mLs by mouth 4 times daily 2400 mL 0     No current facility-administered medications for this visit.       Facility-Administered Medications Ordered in Other Visits   Medication Dose Route Frequency Provider Last Rate Last Dose    lactated ringers infusion  75 mL/hr Intravenous Continuous Mikal Palomares MD         Allergies   Allergen Reactions    Avelox [Moxifloxacin]     Ciprofloxacin Other (See Comments)    Levofloxacin     Lyrica [Pregabalin]     Metoclopramide Other (See Comments)     Severe confusion and paranoid activity    Neurontin [Gabapentin]     Other      Can not have epidural injections, steroids, nerve blocks, or burning of nerves due to being diabetic    Tetanus Toxoids     Tramadol     Tramadol Hcl        Health Maintenance   Topic Date Due    HIV screen  12/02/1985    Hepatitis B vaccine (1 of 3 - Risk 3-dose series) 12/02/1989    Colon cancer screen colonoscopy  01/10/2019    Diabetic retinal exam  11/28/2019    Lipid screen  04/16/2020    A1C test (Diabetic or Prediabetic)  04/22/2020    Flu vaccine (1) 09/01/2020    Potassium monitoring  12/19/2020    Creatinine monitoring  12/19/2020    Diabetic foot exam  01/22/2021    Diabetic microalbuminuria test  05/04/2021    Pneumococcal 0-64 years Vaccine  Completed    Hepatitis A vaccine  Aged Out    Hib vaccine  Aged Out    Meningococcal (ACWY) vaccine  Aged Out       Subjective:      Review of Systems   Constitutional: Negative for chills and fever. HENT: Negative for rhinorrhea and sore throat. Eyes: Negative for discharge and redness. Respiratory: Negative for cough, shortness of breath and wheezing. Cardiovascular: Negative for chest pain and palpitations. Gastrointestinal: Negative for abdominal pain, diarrhea, nausea and vomiting. Genitourinary: Negative for dysuria and frequency. Musculoskeletal: Negative for arthralgias and myalgias. Neurological: Negative for dizziness, light-headedness and headaches. Psychiatric/Behavioral: Negative for sleep disturbance.        Objective:     /84   Pulse 116   Temp 97.5 °F (36.4 °C)   Ht 5' 2.04\" (1.576 m)   Wt 138 lb (62.6 kg)   SpO2 98%   BMI 25.21 kg/m²   Physical Exam  Vitals signs and nursing note reviewed. Constitutional:       General: He is not in acute distress. Appearance: He is well-developed. HENT:      Head: Normocephalic and atraumatic. Eyes:      General: No scleral icterus. Right eye: No discharge. Left eye: No discharge. Conjunctiva/sclera: Conjunctivae normal.      Pupils: Pupils are equal, round, and reactive to light. Neck:      Thyroid: No thyromegaly. Trachea: No tracheal deviation. Cardiovascular:      Rate and Rhythm: Normal rate and regular rhythm. Heart sounds: Normal heart sounds. Comments: No carotid bruits  Pulmonary:      Effort: Pulmonary effort is normal. No respiratory distress. Breath sounds: Normal breath sounds. No wheezing. Lymphadenopathy:      Cervical: No cervical adenopathy. Skin:     General: Skin is warm. Findings: No rash. Neurological:      Mental Status: He is alert and oriented to person, place, and time. Psychiatric:         Behavior: Behavior normal.         Thought Content: Thought content normal.         Assessment:       Diagnosis Orders   1. Cervical spondylosis     2. Depression with anxiety  [facility] Psychiatry   3. Type 1 diabetes mellitus with hyperglycemia (HCC)     4. Essential hypertension          Plan:    Increase Abilify to 5 mg daily. Referral to psychiatry to assist with management. Continue pain medication as is. Pain management will not assume prescription of medication and will not do injections unless A1c less than 8. Continue Trintellix at 20 mg daily. Continue with endocrinology for diabetes mellitus. Still not controlled. Return in about 3 months (around 11/4/2020).     Orders Placed This Encounter   Procedures   [facility]     Referral Priority:   Routine     Referral Type:   Eval and Treat     Referral Reason:   Specialty Services

## 2020-08-10 ENCOUNTER — TELEMEDICINE (OUTPATIENT)
Dept: PSYCHIATRY | Age: 50
End: 2020-08-10
Payer: COMMERCIAL

## 2020-08-10 PROBLEM — F33.9 MAJOR DEPRESSIVE DISORDER, RECURRENT EPISODE WITH ANXIOUS DISTRESS (HCC): Status: ACTIVE | Noted: 2020-08-10

## 2020-08-10 PROCEDURE — 90792 PSYCH DIAG EVAL W/MED SRVCS: CPT | Performed by: NURSE PRACTITIONER

## 2020-08-10 RX ORDER — OXYCODONE HCL 10 MG/1
10 TABLET, FILM COATED, EXTENDED RELEASE ORAL EVERY 12 HOURS
Qty: 60 TABLET | Refills: 0 | Status: SHIPPED | OUTPATIENT
Start: 2020-08-10 | End: 2020-09-04 | Stop reason: SDUPTHER

## 2020-08-10 NOTE — PROGRESS NOTES
Behavioral Health Consultation  Kurt Lesches, MSN, APRN-CNP, PMHNP-BC  8/10/2020, 9:20 AM      Time spent with Patient:  60 minutes  This was a telehealth visit. Patient Location: Home  Provider Location: Home office in [place], [place]  This virtual visit was conducted via interactive/real-time audio/video      Chief Complaint:Depression  Referring Provider:Dr. Baylee Garces. [place]:  Patient complains of a multi-year history of depression. He reports anhedonia, depressed mood, tearfulness, feelings of hopelessness, feelings of worthlessness/excessive guilt, insomnia, fatigue, decreased libido, difficulty concentrating, irritability, excessive worry, restlessness, panic attacks, increased use of drugs or alcohol and impaired memory. He reports difficulty falling asleep and early morning waking on most nights of the week. Pt reports taking ativan, trintellix, abilify. Pt Symptoms/signs of frederick: none. He denies hallucinations. Symptoms have been gradually worsening with time. External stressors: family issues and illness or family illness. Pt reports excessive worry or anxiety, difficulty controlling the worry, restlessness; feeling keyed up or on edge, easily fatigued, difficulty concentrating, irritability, sleep disturbance:  difficulty falling asleep, difficulty staying asleep and early morning waking, for at least 6 months, causing significant distress in important areas of function and does not occur exclusively during a mood disorder or psychotic disorder. Patient denies exposure to traumatic event, nor any symptoms of PTSD. 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 [address] [facility] states that he is eating one meal a day. Social:on disability since 2012 for diabetes. Reports using marijuana around three times a week, reports using a half a joint each time. Reports previous history of alcohol use. Past Psychiatric history:   The patient has a history of  depression and anxiety disorder.      Current treatment includes Anti-depressant: trintellix. Patient has akathisia from current treatment. Previous treatment has included: Paxil- felt this one did not work, Zoloft- felt this one did not work either, Lexapro- had an allergic reaction, Effexor-  Never a dose above 37.5mg for at least six month, Wellbutrin- feels like this was tried but can't remember trials, Cymbalta- felt that this one did not work, benzodiazepine- xanax, an ativan use, sleep aid- trazodone- felt this one did work. and individual therapy- did familycounseling . Family Mental Health history:   Pertinent family history: mother is starting dementia. MSE:    Appearance: alert, cooperative  Attention:Intact  Appetite: abnormal: eating only one meal a day. Ambulation: gait unable to assess due to telehealth. Sleep disturbance: No  Loss of pleasure: Yes  Speech: spontaneous, normal rate, normal volume and well articulated  Mood: Depressed  Affect: depressed affect  Thought Content: intact, hopelessness, helplessness and worthlessness  Insight: Fair  Judgment: Intact  Memory: Intact long-term and Intact short-term  Suicide Assessment: no suicidal ideation  Homicide Assessment: denies current homicidal ideation, plan and intent      History:      Review of Systems:   Constitutional: negative  HENT: negative  Eyes: positive for contacts  Respiratory: positive for current smoker  Cardiovascular: positive for HTN, hyperlipidemia  Gastrointestinal: positive for IBS, GERD. Genitourinary: negative  Musculoskeletal: positive for back pain, shoulder issues- torn rotator cuffs. Skin:negative  Neurological:positive for migraines. Endo/Heme/Allergies:positive for DM, lots of drug allergies. Current Outpatient Medications:     brexpiprazole (REXULTI) 1 MG TABS tablet, Take 0.5 tablets by mouth daily for 7 days, THEN 1 tablet daily for 23 days. , Disp: 27 tablet, Rfl: 0    LORazepam (ATIVAN) 0.5 MG tablet, Take 1 tablet by mouth minutes times three as needed for chest pain, Disp: 25 tablet, Rfl: 2    Insulin Aspart (NOVOLOG SC), Inject  into the skin. Insulin pump, Disp: , Rfl:      PDMP Monitoring:    Last PDMP Rikki as Reviewed [facility]):  Review User Review Instant Review Result          Last Controlled Substance Monitoring Documentation      Office Visit from 5/4/2020 in [facility]   Periodic Controlled Substance Monitoring  Possible medication side effects, risk of tolerance/dependence & alternative treatments discussed., No signs of potential drug abuse or diversion identified. , Random urine drug screen sent today. filed at 05/04/2020 1058   Chronic Pain > 50 MEDD  Obtained or confirmed \"Consent for Opioid Use\" on file. filed at 05/04/2020 1058   Chronic Pain > 120 MEDD  Obtained or confirmed \"Medication Contract\" on file.  filed at 05/04/2020 1058        Urine Drug Screenings (1 yr)     POCT Rapid Drug Screen  Collected: 5/4/2020 11:27 AM (Final result)    Complete Results          POCT Rapid Drug Screen  Collected: 11/11/2019 10:29 AM (Final result)    Complete Results          Urine Drug Screen  Resulted: 8/13/2019 (Final result)    Complete Results          Drug screen multi urine  Collected: 5/23/2017  1:20 PM (Final result)    Complete Results          Serum Drug Screen  Resulted: 11/15/2016 (Edited Result - FINAL)    Complete Results              Medication Contract and Consent for Opioid Use Documents Filed     Patient Documents       Type of Document Status Date Received Received By Description     Medication Contract Received 1/4/2018  3:34 PM JOIE SIMON 1/4/18 [facility] MEDICATION CONTRACT     Medication Contract Received 1/11/2019  1:31 PM SILER, Theodis Cheadle 1/11/19 MED CONTRACT     Medication Contract Received 4/11/2019  4:26 PM TARA HARRIS 4/10/19 [facility] MEDICATION CONTRACT     Medication Contract Received 6/24/2019  2:20 PM TARA HARRIS 6/21/19 [facility] MEDICATION CONTRACT     Medication Contract Received 12/18/2019 10:45 AM TARA HARRIS 12/11/19 [facility] MEDICATION CONTRACT                 OARRS checked and there were no concerns of substance abuse, or prescription misuse. Social History     Socioeconomic History    Marital status:      Spouse name: Not on file    Number of children: Not on file    Years of education: Not on file    Highest education level: Not on file   Occupational History    Occupation: disability   Social Needs    Financial resource strain: Not on file    Food insecurity     Worry: Not on file     Inability: Not on file   [employer] needs     Medical: Not on file     Non-medical: Not on file   Tobacco Use    Smoking status: Current Every Day Smoker     Packs/day: 1.00     Years: 30.00     Pack years: 30.00    Smokeless tobacco: Never Used   Substance and Sexual Activity    Alcohol use: No     Comment: previous heavy ETOH use; pt stopped around 2010    Drug use: No    Sexual activity: Not on file   Lifestyle    Physical activity     Days per week: Not on file     Minutes per session: Not on file    Stress: Not on file   Relationships    Social connections     Talks on phone: Not on file     Gets together: Not on file     Attends Restoration service: Not on file     Active member of club or organization: Not on file     Attends meetings of clubs or organizations: Not on file     Relationship status: Not on file    Intimate partner violence     Fear of current or ex partner: Not on file     Emotionally abused: Not on file     Physically abused: Not on file     Forced sexual activity: Not on file   Other Topics Concern    Not on file   Social History Narrative    Not on file       TOBACCO: Mily Diaz  reports that he has been smoking. He has a 30.00 pack-year smoking history. He has never used smokeless tobacco.  ETOH: Mily Diaz  reports no history of alcohol use.     Past Medical History:   Diagnosis Date    Anxiety     Arthritis     CAD (coronary therapy. No orders of the defined types were placed in this encounter. Orders Placed This Encounter   Medications    brexpiprazole (REXULTI) 1 MG TABS tablet     Sig: Take 0.5 tablets by mouth daily for 7 days, THEN 1 tablet daily for 23 days.      Dispense:  27 tablet     Refill:  0       Pt interventions:    Discussed importance of medication adherence, Discussed risks, benefits, side effects of medication and need for follow up treatment, Discussed benefits of referral for specialty care and Discussed self-care (sleep, nutrition, rewarding activities, social support, exercise)

## 2020-08-15 ENCOUNTER — APPOINTMENT (OUTPATIENT)
Dept: CT IMAGING | Age: 50
End: 2020-08-15
Payer: COMMERCIAL

## 2020-08-15 ENCOUNTER — HOSPITAL ENCOUNTER (EMERGENCY)
Age: 50
Discharge: HOME | End: 2020-08-15
Attending: EMERGENCY MEDICINE
Payer: COMMERCIAL

## 2020-08-15 ENCOUNTER — APPOINTMENT (OUTPATIENT)
Dept: GENERAL RADIOLOGY | Age: 50
End: 2020-08-15
Payer: COMMERCIAL

## 2020-08-15 VITALS
RESPIRATION RATE: 13 BRPM | OXYGEN SATURATION: 97 % | SYSTOLIC BLOOD PRESSURE: 151 MMHG | HEART RATE: 118 BPM | TEMPERATURE: 98.4 F | HEIGHT: 68 IN | BODY MASS INDEX: 20.92 KG/M2 | WEIGHT: 138 LBS | DIASTOLIC BLOOD PRESSURE: 101 MMHG

## 2020-08-15 PROCEDURE — 72128 CT CHEST SPINE W/O DYE: CPT

## 2020-08-15 PROCEDURE — 6370000000 HC RX 637 (ALT 250 FOR IP): Performed by: PHYSICIAN ASSISTANT

## 2020-08-15 PROCEDURE — 96372 THER/PROPH/DIAG INJ SC/IM: CPT

## 2020-08-15 PROCEDURE — 6360000002 HC RX W HCPCS: Performed by: PHYSICIAN ASSISTANT

## 2020-08-15 PROCEDURE — 99284 EMERGENCY DEPT VISIT MOD MDM: CPT

## 2020-08-15 PROCEDURE — 71045 X-RAY EXAM CHEST 1 VIEW: CPT

## 2020-08-15 PROCEDURE — 72125 CT NECK SPINE W/O DYE: CPT

## 2020-08-15 PROCEDURE — 70450 CT HEAD/BRAIN W/O DYE: CPT

## 2020-08-15 RX ORDER — OXYCODONE HYDROCHLORIDE 5 MG/1
10 TABLET ORAL ONCE
Status: COMPLETED | OUTPATIENT
Start: 2020-08-15 | End: 2020-08-15

## 2020-08-15 RX ORDER — CYCLOBENZAPRINE HCL 10 MG
10 TABLET ORAL 2 TIMES DAILY PRN
Qty: 12 TABLET | Refills: 0 | Status: SHIPPED | OUTPATIENT
Start: 2020-08-15 | End: 2020-08-15

## 2020-08-15 RX ORDER — ORPHENADRINE CITRATE 30 MG/ML
60 INJECTION INTRAMUSCULAR; INTRAVENOUS ONCE
Status: COMPLETED | OUTPATIENT
Start: 2020-08-15 | End: 2020-08-15

## 2020-08-15 RX ADMIN — OXYCODONE 10 MG: 5 TABLET ORAL at 17:03

## 2020-08-15 RX ADMIN — ORPHENADRINE CITRATE 60 MG: 30 INJECTION INTRAMUSCULAR; INTRAVENOUS at 17:04

## 2020-08-15 ASSESSMENT — PAIN DESCRIPTION - PAIN TYPE: TYPE: ACUTE PAIN

## 2020-08-15 ASSESSMENT — PAIN SCALES - GENERAL
PAINLEVEL_OUTOF10: 8
PAINLEVEL_OUTOF10: 8

## 2020-08-15 ASSESSMENT — PAIN DESCRIPTION - ORIENTATION: ORIENTATION: UPPER

## 2020-08-15 ASSESSMENT — PAIN DESCRIPTION - LOCATION: LOCATION: BACK
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
## 2020-08-15 NOTE — ED NOTES
Bed: 10  Expected date:   Expected time:   Means of arrival:   Comments:  Luzmaria Quintero RN  08/15/20 0110
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
## 2020-08-17 ENCOUNTER — OFFICE VISIT (OUTPATIENT)
Dept: PRIMARY CARE CLINIC | Age: 50
End: 2020-08-17
Payer: COMMERCIAL

## 2020-08-17 VITALS
SYSTOLIC BLOOD PRESSURE: 168 MMHG | DIASTOLIC BLOOD PRESSURE: 108 MMHG | WEIGHT: 137.6 LBS | OXYGEN SATURATION: 97 % | BODY MASS INDEX: 20.92 KG/M2 | HEART RATE: 101 BPM | TEMPERATURE: 97.3 F

## 2020-08-17 PROCEDURE — G8427 DOCREV CUR MEDS BY ELIG CLIN: HCPCS | Performed by: PHYSICIAN ASSISTANT

## 2020-08-17 PROCEDURE — 4004F PT TOBACCO SCREEN RCVD TLK: CPT | Performed by: PHYSICIAN ASSISTANT

## 2020-08-17 PROCEDURE — G8420 CALC BMI NORM PARAMETERS: HCPCS | Performed by: PHYSICIAN ASSISTANT

## 2020-08-17 PROCEDURE — 99214 OFFICE O/P EST MOD 30 MIN: CPT | Performed by: PHYSICIAN ASSISTANT

## 2020-08-17 RX ORDER — OXYCODONE HYDROCHLORIDE AND ACETAMINOPHEN 5; 325 MG/1; MG/1
1 TABLET ORAL EVERY 6 HOURS PRN
Qty: 20 TABLET | Refills: 0 | Status: SHIPPED | OUTPATIENT
Start: 2020-08-17 | End: 2020-08-22

## 2020-08-17 ASSESSMENT — ENCOUNTER SYMPTOMS
DIARRHEA: 0
SHORTNESS OF BREATH: 0
PHOTOPHOBIA: 0
SINUS PRESSURE: 0
EYE DISCHARGE: 0
CONSTIPATION: 0
BACK PAIN: 1
ABDOMINAL PAIN: 0
SORE THROAT: 0
CHEST TIGHTNESS: 0
RHINORRHEA: 0
VOMITING: 0
COUGH: 0
ABDOMINAL DISTENTION: 0
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
## 2020-08-20 RX ORDER — OXYCODONE AND ACETAMINOPHEN 10; 325 MG/1; MG/1
1 TABLET ORAL EVERY 4 HOURS PRN
Qty: 180 TABLET | Refills: 0 | Status: SHIPPED | OUTPATIENT
Start: 2020-08-20 | End: 2020-09-16 | Stop reason: SDUPTHER

## 2020-08-24 ENCOUNTER — TELEMEDICINE (OUTPATIENT)
Dept: PSYCHIATRY | Age: 50
End: 2020-08-24
Payer: COMMERCIAL

## 2020-08-24 PROCEDURE — 99213 OFFICE O/P EST LOW 20 MIN: CPT | Performed by: NURSE PRACTITIONER

## 2020-08-24 PROCEDURE — G8427 DOCREV CUR MEDS BY ELIG CLIN: HCPCS | Performed by: NURSE PRACTITIONER

## 2020-08-24 NOTE — PROGRESS NOTES
familycounseling . Family Mental Health history:   Pertinent family history: mother is starting dementia. MSE:    Appearance: alert, cooperative  Attention:Intact  Appetite: normal  Ambulation: unable to assess due to telehealth. Sleep disturbance: Yes  Loss of pleasure: No  Speech: spontaneous, normal rate, normal volume and well articulated  Mood: Depressed  Affect: depressed affect  Thought Content: intact  Insight: Fair  Judgment: Intact  Memory: Intact long-term and Intact short-term  Suicide Assessment: no suicidal ideation  Homicide Assessment: denies current homicidal ideation, plan and intent    History:      Review of Systems:   Constitutional: negative  HENT: negative  Eyes: positive for contacts  Respiratory: positive for current smoker  Cardiovascular: positive for HTN, hyperlipidemia  Gastrointestinal: positive for IBS, GERD. Genitourinary: negative  Musculoskeletal: positive for back pain, shoulder issues- torn rotator cuffs. Skin:negative  Neurological:positive for migraines. Endo/Heme/Allergies:positive for DM, lots of drug allergies. Current Outpatient Medications:     brexpiprazole (REXULTI) 0.5 MG TABS tablet, Take 1 tablet by mouth daily, Disp: 30 tablet, Rfl: 0    oxyCODONE-acetaminophen (PERCOCET)  MG per tablet, Take 1 tablet by mouth every 4 hours as needed for Pain for up to 30 days. No more than 6 per day, Disp: 180 tablet, Rfl: 0    oxyCODONE (OXYCONTIN) 10 MG extended release tablet, Take 1 tablet by mouth every 12 hours for 30 days. Intended supply: 30 days, Disp: 60 tablet, Rfl: 0    LORazepam (ATIVAN) 0.5 MG tablet, Take 1 tablet by mouth every 8 hours as needed for Anxiety for up to 30 days.  (Patient not taking: Reported on 8/17/2020), Disp: 90 tablet, Rfl: 2    VORTIoxetine HBr (TRINTELLIX) 20 MG TABS tablet, Take 1 tablet by mouth daily, Disp: 90 tablet, Rfl: 3    propranolol (INDERAL) 20 MG tablet, TAKE 1 TABLET THREE TIMES A DAY, Disp: 90 tablet, Rfl: 0    butalbital-acetaminophen-caffeine (FIORICET, ESGIC) -40 MG per tablet, Take 1 tablet by mouth every 4 hours as needed for Headaches, Disp: 180 tablet, Rfl: 3    pantoprazole (PROTONIX) 40 MG tablet, TAKE ONE TABLET BY MOUTH TWICE DAILY, Disp: 60 tablet, Rfl: 3    sucralfate (CARAFATE) 1 GM/10ML suspension, Take 20 mLs by mouth 4 times daily, Disp: 2400 mL, Rfl: 0    nystatin (MYCOSTATIN) 054568 UNIT/ML suspension, Take 10 mLs by mouth 4 times daily, Disp: 120 mL, Rfl: 1    lisinopril (PRINIVIL;ZESTRIL) 30 MG tablet, Take 1 tablet by mouth daily, Disp: 90 tablet, Rfl: 3    naloxone 4 MG/0.1ML LIQD nasal spray, 1 spray by Nasal route as needed for Opioid Reversal (Patient not taking: Reported on 8/17/2020), Disp: 1 each, Rfl: 5    sucralfate (CARAFATE) 1 GM tablet, Take 1 tablet by mouth 4 times daily, Disp: 120 tablet, Rfl: 3    lipase-protease-amylase (CREON) 42495-07199 units delayed release capsule, Take 1 capsule by mouth 3 times daily (with meals) (Patient not taking: Reported on 8/17/2020), Disp: 180 capsule, Rfl: 3    isosorbide mononitrate (IMDUR) 30 MG extended release tablet, TAKE 1 TABLET DAILY, Disp: 90 tablet, Rfl: 3    atorvastatin (LIPITOR) 80 MG tablet, TAKE 1 TABLET DAILY, Disp: 90 tablet, Rfl: 3    clopidogrel (PLAVIX) 75 MG tablet, TAKE 1 TABLET DAILY FOR DISEASE OF THE ARTERIES OF THE HEART, Disp: 90 tablet, Rfl: 3    nitroGLYCERIN (NITROSTAT) 0.4 MG SL tablet, Place 1 tablet under the tongue every 5 minutes as needed for Chest pain Indications: Disease of the Arteries of the Heart Take 1 tablet every 5 minutes times three as needed for chest pain, Disp: 25 tablet, Rfl: 2    Insulin Aspart (NOVOLOG SC), Inject  into the skin.  Insulin pump, Disp: , Rfl:      PDMP Monitoring:    Last PDMP Rikki as Reviewed [facility]):  Review User Review Instant Review Result   Abby Smith 8/24/2020  8:27 AM Reviewed PDMP [1]     Last Controlled Substance Monitoring Documentation Telemedicine from 8/10/2020 in [address] [place]   Periodic Controlled Substance Monitoring  No signs of potential drug abuse or diversion identified. filed at 08/10/2020 [id number]        Urine Drug Screenings (1 yr)     POCT Rapid Drug Screen  Collected: 5/4/2020 11:27 AM (Final result)    Complete Results          POCT Rapid Drug Screen  Collected: 11/11/2019 10:29 AM (Final result)    Complete Results          Urine Drug Screen  Resulted: 8/13/2019 (Final result)    Complete Results          Drug screen multi urine  Collected: 5/23/2017  1:20 PM (Final result)    Complete Results          Serum Drug Screen  Resulted: 11/15/2016 (Edited Result - FINAL)    Complete Results              Medication Contract and Consent for Opioid Use Documents Filed     Patient Documents       Type of Document Status Date Received Received By Description     Medication Contract Received 1/4/2018  3:34 PM JOIE SIMON 1/4/18 [facility] MEDICATION CONTRACT     Medication Contract Received 1/11/2019  1:31 PM Fredy Girard 1/11/19 MED CONTRACT     Medication Contract Received 4/11/2019  4:26 PM TARA HARRIS 4/10/19 [facility] MEDICATION CONTRACT     Medication Contract Received 6/24/2019  2:20 PM TARA HARRIS 6/21/19 [facility] MEDICATION CONTRACT     Medication Contract Received 12/18/2019 10:45 AM TARA HARRIS 12/11/19 [facility] MEDICATION CONTRACT     Medication Contract Received 8/19/2020  9:47 AM GAGE PARADA 8/19/2020 [facility] MEDICATION CONTRACT                 OARRS checked and there were no signs of substance abuse, or prescription misuse.          Social History     Socioeconomic History    Marital status:      Spouse name: Not on file    Number of children: Not on file    Years of education: Not on file    Highest education level: Not on file   Occupational History    Occupation: disability   Social Needs    Financial resource strain: Not on file    Food insecurity     Worry: Not on file     Inability: Not on file    Transportation needs     Medical: Not on file     Non-medical: Not on file   Tobacco Use    Smoking status: Current Every Day Smoker     Packs/day: 1.00     Years: 30.00     Pack years: 30.00    Smokeless tobacco: Never Used   Substance and Sexual Activity    Alcohol use: No     Comment: previous heavy ETOH use; pt stopped around 2010    Drug use: No    Sexual activity: Not on file   Lifestyle    Physical activity     Days per week: Not on file     Minutes per session: Not on file    Stress: Not on file   Relationships    Social connections     Talks on phone: Not on file     Gets together: Not on file     Attends Mandaen service: Not on file     Active member of club or organization: Not on file     Attends meetings of clubs or organizations: Not on file     Relationship status: Not on file    Intimate partner violence     Fear of current or ex partner: Not on file     Emotionally abused: Not on file     Physically abused: Not on file     Forced sexual activity: Not on file   Other Topics Concern    Not on file   Social History Narrative    Not on file       TOBACCO: Miranda Del Toro  reports that he has been smoking. He has a 30.00 pack-year smoking history. He has never used smokeless tobacco.  ETOH: Miranda Del Toro  reports no history of alcohol use. Past Medical History:   Diagnosis Date    Anxiety     Arthritis     CAD (coronary artery disease)     Calcaneal apophysitis     Corns     Depression     Diabetes type I ([address]     Gastroparesis     GERD (gastroesophageal reflux disease)     Headache(784.0)     Hearing loss     Hyperglycemia     Hyperlipidemia     Hypertension     Intussusception ([address]     MI (myocardial infarction) [facility] jan 2014    Neuropathy     Osteoarthritis     Panic attacks     Temporomandibular joint disorder       Metabolic monitoring is being done by PCP.    Family History   Problem Relation Age of Onset    Diabetes Mother     High Blood Pressure Mother     (sleep, nutrition, rewarding activities, social support, exercise)

## 2020-08-26 RX ORDER — ONDANSETRON 4 MG/1
4 TABLET, ORALLY DISINTEGRATING ORAL EVERY 8 HOURS PRN
Qty: 60 TABLET | Refills: 2 | Status: SHIPPED | OUTPATIENT
Start: 2020-08-26 | End: 2020-09-25

## 2020-09-04 LAB
AVERAGE GLUCOSE: NORMAL
HBA1C MFR BLD: 9.1 %

## 2020-09-04 RX ORDER — OXYCODONE HCL 10 MG/1
10 TABLET, FILM COATED, EXTENDED RELEASE ORAL EVERY 12 HOURS
Qty: 60 TABLET | Refills: 0 | Status: SHIPPED | OUTPATIENT
Start: 2020-09-04 | End: 2020-10-02 | Stop reason: SDUPTHER

## 2020-09-16 RX ORDER — OXYCODONE HCL 10 MG/1
10 TABLET, FILM COATED, EXTENDED RELEASE ORAL EVERY 12 HOURS
Qty: 60 TABLET | Refills: 0 | Status: CANCELLED | OUTPATIENT
Start: 2020-09-16 | End: 2020-10-16

## 2020-09-16 RX ORDER — BUTALBITAL, ACETAMINOPHEN AND CAFFEINE 50; 325; 40 MG/1; MG/1; MG/1
1 TABLET ORAL EVERY 4 HOURS PRN
Qty: 180 TABLET | Refills: 3 | Status: SHIPPED | OUTPATIENT
Start: 2020-09-16 | End: 2021-01-22

## 2020-09-16 RX ORDER — OXYCODONE AND ACETAMINOPHEN 10; 325 MG/1; MG/1
1 TABLET ORAL EVERY 4 HOURS PRN
Qty: 180 TABLET | Refills: 0 | Status: SHIPPED | OUTPATIENT
Start: 2020-09-16 | End: 2020-10-12 | Stop reason: SDUPTHER

## 2020-09-29 ENCOUNTER — TELEMEDICINE (OUTPATIENT)
Dept: PSYCHIATRY | Age: 50
End: 2020-09-29
Payer: COMMERCIAL

## 2020-09-29 PROCEDURE — G8427 DOCREV CUR MEDS BY ELIG CLIN: HCPCS | Performed by: NURSE PRACTITIONER

## 2020-09-29 PROCEDURE — 99213 OFFICE O/P EST LOW 20 MIN: CPT | Performed by: NURSE PRACTITIONER

## 2020-09-29 NOTE — PROGRESS NOTES
Take 20 mLs by mouth 4 times daily, Disp: 2400 mL, Rfl: 0    nystatin (MYCOSTATIN) 397429 UNIT/ML suspension, Take 10 mLs by mouth 4 times daily, Disp: 120 mL, Rfl: 1    lisinopril (PRINIVIL;ZESTRIL) 30 MG tablet, Take 1 tablet by mouth daily, Disp: 90 tablet, Rfl: 3    naloxone 4 MG/0.1ML LIQD nasal spray, 1 spray by Nasal route as needed for Opioid Reversal (Patient not taking: Reported on 8/17/2020), Disp: 1 each, Rfl: 5    sucralfate (CARAFATE) 1 GM tablet, Take 1 tablet by mouth 4 times daily, Disp: 120 tablet, Rfl: 3    lipase-protease-amylase (CREON) 70553-98565 units delayed release capsule, Take 1 capsule by mouth 3 times daily (with meals) (Patient not taking: Reported on 8/17/2020), Disp: 180 capsule, Rfl: 3    isosorbide mononitrate (IMDUR) 30 MG extended release tablet, TAKE 1 TABLET DAILY, Disp: 90 tablet, Rfl: 3    atorvastatin (LIPITOR) 80 MG tablet, TAKE 1 TABLET DAILY, Disp: 90 tablet, Rfl: 3    clopidogrel (PLAVIX) 75 MG tablet, TAKE 1 TABLET DAILY FOR DISEASE OF THE ARTERIES OF THE HEART, Disp: 90 tablet, Rfl: 3    nitroGLYCERIN (NITROSTAT) 0.4 MG SL tablet, Place 1 tablet under the tongue every 5 minutes as needed for Chest pain Indications: Disease of the Arteries of the Heart Take 1 tablet every 5 minutes times three as needed for chest pain, Disp: 25 tablet, Rfl: 2    Insulin Aspart (NOVOLOG SC), Inject  into the skin. Insulin pump, Disp: , Rfl:      PDMP Monitoring:    Last PDMP Rikki as Reviewed [facility]):  Review User Review Instant Review Result   Arvin Sherman 9/29/2020  2:07 PM Reviewed PDMP [1]     Last Controlled Substance Monitoring Documentation      Telemedicine from 8/24/2020 in [address]   Periodic Controlled Substance Monitoring  No signs of potential drug abuse or diversion identified.  filed at 08/24/2020 0827        Urine Drug Screenings (1 yr)     POCT Rapid Drug Screen  Collected: 5/4/2020 11:27 AM (Final result)    Complete Results POCT Rapid Drug Screen  Collected: 11/11/2019 10:29 AM (Final result)    Complete Results          Urine Drug Screen  Resulted: 8/13/2019 (Final result)    Complete Results          Drug screen multi urine  Collected: 5/23/2017  1:20 PM (Final result)    Complete Results          Serum Drug Screen  Resulted: 11/15/2016 (Edited Result - FINAL)    Complete Results              Medication Contract and Consent for Opioid Use Documents Filed     Patient Documents       Type of Document Status Date Received Received By Description     Medication Contract Received 1/4/2018  3:34 PM JOIE SIMON 1/4/18 [facility] MEDICATION CONTRACT     Medication Contract Received 1/11/2019  1:31 PM Peg Fuchs 1/11/19 MED CONTRACT     Medication Contract Received 4/11/2019  4:26 PM TARA HARRIS 4/10/19 [facility] MEDICATION CONTRACT     Medication Contract Received 6/24/2019  2:20 PM TARA HARRIS 6/21/19 [facility] MEDICATION CONTRACT     Medication Contract Received 12/18/2019 10:45 AM TARA HARRIS 12/11/19 [facility] MEDICATION CONTRACT     Medication Contract Received 8/19/2020  9:47 AM GAGE PARADA 8/19/2020 [facility] MEDICATION CONTRACT                 [place]AR[place] checked and there were no signs of substance abuse, or prescription misuse.          Social History     Socioeconomic History    Marital status:      Spouse name: Not on file    Number of children: Not on file    Years of education: Not on file    Highest education level: Not on file   Occupational History    Occupation: disability   Social Needs    Financial resource strain: Not on file    Food insecurity     Worry: Not on file     Inability: Not on file   [employer] needs     Medical: Not on file     Non-medical: Not on file   Tobacco Use    Smoking status: Current Every Day Smoker     Packs/day: 1.00     Years: 30.00     Pack years: 30.00    Smokeless tobacco: Never Used   Substance and Sexual Activity    Alcohol use: No     Comment: previous heavy HGB 15.5 11/14/2017    HCT 45.9 11/14/2017    MCV 81 11/14/2017     11/14/2017    LYMPHOPCT 22.5 11/14/2017    RBC 5.69 11/14/2017    MCH 27.3 11/14/2017    MCHC 33.9 11/14/2017    RDW 14.5 05/25/2017          Lab Results   Component Value Date     11/14/2017    K 3.9 11/14/2017    CL 93 11/14/2017    CO2 25 11/14/2017    BUN 14 11/14/2017    CREATININE 0.90 11/14/2017    GLUCOSE 418 (HH) 11/06/2018    CALCIUM 9.6 11/14/2017    PROT 6.4 05/23/2017    LABALBU 4.1 05/23/2017    BILITOT 0.77 05/23/2017    ALKPHOS 100 05/23/2017    AST 19 05/23/2017    ALT 17 05/23/2017    LABGLOM >60 05/24/2017    GFRAA >60 05/24/2017    GLOB NOT REPORTED 05/28/2016      . last    Diagnosis:      1. Major depressive disorder, recurrent episode with anxious distress ([facility] [address]      Plan:    Discussed keeping the rexulti the same. Discussed risks and benefits of medications, as well as need for yearly lab work. Follow up with [facility] for continued therapy. 25 minutes spent face to face with greater than 50% was spent coordinating care, and therapy. Continue work with PCP to manage medical concerns, and [facility] for continued follow-up. No orders of the defined types were placed in this encounter. No orders of the defined types were placed in this encounter.       Pt interventions:    Discussed importance of medication adherence, Discussed risks, benefits, side effects of medication and need for follow up treatment, Discussed benefits of referral for specialty care and Discussed self-care (sleep, nutrition, rewarding activities, social support, exercise)

## 2020-10-02 RX ORDER — OXYCODONE HCL 10 MG/1
10 TABLET, FILM COATED, EXTENDED RELEASE ORAL EVERY 12 HOURS
Qty: 60 TABLET | Refills: 0 | Status: SHIPPED | OUTPATIENT
Start: 2020-10-02 | End: 2020-10-30 | Stop reason: SDUPTHER

## 2020-10-12 RX ORDER — OXYCODONE AND ACETAMINOPHEN 10; 325 MG/1; MG/1
1 TABLET ORAL EVERY 4 HOURS PRN
Qty: 180 TABLET | Refills: 0 | Status: SHIPPED | OUTPATIENT
Start: 2020-10-12 | End: 2020-11-10 | Stop reason: SDUPTHER

## 2020-10-21 RX ORDER — LORAZEPAM 0.5 MG/1
0.5 TABLET ORAL EVERY 8 HOURS PRN
Qty: 90 TABLET | Refills: 2 | Status: SHIPPED | OUTPATIENT
Start: 2020-10-21 | End: 2021-01-18

## 2020-10-29 NOTE — TELEPHONE ENCOUNTER
Collaborated with Dr. Marbella Bliss - patient is NOT cleared for surgery at this time. To encourage follow through with recommendations 1, 2 and 3 below. Will need this data before we can safely make decision about clearing him for surgery. He is being contacted to schedule with coumadin clinic too.      Jolanta Galloway will call Monday LOV 08/17/20-  Last Rx 10/02/20-  [facility] [address] [place] [address]

## 2020-10-30 RX ORDER — OXYCODONE HCL 10 MG/1
10 TABLET, FILM COATED, EXTENDED RELEASE ORAL EVERY 12 HOURS
Qty: 60 TABLET | Refills: 0 | Status: SHIPPED | OUTPATIENT
Start: 2020-10-30 | End: 2020-11-10

## 2020-11-10 ENCOUNTER — OFFICE VISIT (OUTPATIENT)
Dept: PRIMARY CARE CLINIC | Age: 50
End: 2020-11-10
Payer: COMMERCIAL

## 2020-11-10 VITALS
HEIGHT: 68 IN | DIASTOLIC BLOOD PRESSURE: 100 MMHG | TEMPERATURE: 97.7 F | SYSTOLIC BLOOD PRESSURE: 158 MMHG | HEART RATE: 113 BPM | OXYGEN SATURATION: 98 % | BODY MASS INDEX: 20.64 KG/M2 | WEIGHT: 136.2 LBS

## 2020-11-10 PROCEDURE — 99214 OFFICE O/P EST MOD 30 MIN: CPT | Performed by: FAMILY MEDICINE

## 2020-11-10 PROCEDURE — 90471 IMMUNIZATION ADMIN: CPT | Performed by: FAMILY MEDICINE

## 2020-11-10 PROCEDURE — 90686 IIV4 VACC NO PRSV 0.5 ML IM: CPT | Performed by: FAMILY MEDICINE

## 2020-11-10 RX ORDER — OXYCODONE HCL 10 MG/1
10 TABLET, FILM COATED, EXTENDED RELEASE ORAL EVERY 8 HOURS
Qty: 90 TABLET | Refills: 0 | Status: SHIPPED | OUTPATIENT
Start: 2020-11-10 | End: 2020-11-23

## 2020-11-10 RX ORDER — OXYCODONE AND ACETAMINOPHEN 10; 325 MG/1; MG/1
1 TABLET ORAL EVERY 4 HOURS PRN
Qty: 180 TABLET | Refills: 0 | Status: SHIPPED | OUTPATIENT
Start: 2020-11-10 | End: 2020-12-09 | Stop reason: SDUPTHER

## 2020-11-10 RX ORDER — VORTIOXETINE 10 MG/1
TABLET, FILM COATED ORAL
COMMUNITY
Start: 2020-08-10 | End: 2020-11-10 | Stop reason: DRUGHIGH

## 2020-11-10 RX ORDER — OXYCODONE HCL 10 MG/1
10 TABLET, FILM COATED, EXTENDED RELEASE ORAL EVERY 8 HOURS
Qty: 90 TABLET | Refills: 0 | Status: SHIPPED | OUTPATIENT
Start: 2020-11-10 | End: 2020-11-10

## 2020-11-10 ASSESSMENT — PATIENT HEALTH QUESTIONNAIRE - PHQ9
SUM OF ALL RESPONSES TO PHQ QUESTIONS 1-9: 0
2. FEELING DOWN, DEPRESSED OR HOPELESS: 0
SUM OF ALL RESPONSES TO PHQ QUESTIONS 1-9: 0
1. LITTLE INTEREST OR PLEASURE IN DOING THINGS: 0
SUM OF ALL RESPONSES TO PHQ9 QUESTIONS 1 & 2: 0
SUM OF ALL RESPONSES TO PHQ QUESTIONS 1-9: 0

## 2020-11-10 ASSESSMENT — ENCOUNTER SYMPTOMS
ABDOMINAL PAIN: 0
NAUSEA: 0
SORE THROAT: 0
EYE DISCHARGE: 0
BACK PAIN: 1
RHINORRHEA: 0
DIARRHEA: 0
WHEEZING: 0
SHORTNESS OF BREATH: 0
COUGH: 0
EYE REDNESS: 0
VOMITING: 0

## 2020-11-10 NOTE — PROGRESS NOTES
needed for Anxiety for up to 30 days. 90 tablet 2    brexpiprazole (REXULTI) 0.5 MG TABS tablet Take 1 tablet by mouth daily 30 tablet 1    butalbital-acetaminophen-caffeine (FIORICET, ESGIC) -40 MG per tablet Take 1 tablet by mouth every 4 hours as needed for Headaches 180 tablet 3    VORTIoxetine HBr (TRINTELLIX) 20 MG TABS tablet Take 1 tablet by mouth daily 90 tablet 3    propranolol (INDERAL) 20 MG tablet TAKE 1 TABLET THREE TIMES A DAY 90 tablet 0    pantoprazole (PROTONIX) 40 MG tablet TAKE ONE TABLET BY MOUTH TWICE DAILY 60 tablet 3    nystatin (MYCOSTATIN) 658676 UNIT/ML suspension Take 10 mLs by mouth 4 times daily 120 mL 1    lisinopril (PRINIVIL;ZESTRIL) 30 MG tablet Take 1 tablet by mouth daily 90 tablet 3    naloxone 4 MG/0.1ML LIQD nasal spray 1 spray by Nasal route as needed for Opioid Reversal 1 each 5    sucralfate (CARAFATE) 1 GM tablet Take 1 tablet by mouth 4 times daily 120 tablet 3    isosorbide mononitrate (IMDUR) 30 MG extended release tablet TAKE 1 TABLET DAILY 90 tablet 3    atorvastatin (LIPITOR) 80 MG tablet TAKE 1 TABLET DAILY 90 tablet 3    clopidogrel (PLAVIX) 75 MG tablet TAKE 1 TABLET DAILY FOR DISEASE OF THE ARTERIES OF THE HEART 90 tablet 3    nitroGLYCERIN (NITROSTAT) 0.4 MG SL tablet Place 1 tablet under the tongue every 5 minutes as needed for Chest pain Indications: Disease of the Arteries of the Heart Take 1 tablet every 5 minutes times three as needed for chest pain 25 tablet 2    Insulin Aspart (NOVOLOG SC) Inject  into the skin. Insulin pump      HUMALOG 100 UNIT/ML injection vial inject UP TO 70 units in PUMP      sucralfate (CARAFATE) 1 GM/10ML suspension Take 20 mLs by mouth 4 times daily 2400 mL 0     No current facility-administered medications for this visit.       Facility-Administered Medications Ordered in Other Visits   Medication Dose Route Frequency Provider Last Rate Last Dose    lactated ringers infusion  75 mL/hr Intravenous Continuous Abigail Chavarria MD         Allergies   Allergen Reactions    Avelox [Moxifloxacin]     Ciprofloxacin Other (See Comments)    Levofloxacin     Lyrica [Pregabalin]     Metoclopramide Other (See Comments)     Severe confusion and paranoid activity    Neurontin [Gabapentin]     Other      Can not have epidural injections, steroids, nerve blocks, or burning of nerves due to being diabetic    Tetanus Toxoids     Tramadol     Tramadol Hcl        Health Maintenance   Topic Date Due    HIV screen  12/02/1985    Hepatitis B vaccine (1 of 3 - Risk 3-dose series) 12/02/1989    Colon cancer screen colonoscopy  01/10/2019    Diabetic retinal exam  11/28/2019    Lipid screen  04/16/2020    Flu vaccine (1) 09/01/2020    A1C test (Diabetic or Prediabetic)  12/04/2020    Potassium monitoring  12/19/2020    Creatinine monitoring  12/19/2020    Diabetic microalbuminuria test  05/04/2021    Diabetic foot exam  09/04/2021    Pneumococcal 0-64 years Vaccine  Completed    Hepatitis A vaccine  Aged Out    Hib vaccine  Aged Out    Meningococcal (ACWY) vaccine  Aged Out       Subjective:      Review of Systems   Constitutional: Negative for chills and fever. HENT: Negative for rhinorrhea and sore throat. Eyes: Negative for discharge and redness. Respiratory: Negative for cough, shortness of breath and wheezing. Cardiovascular: Negative for chest pain and palpitations. Gastrointestinal: Negative for abdominal pain, diarrhea, nausea and vomiting. Genitourinary: Negative for dysuria and frequency. Musculoskeletal: Positive for back pain and neck pain. Negative for arthralgias and myalgias. Neurological: Negative for dizziness, light-headedness and headaches. Psychiatric/Behavioral: Negative for sleep disturbance.        Objective:     BP (!) 158/100   Pulse 113   Temp 97.7 °F (36.5 °C)   Ht 5' 8.04\" (1.728 m)   Wt 136 lb 3.2 oz (61.8 kg)   SpO2 98%   BMI 20.68 kg/m²   Physical Exam  Vitals signs and nursing note reviewed. Constitutional:       General: He is not in acute distress. Appearance: He is well-developed. He is not ill-appearing. HENT:      Head: Normocephalic and atraumatic. Right Ear: External ear normal.      Left Ear: External ear normal.   Eyes:      General: No scleral icterus. Right eye: No discharge. Left eye: No discharge. Conjunctiva/sclera: Conjunctivae normal.      Pupils: Pupils are equal, round, and reactive to light. Neck:      Thyroid: No thyromegaly. Trachea: No tracheal deviation. Comments: Pain expressed in paracervical region. Limited flexion extension secondary to discomfort. Cardiovascular:      Rate and Rhythm: Normal rate and regular rhythm. Heart sounds: Normal heart sounds. Pulmonary:      Effort: Pulmonary effort is normal. No respiratory distress. Breath sounds: Normal breath sounds. No wheezing. Musculoskeletal:      Comments: Pain expressed in the midthoracic region to palpation. Lymphadenopathy:      Cervical: No cervical adenopathy. Skin:     General: Skin is warm. Findings: No rash. Neurological:      Mental Status: He is alert and oriented to person, place, and time. Psychiatric:         Mood and Affect: Mood normal.         Behavior: Behavior normal.         Thought Content: Thought content normal.       Controlled Substance Monitoring:    Acute and Chronic Pain Monitoring:   RX Monitoring 11/10/2020   Attestation -   Acute Pain Prescriptions -   Periodic Controlled Substance Monitoring Possible medication side effects, risk of tolerance/dependence & alternative treatments discussed. ;No signs of potential drug abuse or diversion identified. Chronic Pain > 50 MEDD Obtained or confirmed \"Consent for Opioid Use\" on file. Chronic Pain > 80 MEDD -   Chronic Pain > 120 MEDD Obtained or confirmed \"Medication Contract\" on file. Assessment:       Diagnosis Orders   1.  Cervical radiculopathy  MRI THORACIC SPINE WO CONTRAST    MRI CERVICAL SPINE WO CONTRAST    oxyCODONE (OXYCONTIN) 10 MG extended release tablet    DISCONTINUED: oxyCODONE (OXYCONTIN) 10 MG extended release tablet   2. Need for vaccination  INFLUENZA, QUADV, 0.5ML, 6 MO AND OLDER, IM, PF, PREFILL SYR OR SDV (FLUZONE QUADV, PF)   3. Encounter for lipid screening for cardiovascular disease  Lipid, Fasting   4. Annual physical exam  Basic Metabolic Panel, Fasting    Hepatic Function Panel   5. Thoracic radiculopathy  oxyCODONE (OXYCONTIN) 10 MG extended release tablet    DISCONTINUED: oxyCODONE (OXYCONTIN) 10 MG extended release tablet   6. Degenerative cervical disc  oxyCODONE-acetaminophen (PERCOCET)  MG per tablet        Plan:    Increase OxyContin to 10 mg 3 times a day. Continue Percocet 10/3/2025 as is. MRI ordered of cervical and thoracic spine. Blood work ordered. Flu shot today. Patient to follow-up with pain management if pain does not improve. Once again, discussed potential risk of addiction and tolerance to pain medications. Advised his best to try to decrease as much as possible. Return in about 3 months (around 2/10/2021).     Orders Placed This Encounter   Procedures    MRI THORACIC SPINE WO CONTRAST     Standing Status:   Future     Standing Expiration Date:   11/10/2021     Order Specific Question:   Reason for exam:     Answer:   Thoracic radiculopathy    MRI CERVICAL SPINE WO CONTRAST     Standing Status:   Future     Standing Expiration Date:   11/10/2021     Order Specific Question:   Reason for exam:     Answer:   cervical radiculopathy    INFLUENZA, QUADV, 0.5ML, 6 MO AND OLDER, IM, PF, PREFILL SYR OR SDV (FLUZONE QUADV, PF)    Lipid, Fasting     Standing Status:   Future     Standing Expiration Date:   11/10/2021    Basic Metabolic Panel, Fasting     Standing Status:   Future     Standing Expiration Date:   11/10/2021    Hepatic Function Panel     Standing Status:   Future Standing Expiration Date:   11/10/2021     Orders Placed This Encounter   Medications    DISCONTD: oxyCODONE (OXYCONTIN) 10 MG extended release tablet     Sig: Take 1 tablet by mouth every 8 hours for 30 days. Intended supply: 30 days     Dispense:  90 tablet     Refill:  0     Reduce doses taken as pain becomes manageable    oxyCODONE-acetaminophen (PERCOCET)  MG per tablet     Sig: Take 1 tablet by mouth every 4 hours as needed for Pain for up to 30 days. No more than 6 per day     Dispense:  180 tablet     Refill:  0     Reduce doses taken as pain becomes manageable    oxyCODONE (OXYCONTIN) 10 MG extended release tablet     Sig: Take 1 tablet by mouth every 8 hours for 30 days. Intended supply: 30 days     Dispense:  90 tablet     Refill:  0     Reduce doses taken as pain becomes manageable       Patient given educationalmaterials - see patient instructions. Discussed use, benefit, and side effectsof prescribed medications. All patient questions answered. Pt voiced understanding. Reviewed health maintenance. Instructed to continue current medications, diet andexercise. Patient agreed with treatment plan. Follow up as directed.      Electronicallysigned by Kathrin Herrera MD on 11/10/2020 at 9:26 AM

## 2020-11-23 ENCOUNTER — TELEPHONE (OUTPATIENT)
Dept: PRIMARY CARE CLINIC | Age: 50
End: 2020-11-23

## 2020-11-23 RX ORDER — OXYCODONE HCL 20 MG/1
20 TABLET, FILM COATED, EXTENDED RELEASE ORAL 2 TIMES DAILY
Qty: 60 TABLET | Refills: 0 | Status: SHIPPED | OUTPATIENT
Start: 2020-11-23 | End: 2020-12-17 | Stop reason: SDUPTHER

## 2020-11-23 NOTE — TELEPHONE ENCOUNTER
Pt bev and states that he was seen on 11/10/20 and his OxyContin was increased to 10 mg 3 times a day. He states that his insurance will not approve this and is wondering if he can have 20 mg BID instead. Please advise.      [facility] [address] [place] [address]

## 2020-12-09 RX ORDER — OXYCODONE AND ACETAMINOPHEN 10; 325 MG/1; MG/1
1 TABLET ORAL EVERY 4 HOURS PRN
Qty: 180 TABLET | Refills: 0 | Status: SHIPPED | OUTPATIENT
Start: 2020-12-09 | End: 2021-01-04 | Stop reason: SDUPTHER

## 2020-12-15 ENCOUNTER — TELEPHONE (OUTPATIENT)
Dept: PRIMARY CARE CLINIC | Age: 50
End: 2020-12-15

## 2020-12-15 LAB
AVERAGE GLUCOSE: NORMAL
HBA1C MFR BLD: 8.9 %

## 2020-12-15 NOTE — TELEPHONE ENCOUNTER
Kayley ALBERTO/ [facility] Sched[facility] calling to request to have a copy of the orders for MRI THORACIC SPINE [place] faxed to her.    Faxed/scanned

## 2020-12-17 RX ORDER — OXYCODONE HCL 20 MG/1
20 TABLET, FILM COATED, EXTENDED RELEASE ORAL 2 TIMES DAILY
Qty: 60 TABLET | Refills: 0 | Status: SHIPPED | OUTPATIENT
Start: 2020-12-17 | End: 2021-01-15 | Stop reason: SDUPTHER

## 2021-01-04 DIAGNOSIS — M50.30 DEGENERATIVE CERVICAL DISC: ICD-10-CM

## 2021-01-04 DIAGNOSIS — M54.12 CERVICAL RADICULOPATHY: ICD-10-CM

## 2021-01-04 DIAGNOSIS — E11.42 DIABETIC PERIPHERAL NEUROPATHY (HCC): ICD-10-CM

## 2021-01-04 RX ORDER — OXYCODONE AND ACETAMINOPHEN 10; 325 MG/1; MG/1
1 TABLET ORAL EVERY 4 HOURS PRN
Qty: 180 TABLET | Refills: 0 | Status: SHIPPED | OUTPATIENT
Start: 2021-01-04 | End: 2021-02-01 | Stop reason: SDUPTHER

## 2021-01-04 RX ORDER — OXYCODONE HCL 20 MG/1
20 TABLET, FILM COATED, EXTENDED RELEASE ORAL 2 TIMES DAILY
Qty: 60 TABLET | Refills: 0 | Status: CANCELLED | OUTPATIENT
Start: 2021-01-04 | End: 2021-02-03

## 2021-01-08 ENCOUNTER — TELEPHONE (OUTPATIENT)
Dept: PRIMARY CARE CLINIC | Age: 51
End: 2021-01-08

## 2021-01-08 DIAGNOSIS — M48.02 CERVICAL STENOSIS OF SPINAL CANAL: Primary | ICD-10-CM

## 2021-01-08 NOTE — TELEPHONE ENCOUNTER
Patient is schedule for a hospital follow up with you on 1/15/2021. Pt states he was told at discharged her needed to see a neurosurgeon. Pt wants to know who you recommend?

## 2021-01-15 DIAGNOSIS — M54.12 CERVICAL RADICULOPATHY: ICD-10-CM

## 2021-01-15 DIAGNOSIS — E11.42 DIABETIC PERIPHERAL NEUROPATHY (HCC): ICD-10-CM

## 2021-01-15 RX ORDER — OXYCODONE HCL 20 MG/1
20 TABLET, FILM COATED, EXTENDED RELEASE ORAL 2 TIMES DAILY
Qty: 60 TABLET | Refills: 0 | Status: SHIPPED | OUTPATIENT
Start: 2021-01-15 | End: 2021-02-11

## 2021-01-17 DIAGNOSIS — F41.8 DEPRESSION WITH ANXIETY: ICD-10-CM

## 2021-01-18 RX ORDER — LORAZEPAM 0.5 MG/1
TABLET ORAL
Qty: 90 TABLET | Refills: 1 | Status: SHIPPED | OUTPATIENT
Start: 2021-01-18 | End: 2021-03-18 | Stop reason: SDUPTHER

## 2021-01-22 ENCOUNTER — OFFICE VISIT (OUTPATIENT)
Dept: PRIMARY CARE CLINIC | Age: 51
End: 2021-01-22
Payer: COMMERCIAL

## 2021-01-22 VITALS
HEIGHT: 68 IN | OXYGEN SATURATION: 99 % | HEART RATE: 111 BPM | WEIGHT: 141.2 LBS | SYSTOLIC BLOOD PRESSURE: 138 MMHG | BODY MASS INDEX: 21.4 KG/M2 | DIASTOLIC BLOOD PRESSURE: 88 MMHG | TEMPERATURE: 97.6 F

## 2021-01-22 DIAGNOSIS — E10.65 TYPE 1 DIABETES MELLITUS WITH HYPERGLYCEMIA (HCC): ICD-10-CM

## 2021-01-22 DIAGNOSIS — Z13.6 ENCOUNTER FOR LIPID SCREENING FOR CARDIOVASCULAR DISEASE: ICD-10-CM

## 2021-01-22 DIAGNOSIS — M54.2 NECK PAIN: Primary | ICD-10-CM

## 2021-01-22 DIAGNOSIS — Z13.220 ENCOUNTER FOR LIPID SCREENING FOR CARDIOVASCULAR DISEASE: ICD-10-CM

## 2021-01-22 DIAGNOSIS — Z12.5 SCREENING PSA (PROSTATE SPECIFIC ANTIGEN): Primary | ICD-10-CM

## 2021-01-22 DIAGNOSIS — F33.9 MAJOR DEPRESSIVE DISORDER, RECURRENT EPISODE WITH ANXIOUS DISTRESS (HCC): ICD-10-CM

## 2021-01-22 DIAGNOSIS — M46.92 UNSPECIFIED INFLAMMATORY SPONDYLOPATHY, CERVICAL REGION (HCC): ICD-10-CM

## 2021-01-22 PROCEDURE — 99495 TRANSJ CARE MGMT MOD F2F 14D: CPT | Performed by: FAMILY MEDICINE

## 2021-01-22 PROCEDURE — 1111F DSCHRG MED/CURRENT MED MERGE: CPT | Performed by: FAMILY MEDICINE

## 2021-01-22 RX ORDER — PERPHENAZINE 16 MG/1
TABLET, FILM COATED ORAL
COMMUNITY
Start: 2021-01-13

## 2021-01-22 RX ORDER — INSULIN LISPRO 100 [IU]/ML
2-8 INJECTION, SOLUTION INTRAVENOUS; SUBCUTANEOUS NIGHTLY
Status: ON HOLD | COMMUNITY
Start: 2021-01-07 | End: 2021-08-09 | Stop reason: ALTCHOICE

## 2021-01-22 RX ORDER — VORTIOXETINE 10 MG/1
TABLET, FILM COATED ORAL
COMMUNITY
Start: 2020-11-11 | End: 2021-01-22

## 2021-01-22 RX ORDER — PROMETHAZINE HYDROCHLORIDE 25 MG/1
25 TABLET ORAL EVERY 4 HOURS PRN
COMMUNITY
End: 2021-05-07 | Stop reason: ALTCHOICE

## 2021-01-22 RX ORDER — NICOTINE 21 MG/24HR
PATCH, TRANSDERMAL 24 HOURS TRANSDERMAL
COMMUNITY
Start: 2021-01-07 | End: 2021-08-02

## 2021-01-22 RX ORDER — ONDANSETRON 8 MG/1
8 TABLET, ORALLY DISINTEGRATING ORAL EVERY 12 HOURS PRN
COMMUNITY
End: 2021-09-15

## 2021-01-22 SDOH — ECONOMIC STABILITY: INCOME INSECURITY: HOW HARD IS IT FOR YOU TO PAY FOR THE VERY BASICS LIKE FOOD, HOUSING, MEDICAL CARE, AND HEATING?: NOT HARD AT ALL

## 2021-01-22 SDOH — ECONOMIC STABILITY: TRANSPORTATION INSECURITY
IN THE PAST 12 MONTHS, HAS LACK OF TRANSPORTATION KEPT YOU FROM MEETINGS, WORK, OR FROM GETTING THINGS NEEDED FOR DAILY LIVING?: NO

## 2021-01-22 SDOH — ECONOMIC STABILITY: FOOD INSECURITY: WITHIN THE PAST 12 MONTHS, THE FOOD YOU BOUGHT JUST DIDN'T LAST AND YOU DIDN'T HAVE MONEY TO GET MORE.: NEVER TRUE

## 2021-01-22 ASSESSMENT — ENCOUNTER SYMPTOMS
WHEEZING: 0
NAUSEA: 0
SHORTNESS OF BREATH: 0
ABDOMINAL PAIN: 0
COUGH: 0
EYE DISCHARGE: 0
EYE REDNESS: 0
RHINORRHEA: 0
SORE THROAT: 0
VOMITING: 0
DIARRHEA: 0

## 2021-01-22 NOTE — PROGRESS NOTES
Post-Discharge Transitional Care Management Services or Hospital Follow Up      Chrissy Logan   YOB: 1970    Date of Office Visit:  1/22/2021  Date of Hospital Admission: 8/15/20  Date of Hospital Discharge: 8/15/20  Readmission Risk Score(high >=14%.  Medium >=10%):No data recorded    Care management risk score Rising risk (score 2-5) and Complex Care (Scores >=6): 2     Non face to face  following discharge, date last encounter closed (first attempt may have been earlier): 1/8/2021  2:12 PM 1/8/2021  2:12 PM    Call initiated 2 business days of discharge: Yes     Patient Active Problem List   Diagnosis    Ulnar nerve compression at multiple levels    Degenerative cervical disc    Arthropathy of cervical facet joint    Neural foraminal stenosis of cervical spine    Cervical spondylosis    Alcohol dependence in remission ([address]    Chronic coronary artery disease    Depression with anxiety    Diabetic peripheral neuropathy ([address]    Essential hypertension    Gastroparesis    History of long-term treatment with high-risk medication    Hyperlipidemia    Impingement syndrome of left shoulder    Injury of right ulnar nerve    Migraine headache    Type 1 diabetes mellitus with hyperglycemia (HCC)    Current every day smoker    Perforation of right tympanic membrane    Pancreatic insufficiency    Major depressive disorder, recurrent episode with anxious distress ([address]    Unspecified inflammatory spondylopathy, cervical region (HCC)       Allergies   Allergen Reactions    Avelox [Moxifloxacin]     Ciprofloxacin Other (See Comments)    Levofloxacin     Lyrica [Pregabalin]     Metoclopramide Other (See Comments)     Severe confusion and paranoid activity    Neurontin [Gabapentin]     Other      Can not have epidural injections, steroids, nerve blocks, or burning of nerves due to being diabetic    Tetanus Toxoids     Tramadol     Tramadol Hcl        Medications listed as ordered at the time of discharge from hospital   [address] [place] [address], Tracy Dodd \"Jay Jay\"   Home Medication Instructions JESSICA:    Printed on:01/22/21 1017   Medication Information                      atorvastatin (LIPITOR) 80 MG tablet  TAKE 1 TABLET DAILY             brexpiprazole (REXULTI) 0.5 MG TABS tablet  Take 1 tablet by mouth daily             clopidogrel (PLAVIX) 75 MG tablet  TAKE 1 TABLET DAILY FOR DISEASE OF THE ARTERIES OF THE HEART             CONTOUR NEXT TEST strip  use four times a day             Insulin Aspart (NOVOLOG SC)  Inject  into the skin. Insulin pump             insulin lispro, 1 Unit Dial, 100 UNIT/ML SOPN  Inject 2-8 Units into the skin nightly             isosorbide mononitrate (IMDUR) 30 MG extended release tablet  TAKE 1 TABLET DAILY             lisinopril (PRINIVIL;ZESTRIL) 30 MG tablet  Take 1 tablet by mouth daily             LORazepam (ATIVAN) 0.5 MG tablet  take 1 tablet by mouth every 8 hours if needed for anxiety             naloxone 4 MG/0.1ML LIQD nasal spray  1 spray by Nasal route as needed for Opioid Reversal             nicotine (NICODERM CQ) 21 MG/24HR  place 1 patch ONTO THE SKIN once daily             nitroGLYCERIN (NITROSTAT) 0.4 MG SL tablet  Place 1 tablet under the tongue every 5 minutes as needed for Chest pain Indications: Disease of the Arteries of the Heart Take 1 tablet every 5 minutes times three as needed for chest pain             nystatin (MYCOSTATIN) 528373 UNIT/ML suspension  Take 10 mLs by mouth 4 times daily             ondansetron (ZOFRAN-ODT) 8 MG TBDP disintegrating tablet  Take 8 mg by mouth every 12 hours as needed             oxyCODONE (OXYCONTIN) 20 MG extended release tablet  Take 1 tablet by mouth 2 times daily for 30 days. Intended supply: 30 days             oxyCODONE-acetaminophen (PERCOCET)  MG per tablet  Take 1 tablet by mouth every 4 hours as needed for Pain for up to 30 days.  No more than 6 per day             pantoprazole (PROTONIX) 40 MG tablet  TAKE ONE TABLET BY MOUTH TWICE DAILY             promethazine (PHENERGAN) 25 MG tablet  Take 25 mg by mouth every 4 hours as needed             sucralfate (CARAFATE) 1 GM tablet  Take 1 tablet by mouth 4 times daily             sucralfate (CARAFATE) 1 GM/10ML suspension  Take 20 mLs by mouth 4 times daily             VORTIoxetine HBr (TRINTELLIX) 20 MG TABS tablet  Take 1 tablet by mouth daily                   Medications marked \"taking\" at this time  Outpatient Medications Marked as Taking for the 1/22/21 encounter (Office Visit) with Avinash Das MD   Medication Sig Dispense Refill    CONTOUR NEXT TEST strip use four times a day      nicotine (NICODERM CQ) 21 MG/24HR place 1 patch ONTO THE SKIN once daily      ondansetron (ZOFRAN-ODT) 8 MG TBDP disintegrating tablet Take 8 mg by mouth every 12 hours as needed      promethazine (PHENERGAN) 25 MG tablet Take 25 mg by mouth every 4 hours as needed      insulin lispro, 1 Unit Dial, 100 UNIT/ML SOPN Inject 2-8 Units into the skin nightly      VORTIoxetine HBr (TRINTELLIX) 20 MG TABS tablet Take 1 tablet by mouth daily 30 tablet 5    LORazepam (ATIVAN) 0.5 MG tablet take 1 tablet by mouth every 8 hours if needed for anxiety 90 tablet 1    oxyCODONE (OXYCONTIN) 20 MG extended release tablet Take 1 tablet by mouth 2 times daily for 30 days. Intended supply: 30 days 60 tablet 0    oxyCODONE-acetaminophen (PERCOCET)  MG per tablet Take 1 tablet by mouth every 4 hours as needed for Pain for up to 30 days.  No more than 6 per day 180 tablet 0    brexpiprazole (REXULTI) 0.5 MG TABS tablet Take 1 tablet by mouth daily 30 tablet 1    pantoprazole (PROTONIX) 40 MG tablet TAKE ONE TABLET BY MOUTH TWICE DAILY 60 tablet 3    nystatin (MYCOSTATIN) 443198 UNIT/ML suspension Take 10 mLs by mouth 4 times daily 120 mL 1    lisinopril (PRINIVIL;ZESTRIL) 30 MG tablet Take 1 tablet by mouth daily 90 tablet 3    naloxone 4 MG/0.1ML LIQD nasal spray 1 spray by Nasal route as needed for Opioid Reversal 1 each 5    sucralfate (CARAFATE) 1 GM tablet Take 1 tablet by mouth 4 times daily 120 tablet 3    isosorbide mononitrate (IMDUR) 30 MG extended release tablet TAKE 1 TABLET DAILY 90 tablet 3    atorvastatin (LIPITOR) 80 MG tablet TAKE 1 TABLET DAILY 90 tablet 3    clopidogrel (PLAVIX) 75 MG tablet TAKE 1 TABLET DAILY FOR DISEASE OF THE ARTERIES OF THE HEART 90 tablet 3    nitroGLYCERIN (NITROSTAT) 0.4 MG SL tablet Place 1 tablet under the tongue every 5 minutes as needed for Chest pain Indications: Disease of the Arteries of the Heart Take 1 tablet every 5 minutes times three as needed for chest pain 25 tablet 2    Insulin Aspart (NOVOLOG SC) Inject  into the skin. Insulin pump          Medications patient taking as of now reconciled against medications ordered at time of hospital discharge: Yes    Chief Complaint   Patient presents with    Other     Follow up from hospital visit       HPI    Inpatient course: Discharge summary reviewed- see chart. Interval history/Current status: Patient had hospitalization for hyperglycemia. Patient was given the wrong insulin by his endocrinologist.  Patient's insulin pump had broken as well. Could not get it fixed until seen by the endocrinologist in the office. They will not give him an order. Patient was treated overnight with good improvement in his sugars. Was discharged the following day. Patient was discharged on sliding scale insulin. Patient states his mood is been doing okay. Not as depressed as before. No thoughts of hurting self or others. Was started on Rexulti by psychiatry. Has been using his Trintellix at 20 mg daily. Patient continues to run high A1c's. Patient had appointment with neurosurgery. This was canceled at the last minute by the neurosurgeon. Now not rescheduled until mid February. Review of Systems   Constitutional: Negative for chills and fever.    HENT: Negative for rhinorrhea and sore throat. Eyes: Negative for discharge and redness. Respiratory: Negative for cough, shortness of breath and wheezing. Cardiovascular: Negative for chest pain and palpitations. Gastrointestinal: Negative for abdominal pain, diarrhea, nausea and vomiting. Genitourinary: Negative for dysuria and frequency. Musculoskeletal: Positive for neck pain. Negative for arthralgias and myalgias. Neurological: Negative for dizziness, light-headedness and headaches. Psychiatric/Behavioral: Negative for sleep disturbance. Vitals:    01/22/21 0954   BP: 138/88   Pulse: 111   Temp: 97.6 °F (36.4 °C)   SpO2: 99%   Weight: 141 lb 3.2 oz (64 kg)   Height: 5' 8.04\" (1.728 m)     Body mass index is 21.44 kg/m². Wt Readings from Last 3 Encounters:   01/22/21 141 lb 3.2 oz (64 kg)   11/10/20 136 lb 3.2 oz (61.8 kg)   08/17/20 137 lb 9.6 oz (62.4 kg)     BP Readings from Last 3 Encounters:   01/22/21 138/88   11/10/20 (!) 158/100   08/17/20 (!) 168/108       Physical Exam  Vitals signs and nursing note reviewed. Constitutional:       General: He is not in acute distress. Appearance: He is well-developed. He is not ill-appearing. HENT:      Head: Normocephalic and atraumatic. Right Ear: External ear normal.      Left Ear: External ear normal.   Eyes:      General: No scleral icterus. Right eye: No discharge. Left eye: No discharge. Conjunctiva/sclera: Conjunctivae normal.      Pupils: Pupils are equal, round, and reactive to light. Neck:      Thyroid: No thyromegaly. Trachea: No tracheal deviation. Cardiovascular:      Rate and Rhythm: Normal rate and regular rhythm. Heart sounds: Normal heart sounds. Pulmonary:      Effort: Pulmonary effort is normal. No respiratory distress. Breath sounds: Normal breath sounds. No wheezing. Lymphadenopathy:      Cervical: No cervical adenopathy. Skin:     General: Skin is warm. Findings: No rash.    Neurological: Mental Status: He is alert and oriented to person, place, and time. Psychiatric:         Mood and Affect: Mood normal.         Behavior: Behavior normal.         Thought Content: Thought content normal.             Assessment/Plan:  1. Unspecified inflammatory spondylopathy, cervical region ([address]  Stable. Patient to try ice for the pain. Consider physical therapy with TENS unit. States they will check with therapy to see if they would do this. Still awaiting input neurosurgery    2. Major depressive disorder, recurrent episode with anxious distress (HCC)  Stable. Continue Trintellix and Rexulti as is    3. Type 1 diabetes mellitus with hyperglycemia (HCC)  Stable but less than optimal control. Managed by endocrinology. On insulin pump.         Medical Decision Making: high complexity

## 2021-01-29 ENCOUNTER — OFFICE VISIT (OUTPATIENT)
Dept: NEUROSURGERY | Age: 51
End: 2021-01-29
Payer: COMMERCIAL

## 2021-01-29 VITALS
HEIGHT: 68 IN | OXYGEN SATURATION: 99 % | SYSTOLIC BLOOD PRESSURE: 160 MMHG | DIASTOLIC BLOOD PRESSURE: 97 MMHG | BODY MASS INDEX: 21.37 KG/M2 | WEIGHT: 141 LBS | HEART RATE: 116 BPM

## 2021-01-29 DIAGNOSIS — M47.22 CERVICAL SPONDYLOSIS WITH RADICULOPATHY: Primary | ICD-10-CM

## 2021-01-29 DIAGNOSIS — M48.02 CERVICAL STENOSIS OF SPINE: ICD-10-CM

## 2021-01-29 PROCEDURE — 3017F COLORECTAL CA SCREEN DOC REV: CPT | Performed by: NURSE PRACTITIONER

## 2021-01-29 PROCEDURE — 99203 OFFICE O/P NEW LOW 30 MIN: CPT | Performed by: NURSE PRACTITIONER

## 2021-01-29 PROCEDURE — 4004F PT TOBACCO SCREEN RCVD TLK: CPT | Performed by: NURSE PRACTITIONER

## 2021-01-29 PROCEDURE — G8482 FLU IMMUNIZE ORDER/ADMIN: HCPCS | Performed by: NURSE PRACTITIONER

## 2021-01-29 PROCEDURE — G8427 DOCREV CUR MEDS BY ELIG CLIN: HCPCS | Performed by: NURSE PRACTITIONER

## 2021-01-29 PROCEDURE — G8420 CALC BMI NORM PARAMETERS: HCPCS | Performed by: NURSE PRACTITIONER
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
## 2021-02-01 DIAGNOSIS — M50.30 DEGENERATIVE CERVICAL DISC: ICD-10-CM

## 2021-02-01 RX ORDER — OXYCODONE AND ACETAMINOPHEN 10; 325 MG/1; MG/1
1 TABLET ORAL EVERY 4 HOURS PRN
Qty: 180 TABLET | Refills: 0 | Status: SHIPPED | OUTPATIENT
Start: 2021-02-01 | End: 2021-03-01 | Stop reason: SDUPTHER

## 2021-02-11 ENCOUNTER — OFFICE VISIT (OUTPATIENT)
Dept: PRIMARY CARE CLINIC | Age: 51
End: 2021-02-11
Payer: COMMERCIAL

## 2021-02-11 VITALS
SYSTOLIC BLOOD PRESSURE: 138 MMHG | HEIGHT: 68 IN | HEART RATE: 86 BPM | WEIGHT: 139.6 LBS | BODY MASS INDEX: 21.16 KG/M2 | DIASTOLIC BLOOD PRESSURE: 84 MMHG | TEMPERATURE: 97.6 F | OXYGEN SATURATION: 96 %

## 2021-02-11 DIAGNOSIS — E11.42 DIABETIC PERIPHERAL NEUROPATHY (HCC): ICD-10-CM

## 2021-02-11 DIAGNOSIS — M54.12 CERVICAL RADICULOPATHY: ICD-10-CM

## 2021-02-11 DIAGNOSIS — F33.9 MAJOR DEPRESSIVE DISORDER, RECURRENT EPISODE WITH ANXIOUS DISTRESS (HCC): ICD-10-CM

## 2021-02-11 DIAGNOSIS — Z79.899 HIGH RISK MEDICATION USE: ICD-10-CM

## 2021-02-11 DIAGNOSIS — K20.90 ESOPHAGITIS, ACUTE: ICD-10-CM

## 2021-02-11 PROCEDURE — G8482 FLU IMMUNIZE ORDER/ADMIN: HCPCS | Performed by: FAMILY MEDICINE

## 2021-02-11 PROCEDURE — G8420 CALC BMI NORM PARAMETERS: HCPCS | Performed by: FAMILY MEDICINE

## 2021-02-11 PROCEDURE — 99213 OFFICE O/P EST LOW 20 MIN: CPT | Performed by: FAMILY MEDICINE

## 2021-02-11 PROCEDURE — 2022F DILAT RTA XM EVC RTNOPTHY: CPT | Performed by: FAMILY MEDICINE

## 2021-02-11 PROCEDURE — G8427 DOCREV CUR MEDS BY ELIG CLIN: HCPCS | Performed by: FAMILY MEDICINE

## 2021-02-11 PROCEDURE — 3017F COLORECTAL CA SCREEN DOC REV: CPT | Performed by: FAMILY MEDICINE

## 2021-02-11 PROCEDURE — 3046F HEMOGLOBIN A1C LEVEL >9.0%: CPT | Performed by: FAMILY MEDICINE

## 2021-02-11 PROCEDURE — 4004F PT TOBACCO SCREEN RCVD TLK: CPT | Performed by: FAMILY MEDICINE

## 2021-02-11 RX ORDER — ATORVASTATIN CALCIUM 80 MG/1
80 TABLET, FILM COATED ORAL NIGHTLY
Qty: 90 TABLET | Refills: 3 | Status: SHIPPED | OUTPATIENT
Start: 2021-02-11

## 2021-02-11 RX ORDER — PANTOPRAZOLE SODIUM 40 MG/1
TABLET, DELAYED RELEASE ORAL
Qty: 60 TABLET | Refills: 3 | Status: SHIPPED | OUTPATIENT
Start: 2021-02-11 | End: 2022-08-16

## 2021-02-11 RX ORDER — ISOSORBIDE MONONITRATE 30 MG/1
30 TABLET, EXTENDED RELEASE ORAL DAILY
Qty: 90 TABLET | Refills: 3 | Status: SHIPPED | OUTPATIENT
Start: 2021-02-11

## 2021-02-11 RX ORDER — OXYCODONE HCL 20 MG/1
20 TABLET, FILM COATED, EXTENDED RELEASE ORAL 2 TIMES DAILY
Qty: 60 TABLET | Refills: 0 | Status: SHIPPED | OUTPATIENT
Start: 2021-02-11 | End: 2021-03-10 | Stop reason: SDUPTHER

## 2021-02-11 RX ORDER — LISINOPRIL 30 MG/1
30 TABLET ORAL DAILY
Qty: 90 TABLET | Refills: 3 | Status: SHIPPED | OUTPATIENT
Start: 2021-02-11

## 2021-02-11 RX ORDER — CLOPIDOGREL BISULFATE 75 MG/1
75 TABLET ORAL DAILY
Qty: 90 TABLET | Refills: 3 | Status: SHIPPED | OUTPATIENT
Start: 2021-02-11

## 2021-02-11 ASSESSMENT — ENCOUNTER SYMPTOMS
WHEEZING: 0
EYE REDNESS: 0
RHINORRHEA: 0
EYE DISCHARGE: 0
COUGH: 0
VOMITING: 0
SHORTNESS OF BREATH: 0
SORE THROAT: 0
DIARRHEA: 0
ABDOMINAL PAIN: 0
NAUSEA: 0

## 2021-02-11 ASSESSMENT — PATIENT HEALTH QUESTIONNAIRE - PHQ9
SUM OF ALL RESPONSES TO PHQ QUESTIONS 1-9: 0
SUM OF ALL RESPONSES TO PHQ QUESTIONS 1-9: 0
SUM OF ALL RESPONSES TO PHQ9 QUESTIONS 1 & 2: 0
1. LITTLE INTEREST OR PLEASURE IN DOING THINGS: 0

## 2021-02-11 NOTE — PROGRESS NOTES
[facility] PRIMARY CARE  [address] [place] [id number]  Dept: [phone]    Leona Altamirano is a 48 y.o. male Established patient, who presents today for his medical conditions/complaintsas noted below. Chief Complaint   Patient presents with    Medication Check     Back pain       HPI:     HPI  Pt had seen neurosurgery nurse practitioner. Had ct myelogram ordered. No fever or chills. No change in neck pain. Causing headaches. Blood sugars varying. She states neurosurgery not willing to take over medication as of yet. Patient is hopeful that they will find something that can be done on the neck and patient can be weaned. Patient continues to follow with endocrinology.      Reviewed prior notes Neurosurgery  Reviewed previous Labs    LDL Calculated (mg/dL)   Date Value   04/16/2019 97   05/21/2018 66   01/05/2018 66       (goal LDL is <100)   AST (U/L)   Date Value   05/23/2017 19     ALT (U/L)   Date Value   05/23/2017 17     BUN (mg/dL)   Date Value   11/14/2017 14     Hemoglobin A1C (%)   Date Value   12/15/2020 8.9     TSH (mIU/L)   Date Value   07/27/2018 0.43     BP Readings from Last 3 Encounters:   02/11/21 138/84   01/29/21 (!) 160/97   01/22/21 138/88          (goal 120/80)    Past Medical History:   Diagnosis Date    Anxiety     Arthritis     CAD (coronary artery disease)     Calcaneal apophysitis     Corns     Depression     Diabetes type I ([address]     Gastroparesis     GERD (gastroesophageal reflux disease)     Headache(784.0)     Hearing loss     Hyperglycemia     Hyperlipidemia     Hypertension     Intussusception ([address]     MI (myocardial infarction) ([address] jan 2014    Neuropathy     Osteoarthritis     Panic attacks     Temporomandibular joint disorder       Past Surgical History:   Procedure Laterality Date    ABDOMEN SURGERY      insulin pump    ABDOMINAL EXPLORATION SURGERY  05/28/2016    bowel resection Rt. colon &terminal ileum, intussuseption.     [address]  2014    CARPAL TUNNEL RELEASE      bilateral    COLONOSCOPY  05/23/2017    poor prep; diverticulosis    COLONOSCOPY N/A 7/10/2018    COLONOSCOPY POLYPECTOMY HOT BIOPSY performed by Radha Rubi MD at [facility]      right    HERNIA REPAIR      inguinal    KNEE SURGERY      [place] COLON [place] SCRN NOT  [address]  IND N/A 5/25/2017    COLONOSCOPY performed by Wilma Mcgill MD at [address] [facility] ESOPHAGOGASTRODUODENOSCOPY TRANSORAL DIAGNOSTIC N/A 5/24/2017    EGD ESOPHAGOGASTRODUODENOSCOPY performed by Wilma Mcgill MD at [address] [facility]      fracture     TONSILLECTOMY      x2    TOOTH EXTRACTION      x4    ULNAR TUNNEL RELEASE      UPPER GASTROINTESTINAL ENDOSCOPY  05/23/2017    mild gastritis    UPPER GASTROINTESTINAL ENDOSCOPY N/A 7/10/2018    EGD BIOPSY performed by Radha Rubi MD at [address] EXTRACTION      x4       Family History   Problem Relation Age of Onset    Diabetes Mother     High Blood Pressure Mother     Heart Disease Mother     Migraines Mother     Other Mother         diabetic neuropathy    High Blood Pressure Father        Social History     Tobacco Use    Smoking status: Current Every Day Smoker     Packs/day: 1.00     Years: 30.00     Pack years: 30.00    Smokeless tobacco: Never Used   Substance Use Topics    Alcohol use: No     Comment: previous heavy ETOH use; pt stopped around 2010      Current Outpatient Medications   Medication Sig Dispense Refill    VORTIoxetine HBr (TRINTELLIX) 20 MG TABS tablet Take 1 tablet by mouth daily 90 tablet 3    brexpiprazole (REXULTI) 0.5 MG TABS tablet Take 1 tablet by mouth daily 90 tablet 3    pantoprazole (PROTONIX) 40 MG tablet TAKE ONE TABLET BY MOUTH TWICE DAILY 60 tablet 3    lisinopril (PRINIVIL;ZESTRIL) 30 MG tablet Take 1 tablet by mouth daily 90 tablet 3    isosorbide mononitrate (IMDUR) 30 MG extended release tablet Take 1 tablet by mouth daily 90 tablet 3    atorvastatin (LIPITOR) 80 MG tablet Take 1 tablet by mouth nightly 90 tablet 3    clopidogrel (PLAVIX) 75 MG tablet Take 1 tablet by mouth daily 90 tablet 3    oxyCODONE (OXYCONTIN) 20 MG extended release tablet Take 1 tablet by mouth 2 times daily for 30 days. Intended supply: 30 days 60 tablet 0    oxyCODONE-acetaminophen (PERCOCET)  MG per tablet Take 1 tablet by mouth every 4 hours as needed for Pain for up to 30 days. No more than 6 per day 180 tablet 0    CONTOUR NEXT TEST strip use four times a day      nicotine (NICODERM CQ) 21 MG/24HR place 1 patch ONTO THE SKIN once daily      ondansetron (ZOFRAN-ODT) 8 MG TBDP disintegrating tablet Take 8 mg by mouth every 12 hours as needed      promethazine (PHENERGAN) 25 MG tablet Take 25 mg by mouth every 4 hours as needed      insulin lispro, 1 Unit Dial, 100 UNIT/ML SOPN Inject 2-8 Units into the skin nightly      LORazepam (ATIVAN) 0.5 MG tablet take 1 tablet by mouth every 8 hours if needed for anxiety 90 tablet 1    nystatin (MYCOSTATIN) 048532 UNIT/ML suspension Take 10 mLs by mouth 4 times daily 120 mL 1    naloxone 4 MG/0.1ML LIQD nasal spray 1 spray by Nasal route as needed for Opioid Reversal 1 each 5    nitroGLYCERIN (NITROSTAT) 0.4 MG SL tablet Place 1 tablet under the tongue every 5 minutes as needed for Chest pain Indications: Disease of the Arteries of the Heart Take 1 tablet every 5 minutes times three as needed for chest pain 25 tablet 2    Insulin Aspart (NOVOLOG SC) Inject  into the skin. Insulin pump       No current facility-administered medications for this visit.       Facility-Administered Medications Ordered in Other Visits   Medication Dose Route Frequency Provider Last Rate Last Admin    lactated ringers infusion  75 mL/hr Intravenous Continuous Mini Rojas MD         Allergies   Allergen Reactions    Avelox [Moxifloxacin]     Ciprofloxacin Other (See Comments)    Levofloxacin     Lyrica [Pregabalin]     Metoclopramide Other (See Comments)     Severe confusion and paranoid activity    Neurontin [Gabapentin]     Other      Can not have epidural injections, steroids, nerve blocks, or burning of nerves due to being diabetic    Tetanus Toxoids     Tramadol     Tramadol Hcl        Health Maintenance   Topic Date Due    Hepatitis C screen  1970    HIV screen  12/02/1985    Hepatitis B vaccine (1 of 3 - Risk 3-dose series) 12/02/1989    Colon cancer screen colonoscopy  01/10/2019    Diabetic retinal exam  11/28/2019    Lipid screen  04/16/2020    Shingles Vaccine (1 of 2) 12/02/2020    Potassium monitoring  12/19/2020    Creatinine monitoring  12/19/2020    Diabetic microalbuminuria test  05/04/2021    Diabetic foot exam  09/04/2021    A1C test (Diabetic or Prediabetic)  12/15/2021    Flu vaccine  Completed    Pneumococcal 0-64 years Vaccine  Completed    Hepatitis A vaccine  Aged Out    Hib vaccine  Aged Out    Meningococcal (ACWY) vaccine  Aged Out       Subjective:      Review of Systems   Constitutional: Negative for chills and fever. HENT: Negative for rhinorrhea and sore throat. Eyes: Negative for discharge and redness. Respiratory: Negative for cough, shortness of breath and wheezing. Cardiovascular: Negative for chest pain and palpitations. Gastrointestinal: Negative for abdominal pain, diarrhea, nausea and vomiting. Genitourinary: Negative for dysuria and frequency. Musculoskeletal: Positive for neck pain. Negative for arthralgias and myalgias. Neurological: Negative for dizziness, light-headedness and headaches. Psychiatric/Behavioral: Negative for sleep disturbance. Objective:     /84   Pulse 86   Temp 97.6 °F (36.4 °C)   Ht 5' 8.04\" (1.728 m)   Wt 139 lb 9.6 oz (63.3 kg)   SpO2 96%   BMI 21.20 kg/m²   Physical Exam  Vitals signs and nursing note reviewed. Constitutional:       General: He is not in acute distress. Appearance: He is well-developed. He is not ill-appearing. HENT:      Head: Normocephalic and atraumatic. Right Ear: External ear normal.      Left Ear: External ear normal.   Eyes:      General: No scleral icterus. Right eye: No discharge. Left eye: No discharge. Conjunctiva/sclera: Conjunctivae normal.      Pupils: Pupils are equal, round, and reactive to light. Neck:      Thyroid: No thyromegaly. Trachea: No tracheal deviation. Cardiovascular:      Rate and Rhythm: Normal rate and regular rhythm. Heart sounds: Normal heart sounds. Pulmonary:      Effort: Pulmonary effort is normal. No respiratory distress. Breath sounds: Normal breath sounds. No wheezing. Lymphadenopathy:      Cervical: No cervical adenopathy. Skin:     General: Skin is warm. Findings: No rash. Neurological:      Mental Status: He is alert and oriented to person, place, and time. Psychiatric:         Mood and Affect: Mood normal.         Behavior: Behavior normal.         Thought Content: Thought content normal.       Controlled Substance Monitoring:    Acute and Chronic Pain Monitoring:   RX Monitoring 2/11/2021   Attestation -   Acute Pain Prescriptions -   Periodic Controlled Substance Monitoring Possible medication side effects, risk of tolerance/dependence & alternative treatments discussed. ;No signs of potential drug abuse or diversion identified. Chronic Pain > 50 MEDD Obtained or confirmed \"Consent for Opioid Use\" on file. Chronic Pain > 80 MEDD -   Chronic Pain > 120 MEDD Obtained or confirmed \"Medication Contract\" on file. Assessment:       Diagnosis Orders   1. Major depressive disorder, recurrent episode with anxious distress (HCC)  brexpiprazole (REXULTI) 0.5 MG TABS tablet   2. Esophagitis, acute  pantoprazole (PROTONIX) 40 MG tablet   3.  Diabetic peripheral neuropathy (HCC)  lisinopril (PRINIVIL;ZESTRIL) 30 MG tablet    oxyCODONE (OXYCONTIN) 20 MG extended release tablet   4. High risk medication use  isosorbide mononitrate (IMDUR) 30 MG extended release tablet    clopidogrel (PLAVIX) 75 MG tablet   5. Cervical radiculopathy  oxyCODONE (OXYCONTIN) 20 MG extended release tablet        Plan:    Pt to call dr. Drea Russell for colonoscopy   Await input from neurosurgery. Refill medications. Reprint order for blood work. Information on Covid vaccine   Encouraged to quit smoking    Return in about 3 months (around 5/11/2021). No orders of the defined types were placed in this encounter. Orders Placed This Encounter   Medications    VORTIoxetine HBr (TRINTELLIX) 20 MG TABS tablet     Sig: Take 1 tablet by mouth daily     Dispense:  90 tablet     Refill:  3    brexpiprazole (REXULTI) 0.5 MG TABS tablet     Sig: Take 1 tablet by mouth daily     Dispense:  90 tablet     Refill:  3    pantoprazole (PROTONIX) 40 MG tablet     Sig: TAKE ONE TABLET BY MOUTH TWICE DAILY     Dispense:  60 tablet     Refill:  3     Please consider 90 day supplies to promote better adherence    lisinopril (PRINIVIL;ZESTRIL) 30 MG tablet     Sig: Take 1 tablet by mouth daily     Dispense:  90 tablet     Refill:  3    isosorbide mononitrate (IMDUR) 30 MG extended release tablet     Sig: Take 1 tablet by mouth daily     Dispense:  90 tablet     Refill:  3    atorvastatin (LIPITOR) 80 MG tablet     Sig: Take 1 tablet by mouth nightly     Dispense:  90 tablet     Refill:  3    clopidogrel (PLAVIX) 75 MG tablet     Sig: Take 1 tablet by mouth daily     Dispense:  90 tablet     Refill:  3    oxyCODONE (OXYCONTIN) 20 MG extended release tablet     Sig: Take 1 tablet by mouth 2 times daily for 30 days. Intended supply: 30 days     Dispense:  60 tablet     Refill:  0     Reduce doses taken as pain becomes manageable       Patient given educationalmaterials - see patient instructions.   Discussed use, benefit, and side effectsof prescribed medications. All patient questions answered. Pt voiced understanding. Reviewed health maintenance. Instructed to continue current medications, diet andexercise. Patient agreed with treatment plan. Follow up as directed.      Electronicallysigned by Qasim Lyles MD on 2/11/2021 at 4:53 PM

## 2021-02-18 ENCOUNTER — TELEPHONE (OUTPATIENT)
Dept: NEUROSURGERY | Age: 51
End: 2021-02-18

## 2021-02-18 NOTE — TELEPHONE ENCOUNTER
Patient called office concerned that appointment with Ilene Hurd was rescheduled. Patient is asking if xr needs to be done with Krystal since they won't be able to get him in for consult until 3.11.2021?

## 2021-03-01 DIAGNOSIS — M50.30 DEGENERATIVE CERVICAL DISC: ICD-10-CM

## 2021-03-01 RX ORDER — OXYCODONE AND ACETAMINOPHEN 10; 325 MG/1; MG/1
1 TABLET ORAL EVERY 4 HOURS PRN
Qty: 180 TABLET | Refills: 0 | Status: SHIPPED | OUTPATIENT
Start: 2021-03-01 | End: 2021-03-30 | Stop reason: SDUPTHER

## 2021-03-10 DIAGNOSIS — M54.12 CERVICAL RADICULOPATHY: ICD-10-CM

## 2021-03-10 DIAGNOSIS — E11.42 DIABETIC PERIPHERAL NEUROPATHY (HCC): ICD-10-CM

## 2021-03-10 RX ORDER — OXYCODONE HCL 20 MG/1
20 TABLET, FILM COATED, EXTENDED RELEASE ORAL 2 TIMES DAILY
Qty: 60 TABLET | Refills: 0 | Status: SHIPPED | OUTPATIENT
Start: 2021-03-10 | End: 2021-04-08 | Stop reason: SDUPTHER

## 2021-03-10 RX ORDER — OXYCODONE HCL 20 MG/1
20 TABLET, FILM COATED, EXTENDED RELEASE ORAL 2 TIMES DAILY
Qty: 60 TABLET | Refills: 0 | Status: SHIPPED | OUTPATIENT
Start: 2021-03-10 | End: 2021-03-10 | Stop reason: SDUPTHER

## 2021-03-10 NOTE — TELEPHONE ENCOUNTER
Pt states oxycontin sent to the wrong pharmacy, states it needs to go to [facility]. [facility]

## 2021-03-11 ENCOUNTER — INITIAL CONSULT (OUTPATIENT)
Dept: PAIN MANAGEMENT | Age: 51
End: 2021-03-11
Payer: COMMERCIAL

## 2021-03-11 VITALS
WEIGHT: 140 LBS | SYSTOLIC BLOOD PRESSURE: 147 MMHG | TEMPERATURE: 97.1 F | BODY MASS INDEX: 21.22 KG/M2 | DIASTOLIC BLOOD PRESSURE: 105 MMHG | HEIGHT: 68 IN

## 2021-03-11 DIAGNOSIS — G89.29 OTHER CHRONIC PAIN: ICD-10-CM

## 2021-03-11 DIAGNOSIS — M47.812 CERVICAL SPONDYLOSIS: Primary | ICD-10-CM

## 2021-03-11 DIAGNOSIS — M54.2 CERVICALGIA: ICD-10-CM

## 2021-03-11 PROCEDURE — 3017F COLORECTAL CA SCREEN DOC REV: CPT | Performed by: PAIN MEDICINE

## 2021-03-11 PROCEDURE — G8482 FLU IMMUNIZE ORDER/ADMIN: HCPCS | Performed by: PAIN MEDICINE

## 2021-03-11 PROCEDURE — G8420 CALC BMI NORM PARAMETERS: HCPCS | Performed by: PAIN MEDICINE

## 2021-03-11 PROCEDURE — G8428 CUR MEDS NOT DOCUMENT: HCPCS | Performed by: PAIN MEDICINE

## 2021-03-11 PROCEDURE — 99204 OFFICE O/P NEW MOD 45 MIN: CPT | Performed by: PAIN MEDICINE

## 2021-03-11 PROCEDURE — 4004F PT TOBACCO SCREEN RCVD TLK: CPT | Performed by: PAIN MEDICINE
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
## 2021-03-18 DIAGNOSIS — F41.8 DEPRESSION WITH ANXIETY: ICD-10-CM

## 2021-03-18 RX ORDER — LORAZEPAM 0.5 MG/1
0.5 TABLET ORAL EVERY 8 HOURS PRN
Qty: 90 TABLET | Refills: 1 | Status: SHIPPED | OUTPATIENT
Start: 2021-03-18 | End: 2021-04-17

## 2021-03-30 DIAGNOSIS — M50.30 DEGENERATIVE CERVICAL DISC: ICD-10-CM

## 2021-03-30 RX ORDER — OXYCODONE AND ACETAMINOPHEN 10; 325 MG/1; MG/1
1 TABLET ORAL EVERY 4 HOURS PRN
Qty: 180 TABLET | Refills: 0 | Status: SHIPPED | OUTPATIENT
Start: 2021-03-30 | End: 2021-04-26 | Stop reason: SDUPTHER

## 2021-04-05 LAB
AVERAGE GLUCOSE: NORMAL
HBA1C MFR BLD: 9.7 %

## 2021-04-08 DIAGNOSIS — M54.12 CERVICAL RADICULOPATHY: ICD-10-CM

## 2021-04-08 DIAGNOSIS — E11.42 DIABETIC PERIPHERAL NEUROPATHY (HCC): ICD-10-CM

## 2021-04-08 RX ORDER — OXYCODONE HCL 20 MG/1
20 TABLET, FILM COATED, EXTENDED RELEASE ORAL 2 TIMES DAILY
Qty: 60 TABLET | Refills: 0 | Status: SHIPPED | OUTPATIENT
Start: 2021-04-08 | End: 2021-05-07 | Stop reason: SDUPTHER

## 2021-04-21 ENCOUNTER — HOSPITAL ENCOUNTER (OUTPATIENT)
Age: 51
Discharge: HOME | End: 2021-04-23
Payer: COMMERCIAL

## 2021-04-21 ENCOUNTER — HOSPITAL ENCOUNTER (OUTPATIENT)
Dept: GENERAL RADIOLOGY | Age: 51
Discharge: HOME | End: 2021-04-23
Payer: COMMERCIAL

## 2021-04-21 DIAGNOSIS — M47.22 CERVICAL SPONDYLOSIS WITH RADICULOPATHY: ICD-10-CM

## 2021-04-21 DIAGNOSIS — M48.02 CERVICAL STENOSIS OF SPINE: ICD-10-CM

## 2021-04-21 PROCEDURE — 72040 X-RAY EXAM NECK SPINE 2-3 VW: CPT

## 2021-04-23 ENCOUNTER — OFFICE VISIT (OUTPATIENT)
Dept: NEUROSURGERY | Age: 51
End: 2021-04-23
Payer: COMMERCIAL

## 2021-04-23 VITALS
DIASTOLIC BLOOD PRESSURE: 95 MMHG | BODY MASS INDEX: 20.98 KG/M2 | WEIGHT: 138 LBS | HEART RATE: 94 BPM | SYSTOLIC BLOOD PRESSURE: 166 MMHG

## 2021-04-23 DIAGNOSIS — M47.22 CERVICAL SPONDYLOSIS WITH RADICULOPATHY: Primary | ICD-10-CM

## 2021-04-23 DIAGNOSIS — M48.02 CERVICAL STENOSIS OF SPINE: ICD-10-CM

## 2021-04-23 PROCEDURE — 4004F PT TOBACCO SCREEN RCVD TLK: CPT | Performed by: NURSE PRACTITIONER

## 2021-04-23 PROCEDURE — G8420 CALC BMI NORM PARAMETERS: HCPCS | Performed by: NURSE PRACTITIONER

## 2021-04-23 PROCEDURE — G8427 DOCREV CUR MEDS BY ELIG CLIN: HCPCS | Performed by: NURSE PRACTITIONER

## 2021-04-23 PROCEDURE — 3017F COLORECTAL CA SCREEN DOC REV: CPT | Performed by: NURSE PRACTITIONER

## 2021-04-23 PROCEDURE — 99214 OFFICE O/P EST MOD 30 MIN: CPT | Performed by: NURSE PRACTITIONER

## 2021-04-23 RX ORDER — LORAZEPAM 0.5 MG/1
TABLET ORAL
COMMUNITY
Start: 2021-04-17 | End: 2021-05-13 | Stop reason: SDUPTHER
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
## 2021-04-26 ENCOUNTER — TELEPHONE (OUTPATIENT)
Dept: NEUROSURGERY | Age: 51
End: 2021-04-26

## 2021-04-26 DIAGNOSIS — M50.30 DEGENERATIVE CERVICAL DISC: ICD-10-CM

## 2021-04-26 RX ORDER — OXYCODONE AND ACETAMINOPHEN 10; 325 MG/1; MG/1
1 TABLET ORAL EVERY 4 HOURS PRN
Qty: 180 TABLET | Refills: 0 | Status: SHIPPED | OUTPATIENT
Start: 2021-04-26 | End: 2021-05-24 | Stop reason: SDUPTHER

## 2021-04-26 NOTE — TELEPHONE ENCOUNTER
AIRAM on Driver listed. PT STATED THAT THE NEURO  CANCELED HIS APPT TODAY AND HE IS NOT RESCHEDULED UNTIL MAY 26TH.

## 2021-04-26 NOTE — TELEPHONE ENCOUNTER
X-ray does not show any instability. He is going to see the surgeon to review the narrowing in his spinal canal (stenosis) of his neck, as we discussed during his office visit.

## 2021-04-26 NOTE — TELEPHONE ENCOUNTER
Contacted pt to r/s todays appt and pt is very upset. Pt states he was informed that Dr Duane Jett needed to discuss something with him regarding his back and pt has been very anxious all weekend. Scheduled pt to see Dr Duane Jett in May, but would like a call back to discuss whatever was needed to be discussed about. Explained that it was an error for the appt to be added to [facility] schedule as Dr Duane Jett is on vacation this week. Pt was very upset about having to be rescheduled again. States he has been rescheduled two out of the three appts he has had. States it is unfair for him to be sitting there anxious and then to be rescheduled. Apologized repeatedly. Pt would like results of the XR he had done and would like to have someone reach out to him regarding his issues with his back and why he needed to schedule with Susan.

## 2021-05-07 ENCOUNTER — OFFICE VISIT (OUTPATIENT)
Dept: PRIMARY CARE CLINIC | Age: 51
End: 2021-05-07
Payer: COMMERCIAL

## 2021-05-07 VITALS
WEIGHT: 138.7 LBS | SYSTOLIC BLOOD PRESSURE: 154 MMHG | DIASTOLIC BLOOD PRESSURE: 92 MMHG | OXYGEN SATURATION: 98 % | HEART RATE: 88 BPM | BODY MASS INDEX: 21.09 KG/M2

## 2021-05-07 DIAGNOSIS — I10 ESSENTIAL HYPERTENSION: ICD-10-CM

## 2021-05-07 DIAGNOSIS — F33.9 MAJOR DEPRESSIVE DISORDER, RECURRENT EPISODE WITH ANXIOUS DISTRESS (HCC): Primary | ICD-10-CM

## 2021-05-07 DIAGNOSIS — E11.42 DIABETIC PERIPHERAL NEUROPATHY (HCC): ICD-10-CM

## 2021-05-07 DIAGNOSIS — M54.12 CERVICAL RADICULOPATHY: ICD-10-CM

## 2021-05-07 PROCEDURE — 2022F DILAT RTA XM EVC RTNOPTHY: CPT | Performed by: FAMILY MEDICINE

## 2021-05-07 PROCEDURE — 3046F HEMOGLOBIN A1C LEVEL >9.0%: CPT | Performed by: FAMILY MEDICINE

## 2021-05-07 PROCEDURE — 4004F PT TOBACCO SCREEN RCVD TLK: CPT | Performed by: FAMILY MEDICINE

## 2021-05-07 PROCEDURE — 99214 OFFICE O/P EST MOD 30 MIN: CPT | Performed by: FAMILY MEDICINE

## 2021-05-07 PROCEDURE — G8420 CALC BMI NORM PARAMETERS: HCPCS | Performed by: FAMILY MEDICINE

## 2021-05-07 PROCEDURE — G8427 DOCREV CUR MEDS BY ELIG CLIN: HCPCS | Performed by: FAMILY MEDICINE

## 2021-05-07 PROCEDURE — 3017F COLORECTAL CA SCREEN DOC REV: CPT | Performed by: FAMILY MEDICINE

## 2021-05-07 RX ORDER — OXYCODONE HCL 20 MG/1
20 TABLET, FILM COATED, EXTENDED RELEASE ORAL 2 TIMES DAILY
Qty: 60 TABLET | Refills: 0 | Status: SHIPPED | OUTPATIENT
Start: 2021-05-07 | End: 2021-06-03 | Stop reason: SDUPTHER

## 2021-05-07 ASSESSMENT — ENCOUNTER SYMPTOMS
EYE DISCHARGE: 0
RHINORRHEA: 0
SHORTNESS OF BREATH: 0
SORE THROAT: 0
WHEEZING: 0
ABDOMINAL PAIN: 0
NAUSEA: 0
DIARRHEA: 0
EYE REDNESS: 0
VOMITING: 0
COUGH: 0

## 2021-05-07 NOTE — PROGRESS NOTES
[facility]  [address]  [place] [place] [facility] [place] [address]  Dept: [phone]    Esther Mary is a 48 y.o. male Established patient, who presents today for his medical conditions/complaints as noted below. Chief Complaint   Patient presents with    3 Month Follow-Up     Medication check        HPI:     HPI  Here for follow-up. Patient continues to have headaches. Continues to have neck pain. Has been having numbness and tingling going down the legs. By history, patient has finally scheduled for some nerve blocks to see if radiofrequency ablation would be of benefit. Patient has seen the neurosurgeon nurse practitioner twice but still awaiting input from the neurosurgeon. Patient still having weakness in the arms as well as headaches. Patient continues to be depressed. States wife is doing all the work and he has a hard time doing anything at this time. Patient states endocrinology is having a hard time getting him the [facility] system. Unsure why this is so difficult with him having an insulin pump. Patient not checking blood pressures outside the office. MRI of the neck again reviewed.     Reviewed prior notes Neurology  Reviewed previous Imaging    LDL Calculated (mg/dL)   Date Value   04/16/2019 97   05/21/2018 66   01/05/2018 66       (goal LDL is <100)   AST (U/L)   Date Value   05/23/2017 19     ALT (U/L)   Date Value   05/23/2017 17     BUN (mg/dL)   Date Value   11/14/2017 14     Hemoglobin A1C (%)   Date Value   04/05/2021 9.7     TSH (mIU/L)   Date Value   07/27/2018 0.43     BP Readings from Last 3 Encounters:   05/07/21 (!) 154/92   04/23/21 (!) 166/95   03/11/21 (!) 147/105          (goal 120/80)    Past Medical History:   Diagnosis Date    Anxiety     Arthritis     CAD (coronary artery disease)     Calcaneal apophysitis     Corns     Depression     Diabetes type I ([facility] [address]     Gastroparesis     GERD (gastroesophageal reflux disease)  [id number](813.5)     Hearing loss     Hyperglycemia     Hyperlipidemia     Hypertension     Intussusception ([facility] [address]     MI (myocardial infarction) ([facility] [address] jan 2014    Neuropathy     Osteoarthritis     Panic attacks     Temporomandibular joint disorder       Past Surgical History:   Procedure Laterality Date    ABDOMEN SURGERY      insulin pump    ABDOMINAL EXPLORATION SURGERY  05/28/2016    bowel resection Rt. colon &terminal ileum, intussuseption.     [address]  2014    CARPAL TUNNEL RELEASE      bilateral    COLONOSCOPY  05/23/2017    poor prep; diverticulosis    COLONOSCOPY N/A 7/10/2018    COLONOSCOPY POLYPECTOMY HOT BIOPSY performed by Shar Whyte MD at [address] [facility]      right    HERNIA REPAIR      inguinal    KNEE SURGERY      IN COLON CA SCRN NOT  [address] [place] [address] IND N/A 5/25/2017    COLONOSCOPY performed by Aurea Alvarenga MD at [address] ESOPHAGOGASTRODUODENOSCOPY TRANSORAL DIAGNOSTIC N/A 5/24/2017    EGD ESOPHAGOGASTRODUODENOSCOPY performed by Aurea Alvarenga MD at [address] [facility] [address]      fracture     TONSILLECTOMY      x2    TOOTH EXTRACTION      x4    ULNAR TUNNEL RELEASE      UPPER GASTROINTESTINAL ENDOSCOPY  05/23/2017    mild gastritis    UPPER GASTROINTESTINAL ENDOSCOPY N/A 7/10/2018    EGD BIOPSY performed by Shar Whyte MD at [address] EXTRACTION      x4       Family History   Problem Relation Age of Onset    Diabetes Mother     High Blood Pressure Mother     Heart Disease Mother    [facility] Migraines Mother     Other Mother         diabetic neuropathy    High Blood Pressure Father        Social History     Tobacco Use    Smoking status: Current Every Day Smoker     Packs/day: 1.00     Years: 30.00     Pack years: 30.00    Smokeless tobacco: Never Used   Substance Use Topics    Alcohol use: No     Comment: previous heavy ETOH use; pt stopped around 2010      Current Outpatient Medications   Medication Sig Dispense Refill    oxyCODONE (OXYCONTIN) 20 MG extended release tablet Take 1 tablet by mouth 2 times daily for 30 days. Intended supply: 30 days 60 tablet 0    oxyCODONE-acetaminophen (PERCOCET)  MG per tablet Take 1 tablet by mouth every 4 hours as needed for Pain for up to 30 days. No more than 6 per day 180 tablet 0    LORazepam (ATIVAN) 0.5 MG tablet take 1 tablet by mouth every 8 hours if needed for anxiety      VORTIoxetine HBr (TRINTELLIX) 20 MG TABS tablet Take 1 tablet by mouth daily 90 tablet 3    brexpiprazole (REXULTI) 0.5 MG TABS tablet Take 1 tablet by mouth daily 90 tablet 3    pantoprazole (PROTONIX) 40 MG tablet TAKE ONE TABLET BY MOUTH TWICE DAILY 60 tablet 3    lisinopril (PRINIVIL;ZESTRIL) 30 MG tablet Take 1 tablet by mouth daily 90 tablet 3    isosorbide mononitrate (IMDUR) 30 MG extended release tablet Take 1 tablet by mouth daily 90 tablet 3    atorvastatin (LIPITOR) 80 MG tablet Take 1 tablet by mouth nightly 90 tablet 3    clopidogrel (PLAVIX) 75 MG tablet Take 1 tablet by mouth daily 90 tablet 3    CONTOUR NEXT TEST strip use four times a day      ondansetron (ZOFRAN-ODT) 8 MG TBDP disintegrating tablet Take 8 mg by mouth every 12 hours as needed      insulin lispro, 1 Unit Dial, 100 UNIT/ML SOPN Inject 2-8 Units into the skin nightly      naloxone 4 MG/0.1ML LIQD nasal spray 1 spray by Nasal route as needed for Opioid Reversal 1 each 5    nitroGLYCERIN (NITROSTAT) 0.4 MG SL tablet Place 1 tablet under the tongue every 5 minutes as needed for Chest pain Indications: Disease of the Arteries of the Heart Take 1 tablet every 5 minutes times three as needed for chest pain 25 tablet 2    nicotine (NICODERM CQ) 21 MG/24HR place 1 patch ONTO THE SKIN once daily      Insulin Aspart (NOVOLOG SC) Inject  into the skin. Insulin pump       No current facility-administered medications for this visit.       Facility-Administered Medications Ordered in Other Visits   Medication Dose Route Frequency Provider Last Rate Last Admin    lactated ringers infusion  75 mL/hr Intravenous Continuous Mikal Sauer MD         Allergies   Allergen Reactions    Avelox [Moxifloxacin]     Ciprofloxacin Other (See Comments)    Levofloxacin     Lyrica [Pregabalin]     Metoclopramide Other (See Comments)     Severe confusion and paranoid activity    Neurontin [Gabapentin]     Other      Can not have epidural injections, steroids, nerve blocks, or burning of nerves due to being diabetic    Tetanus Toxoids     Tramadol     Tramadol Hcl        Health Maintenance   Topic Date Due    Hepatitis C screen  Never done    HIV screen  Never done    COVID-19 Vaccine (1) Never done    Hepatitis B vaccine (1 of 3 - Risk 3-dose series) Never done    Colon cancer screen colonoscopy  01/10/2019    Diabetic retinal exam  11/28/2019    Lipid screen  04/16/2020    Shingles Vaccine (1 of 2) Never done    Potassium monitoring  12/19/2020    Creatinine monitoring  12/19/2020    Diabetic microalbuminuria test  05/04/2021    A1C test (Diabetic or Prediabetic)  07/05/2021    Diabetic foot exam  01/26/2022    Flu vaccine  Completed    Pneumococcal 0-64 years Vaccine  Completed    Hepatitis A vaccine  Aged Out    Hib vaccine  Aged Out    Meningococcal (ACWY) vaccine  Aged Out       Subjective:      Review of Systems   Constitutional: Negative for chills and fever. HENT: Negative for rhinorrhea and sore throat. Eyes: Negative for discharge and redness. Respiratory: Negative for cough, shortness of breath and wheezing. Cardiovascular: Negative for chest pain and palpitations. Gastrointestinal: Negative for abdominal pain, diarrhea, nausea and vomiting. Genitourinary: Negative for dysuria and frequency. Musculoskeletal: Positive for neck pain. Negative for arthralgias and myalgias. Neurological: Positive for weakness.  Negative for dizziness, light-headedness and headaches. Psychiatric/Behavioral: Negative for sleep disturbance. Objective:     BP (!) 154/92   Pulse 88   Wt 138 lb 11.2 oz (62.9 kg)   SpO2 98%   BMI 21.09 kg/m²   Physical Exam  Vitals signs and nursing note reviewed. Constitutional:       General: He is not in acute distress. Appearance: He is well-developed. He is not ill-appearing. HENT:      Head: Normocephalic and atraumatic. Right Ear: External ear normal.      Left Ear: External ear normal.   Eyes:      General: No scleral icterus. Right eye: No discharge. Left eye: No discharge. Conjunctiva/sclera: Conjunctivae normal.      Pupils: Pupils are equal, round, and reactive to light. Neck:      Thyroid: No thyromegaly. Trachea: No tracheal deviation. Cardiovascular:      Rate and Rhythm: Normal rate and regular rhythm. Heart sounds: Normal heart sounds. Pulmonary:      Effort: Pulmonary effort is normal. No respiratory distress. Breath sounds: Normal breath sounds. No wheezing. Lymphadenopathy:      Cervical: No cervical adenopathy. Skin:     General: Skin is warm. Findings: No rash. Neurological:      Mental Status: He is alert and oriented to person, place, and time. Psychiatric:         Mood and Affect: Mood normal.         Behavior: Behavior normal.         Thought Content: Thought content normal.       Controlled Substance Monitoring:    Acute and Chronic Pain Monitoring:   RX Monitoring 5/7/2021   Attestation -   Acute Pain Prescriptions -   Periodic Controlled Substance Monitoring Possible medication side effects, risk of tolerance/dependence & alternative treatments discussed. ;No signs of potential drug abuse or diversion identified. Chronic Pain > 50 MEDD Obtained or confirmed \"Consent for Opioid Use\" on file. Chronic Pain > 80 MEDD -   Chronic Pain > 120 MEDD Obtained or confirmed \"Medication Contract\" on file. Assessment:       Diagnosis Orders   1. Major depressive disorder, recurrent episode with anxious distress ([facility] [address]     2. Diabetic peripheral neuropathy (HCC)  oxyCODONE (OXYCONTIN) 20 MG extended release tablet   3. Cervical radiculopathy  oxyCODONE (OXYCONTIN) 20 MG extended release tablet   4. Essential hypertension          Plan:    Refill OxyContin. Pain management requesting that we continue. Await trial of nerve blocks with pain management. Advised patient if they are not helpful, believe he would need neurosurgical intervention. Await neurosurgery input May 26. Continue with endocrinology for diabetes mellitus. Return in about 3 months (around 8/7/2021). No orders of the defined types were placed in this encounter. Orders Placed This Encounter   Medications    oxyCODONE (OXYCONTIN) 20 MG extended release tablet     Sig: Take 1 tablet by mouth 2 times daily for 30 days. Intended supply: 30 days     Dispense:  60 tablet     Refill:  0     Reduce doses taken as pain becomes manageable       Patient given educational materials - see patient instructions. Discussed use, benefit, and side effects of prescribed medications. All patient questions answered. Pt voiced understanding. Reviewed health maintenance. Instructed to continue current medications, diet andexercise. Patient agreed with treatment plan. Follow up as directed.      Electronicallysigned by Natalie Valencia MD on 5/7/2021 at 10:20 AM

## 2021-05-13 DIAGNOSIS — F41.8 DEPRESSION WITH ANXIETY: Primary | ICD-10-CM

## 2021-05-14 RX ORDER — LORAZEPAM 0.5 MG/1
TABLET ORAL
Qty: 90 TABLET | Refills: 1 | Status: SHIPPED | OUTPATIENT
Start: 2021-05-14 | End: 2021-07-14 | Stop reason: SDUPTHER

## 2021-05-24 DIAGNOSIS — M50.30 DEGENERATIVE CERVICAL DISC: ICD-10-CM

## 2021-05-24 RX ORDER — OXYCODONE AND ACETAMINOPHEN 10; 325 MG/1; MG/1
1 TABLET ORAL EVERY 4 HOURS PRN
Qty: 180 TABLET | Refills: 0 | Status: SHIPPED | OUTPATIENT
Start: 2021-05-24 | End: 2021-06-21 | Stop reason: SDUPTHER

## 2021-05-26 ENCOUNTER — OFFICE VISIT (OUTPATIENT)
Dept: NEUROSURGERY | Age: 51
End: 2021-05-26
Payer: COMMERCIAL

## 2021-05-26 VITALS
WEIGHT: 138 LBS | OXYGEN SATURATION: 99 % | BODY MASS INDEX: 20.92 KG/M2 | DIASTOLIC BLOOD PRESSURE: 110 MMHG | SYSTOLIC BLOOD PRESSURE: 180 MMHG | HEART RATE: 78 BPM | HEIGHT: 68 IN

## 2021-05-26 DIAGNOSIS — G99.2 STENOSIS OF CERVICAL SPINE WITH MYELOPATHY (HCC): ICD-10-CM

## 2021-05-26 DIAGNOSIS — G62.9 PERIPHERAL POLYNEUROPATHY: Primary | ICD-10-CM

## 2021-05-26 DIAGNOSIS — M48.02 STENOSIS OF CERVICAL SPINE WITH MYELOPATHY (HCC): ICD-10-CM

## 2021-05-26 PROCEDURE — 99213 OFFICE O/P EST LOW 20 MIN: CPT | Performed by: NEUROLOGICAL SURGERY

## 2021-05-26 PROCEDURE — G8427 DOCREV CUR MEDS BY ELIG CLIN: HCPCS | Performed by: NEUROLOGICAL SURGERY

## 2021-05-26 PROCEDURE — 3017F COLORECTAL CA SCREEN DOC REV: CPT | Performed by: NEUROLOGICAL SURGERY

## 2021-05-26 PROCEDURE — 4004F PT TOBACCO SCREEN RCVD TLK: CPT | Performed by: NEUROLOGICAL SURGERY

## 2021-05-26 PROCEDURE — G8420 CALC BMI NORM PARAMETERS: HCPCS | Performed by: NEUROLOGICAL SURGERY
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
## 2021-06-03 DIAGNOSIS — E11.42 DIABETIC PERIPHERAL NEUROPATHY (HCC): ICD-10-CM

## 2021-06-03 DIAGNOSIS — M54.12 CERVICAL RADICULOPATHY: ICD-10-CM

## 2021-06-03 RX ORDER — OXYCODONE HCL 20 MG/1
20 TABLET, FILM COATED, EXTENDED RELEASE ORAL 2 TIMES DAILY
Qty: 60 TABLET | Refills: 0 | Status: SHIPPED | OUTPATIENT
Start: 2021-06-03 | End: 2021-07-07 | Stop reason: SDUPTHER

## 2021-06-21 DIAGNOSIS — M50.30 DEGENERATIVE CERVICAL DISC: ICD-10-CM

## 2021-06-21 RX ORDER — OXYCODONE AND ACETAMINOPHEN 10; 325 MG/1; MG/1
1 TABLET ORAL EVERY 4 HOURS PRN
Qty: 180 TABLET | Refills: 0 | Status: SHIPPED | OUTPATIENT
Start: 2021-06-21 | End: 2021-07-19 | Stop reason: SDUPTHER

## 2021-07-07 DIAGNOSIS — E11.42 DIABETIC PERIPHERAL NEUROPATHY (HCC): ICD-10-CM

## 2021-07-07 DIAGNOSIS — M54.12 CERVICAL RADICULOPATHY: ICD-10-CM

## 2021-07-07 RX ORDER — OXYCODONE HCL 20 MG/1
20 TABLET, FILM COATED, EXTENDED RELEASE ORAL 2 TIMES DAILY
Qty: 60 TABLET | Refills: 0 | Status: SHIPPED | OUTPATIENT
Start: 2021-07-07 | End: 2021-08-02 | Stop reason: SDUPTHER

## 2021-07-14 DIAGNOSIS — F41.8 DEPRESSION WITH ANXIETY: ICD-10-CM

## 2021-07-14 RX ORDER — LORAZEPAM 0.5 MG/1
TABLET ORAL
Qty: 90 TABLET | Refills: 1 | Status: SHIPPED | OUTPATIENT
Start: 2021-07-14 | End: 2021-09-14

## 2021-07-19 DIAGNOSIS — M50.30 DEGENERATIVE CERVICAL DISC: ICD-10-CM

## 2021-07-19 RX ORDER — OXYCODONE AND ACETAMINOPHEN 10; 325 MG/1; MG/1
1 TABLET ORAL EVERY 4 HOURS PRN
Qty: 180 TABLET | Refills: 0 | Status: SHIPPED | OUTPATIENT
Start: 2021-07-19 | End: 2021-08-16 | Stop reason: SDUPTHER

## 2021-07-29 ENCOUNTER — TELEPHONE (OUTPATIENT)
Dept: PRIMARY CARE CLINIC | Age: 51
End: 2021-07-29

## 2021-07-29 NOTE — TELEPHONE ENCOUNTER
----- Message from Angelo Gauthier sent at 7/29/2021  3:16 PM EDT -----  Subject: Appointment Request    Reason for Call: Routine Existing Condition Follow Up    QUESTIONS  Type of Appointment? Established Patient  Reason for appointment request? No appointments available during search  Additional Information for Provider? PT has an apt for clearance for   hernia surgery but  just called him and surgery is set for 8/10 and no   available open times. If clearance can be given without an apt or if PT   can be fit in to an apt.   ---------------------------------------------------------------------------  --------------  CALL BACK INFO  What is the best way for the office to contact you? OK to leave message on   voicemail  Preferred Call Back Phone Number? [phone]  ---------------------------------------------------------------------------  --------------  SCRIPT ANSWERS  Relationship to Patient? Self  Have your symptoms changed? No  Have you been diagnosed with, awaiting test results for, or told that you   are suspected of having COVID-19 (Coronavirus)? (If patient has tested   negative or was tested as a requirement for work, school, or travel and   not based on symptoms, answer no)? No  Do you currently have flu-like symptoms including fever or chills, cough,   shortness of breath, difficulty breathing, or new loss of taste or smell? No  Have you had close contact with someone with COVID-19 in the last 14 days? No  (Service Expert  click yes below to proceed with [facility] As Usual   Scheduling)?  Yes

## 2021-07-30 ENCOUNTER — TELEPHONE (OUTPATIENT)
Dept: PRIMARY CARE CLINIC | Age: 51
End: 2021-07-30

## 2021-07-30 DIAGNOSIS — M54.12 CERVICAL RADICULOPATHY: ICD-10-CM

## 2021-07-30 DIAGNOSIS — E11.42 DIABETIC PERIPHERAL NEUROPATHY (HCC): ICD-10-CM

## 2021-07-30 NOTE — TELEPHONE ENCOUNTER
Pt is scheduled for hernia surgery on 8/10/21 with Dr. Keyonna Booker. Asking if he needs to come in for surgery clearance, or can you clear him w/out seeing him?

## 2021-08-02 ENCOUNTER — HOSPITAL ENCOUNTER (OUTPATIENT)
Dept: PREADMISSION TESTING | Age: 51
Discharge: HOME | End: 2021-08-06
Payer: COMMERCIAL

## 2021-08-02 VITALS
SYSTOLIC BLOOD PRESSURE: 150 MMHG | TEMPERATURE: 97.9 F | HEART RATE: 77 BPM | WEIGHT: 133 LBS | HEIGHT: 68 IN | BODY MASS INDEX: 20.16 KG/M2 | RESPIRATION RATE: 16 BRPM | DIASTOLIC BLOOD PRESSURE: 96 MMHG | OXYGEN SATURATION: 100 %

## 2021-08-02 LAB
ABSOLUTE EOS #: 0 K/UL (ref 0–0.4)
ABSOLUTE IMMATURE GRANULOCYTE: ABNORMAL K/UL (ref 0–0.3)
ABSOLUTE LYMPH #: 1.8 K/UL (ref 1–4.8)
ABSOLUTE MONO #: 0.4 K/UL (ref 0.1–1.3)
ANION GAP SERPL CALCULATED.3IONS-SCNC: 7 MMOL/L (ref 9–17)
BASOPHILS # BLD: 1 % (ref 0–2)
BASOPHILS ABSOLUTE: 0.1 K/UL (ref 0–0.2)
BUN BLDV-MCNC: 6 MG/DL (ref 6–20)
BUN/CREAT BLD: ABNORMAL (ref 9–20)
CALCIUM SERPL-MCNC: 9.1 MG/DL (ref 8.6–10.4)
CHLORIDE BLD-SCNC: 100 MMOL/L (ref 98–107)
CO2: 30 MMOL/L (ref 20–31)
CREAT SERPL-MCNC: 0.54 MG/DL (ref 0.7–1.2)
DIFFERENTIAL TYPE: ABNORMAL
EOSINOPHILS RELATIVE PERCENT: 1 % (ref 0–4)
GFR AFRICAN AMERICAN: >60 ML/MIN
GFR NON-AFRICAN AMERICAN: >60 ML/MIN
GFR SERPL CREATININE-BSD FRML MDRD: ABNORMAL ML/MIN/{1.73_M2}
GFR SERPL CREATININE-BSD FRML MDRD: ABNORMAL ML/MIN/{1.73_M2}
GLUCOSE BLD-MCNC: 181 MG/DL (ref 70–99)
HCT VFR BLD CALC: 48.2 % (ref 41–53)
HEMOGLOBIN: 16.5 G/DL (ref 13.5–17.5)
IMMATURE GRANULOCYTES: ABNORMAL %
LYMPHOCYTES # BLD: 24 % (ref 24–44)
MCH RBC QN AUTO: 32.5 PG (ref 26–34)
MCHC RBC AUTO-ENTMCNC: 34.1 G/DL (ref 31–37)
MCV RBC AUTO: 95 FL (ref 80–100)
MONOCYTES # BLD: 6 % (ref 1–7)
NRBC AUTOMATED: ABNORMAL PER 100 WBC
PDW BLD-RTO: 13.5 % (ref 11.5–14.9)
PLATELET # BLD: 183 K/UL (ref 150–450)
PLATELET ESTIMATE: ABNORMAL
PMV BLD AUTO: 8.4 FL (ref 6–12)
POTASSIUM SERPL-SCNC: 4.5 MMOL/L (ref 3.7–5.3)
RBC # BLD: 5.07 M/UL (ref 4.5–5.9)
RBC # BLD: ABNORMAL 10*6/UL
SEG NEUTROPHILS: 68 % (ref 36–66)
SEGMENTED NEUTROPHILS ABSOLUTE COUNT: 5 K/UL (ref 1.3–9.1)
SODIUM BLD-SCNC: 137 MMOL/L (ref 135–144)
WBC # BLD: 7.3 K/UL (ref 3.5–11)
WBC # BLD: ABNORMAL 10*3/UL

## 2021-08-02 PROCEDURE — 36415 COLL VENOUS BLD VENIPUNCTURE: CPT

## 2021-08-02 PROCEDURE — 85025 COMPLETE CBC W/AUTO DIFF WBC: CPT

## 2021-08-02 PROCEDURE — 93005 ELECTROCARDIOGRAM TRACING: CPT | Performed by: ANESTHESIOLOGY

## 2021-08-02 PROCEDURE — 80048 BASIC METABOLIC PNL TOTAL CA: CPT

## 2021-08-02 RX ORDER — PROPRANOLOL HYDROCHLORIDE 20 MG/1
20 TABLET ORAL PRN
COMMUNITY

## 2021-08-02 RX ORDER — VITAMIN B COMPLEX
1 CAPSULE ORAL DAILY
COMMUNITY

## 2021-08-02 RX ORDER — ARIPIPRAZOLE 10 MG/1
10 TABLET ORAL DAILY
Status: ON HOLD | COMMUNITY
End: 2021-08-10

## 2021-08-02 RX ORDER — OXYCODONE HCL 20 MG/1
20 TABLET, FILM COATED, EXTENDED RELEASE ORAL 2 TIMES DAILY
Qty: 60 TABLET | Refills: 0 | Status: SHIPPED | OUTPATIENT
Start: 2021-08-02 | End: 2021-08-30 | Stop reason: SDUPTHER

## 2021-08-02 ASSESSMENT — PAIN DESCRIPTION - PAIN TYPE: TYPE: CHRONIC PAIN

## 2021-08-02 ASSESSMENT — ENCOUNTER SYMPTOMS
COUGH: 0
DIARRHEA: 0
ABDOMINAL DISTENTION: 0
CONSTIPATION: 0
SHORTNESS OF BREATH: 0
BLOOD IN STOOL: 0
WHEEZING: 0
NAUSEA: 0
VOMITING: 0
SORE THROAT: 0
ABDOMINAL PAIN: 1
BACK PAIN: 1

## 2021-08-02 ASSESSMENT — PAIN DESCRIPTION - DESCRIPTORS: DESCRIPTORS: ACHING

## 2021-08-02 ASSESSMENT — PAIN DESCRIPTION - LOCATION: LOCATION: NECK;BACK;SHOULDER

## 2021-08-02 ASSESSMENT — PAIN SCALES - GENERAL: PAINLEVEL_OUTOF10: 6

## 2021-08-02 NOTE — H&P (VIEW-ONLY)
imdur, lipitor. Denies chest pain/pressure, palpitations, SOB, dizziness, lower extremity swelling or blurred vision. BP Readings from Last 3 Encounters:   08/02/21 (!) 150/96   05/26/21 (!) 180/110   05/07/21 (!) 154/92     Denies personal or family history of complications with anesthesia. PAST MEDICAL HISTORY     Past Medical History:   Diagnosis Date    Anxiety     Arthritis     CAD (coronary artery disease)     Calcaneal apophysitis     Corns     Depression     Diabetes type I ([address]     Gastroparesis     GERD (gastroesophageal reflux disease)     Headache(784.0)     Hearing loss     Hyperglycemia     Hyperlipidemia     Hypertension     Intussusception ([address]     MI (myocardial infarction) ([address] jan 2014    Neuropathy     Osteoarthritis     Panic attacks     Temporomandibular joint disorder        SURGICAL HISTORY       Past Surgical History:   Procedure Laterality Date    ABDOMEN SURGERY      insulin pump    ABDOMINAL EXPLORATION SURGERY  05/28/2016    bowel resection Rt. colon &terminal ileum, intussuseption.      CARDIAC CATHETERIZATION  2014    CARPAL TUNNEL RELEASE      bilateral    COLONOSCOPY  05/23/2017    poor prep; diverticulosis    COLONOSCOPY N/A 7/10/2018    COLONOSCOPY POLYPECTOMY HOT BIOPSY performed by January Montiel MD at [facility]      right    HERNIA REPAIR      inguinal    [place] COLON CA SCRN NOT  [address] IND N/A 5/25/2017    COLONOSCOPY performed by Crissy Smith MD at [address] ESOPHAGOGASTRODUODENOSCOPY TRANSORAL DIAGNOSTIC N/A 5/24/2017    EGD ESOPHAGOGASTRODUODENOSCOPY performed by Crissy Smith MD at [facility] Bilateral     SHOULDER SURGERY      fracture     TONSILLECTOMY      x2    TOOTH EXTRACTION      x4    ULNAR TUNNEL RELEASE      UPPER GASTROINTESTINAL ENDOSCOPY  05/23/2017    mild gastritis    UPPER GASTROINTESTINAL ENDOSCOPY N/A 7/10/2018    EGD BIOPSY performed by January Montiel MD at [facility]      Allergies   Allergen Reactions    Avelox [Moxifloxacin]     Ciprofloxacin Other (See Comments)    Levofloxacin     Lyrica [Pregabalin]     Metoclopramide Other (See Comments)     Severe confusion and paranoid activity    Neurontin [Gabapentin]     Other      Can not have epidural injections, steroids, nerve blocks, or burning of nerves due to being diabetic    Tetanus Toxoids     Tramadol     Tramadol Hcl        Current Outpatient Medications on File Prior to Encounter   Medication Sig Dispense Refill    oxyCODONE (OXYCONTIN) 20 MG extended release tablet Take 1 tablet by mouth 2 times daily for 30 days. Intended supply: 30 days 60 tablet 0    ARIPiprazole (ABILIFY) 10 MG tablet Take 10 mg by mouth daily      propranolol (INDERAL) 20 MG tablet Take 20 mg by mouth as needed Takes for migraines      b complex vitamins capsule Take 1 capsule by mouth daily      oxyCODONE-acetaminophen (PERCOCET)  MG per tablet Take 1 tablet by mouth every 4 hours as needed for Pain for up to 30 days.  No more than 6 per day 180 tablet 0    LORazepam (ATIVAN) 0.5 MG tablet take 1 tablet by mouth every 8 hours if needed for anxiety 90 tablet 1    VORTIoxetine HBr (TRINTELLIX) 20 MG TABS tablet Take 1 tablet by mouth daily 90 tablet 3    pantoprazole (PROTONIX) 40 MG tablet TAKE ONE TABLET BY MOUTH TWICE DAILY 60 tablet 3    lisinopril (PRINIVIL;ZESTRIL) 30 MG tablet Take 1 tablet by mouth daily (Patient taking differently: Take 5 mg by mouth daily ) 90 tablet 3    isosorbide mononitrate (IMDUR) 30 MG extended release tablet Take 1 tablet by mouth daily 90 tablet 3    atorvastatin (LIPITOR) 80 MG tablet Take 1 tablet by mouth nightly 90 tablet 3    clopidogrel (PLAVIX) 75 MG tablet Take 1 tablet by mouth daily 90 tablet 3    ondansetron (ZOFRAN-ODT) 8 MG TBDP disintegrating tablet Take 8 mg by mouth every 12 hours as needed      insulin lispro, 1 Unit Dial, 100 UNIT/ML SOPN Inject 2-8 Units into the skin nightly      naloxone 4 MG/0.1ML LIQD nasal spray 1 spray by Nasal route as needed for Opioid Reversal 1 each 5    nitroGLYCERIN (NITROSTAT) 0.4 MG SL tablet Place 1 tablet under the tongue every 5 minutes as needed for Chest pain Indications: Disease of the Arteries of the Heart Take 1 tablet every 5 minutes times three as needed for chest pain 25 tablet 2    Insulin Aspart (NOVOLOG SC) Inject  into the skin. Insulin pump      CONTOUR NEXT TEST strip use four times a day       Current Facility-Administered Medications on File Prior to Encounter   Medication Dose Route Frequency Provider Last Rate Last Admin    lactated ringers infusion  75 mL/hr Intravenous Continuous Rupinder Paul MD           Review of Systems   Constitutional: Negative for appetite change, chills and fever. HENT: Negative for congestion and sore throat. Eyes: Negative for visual disturbance. Respiratory: Negative for cough, shortness of breath and wheezing. Cardiovascular: Negative for chest pain, palpitations and leg swelling. Gastrointestinal: Positive for abdominal pain (Intermittent). Negative for abdominal distention, blood in stool, constipation, diarrhea, nausea and vomiting. Genitourinary: Negative. Musculoskeletal: Positive for arthralgias, back pain, myalgias and neck pain. Negative for gait problem. Skin: Negative. Neurological: Positive for numbness (Bilateral legs/feet and occasionally bilateral arms, hands.) and headaches (Occasional migraines). Negative for dizziness, speech difficulty, weakness and light-headedness. Hematological: Negative. Psychiatric/Behavioral: Negative. GENERAL PHYSICAL EXAM     Vitals: BP (!) 150/96   Pulse 77   Temp 97.9 °F (36.6 °C) (Infrared)   Resp 16   Ht 5' 8\" (1.727 m)   Wt 133 lb (60.3 kg)   SpO2 100%   BMI 20.22 kg/m²  Body mass index is 20.22 kg/m².      GENERAL APPEARANCE:   Mariusz Ventura is 48 y.o.  male, nourished, conscious, alert. Does not appear to be in distress or pain at this time. Physical Exam  Constitutional:       General: He is not in acute distress. Appearance: He is not ill-appearing. HENT:      Head: Normocephalic and atraumatic. Nose: Nose normal. No congestion. Mouth/Throat:      Mouth: Mucous membranes are moist.      Pharynx: Oropharynx is clear. No oropharyngeal exudate or posterior oropharyngeal erythema. Eyes:      General: No scleral icterus. Conjunctiva/sclera: Conjunctivae normal.   Cardiovascular:      Rate and Rhythm: Normal rate and regular rhythm. Heart sounds: Normal heart sounds. No murmur heard. No friction rub. No gallop. Pulmonary:      Effort: Pulmonary effort is normal. No respiratory distress. Breath sounds: Normal breath sounds. No wheezing, rhonchi or rales. Abdominal:      General: Bowel sounds are normal. There is no distension. Palpations: Abdomen is soft. Tenderness: There is no abdominal tenderness. There is no guarding. Hernia: A hernia (Incisional hernia present along well-healed midline abdominal surgical incision, reducible, expansile with cough) is present. Musculoskeletal:         General: No swelling or tenderness. Cervical back: Neck supple. No tenderness. Right lower leg: No edema. Left lower leg: No edema. Skin:     General: Skin is warm and dry. Coloration: Skin is not jaundiced. Neurological:      General: No focal deficit present. Mental Status: He is alert and oriented to person, place, and time.       Gait: Gait normal.   Psychiatric:         Mood and Affect: Mood normal.        PROVISIONAL DIAGNOSES / SURGERY:      ABDOMINAL PAIN / INCISIONAL HERNIA    COLONOSCOPY DIAGNOSTIC / HERNIA INCISIONAL REPAIR OPEN W/MESH      Patient Active Problem List    Diagnosis Date Noted    Unspecified inflammatory spondylopathy, cervical region ([facility] 01/22/2021    Major depressive disorder, recurrent episode with anxious distress ([facility] 08/10/2020    Pancreatic insufficiency 01/15/2019    Cervical spondylosis 10/21/2017    Perforation of right tympanic membrane 02/14/2017    Chronic coronary artery disease 11/03/2015    Depression with anxiety 11/03/2015    Diabetic peripheral neuropathy ([facility] 11/03/2015    Essential hypertension 11/03/2015    Gastroparesis 11/03/2015    History of long-term treatment with high-risk medication 11/03/2015    Hyperlipidemia 11/03/2015    Impingement syndrome of left shoulder 11/03/2015    Injury of right ulnar nerve 11/03/2015    Migraine headache 11/03/2015    Type 1 diabetes mellitus with hyperglycemia ([facility] 11/03/2015    Current every day smoker 11/03/2015    Ulnar nerve compression at multiple levels 12/17/2014    Degenerative cervical disc 12/17/2014    Arthropathy of cervical facet joint 12/17/2014    Neural foraminal stenosis of cervical spine 12/17/2014           CLYDE Cm - CNP on 8/2/2021 at 12:06 PM

## 2021-08-02 NOTE — PROGRESS NOTES
Medical clearance is included with surgery chart. Notified Dr Linda Connor of HGB A1C from 4/5/21 was 9.7, no orders to draw another today. Patient was instructed to look at paperwork from Dr Lainey Chandler office to see if they told him when to stop plavix, if it is not on paperwork he was told to call the office and ask when to stop. Also instructed him to ask his MD regarding his insulin pump and protocol prior to surgery, pt states understanding.

## 2021-08-02 NOTE — H&P
HISTORY and [id number]       NAME:  Maria E Francis  MRN: [id number]   YOB: 1970   Date: 8/2/2021   Age: 48 y.o. Gender: male       COMPLAINT AND PRESENT HISTORY:     Maria E Francis is 48 y.o.  male, preadmission testing for abdominal pain, incisional hernia with scheduled colonoscopy on 08/09/2021 and open incisional hernia repair on 08/10/2021. Pt initially noticed the hernia about 6 months ago. History of intussusception s/p repair in 2016. The hernia has not grown in size since he initially noticed. Pt has intermittent abdominal pain at site of hernia. Describes pain as sharp pain. Rating pain 4-5/10. Takes OxyContin and percocet for generalized pain which does provide some relief of abdominal pain. The hernia is reducible, expansile on coughing, no signs of bowel obstruction. Denies nausea, vomiting, diarrhea, constipation, hematochezia or melena. Pt is able to pass gas without difficulty. Pt has had a previous colonoscopies with last being in 2018. Has history of colon polyps and diverticulosis. Denies family history of colon cancer. History of gastroparesis. History of GERD. Takes Protonix daily with good relief. No Hx of MRSA infections in the past.    PMHx includes:    CAD/MI: MI in 2014. Pt s/o cardiac catheterization. No stents placed. Takes Plavix. Nitroglycerin prn. He has not needed nitroglycerin in about 8 months. Denies current or recent chest pain/pressure, palpitations. DM: Has associated gastroparesis and neuropathy to bilateral legs/feet and occasionally bilateral arms/hands. Has inulin pump with humalog in place currently to right lower abdomen. Insulin pump with basal rate which changes throughout the day and sliding scale bolus based on carb consumption/BS reading. Checks BS 4-5 times per day.      Lab Results   Component Value Date    LABA1C 9.7 04/05/2021     Lab Results   Component Value Date     01/20/2012     HTN/HLD: Takes lisinopril, imdur, lipitor. Denies chest pain/pressure, palpitations, SOB, dizziness, lower extremity swelling or blurred vision. BP Readings from Last 3 Encounters:   08/02/21 (!) 150/96   05/26/21 (!) 180/110   05/07/21 (!) 154/92     Denies personal or family history of complications with anesthesia. PAST MEDICAL HISTORY     Past Medical History:   Diagnosis Date    Anxiety     Arthritis     CAD (coronary artery disease)     Calcaneal apophysitis     Corns     Depression     Diabetes type I ([address]     Gastroparesis     GERD (gastroesophageal reflux disease)     Headache(784.0)     Hearing loss     Hyperglycemia     Hyperlipidemia     Hypertension     Intussusception ([address]     MI (myocardial infarction) ([address] jan 2014    Neuropathy     Osteoarthritis     Panic attacks     Temporomandibular joint disorder        SURGICAL HISTORY       Past Surgical History:   Procedure Laterality Date    ABDOMEN SURGERY      insulin pump    ABDOMINAL EXPLORATION SURGERY  05/28/2016    bowel resection Rt. colon &terminal ileum, intussuseption.      CARDIAC CATHETERIZATION  2014    CARPAL TUNNEL RELEASE      bilateral    COLONOSCOPY  05/23/2017    poor prep; diverticulosis    COLONOSCOPY N/A 7/10/2018    COLONOSCOPY POLYPECTOMY HOT BIOPSY performed by Jai Liang MD at [facility]      right    HERNIA REPAIR      inguinal    [place] COLON CA SCRN NOT  [address] [place] [address] IND N/A 5/25/2017    COLONOSCOPY performed by Carlitos Foy MD at [address] ESOPHAGOGASTRODUODENOSCOPY TRANSORAL DIAGNOSTIC N/A 5/24/2017    EGD ESOPHAGOGASTRODUODENOSCOPY performed by Carlitos Foy MD at [address] Bilateral     SHOULDER SURGERY      fracture     TONSILLECTOMY      x2    TOOTH EXTRACTION      x4    ULNAR TUNNEL RELEASE      UPPER GASTROINTESTINAL ENDOSCOPY  05/23/2017    mild gastritis    UPPER GASTROINTESTINAL ENDOSCOPY N/A 7/10/2018    EGD BIOPSY performed by Jai Liang MD at [facility]    WISDOM TOOTH EXTRACTION      x4       FAMILY HISTORY       Family History   Problem Relation Age of Onset    Diabetes Mother     High Blood Pressure Mother     Heart Disease Mother    Kylah Solum Migraines Mother     Other Mother         diabetic neuropathy    High Blood Pressure Father        SOCIAL HISTORY       Social History     Socioeconomic History    Marital status:      Spouse name: None    Number of children: None    Years of education: None    Highest education level: None   Occupational History    Occupation: disability   Tobacco Use    Smoking status: Current Every Day Smoker     Packs/day: 1.00     Years: 30.00     Pack years: 30.00    Smokeless tobacco: Never Used   Vaping Use    Vaping Use: Never used   Substance and Sexual Activity    Alcohol use: No     Comment: previous heavy ETOH use; pt stopped around 2010    Drug use: No    Sexual activity: None   Other Topics Concern    None   Social History Narrative    None     Social Determinants of Health     Financial Resource Strain: Low Risk     Difficulty of Paying Living Expenses: Not hard at all   Food Insecurity: No Food Insecurity    Worried About Running Out of Food in the Last Year: Never true    Ron of Food in the Last Year: Never true   Transportation Needs: No Transportation Needs    Lack of Transportation (Medical): No    Lack of Transportation (Non-Medical):  No   Physical Activity:     Days of Exercise per Week:     Minutes of Exercise per Session:    Stress:     Feeling of Stress :    Social Connections:     Frequency of Communication with Friends and Family:     Frequency of Social Gatherings with Friends and Family:     Attends Gnosticism Services:     Active Member of Clubs or Organizations:     Attends Club or Organization Meetings:     Marital Status:    Intimate Partner Violence:     Fear of Current or Ex-Partner:     Emotionally Abused:     Physically Abused:     Sexually Abused: REVIEW OF SYSTEMS      Allergies   Allergen Reactions    Avelox [Moxifloxacin]     Ciprofloxacin Other (See Comments)    Levofloxacin     Lyrica [Pregabalin]     Metoclopramide Other (See Comments)     Severe confusion and paranoid activity    Neurontin [Gabapentin]     Other      Can not have epidural injections, steroids, nerve blocks, or burning of nerves due to being diabetic    Tetanus Toxoids     Tramadol     Tramadol Hcl        Current Outpatient Medications on File Prior to Encounter   Medication Sig Dispense Refill    oxyCODONE (OXYCONTIN) 20 MG extended release tablet Take 1 tablet by mouth 2 times daily for 30 days. Intended supply: 30 days 60 tablet 0    ARIPiprazole (ABILIFY) 10 MG tablet Take 10 mg by mouth daily      propranolol (INDERAL) 20 MG tablet Take 20 mg by mouth as needed Takes for migraines      b complex vitamins capsule Take 1 capsule by mouth daily      oxyCODONE-acetaminophen (PERCOCET)  MG per tablet Take 1 tablet by mouth every 4 hours as needed for Pain for up to 30 days.  No more than 6 per day 180 tablet 0    LORazepam (ATIVAN) 0.5 MG tablet take 1 tablet by mouth every 8 hours if needed for anxiety 90 tablet 1    VORTIoxetine HBr (TRINTELLIX) 20 MG TABS tablet Take 1 tablet by mouth daily 90 tablet 3    pantoprazole (PROTONIX) 40 MG tablet TAKE ONE TABLET BY MOUTH TWICE DAILY 60 tablet 3    lisinopril (PRINIVIL;ZESTRIL) 30 MG tablet Take 1 tablet by mouth daily (Patient taking differently: Take 5 mg by mouth daily ) 90 tablet 3    isosorbide mononitrate (IMDUR) 30 MG extended release tablet Take 1 tablet by mouth daily 90 tablet 3    atorvastatin (LIPITOR) 80 MG tablet Take 1 tablet by mouth nightly 90 tablet 3    clopidogrel (PLAVIX) 75 MG tablet Take 1 tablet by mouth daily 90 tablet 3    ondansetron (ZOFRAN-ODT) 8 MG TBDP disintegrating tablet Take 8 mg by mouth every 12 hours as needed      insulin lispro, 1 Unit Dial, 100 UNIT/ML SOPN Inject 2-8 Units into the skin nightly      naloxone 4 MG/0.1ML LIQD nasal spray 1 spray by Nasal route as needed for Opioid Reversal 1 each 5    nitroGLYCERIN (NITROSTAT) 0.4 MG SL tablet Place 1 tablet under the tongue every 5 minutes as needed for Chest pain Indications: Disease of the Arteries of the Heart Take 1 tablet every 5 minutes times three as needed for chest pain 25 tablet 2    Insulin Aspart (NOVOLOG SC) Inject  into the skin. Insulin pump      CONTOUR NEXT TEST strip use four times a day       Current Facility-Administered Medications on File Prior to Encounter   Medication Dose Route Frequency Provider Last Rate Last Admin    lactated ringers infusion  75 mL/hr Intravenous Continuous Prince Singletary MD           Review of Systems   Constitutional: Negative for appetite change, chills and fever. HENT: Negative for congestion and sore throat. Eyes: Negative for visual disturbance. Respiratory: Negative for cough, shortness of breath and wheezing. Cardiovascular: Negative for chest pain, palpitations and leg swelling. Gastrointestinal: Positive for abdominal pain (Intermittent). Negative for abdominal distention, blood in stool, constipation, diarrhea, nausea and vomiting. Genitourinary: Negative. Musculoskeletal: Positive for arthralgias, back pain, myalgias and neck pain. Negative for gait problem. Skin: Negative. Neurological: Positive for numbness (Bilateral legs/feet and occasionally bilateral arms, hands.) and headaches (Occasional migraines). Negative for dizziness, speech difficulty, weakness and light-headedness. Hematological: Negative. Psychiatric/Behavioral: Negative. GENERAL PHYSICAL EXAM     Vitals: BP (!) 150/96   Pulse 77   Temp 97.9 °F (36.6 °C) (Infrared)   Resp 16   Ht 5' 8\" (1.727 m)   Wt 133 lb (60.3 kg)   SpO2 100%   BMI 20.22 kg/m²  Body mass index is 20.22 kg/m².      GENERAL APPEARANCE:   Eriberto Wells is 48 y.o.  male, nourished, conscious, alert. Does not appear to be in distress or pain at this time. Physical Exam  Constitutional:       General: He is not in acute distress. Appearance: He is not ill-appearing. HENT:      Head: Normocephalic and atraumatic. Nose: Nose normal. No congestion. Mouth/Throat:      Mouth: Mucous membranes are moist.      Pharynx: Oropharynx is clear. No oropharyngeal exudate or posterior oropharyngeal erythema. Eyes:      General: No scleral icterus. Conjunctiva/sclera: Conjunctivae normal.   Cardiovascular:      Rate and Rhythm: Normal rate and regular rhythm. Heart sounds: Normal heart sounds. No murmur heard. No friction rub. No gallop. Pulmonary:      Effort: Pulmonary effort is normal. No respiratory distress. Breath sounds: Normal breath sounds. No wheezing, rhonchi or rales. Abdominal:      General: Bowel sounds are normal. There is no distension. Palpations: Abdomen is soft. Tenderness: There is no abdominal tenderness. There is no guarding. Hernia: A hernia (Incisional hernia present along well-healed midline abdominal surgical incision, reducible, expansile with cough) is present. Musculoskeletal:         General: No swelling or tenderness. Cervical back: Neck supple. No tenderness. Right lower leg: No edema. Left lower leg: No edema. Skin:     General: Skin is warm and dry. Coloration: Skin is not jaundiced. Neurological:      General: No focal deficit present. Mental Status: He is alert and oriented to person, place, and time.       Gait: Gait normal.   Psychiatric:         Mood and Affect: Mood normal.        PROVISIONAL DIAGNOSES / SURGERY:      ABDOMINAL PAIN / INCISIONAL HERNIA    COLONOSCOPY DIAGNOSTIC / HERNIA INCISIONAL REPAIR OPEN W/MESH      Patient Active Problem List    Diagnosis Date Noted    Unspecified inflammatory spondylopathy, cervical region ([facility] 01/22/2021    Major depressive disorder, recurrent episode with anxious distress ([facility] 08/10/2020    Pancreatic insufficiency 01/15/2019    Cervical spondylosis 10/21/2017    Perforation of right tympanic membrane 02/14/2017    Chronic coronary artery disease 11/03/2015    Depression with anxiety 11/03/2015    Diabetic peripheral neuropathy ([facility] 11/03/2015    Essential hypertension 11/03/2015    Gastroparesis 11/03/2015    History of long-term treatment with high-risk medication 11/03/2015    Hyperlipidemia 11/03/2015    Impingement syndrome of left shoulder 11/03/2015    Injury of right ulnar nerve 11/03/2015    Migraine headache 11/03/2015    Type 1 diabetes mellitus with hyperglycemia ([facility] 11/03/2015    Current every day smoker 11/03/2015    Ulnar nerve compression at multiple levels 12/17/2014    Degenerative cervical disc 12/17/2014    Arthropathy of cervical facet joint 12/17/2014    Neural foraminal stenosis of cervical spine 12/17/2014           CLYDE Munoz - CNP on 8/2/2021 at 12:06 PM

## 2021-08-03 LAB
EKG ATRIAL RATE: 68 BPM
EKG P AXIS: 60 DEGREES
EKG P-R INTERVAL: 134 MS
EKG Q-T INTERVAL: 402 MS
EKG QRS DURATION: 90 MS
EKG QTC CALCULATION (BAZETT): 427 MS
EKG R AXIS: 58 DEGREES
EKG T AXIS: 57 DEGREES
EKG VENTRICULAR RATE: 68 BPM

## 2021-08-03 PROCEDURE — 93010 ELECTROCARDIOGRAM REPORT: CPT | Performed by: INTERNAL MEDICINE

## 2021-08-05 ENCOUNTER — HOSPITAL ENCOUNTER (OUTPATIENT)
Dept: CT IMAGING | Age: 51
Discharge: HOME | End: 2021-08-07
Payer: COMMERCIAL

## 2021-08-05 ENCOUNTER — HOSPITAL ENCOUNTER (OUTPATIENT)
Dept: LAB | Age: 51
Setting detail: SPECIMEN
End: 2021-08-05
Payer: COMMERCIAL

## 2021-08-05 DIAGNOSIS — K43.2 RECURRENT VENTRAL HERNIA: ICD-10-CM

## 2021-08-05 DIAGNOSIS — Z01.818 PREOP TESTING: Primary | ICD-10-CM

## 2021-08-05 PROCEDURE — 2580000003 HC RX 258: Performed by: SURGERY

## 2021-08-05 PROCEDURE — 74177 CT ABD & PELVIS W/CONTRAST: CPT

## 2021-08-05 PROCEDURE — 6360000004 HC RX CONTRAST MEDICATION: Performed by: SURGERY

## 2021-08-05 RX ORDER — SODIUM CHLORIDE 0.9 % (FLUSH) 0.9 %
10 SYRINGE (ML) INJECTION PRN
Status: DISCONTINUED | OUTPATIENT
Start: 2021-08-05 | End: 2021-08-08 | Stop reason: HOSPADM

## 2021-08-05 RX ORDER — 0.9 % SODIUM CHLORIDE 0.9 %
80 INTRAVENOUS SOLUTION INTRAVENOUS ONCE
Status: COMPLETED | OUTPATIENT
Start: 2021-08-05 | End: 2021-08-05

## 2021-08-05 RX ADMIN — IOHEXOL 50 ML: 240 INJECTION, SOLUTION INTRATHECAL; INTRAVASCULAR; INTRAVENOUS; ORAL at 14:05

## 2021-08-05 RX ADMIN — IOPAMIDOL 75 ML: 755 INJECTION, SOLUTION INTRAVENOUS at 14:05

## 2021-08-05 RX ADMIN — SODIUM CHLORIDE 80 ML: 9 INJECTION, SOLUTION INTRAVENOUS at 14:05

## 2021-08-05 RX ADMIN — SODIUM CHLORIDE, PRESERVATIVE FREE 10 ML: 5 INJECTION INTRAVENOUS at 14:05

## 2021-08-06 ENCOUNTER — ANESTHESIA EVENT (OUTPATIENT)
Dept: ENDOSCOPY | Age: 51
End: 2021-08-06
Payer: COMMERCIAL

## 2021-08-09 ENCOUNTER — ANESTHESIA EVENT (OUTPATIENT)
Dept: OPERATING ROOM | Age: 51
DRG: 355 | End: 2021-08-09
Payer: COMMERCIAL

## 2021-08-09 ENCOUNTER — HOSPITAL ENCOUNTER (OUTPATIENT)
Age: 51
Setting detail: OUTPATIENT SURGERY
Discharge: HOME | End: 2021-08-09
Attending: SURGERY | Admitting: SURGERY
Payer: COMMERCIAL

## 2021-08-09 ENCOUNTER — ANESTHESIA (OUTPATIENT)
Dept: ENDOSCOPY | Age: 51
End: 2021-08-09
Payer: COMMERCIAL

## 2021-08-09 VITALS
HEIGHT: 68 IN | RESPIRATION RATE: 11 BRPM | DIASTOLIC BLOOD PRESSURE: 96 MMHG | HEART RATE: 72 BPM | BODY MASS INDEX: 20.16 KG/M2 | OXYGEN SATURATION: 95 % | SYSTOLIC BLOOD PRESSURE: 149 MMHG | TEMPERATURE: 97.8 F | WEIGHT: 133 LBS

## 2021-08-09 VITALS — DIASTOLIC BLOOD PRESSURE: 74 MMHG | OXYGEN SATURATION: 99 % | TEMPERATURE: 96.4 F | SYSTOLIC BLOOD PRESSURE: 123 MMHG

## 2021-08-09 LAB
GLUCOSE BLD-MCNC: 155 MG/DL (ref 75–110)
SARS-COV-2, RAPID: NOT DETECTED
SPECIMEN DESCRIPTION: NORMAL

## 2021-08-09 PROCEDURE — 2500000003 HC RX 250 WO HCPCS: Performed by: NURSE ANESTHETIST, CERTIFIED REGISTERED

## 2021-08-09 PROCEDURE — 2580000003 HC RX 258: Performed by: ANESTHESIOLOGY

## 2021-08-09 PROCEDURE — 3609027000 HC COLONOSCOPY: Performed by: SURGERY

## 2021-08-09 PROCEDURE — 82947 ASSAY GLUCOSE BLOOD QUANT: CPT

## 2021-08-09 PROCEDURE — 7100000000 HC PACU RECOVERY - FIRST 15 MIN: Performed by: SURGERY

## 2021-08-09 PROCEDURE — 6360000002 HC RX W HCPCS: Performed by: NURSE ANESTHETIST, CERTIFIED REGISTERED

## 2021-08-09 PROCEDURE — 2709999900 HC NON-CHARGEABLE SUPPLY: Performed by: SURGERY

## 2021-08-09 PROCEDURE — 3700000001 HC ADD 15 MINUTES (ANESTHESIA): Performed by: SURGERY

## 2021-08-09 PROCEDURE — 7100000001 HC PACU RECOVERY - ADDTL 15 MIN: Performed by: SURGERY

## 2021-08-09 PROCEDURE — 87635 SARS-COV-2 COVID-19 AMP PRB: CPT

## 2021-08-09 PROCEDURE — 3700000000 HC ANESTHESIA ATTENDED CARE: Performed by: SURGERY

## 2021-08-09 RX ORDER — SODIUM CHLORIDE 9 MG/ML
INJECTION, SOLUTION INTRAVENOUS CONTINUOUS
Status: DISCONTINUED | OUTPATIENT
Start: 2021-08-09 | End: 2021-08-09 | Stop reason: HOSPADM

## 2021-08-09 RX ORDER — FENTANYL CITRATE 50 UG/ML
INJECTION, SOLUTION INTRAMUSCULAR; INTRAVENOUS PRN
Status: DISCONTINUED | OUTPATIENT
Start: 2021-08-09 | End: 2021-08-09 | Stop reason: SDUPTHER

## 2021-08-09 RX ORDER — LIDOCAINE HYDROCHLORIDE 10 MG/ML
INJECTION, SOLUTION EPIDURAL; INFILTRATION; INTRACAUDAL; PERINEURAL PRN
Status: DISCONTINUED | OUTPATIENT
Start: 2021-08-09 | End: 2021-08-09 | Stop reason: SDUPTHER

## 2021-08-09 RX ORDER — PROPOFOL 10 MG/ML
INJECTION, EMULSION INTRAVENOUS PRN
Status: DISCONTINUED | OUTPATIENT
Start: 2021-08-09 | End: 2021-08-09 | Stop reason: SDUPTHER

## 2021-08-09 RX ORDER — DIPHENHYDRAMINE HYDROCHLORIDE 50 MG/ML
12.5 INJECTION INTRAMUSCULAR; INTRAVENOUS
Status: DISCONTINUED | OUTPATIENT
Start: 2021-08-09 | End: 2021-08-09 | Stop reason: HOSPADM

## 2021-08-09 RX ORDER — MORPHINE SULFATE 2 MG/ML
2 INJECTION, SOLUTION INTRAMUSCULAR; INTRAVENOUS EVERY 5 MIN PRN
Status: DISCONTINUED | OUTPATIENT
Start: 2021-08-09 | End: 2021-08-09 | Stop reason: HOSPADM

## 2021-08-09 RX ORDER — MIDAZOLAM HYDROCHLORIDE 1 MG/ML
INJECTION INTRAMUSCULAR; INTRAVENOUS PRN
Status: DISCONTINUED | OUTPATIENT
Start: 2021-08-09 | End: 2021-08-09 | Stop reason: SDUPTHER

## 2021-08-09 RX ORDER — SODIUM CHLORIDE 0.9 % (FLUSH) 0.9 %
10 SYRINGE (ML) INJECTION PRN
Status: DISCONTINUED | OUTPATIENT
Start: 2021-08-09 | End: 2021-08-09 | Stop reason: HOSPADM

## 2021-08-09 RX ORDER — LABETALOL HYDROCHLORIDE 5 MG/ML
5 INJECTION, SOLUTION INTRAVENOUS EVERY 10 MIN PRN
Status: DISCONTINUED | OUTPATIENT
Start: 2021-08-09 | End: 2021-08-09 | Stop reason: HOSPADM

## 2021-08-09 RX ORDER — ONDANSETRON 2 MG/ML
4 INJECTION INTRAMUSCULAR; INTRAVENOUS
Status: DISCONTINUED | OUTPATIENT
Start: 2021-08-09 | End: 2021-08-09 | Stop reason: HOSPADM

## 2021-08-09 RX ORDER — SODIUM CHLORIDE 9 MG/ML
25 INJECTION, SOLUTION INTRAVENOUS PRN
Status: DISCONTINUED | OUTPATIENT
Start: 2021-08-09 | End: 2021-08-09 | Stop reason: HOSPADM

## 2021-08-09 RX ORDER — MEPERIDINE HYDROCHLORIDE 25 MG/ML
12.5 INJECTION INTRAMUSCULAR; INTRAVENOUS; SUBCUTANEOUS EVERY 5 MIN PRN
Status: DISCONTINUED | OUTPATIENT
Start: 2021-08-09 | End: 2021-08-09 | Stop reason: HOSPADM

## 2021-08-09 RX ORDER — SODIUM CHLORIDE 9 MG/ML
25 INJECTION, SOLUTION INTRAVENOUS PRN
Status: CANCELLED | OUTPATIENT
Start: 2021-08-09

## 2021-08-09 RX ORDER — ONDANSETRON 2 MG/ML
INJECTION INTRAMUSCULAR; INTRAVENOUS PRN
Status: DISCONTINUED | OUTPATIENT
Start: 2021-08-09 | End: 2021-08-09 | Stop reason: SDUPTHER

## 2021-08-09 RX ADMIN — SODIUM CHLORIDE: 9 INJECTION, SOLUTION INTRAVENOUS at 09:53

## 2021-08-09 RX ADMIN — PROPOFOL 400 MG: 10 INJECTION, EMULSION INTRAVENOUS at 10:52

## 2021-08-09 RX ADMIN — ONDANSETRON 4 MG: 2 INJECTION, SOLUTION INTRAMUSCULAR; INTRAVENOUS at 11:21

## 2021-08-09 RX ADMIN — MIDAZOLAM 2 MG: 1 INJECTION INTRAMUSCULAR; INTRAVENOUS at 10:46

## 2021-08-09 RX ADMIN — FENTANYL CITRATE 50 MCG: 50 INJECTION, SOLUTION INTRAMUSCULAR; INTRAVENOUS at 10:52

## 2021-08-09 RX ADMIN — LIDOCAINE HYDROCHLORIDE 50 MG: 10 INJECTION, SOLUTION EPIDURAL; INFILTRATION; INTRACAUDAL; PERINEURAL at 10:52

## 2021-08-09 ASSESSMENT — PULMONARY FUNCTION TESTS
PIF_VALUE: 1
PIF_VALUE: 12
PIF_VALUE: 12
PIF_VALUE: 14
PIF_VALUE: 12
PIF_VALUE: 0
PIF_VALUE: 15
PIF_VALUE: 13
PIF_VALUE: 12
PIF_VALUE: 12
PIF_VALUE: 10
PIF_VALUE: 3
PIF_VALUE: 6
PIF_VALUE: 1
PIF_VALUE: 12
PIF_VALUE: 6
PIF_VALUE: 12
PIF_VALUE: 6
PIF_VALUE: 2
PIF_VALUE: 10
PIF_VALUE: 1
PIF_VALUE: 12
PIF_VALUE: 10
PIF_VALUE: 12
PIF_VALUE: 12
PIF_VALUE: 10
PIF_VALUE: 12
PIF_VALUE: 1
PIF_VALUE: 1
PIF_VALUE: 14
PIF_VALUE: 13
PIF_VALUE: 5
PIF_VALUE: 12
PIF_VALUE: 5
PIF_VALUE: 14
PIF_VALUE: 2
PIF_VALUE: 12
PIF_VALUE: 1
PIF_VALUE: 12
PIF_VALUE: 12

## 2021-08-09 ASSESSMENT — PAIN DESCRIPTION - DESCRIPTORS: DESCRIPTORS: ACHING;DISCOMFORT

## 2021-08-09 ASSESSMENT — LIFESTYLE VARIABLES: SMOKING_STATUS: 0

## 2021-08-09 ASSESSMENT — ENCOUNTER SYMPTOMS
STRIDOR: 0
SHORTNESS OF BREATH: 0

## 2021-08-09 ASSESSMENT — PAIN - FUNCTIONAL ASSESSMENT: PAIN_FUNCTIONAL_ASSESSMENT: 0-10

## 2021-08-09 ASSESSMENT — PAIN SCALES - GENERAL: PAINLEVEL_OUTOF10: 0

## 2021-08-09 NOTE — OP NOTE
Operative Note      Patient: Gemini Diaz  YOB: 1970  MRN: [id number]    Date of Procedure: 8/9/2021    PROCEDURE NOTE    DATE OF PROCEDURE: 8/9/2021    SURGEON: Nati Muhammad MD    ASSISTANT: None    PREOPERATIVE DIAGNOSIS: Abdominal pain    POSTOPERATIVE DIAGNOSIS: Patent anastomosis previous right hemicolectomy. Poor prep. Grade 1 internal hemorrhoid. OPERATION: Total colonoscopy to ileocolonic anastomosis with intubation of terminal ileum    ANESTHESIA: General    ESTIMATED BLOOD LOSS: None    COMPLICATIONS: None     SPECIMENS:  Was Not Obtained    HISTORY: The patient is a 48y.o. year old male with history of above preop diagnosis. I recommended colonoscopy with possible biopsy or polypectomy and I explained the risk, benefits, expected outcome, and alternatives to the procedure. Risks included but are not limited to bleeding, infection, respiratory distress, hypotension, and perforation of the colon and possibility of missing a lesion. The patient understands and is in agreement. PROCEDURE: The patient was given IV conscious sedation. The patient's SPO2 remained above 90% throughout the procedure. Digital rectal exam was normal.  The colonoscope was inserted through the anus into the rectum and advanced under direct vision to the cecum without difficulty. Terminal ileum was examined for approximately 2 inches. The prep was poor. Findings:  Terminal ileum: normal    Cecum/Ascending colon: Patent anastomosis status post right hemicolectomy    Transverse colon: Poor preparation but grossly unremarkable    Descending/Sigmoid colon: Poor preparation but grossly unremarkable    Rectum/Anus: examined in normal and retroflexed positions and was poor preparation. Grade 1 internal hemorrhoid. Withdrawal Time was (minutes): 15      Next screening colonoscopy: 3 years. If screening is less than 10 years the recommended reason is due:History of colon polyp.   Poor preparation. The colon was decompressed. While withdrawing the scope the above findings were verified and the scope was removed. The patient tolerated the procedure and conscious sedation without unusual events. In the recovery room patient was examined and remains hemodynamically stable. Discharge home when criteria met. Recommendations/Plan:   1. To proceed with incisional hernia repair with mesh tomorrow as scheduled. 2. Discussed with the family  3. High fiber diet   4.  Precautions to avoid constipation    Electronically signed by Nuha Noriega MD  on 8/9/2021 at 11:29 AM

## 2021-08-09 NOTE — INTERVAL H&P NOTE
Update History & Physical    The patient's History and Physical of August 2, 2021 was reviewed with the patient and I examined the patient. There was no change. I concur with findings. Here today for diagnostic colonoscopy. Completed and followed prescribed prep. NPO. No medications taken this am. Stopped Plavix 6 days ago. Denies taking any other blood thinning medications. BS this am was 103. Insulin pump with humalog in place with basal rate infusing. Denies chest pain/pressure, palpitations, SOB, recent URI, fever or chills. Review vitals per RN flowsheet.        Electronically signed by CLYDE Potter CNP on 8/9/2021 at 9:05 AM

## 2021-08-09 NOTE — ANESTHESIA PRE PROCEDURE
Department of Anesthesiology  Preprocedure Note       Name:  Mariusz Ventura   Age:  48 y.o.  :  1970                                          MRN:  [id number]         Date:  2021      Surgeon: Gab Russell):  Naeem Dacosta MD    Procedure: Procedure(s):  COLONOSCOPY DIAGNOSTIC    Medications prior to admission:   Prior to Admission medications    Medication Sig Start Date End Date Taking? Authorizing Provider   oxyCODONE (OXYCONTIN) 20 MG extended release tablet Take 1 tablet by mouth 2 times daily for 30 days. Intended supply: 30 days 21 Yes Aneesh Gregory MD   ARIPiprazole (ABILIFY) 10 MG tablet Take 10 mg by mouth daily   Yes Historical Provider, MD   propranolol (INDERAL) 20 MG tablet Take 20 mg by mouth as needed Takes for migraines   Yes Historical Provider, MD   b complex vitamins capsule Take 1 capsule by mouth daily   Yes Historical Provider, MD   oxyCODONE-acetaminophen (PERCOCET)  MG per tablet Take 1 tablet by mouth every 4 hours as needed for Pain for up to 30 days.  No more than 6 per day 21 Yes Aneesh Gregory MD   LORazepam (ATIVAN) 0.5 MG tablet take 1 tablet by mouth every 8 hours if needed for anxiety 21 Yes Aneesh Gregory MD   VORTIoxetine HBr (TRINTELLIX) 20 MG TABS tablet Take 1 tablet by mouth daily 21  Yes Aneesh Gregory MD   pantoprazole (PROTONIX) 40 MG tablet TAKE ONE TABLET BY MOUTH TWICE DAILY 21  Yes Aneesh Gregory MD   lisinopril (PRINIVIL;ZESTRIL) 30 MG tablet Take 1 tablet by mouth daily  Patient taking differently: Take 5 mg by mouth daily  21  Yes Aneesh Gregory MD   isosorbide mononitrate (IMDUR) 30 MG extended release tablet Take 1 tablet by mouth daily 21  Yes Aneesh Gregory MD   atorvastatin (LIPITOR) 80 MG tablet Take 1 tablet by mouth nightly 21  Yes Aneesh Gregory MD   ondansetron (ZOFRAN-ODT) 8 MG TBDP disintegrating tablet Take 8 mg by mouth every 12 hours as needed   Yes Historical Provider, MD   Insulin Aspart (NOVOLOG SC) Inject  into the skin. Insulin pump   Yes Historical Provider, MD   clopidogrel (PLAVIX) 75 MG tablet Take 1 tablet by mouth daily 2/11/21   Jerome Luo MD   CONTOUR NEXT TEST strip use four times a day 1/13/21   Historical Provider, MD   naloxone 4 MG/0.1ML LIQD nasal spray 1 spray by Nasal route as needed for Opioid Reversal 12/11/19   Jerome Luo MD   nitroGLYCERIN (NITROSTAT) 0.4 MG SL tablet Place 1 tablet under the tongue every 5 minutes as needed for Chest pain Indications: Disease of the Arteries of the Heart Take 1 tablet every 5 minutes times three as needed for chest pain 2/8/16   Carmel Perez APRN - CNP       Current medications:    Current Facility-Administered Medications   Medication Dose Route Frequency Provider Last Rate Last Admin    sodium chloride flush 0.9 % injection 10 mL  10 mL Intravenous PRN Agata Gillespie MD        0.9 % sodium chloride infusion  25 mL Intravenous PRN Waldo MD Jose Miguel        0.9 % sodium chloride infusion   Intravenous Continuous Luis Enrique Lowry  mL/hr at 08/09/21 0953 New Bag at 08/09/21 0953     Facility-Administered Medications Ordered in Other Encounters   Medication Dose Route Frequency Provider Last Rate Last Admin    lactated ringers infusion  75 mL/hr Intravenous Continuous Marissa Pollard MD           Allergies:     Allergies   Allergen Reactions    Avelox [Moxifloxacin]     Ciprofloxacin Other (See Comments)    Levofloxacin     Lyrica [Pregabalin]     Metoclopramide Other (See Comments)     Severe confusion and paranoid activity    Neurontin [Gabapentin]     Other      Can not have epidural injections, steroids, nerve blocks, or burning of nerves due to being diabetic    Tetanus Toxoids     Tramadol     Tramadol Hcl        Problem List:    Patient Active Problem List   Diagnosis Code    Ulnar nerve compression at multiple levels G56.20    Degenerative cervical disc M50.30    Arthropathy of cervical facet joint M47.812    Neural foraminal stenosis of cervical spine M48.02    Cervical spondylosis M47.812    Chronic coronary artery disease I25.10    Depression with anxiety F41.8    Diabetic peripheral neuropathy (HCC) E11.42    Essential hypertension I10    Gastroparesis K31.84    History of long-term treatment with high-risk medication Z79.899    Hyperlipidemia E78.5    Impingement syndrome of left shoulder M75.42    Injury of right ulnar nerve S54. 01XA    Migraine headache G43.909    Type 1 diabetes mellitus with hyperglycemia (HCC) E10.65    Current every day smoker F17.200    Perforation of right tympanic membrane H72.91    Pancreatic insufficiency K86.89    Major depressive disorder, recurrent episode with anxious distress ([facility]) F33.9    Unspecified inflammatory spondylopathy, cervical region [facility]) M46.92       Past Medical History:        Diagnosis Date    Anxiety     Arthritis     CAD (coronary artery disease)     Calcaneal apophysitis     Corns     Depression     Diabetes type I ([address]     Gastroparesis     GERD (gastroesophageal reflux disease)     Headache(784.0)     Hearing loss     Hyperglycemia     Hyperlipidemia     Hypertension     Intussusception ([address]     MI (myocardial infarction) ([address] jan 2014    Neuropathy     Osteoarthritis     Panic attacks     Temporomandibular joint disorder        Past Surgical History:        Procedure Laterality Date    ABDOMEN SURGERY      insulin pump    ABDOMINAL EXPLORATION SURGERY  05/28/2016    bowel resection Rt. colon &terminal ileum, intussuseption.      CARDIAC CATHETERIZATION  2014    CARPAL TUNNEL RELEASE      bilateral    COLONOSCOPY  05/23/2017    poor prep; diverticulosis    COLONOSCOPY N/A 7/10/2018    COLONOSCOPY POLYPECTOMY HOT BIOPSY performed by Thor Upton MD at [address] [place] [address]      right    HERNIA REPAIR      inguinal  IA COLON CA SCRN NOT HI RSK IND N/A 5/25/2017    COLONOSCOPY performed [facility] Elham Chavis MD at [facility] ESOPHAGOGASTRODUODENOSCOPY TRANSORAL DIAGNOSTIC N/A 5/24/2017    EGD ESOPHAGOGASTRODUODENOSCOPY performed by Elham Chavis MD at [facility] Bilateral     [address]      fracture     TONSILLECTOMY      x2    TOOTH EXTRACTION      x4    ULNAR TUNNEL RELEASE      UPPER GASTROINTESTINAL ENDOSCOPY  05/23/2017    mild gastritis    UPPER GASTROINTESTINAL ENDOSCOPY N/A 7/10/2018    EGD BIOPSY performed by Cedrick Luu MD at [address] EXTRACTION      x4       Social History:    Social History     Tobacco Use    Smoking status: Current Every Day Smoker     Packs/day: 1.00     Years: 30.00     Pack years: 30.00    Smokeless tobacco: Never Used   Substance Use Topics    Alcohol use: No     Comment: previous heavy ETOH use; pt stopped around 2010                                Ready to quit: Not Answered  Counseling given: Not Answered      Vital Signs (Current):   Vitals:    08/09/21 0923   BP: (!) 170/106   Pulse: 75   Resp: 16   Temp: 99.1 °F (37.3 °C)   TempSrc: Infrared   SpO2: 97%   Weight: 133 lb (60.3 kg)   Height: 5' 8\" (1.727 m)                                              BP Readings from Last 3 Encounters:   08/09/21 (!) 170/106   08/02/21 (!) 150/96   05/26/21 (!) 180/110       NPO Status: Time of last liquid consumption: 2200                        Time of last solid consumption: 2100                        Date of last liquid consumption: 08/08/21                        Date of last solid food consumption: 08/07/21    BMI:   Wt Readings from Last 3 Encounters:   08/09/21 133 lb (60.3 kg)   08/02/21 133 lb (60.3 kg)   05/26/21 138 lb (62.6 kg)     Body mass index is 20.22 kg/m².     CBC:   Lab Results   Component Value Date    WBC 7.3 08/02/2021    RBC 5.07 08/02/2021    RBC 4.16 01/21/2012    HGB 16.5 08/02/2021    HCT 48.2 08/02/2021    MCV 95.0 08/02/2021    RDW 13.5 08/02/2021     08/02/2021     01/21/2012     LR    CMP:   Lab Results   Component Value Date     08/02/2021    K 4.5 08/02/2021     08/02/2021    CO2 30 08/02/2021    BUN 6 08/02/2021    CREATININE 0.54 08/02/2021    GFRAA >60 08/02/2021    LABGLOM >60 08/02/2021    GLUCOSE 181 08/02/2021    GLUCOSE 145 01/20/2012    PROT 6.4 05/23/2017    CALCIUM 9.1 08/02/2021    BILITOT 0.77 05/23/2017    ALKPHOS 100 05/23/2017    AST 19 05/23/2017    ALT 17 05/23/2017       POC Tests:   Recent Labs     08/09/21  0946   POCGLU 155*       Coags: No results found for: PROTIME, INR, APTT    HCG (If Applicable): No results found for: PREGTESTUR, PREGSERUM, HCG, HCGQUANT     ABGs: No results found for: PHART, PO2ART, ZAP8COB, OVQ6WRL, BEART, Y1LNPTER     Type & Screen (If Applicable):  No results found for: LABABO, LABRH    Drug/Infectious Status (If Applicable):  No results found for: HIV, HEPCAB    COVID-19 Screening (If Applicable):   Lab Results   Component Value Date    COVID19 Not Detected 08/09/2021           Anesthesia Evaluation  Patient summary reviewed and Nursing notes reviewed no history of anesthetic complications:   Airway: Mallampati: II  TM distance: >3 FB   Neck ROM: full  Mouth opening: > = 3 FB Dental:    (+) edentulous      Pulmonary:normal exam  breath sounds clear to auscultation      (-) pneumonia, COPD, asthma, shortness of breath, recent URI, sleep apnea, rhonchi, wheezes, rales, stridor, not a current smoker and no decreased breath sounds          Patient smoked on day of surgery.                  Cardiovascular:  Exercise tolerance: good (>4 METS),   (+) hypertension:, past MI: no interval change, CAD: no interval change,     (-) pacemaker, valvular problems/murmurs, CABG/stent, dysrhythmias,  angina,  CHF, orthopnea, PND,  RICHARDS, murmur, weak pulses,  friction rub, systolic click, carotid bruit,  JVD, peripheral edema, no pulmonary hypertension and no hyperlipidemia    ECG reviewed  Rhythm: regular  Rate: normal  Echocardiogram reviewed         Beta Blocker:  Dose within 24 Hrs         Neuro/Psych:   (+) neuromuscular disease:, headaches:, psychiatric history: stable with treatmentdepression/anxiety    (-) seizures, TIA and CVA           GI/Hepatic/Renal:   (+) GERD:,      (-) hiatal hernia, PUD, hepatitis, liver disease, no renal disease, bowel prep and no morbid obesity       Endo/Other:    (+) DiabetesType II DM, , : arthritis: no interval change. , no malignancy/cancer. (-) hypothyroidism, hyperthyroidism, blood dyscrasia, no electrolyte abnormalities, no malignancy/cancer               Abdominal:             Vascular: negative vascular ROS. Other Findings:           Anesthesia Plan      general     ASA 3       Induction: intravenous. MIPS: Postoperative opioids intended and Prophylactic antiemetics administered. Anesthetic plan and risks discussed with patient. Plan discussed with CRNA.                   Carolin Hills MD   8/9/2021

## 2021-08-09 NOTE — PROGRESS NOTES
Dr. Miguel Paz notified that pt has a insulin pump needle in his left lower abdomen. Dr. Miguel Paz said that pt can leave it in for procedure.

## 2021-08-09 NOTE — ANESTHESIA POSTPROCEDURE EVALUATION
POST- ANESTHESIA EVALUATION       Pt Name: Andrew Madden  MRN: [id number]  YOB: 1970  Date of evaluation: 8/9/2021  Time:  12:36 PM      BP (!) 149/96   Pulse 72   Temp 97.8 °F (36.6 °C)   Resp 11   Ht 5' 8\" (1.727 m)   Wt 133 lb (60.3 kg)   SpO2 95%   BMI 20.22 kg/m²      Consciousness Level  Awake  Cardiopulmonary Status  Stable  Pain Adequately Treated YES  Nausea / Vomiting  NO  Adequate Hydration  YES  Anesthesia Related Complications NONE      Electronically signed by Ye Villanueva MD on 8/9/2021 at 12:36 PM       Department of Anesthesiology  Postprocedure Note    Patient: Andrew Madden  MRN: [id number]  YOB: 1970  Date of evaluation: 8/9/2021  Time:  12:36 PM     Procedure Summary     Date: 08/09/21 Room / Location: [place] [address] / [facility]    Anesthesia Start: 6953 Anesthesia Stop: 2860    Procedure: COLONOSCOPY DIAGNOSTIC (N/A Anus) Diagnosis:       (ABDOMINAL PAIN)      (RAPID COVID ON ADMIT PER OFFICE DUE TO CONFLICT WITH CT SCAN)    Surgeons: Barbara Abdullahi MD Responsible Provider: Ye Villanueva MD    Anesthesia Type: general ASA Status: 3          Anesthesia Type: general    Demond Phase I: Demond Score: 10    Demond Phase II:      Last vitals: Reviewed and per EMR flowsheets.        Anesthesia Post Evaluation

## 2021-08-10 ENCOUNTER — HOSPITAL ENCOUNTER (INPATIENT)
Age: 51
LOS: 2 days | Discharge: HOME | DRG: 355 | End: 2021-08-12
Attending: SURGERY | Admitting: SURGERY
Payer: COMMERCIAL

## 2021-08-10 ENCOUNTER — ANESTHESIA (OUTPATIENT)
Dept: OPERATING ROOM | Age: 51
DRG: 355 | End: 2021-08-10
Payer: COMMERCIAL

## 2021-08-10 VITALS — SYSTOLIC BLOOD PRESSURE: 138 MMHG | TEMPERATURE: 96.8 F | DIASTOLIC BLOOD PRESSURE: 92 MMHG | OXYGEN SATURATION: 98 %

## 2021-08-10 DIAGNOSIS — K43.2 INCISIONAL HERNIA, WITHOUT OBSTRUCTION OR GANGRENE: Primary | ICD-10-CM

## 2021-08-10 LAB
GLUCOSE BLD-MCNC: 111 MG/DL (ref 75–110)
GLUCOSE BLD-MCNC: 278 MG/DL (ref 75–110)
GLUCOSE BLD-MCNC: 280 MG/DL (ref 75–110)
GLUCOSE BLD-MCNC: 300 MG/DL (ref 75–110)
GLUCOSE BLD-MCNC: 403 MG/DL (ref 75–110)
GLUCOSE BLD-MCNC: 459 MG/DL (ref 75–110)
GLUCOSE BLD-MCNC: 93 MG/DL (ref 75–110)

## 2021-08-10 PROCEDURE — 2580000003 HC RX 258: Performed by: ANESTHESIOLOGY

## 2021-08-10 PROCEDURE — 7100000001 HC PACU RECOVERY - ADDTL 15 MIN: Performed by: SURGERY

## 2021-08-10 PROCEDURE — C1781 MESH (IMPLANTABLE): HCPCS | Performed by: SURGERY

## 2021-08-10 PROCEDURE — 0WUF0JZ SUPPLEMENT ABDOMINAL WALL WITH SYNTHETIC SUBSTITUTE, OPEN APPROACH: ICD-10-PCS | Performed by: SURGERY

## 2021-08-10 PROCEDURE — 2720000010 HC SURG SUPPLY STERILE: Performed by: SURGERY

## 2021-08-10 PROCEDURE — 3600000003 HC SURGERY LEVEL 3 BASE: Performed by: SURGERY

## 2021-08-10 PROCEDURE — 3700000001 HC ADD 15 MINUTES (ANESTHESIA): Performed by: SURGERY

## 2021-08-10 PROCEDURE — 1200000000 HC SEMI PRIVATE

## 2021-08-10 PROCEDURE — 6370000000 HC RX 637 (ALT 250 FOR IP): Performed by: FAMILY MEDICINE

## 2021-08-10 PROCEDURE — 2709999900 HC NON-CHARGEABLE SUPPLY: Performed by: SURGERY

## 2021-08-10 PROCEDURE — 7100000010 HC PHASE II RECOVERY - FIRST 15 MIN: Performed by: SURGERY

## 2021-08-10 PROCEDURE — 82947 ASSAY GLUCOSE BLOOD QUANT: CPT

## 2021-08-10 PROCEDURE — 7100000011 HC PHASE II RECOVERY - ADDTL 15 MIN: Performed by: SURGERY

## 2021-08-10 PROCEDURE — 7100000000 HC PACU RECOVERY - FIRST 15 MIN: Performed by: SURGERY

## 2021-08-10 PROCEDURE — 99999 PR OFFICE/OUTPT VISIT,PROCEDURE ONLY: CPT | Performed by: PHYSICIAN ASSISTANT

## 2021-08-10 PROCEDURE — 3700000000 HC ANESTHESIA ATTENDED CARE: Performed by: SURGERY

## 2021-08-10 PROCEDURE — 2500000003 HC RX 250 WO HCPCS

## 2021-08-10 PROCEDURE — 7100000031 HC ASPR PHASE II RECOVERY - ADDTL 15 MIN: Performed by: SURGERY

## 2021-08-10 PROCEDURE — 94761 N-INVAS EAR/PLS OXIMETRY MLT: CPT

## 2021-08-10 PROCEDURE — 2580000003 HC RX 258: Performed by: SURGERY

## 2021-08-10 PROCEDURE — 3600000013 HC SURGERY LEVEL 3 ADDTL 15MIN: Performed by: SURGERY

## 2021-08-10 PROCEDURE — 6360000002 HC RX W HCPCS: Performed by: SURGERY

## 2021-08-10 PROCEDURE — 7100000030 HC ASPR PHASE II RECOVERY - FIRST 15 MIN: Performed by: SURGERY

## 2021-08-10 PROCEDURE — 6360000002 HC RX W HCPCS: Performed by: ANESTHESIOLOGY

## 2021-08-10 PROCEDURE — 6370000000 HC RX 637 (ALT 250 FOR IP): Performed by: ANESTHESIOLOGY

## 2021-08-10 PROCEDURE — 6360000002 HC RX W HCPCS

## 2021-08-10 PROCEDURE — 93005 ELECTROCARDIOGRAM TRACING: CPT | Performed by: FAMILY MEDICINE

## 2021-08-10 PROCEDURE — 2500000003 HC RX 250 WO HCPCS: Performed by: SURGERY

## 2021-08-10 DEVICE — IMPLANTABLE DEVICE: Type: IMPLANTABLE DEVICE | Site: ABDOMEN | Status: FUNCTIONAL

## 2021-08-10 RX ORDER — CEPHALEXIN 500 MG/1
CAPSULE ORAL
Qty: 21 CAPSULE | Refills: 0 | Status: SHIPPED | OUTPATIENT
Start: 2021-08-10 | End: 2022-01-25

## 2021-08-10 RX ORDER — MAGNESIUM SULFATE 1 G/100ML
1000 INJECTION INTRAVENOUS PRN
Status: DISCONTINUED | OUTPATIENT
Start: 2021-08-10 | End: 2021-08-12 | Stop reason: HOSPADM

## 2021-08-10 RX ORDER — NALOXONE HYDROCHLORIDE 4 MG/.1ML
1 SPRAY NASAL PRN
Qty: 1 EACH | Refills: 5 | Status: SHIPPED | OUTPATIENT
Start: 2021-08-10

## 2021-08-10 RX ORDER — NALOXONE HYDROCHLORIDE 4 MG/.1ML
1 SPRAY NASAL PRN
Qty: 1 EACH | Refills: 5 | Status: SHIPPED | OUTPATIENT
Start: 2021-08-10 | End: 2021-09-15

## 2021-08-10 RX ORDER — SODIUM CHLORIDE 0.9 % (FLUSH) 0.9 %
10 SYRINGE (ML) INJECTION PRN
Status: DISCONTINUED | OUTPATIENT
Start: 2021-08-10 | End: 2021-08-12 | Stop reason: HOSPADM

## 2021-08-10 RX ORDER — ROCURONIUM BROMIDE 10 MG/ML
INJECTION, SOLUTION INTRAVENOUS PRN
Status: DISCONTINUED | OUTPATIENT
Start: 2021-08-10 | End: 2021-08-10 | Stop reason: SDUPTHER

## 2021-08-10 RX ORDER — LIDOCAINE HYDROCHLORIDE 10 MG/ML
1 INJECTION, SOLUTION EPIDURAL; INFILTRATION; INTRACAUDAL; PERINEURAL
Status: DISCONTINUED | OUTPATIENT
Start: 2021-08-10 | End: 2021-08-10 | Stop reason: HOSPADM

## 2021-08-10 RX ORDER — DEXTROSE MONOHYDRATE 50 MG/ML
100 INJECTION, SOLUTION INTRAVENOUS PRN
Status: DISCONTINUED | OUTPATIENT
Start: 2021-08-10 | End: 2021-08-12 | Stop reason: HOSPADM

## 2021-08-10 RX ORDER — SODIUM CHLORIDE 9 MG/ML
25 INJECTION, SOLUTION INTRAVENOUS PRN
Status: DISCONTINUED | OUTPATIENT
Start: 2021-08-10 | End: 2021-08-12 | Stop reason: HOSPADM

## 2021-08-10 RX ORDER — LIDOCAINE HYDROCHLORIDE 10 MG/ML
INJECTION, SOLUTION EPIDURAL; INFILTRATION; INTRACAUDAL; PERINEURAL PRN
Status: DISCONTINUED | OUTPATIENT
Start: 2021-08-10 | End: 2021-08-10 | Stop reason: SDUPTHER

## 2021-08-10 RX ORDER — NITROGLYCERIN 0.4 MG/1
0.4 TABLET SUBLINGUAL EVERY 5 MIN PRN
Status: DISCONTINUED | OUTPATIENT
Start: 2021-08-10 | End: 2021-08-12 | Stop reason: HOSPADM

## 2021-08-10 RX ORDER — ONDANSETRON 2 MG/ML
4 INJECTION INTRAMUSCULAR; INTRAVENOUS
Status: DISCONTINUED | OUTPATIENT
Start: 2021-08-10 | End: 2021-08-10 | Stop reason: HOSPADM

## 2021-08-10 RX ORDER — SODIUM CHLORIDE 0.9 % (FLUSH) 0.9 %
5-40 SYRINGE (ML) INJECTION EVERY 12 HOURS SCHEDULED
Status: DISCONTINUED | OUTPATIENT
Start: 2021-08-10 | End: 2021-08-10 | Stop reason: HOSPADM

## 2021-08-10 RX ORDER — ONDANSETRON 2 MG/ML
INJECTION INTRAMUSCULAR; INTRAVENOUS PRN
Status: DISCONTINUED | OUTPATIENT
Start: 2021-08-10 | End: 2021-08-10 | Stop reason: SDUPTHER

## 2021-08-10 RX ORDER — NICOTINE POLACRILEX 4 MG
15 LOZENGE BUCCAL PRN
Status: DISCONTINUED | OUTPATIENT
Start: 2021-08-10 | End: 2021-08-12 | Stop reason: HOSPADM

## 2021-08-10 RX ORDER — POTASSIUM CHLORIDE 7.45 MG/ML
10 INJECTION INTRAVENOUS PRN
Status: DISCONTINUED | OUTPATIENT
Start: 2021-08-10 | End: 2021-08-12 | Stop reason: HOSPADM

## 2021-08-10 RX ORDER — SODIUM CHLORIDE 0.9 % (FLUSH) 0.9 %
10 SYRINGE (ML) INJECTION EVERY 12 HOURS SCHEDULED
Status: DISCONTINUED | OUTPATIENT
Start: 2021-08-10 | End: 2021-08-12 | Stop reason: HOSPADM

## 2021-08-10 RX ORDER — LABETALOL HYDROCHLORIDE 5 MG/ML
5 INJECTION, SOLUTION INTRAVENOUS EVERY 10 MIN PRN
Status: DISCONTINUED | OUTPATIENT
Start: 2021-08-10 | End: 2021-08-10 | Stop reason: HOSPADM

## 2021-08-10 RX ORDER — SODIUM CHLORIDE 9 MG/ML
INJECTION, SOLUTION INTRAVENOUS CONTINUOUS
Status: DISCONTINUED | OUTPATIENT
Start: 2021-08-10 | End: 2021-08-12 | Stop reason: HOSPADM

## 2021-08-10 RX ORDER — FENTANYL CITRATE 50 UG/ML
INJECTION, SOLUTION INTRAMUSCULAR; INTRAVENOUS PRN
Status: DISCONTINUED | OUTPATIENT
Start: 2021-08-10 | End: 2021-08-10 | Stop reason: SDUPTHER

## 2021-08-10 RX ORDER — LORAZEPAM 0.5 MG/1
0.5 TABLET ORAL 3 TIMES DAILY PRN
Status: DISCONTINUED | OUTPATIENT
Start: 2021-08-10 | End: 2021-08-12 | Stop reason: HOSPADM

## 2021-08-10 RX ORDER — NITROGLYCERIN 0.4 MG/1
TABLET SUBLINGUAL
Status: DISPENSED
Start: 2021-08-10 | End: 2021-08-11

## 2021-08-10 RX ORDER — HYDROMORPHONE HCL 110MG/55ML
PATIENT CONTROLLED ANALGESIA SYRINGE INTRAVENOUS PRN
Status: DISCONTINUED | OUTPATIENT
Start: 2021-08-10 | End: 2021-08-10 | Stop reason: SDUPTHER

## 2021-08-10 RX ORDER — OXYCODONE HYDROCHLORIDE AND ACETAMINOPHEN 5; 325 MG/1; MG/1
1 TABLET ORAL PRN
Status: DISCONTINUED | OUTPATIENT
Start: 2021-08-10 | End: 2021-08-10 | Stop reason: HOSPADM

## 2021-08-10 RX ORDER — ONDANSETRON 4 MG/1
TABLET, FILM COATED ORAL
Qty: 20 TABLET | Refills: 0 | Status: SHIPPED | OUTPATIENT
Start: 2021-08-10 | End: 2021-09-24 | Stop reason: SDUPTHER

## 2021-08-10 RX ORDER — ISOSORBIDE MONONITRATE 30 MG/1
30 TABLET, EXTENDED RELEASE ORAL DAILY
Status: DISCONTINUED | OUTPATIENT
Start: 2021-08-10 | End: 2021-08-12 | Stop reason: HOSPADM

## 2021-08-10 RX ORDER — DEXTROSE MONOHYDRATE 25 G/50ML
12.5 INJECTION, SOLUTION INTRAVENOUS PRN
Status: DISCONTINUED | OUTPATIENT
Start: 2021-08-10 | End: 2021-08-12 | Stop reason: HOSPADM

## 2021-08-10 RX ORDER — PROPOFOL 10 MG/ML
INJECTION, EMULSION INTRAVENOUS PRN
Status: DISCONTINUED | OUTPATIENT
Start: 2021-08-10 | End: 2021-08-10 | Stop reason: SDUPTHER

## 2021-08-10 RX ORDER — OXYCODONE HYDROCHLORIDE AND ACETAMINOPHEN 5; 325 MG/1; MG/1
2 TABLET ORAL EVERY 4 HOURS PRN
Status: DISCONTINUED | OUTPATIENT
Start: 2021-08-10 | End: 2021-08-12 | Stop reason: HOSPADM

## 2021-08-10 RX ORDER — HYDRALAZINE HYDROCHLORIDE 20 MG/ML
10 INJECTION INTRAMUSCULAR; INTRAVENOUS EVERY 10 MIN PRN
Status: DISCONTINUED | OUTPATIENT
Start: 2021-08-10 | End: 2021-08-10 | Stop reason: HOSPADM

## 2021-08-10 RX ORDER — LISINOPRIL 5 MG/1
5 TABLET ORAL DAILY
Status: DISCONTINUED | OUTPATIENT
Start: 2021-08-10 | End: 2021-08-12 | Stop reason: HOSPADM

## 2021-08-10 RX ORDER — ONDANSETRON 2 MG/ML
4 INJECTION INTRAMUSCULAR; INTRAVENOUS EVERY 6 HOURS PRN
Status: DISCONTINUED | OUTPATIENT
Start: 2021-08-10 | End: 2021-08-12 | Stop reason: HOSPADM

## 2021-08-10 RX ORDER — PANTOPRAZOLE SODIUM 40 MG/1
40 TABLET, DELAYED RELEASE ORAL 2 TIMES DAILY
Status: DISCONTINUED | OUTPATIENT
Start: 2021-08-10 | End: 2021-08-12 | Stop reason: HOSPADM

## 2021-08-10 RX ORDER — OXYCODONE HCL 20 MG/1
20 TABLET, FILM COATED, EXTENDED RELEASE ORAL 2 TIMES DAILY
Status: DISCONTINUED | OUTPATIENT
Start: 2021-08-10 | End: 2021-08-12 | Stop reason: HOSPADM

## 2021-08-10 RX ORDER — HYDROCODONE BITARTRATE AND ACETAMINOPHEN 5; 325 MG/1; MG/1
1 TABLET ORAL EVERY 4 HOURS PRN
Qty: 30 TABLET | Refills: 0 | Status: SHIPPED | OUTPATIENT
Start: 2021-08-10 | End: 2021-08-15

## 2021-08-10 RX ORDER — KETOROLAC TROMETHAMINE 30 MG/ML
15 INJECTION, SOLUTION INTRAMUSCULAR; INTRAVENOUS EVERY 6 HOURS PRN
Status: DISCONTINUED | OUTPATIENT
Start: 2021-08-10 | End: 2021-08-12 | Stop reason: HOSPADM

## 2021-08-10 RX ORDER — SODIUM CHLORIDE 9 MG/ML
INJECTION, SOLUTION INTRAVENOUS CONTINUOUS
Status: DISCONTINUED | OUTPATIENT
Start: 2021-08-10 | End: 2021-08-10

## 2021-08-10 RX ORDER — KETOROLAC TROMETHAMINE 30 MG/ML
30 INJECTION, SOLUTION INTRAMUSCULAR; INTRAVENOUS ONCE
Status: COMPLETED | OUTPATIENT
Start: 2021-08-10 | End: 2021-08-10

## 2021-08-10 RX ORDER — SODIUM CHLORIDE 0.9 % (FLUSH) 0.9 %
5-40 SYRINGE (ML) INJECTION PRN
Status: DISCONTINUED | OUTPATIENT
Start: 2021-08-10 | End: 2021-08-10 | Stop reason: HOSPADM

## 2021-08-10 RX ORDER — ATORVASTATIN CALCIUM 80 MG/1
80 TABLET, FILM COATED ORAL NIGHTLY
Status: DISCONTINUED | OUTPATIENT
Start: 2021-08-10 | End: 2021-08-12 | Stop reason: HOSPADM

## 2021-08-10 RX ORDER — HYDROCODONE BITARTRATE AND ACETAMINOPHEN 5; 325 MG/1; MG/1
1 TABLET ORAL EVERY 4 HOURS PRN
Status: DISCONTINUED | OUTPATIENT
Start: 2021-08-10 | End: 2021-08-10

## 2021-08-10 RX ORDER — DIPHENHYDRAMINE HYDROCHLORIDE 50 MG/ML
12.5 INJECTION INTRAMUSCULAR; INTRAVENOUS
Status: DISCONTINUED | OUTPATIENT
Start: 2021-08-10 | End: 2021-08-10 | Stop reason: HOSPADM

## 2021-08-10 RX ORDER — METOCLOPRAMIDE HYDROCHLORIDE 5 MG/ML
10 INJECTION INTRAMUSCULAR; INTRAVENOUS EVERY 6 HOURS
Status: DISCONTINUED | OUTPATIENT
Start: 2021-08-10 | End: 2021-08-10

## 2021-08-10 RX ORDER — MIDAZOLAM HYDROCHLORIDE 1 MG/ML
INJECTION INTRAMUSCULAR; INTRAVENOUS PRN
Status: DISCONTINUED | OUTPATIENT
Start: 2021-08-10 | End: 2021-08-10 | Stop reason: SDUPTHER

## 2021-08-10 RX ORDER — OXYCODONE HYDROCHLORIDE AND ACETAMINOPHEN 5; 325 MG/1; MG/1
2 TABLET ORAL PRN
Status: DISCONTINUED | OUTPATIENT
Start: 2021-08-10 | End: 2021-08-10 | Stop reason: HOSPADM

## 2021-08-10 RX ADMIN — MIDAZOLAM 2 MG: 1 INJECTION INTRAMUSCULAR; INTRAVENOUS at 07:00

## 2021-08-10 RX ADMIN — INSULIN LISPRO 3 UNITS: 100 INJECTION, SOLUTION INTRAVENOUS; SUBCUTANEOUS at 21:33

## 2021-08-10 RX ADMIN — CEFAZOLIN 2000 MG: 10 INJECTION, POWDER, FOR SOLUTION INTRAVENOUS at 15:18

## 2021-08-10 RX ADMIN — HYDROMORPHONE HYDROCHLORIDE 1 MG: 1 INJECTION, SOLUTION INTRAMUSCULAR; INTRAVENOUS; SUBCUTANEOUS at 17:17

## 2021-08-10 RX ADMIN — ONDANSETRON 4 MG: 2 INJECTION INTRAMUSCULAR; INTRAVENOUS at 13:27

## 2021-08-10 RX ADMIN — SODIUM CHLORIDE: 9 INJECTION, SOLUTION INTRAVENOUS at 09:10

## 2021-08-10 RX ADMIN — KETOROLAC TROMETHAMINE 15 MG: 30 INJECTION, SOLUTION INTRAMUSCULAR; INTRAVENOUS at 20:08

## 2021-08-10 RX ADMIN — FENTANYL CITRATE 50 MCG: 50 INJECTION, SOLUTION INTRAMUSCULAR; INTRAVENOUS at 07:27

## 2021-08-10 RX ADMIN — INSULIN LISPRO 10 UNITS: 100 INJECTION, SOLUTION INTRAVENOUS; SUBCUTANEOUS at 06:56

## 2021-08-10 RX ADMIN — CEFAZOLIN 2000 MG: 10 INJECTION, POWDER, FOR SOLUTION INTRAVENOUS at 07:15

## 2021-08-10 RX ADMIN — HYDROMORPHONE HYDROCHLORIDE 1 MG: 1 INJECTION, SOLUTION INTRAMUSCULAR; INTRAVENOUS; SUBCUTANEOUS at 11:39

## 2021-08-10 RX ADMIN — HYDROMORPHONE HYDROCHLORIDE 0.4 MG: 2 INJECTION, SOLUTION INTRAMUSCULAR; INTRAVENOUS; SUBCUTANEOUS at 07:30

## 2021-08-10 RX ADMIN — KETOROLAC TROMETHAMINE 15 MG: 30 INJECTION, SOLUTION INTRAMUSCULAR; INTRAVENOUS at 13:28

## 2021-08-10 RX ADMIN — ROCURONIUM BROMIDE 10 MG: 10 INJECTION, SOLUTION INTRAVENOUS at 07:27

## 2021-08-10 RX ADMIN — HYDROMORPHONE HYDROCHLORIDE 0.5 MG: 1 INJECTION, SOLUTION INTRAMUSCULAR; INTRAVENOUS; SUBCUTANEOUS at 08:43

## 2021-08-10 RX ADMIN — LIDOCAINE HYDROCHLORIDE 50 MG: 10 INJECTION, SOLUTION EPIDURAL; INFILTRATION; INTRACAUDAL; PERINEURAL at 07:07

## 2021-08-10 RX ADMIN — NITROGLYCERIN 0.4 MG: 0.4 TABLET SUBLINGUAL at 13:14

## 2021-08-10 RX ADMIN — PROMETHAZINE HYDROCHLORIDE 12.5 MG: 25 INJECTION INTRAMUSCULAR; INTRAVENOUS at 17:17

## 2021-08-10 RX ADMIN — SODIUM CHLORIDE: 9 INJECTION, SOLUTION INTRAVENOUS at 11:39

## 2021-08-10 RX ADMIN — FAMOTIDINE 20 MG: 10 INJECTION, SOLUTION INTRAVENOUS at 21:32

## 2021-08-10 RX ADMIN — HYDRALAZINE HYDROCHLORIDE 10 MG: 20 INJECTION INTRAMUSCULAR; INTRAVENOUS at 09:12

## 2021-08-10 RX ADMIN — SUGAMMADEX 150 MG: 100 INJECTION, SOLUTION INTRAVENOUS at 08:04

## 2021-08-10 RX ADMIN — HYDROMORPHONE HYDROCHLORIDE 0.5 MG: 1 INJECTION, SOLUTION INTRAMUSCULAR; INTRAVENOUS; SUBCUTANEOUS at 08:35

## 2021-08-10 RX ADMIN — INSULIN LISPRO 12 UNITS: 100 INJECTION, SOLUTION INTRAVENOUS; SUBCUTANEOUS at 18:39

## 2021-08-10 RX ADMIN — ONDANSETRON 4 MG: 2 INJECTION, SOLUTION INTRAMUSCULAR; INTRAVENOUS at 07:15

## 2021-08-10 RX ADMIN — HYDROMORPHONE HYDROCHLORIDE 1 MG: 1 INJECTION, SOLUTION INTRAMUSCULAR; INTRAVENOUS; SUBCUTANEOUS at 15:16

## 2021-08-10 RX ADMIN — FENTANYL CITRATE 50 MCG: 50 INJECTION, SOLUTION INTRAMUSCULAR; INTRAVENOUS at 07:07

## 2021-08-10 RX ADMIN — KETOROLAC TROMETHAMINE 30 MG: 30 INJECTION, SOLUTION INTRAMUSCULAR; INTRAVENOUS at 09:12

## 2021-08-10 RX ADMIN — ATORVASTATIN CALCIUM 80 MG: 80 TABLET, FILM COATED ORAL at 21:32

## 2021-08-10 RX ADMIN — PROPOFOL 200 MG: 10 INJECTION, EMULSION INTRAVENOUS at 07:07

## 2021-08-10 RX ADMIN — HYDROMORPHONE HYDROCHLORIDE 0.5 MG: 1 INJECTION, SOLUTION INTRAMUSCULAR; INTRAVENOUS; SUBCUTANEOUS at 08:26

## 2021-08-10 RX ADMIN — ROCURONIUM BROMIDE 40 MG: 10 INJECTION, SOLUTION INTRAVENOUS at 07:07

## 2021-08-10 RX ADMIN — HYDROMORPHONE HYDROCHLORIDE 0.5 MG: 1 INJECTION, SOLUTION INTRAMUSCULAR; INTRAVENOUS; SUBCUTANEOUS at 08:52

## 2021-08-10 RX ADMIN — SODIUM CHLORIDE: 9 INJECTION, SOLUTION INTRAVENOUS at 06:11

## 2021-08-10 RX ADMIN — PANTOPRAZOLE SODIUM 40 MG: 40 TABLET, DELAYED RELEASE ORAL at 21:32

## 2021-08-10 ASSESSMENT — PULMONARY FUNCTION TESTS
PIF_VALUE: 17
PIF_VALUE: 16
PIF_VALUE: 16
PIF_VALUE: 17
PIF_VALUE: 16
PIF_VALUE: 17
PIF_VALUE: 16
PIF_VALUE: 15
PIF_VALUE: 17
PIF_VALUE: 1
PIF_VALUE: 0
PIF_VALUE: 17
PIF_VALUE: 16
PIF_VALUE: 17
PIF_VALUE: 17
PIF_VALUE: 15
PIF_VALUE: 1
PIF_VALUE: 17
PIF_VALUE: 17
PIF_VALUE: 16
PIF_VALUE: 16
PIF_VALUE: 17
PIF_VALUE: 16
PIF_VALUE: 2
PIF_VALUE: 17
PIF_VALUE: 1
PIF_VALUE: 16
PIF_VALUE: 28
PIF_VALUE: 16
PIF_VALUE: 17
PIF_VALUE: 17
PIF_VALUE: 16
PIF_VALUE: 17
PIF_VALUE: 4
PIF_VALUE: 17
PIF_VALUE: 17
PIF_VALUE: 16
PIF_VALUE: 17
PIF_VALUE: 0
PIF_VALUE: 17
PIF_VALUE: 16
PIF_VALUE: 1
PIF_VALUE: 17
PIF_VALUE: 14
PIF_VALUE: 15
PIF_VALUE: 17
PIF_VALUE: 15
PIF_VALUE: 16
PIF_VALUE: 17
PIF_VALUE: 25
PIF_VALUE: 17
PIF_VALUE: 18
PIF_VALUE: 17
PIF_VALUE: 16
PIF_VALUE: 22
PIF_VALUE: 16
PIF_VALUE: 4
PIF_VALUE: 17
PIF_VALUE: 16
PIF_VALUE: 17
PIF_VALUE: 17
PIF_VALUE: 5
PIF_VALUE: 0
PIF_VALUE: 16
PIF_VALUE: 17

## 2021-08-10 ASSESSMENT — PAIN SCALES - GENERAL
PAINLEVEL_OUTOF10: 8
PAINLEVEL_OUTOF10: 5
PAINLEVEL_OUTOF10: 8
PAINLEVEL_OUTOF10: 8
PAINLEVEL_OUTOF10: 9
PAINLEVEL_OUTOF10: 8
PAINLEVEL_OUTOF10: 5
PAINLEVEL_OUTOF10: 8
PAINLEVEL_OUTOF10: 5
PAINLEVEL_OUTOF10: 8
PAINLEVEL_OUTOF10: 6
PAINLEVEL_OUTOF10: 3
PAINLEVEL_OUTOF10: 7
PAINLEVEL_OUTOF10: 8

## 2021-08-10 ASSESSMENT — PAIN DESCRIPTION - LOCATION
LOCATION: ABDOMEN

## 2021-08-10 ASSESSMENT — PAIN DESCRIPTION - ORIENTATION
ORIENTATION: MID
ORIENTATION: MID;UPPER

## 2021-08-10 ASSESSMENT — PAIN - FUNCTIONAL ASSESSMENT: PAIN_FUNCTIONAL_ASSESSMENT: 0-10

## 2021-08-10 ASSESSMENT — PAIN DESCRIPTION - PAIN TYPE
TYPE: SURGICAL PAIN
TYPE: SURGICAL PAIN
TYPE: ACUTE PAIN;SURGICAL PAIN

## 2021-08-10 ASSESSMENT — LIFESTYLE VARIABLES: SMOKING_STATUS: 1

## 2021-08-10 ASSESSMENT — PAIN DESCRIPTION - DESCRIPTORS
DESCRIPTORS: ACHING
DESCRIPTORS: ACHING

## 2021-08-10 ASSESSMENT — PAIN DESCRIPTION - FREQUENCY: FREQUENCY: INTERMITTENT

## 2021-08-10 NOTE — PROGRESS NOTES
Patient calls out complaining of CP, telemetry applied and reading NSR. Dr. Paty Clarke and Dr. Justin Deras notified via perfect serve.

## 2021-08-10 NOTE — PROGRESS NOTES
[address] [facility]    OCCUPATIONAL THERAPY MISSED TREATMENT NOTE   INPATIENT   Date: 8/10/21  Patient Name: Mariposa Pierce       Room: [facility]/6849-37  MRN: [id number]   Account #: [de-identified]    : 1970  (48 y.o.)  Gender: male                 REASON FOR MISSED TREATMENT:  Hold treatment per nursing request   -    NEEDS EVAL 8-10-21 Patient with chest pain - Nursing deferred eval      Praful Davies, OT

## 2021-08-10 NOTE — PROGRESS NOTES
Patient arrives to [facility] C room 2058 at this time from short stay. Patient oriented to room. Appropriate safety measures in place. Will monitor.

## 2021-08-10 NOTE — PROGRESS NOTES
Writer calls and speaks with Dr. Pedro Chopra per patient and wife request. Kartik Michaels informs Dr. Pedro Chopra of patient and wife concerns of not being able to control pain or care for patient at home. Dr. Pedro Chopra speaks to patient's wife via phone. Decision made to admit patient for observation.

## 2021-08-10 NOTE — PROGRESS NOTES
Patient urinates post op at this time. Significant other empties both urinal and AVELINO. Educated on need for accurate output.

## 2021-08-10 NOTE — CARE COORDINATION
CASE MANAGEMENT NOTE:    Admission Date:  8/10/2021 Clifford Suggs is a 48 y.o.  male    Admitted for : Incisional hernia without obstruction or gangrene [K43.2]    Met with:  Bam Gillespie    PCP:  Dr Lisa Peralta:  Medicare      Is patient alert and oriented at time of discussion:  Yes    Current Residence/ Living Arrangements:  independently at home             Current Services PTA:  No    Does patient go to outpatient dialysis: No  If yes, location and chair time:     Is patient agreeable to VNS: No    Freedom of choice provided:  NA    List of [address] provided: NA    VNS chosen:  No    DME:  straight cane, walker and wheelchair    Home Oxygen: No    Nebulizer: No    CPAP/BIPAP: No    Supplier: N/A    Potential Assistance Needed: No    SNF needed: No    Freedom of choice and list provided: NA    [place]  [facility] [place]       Does Patient want to use MEDS to BEDS? No    Is patient currently receiving oral anticoagulation therapy? No    Is the Patient an [facility] with Readmission Risk Score greater than 14%? No  If yes, pt needs a follow up appointment made within 7 days. Family Members/Caregivers that pt would like involved in their care:    Yes    If yes, list name here:  Spouse edith    Transportation Provider:  Family             Discharge Plan:  8/10/21 - Medicare - Patient is for home with spouse. DME: Cane, walker w/c , has them if he needs it, Denies need for VNS, Plan is for home with no needs. Orange header pt risk 9%, needs f/u appt with Dr Narendra Carter. Will follow closely.  .//pf                 Electronically signed by: Austin Robert RN on 8/10/2021 at 5:28 PM

## 2021-08-10 NOTE — H&P
SURGICAL HISTORY       Past Surgical History:   Procedure Laterality Date    ABDOMEN SURGERY      insulin pump    ABDOMINAL EXPLORATION SURGERY  05/28/2016    bowel resection Rt. colon &terminal ileum, intussuseption.      CARDIAC CATHETERIZATION  2014    CARPAL TUNNEL RELEASE      bilateral    COLONOSCOPY  05/23/2017    poor prep; diverticulosis    COLONOSCOPY N/A 7/10/2018    COLONOSCOPY POLYPECTOMY HOT BIOPSY performed by Librado Blanco MD at [facility] COLONOSCOPY N/A 8/9/2021    COLONOSCOPY DIAGNOSTIC performed by Librado Blanco MD at [address]      right    HERNIA REPAIR      inguinal    IA COLON CA SCRN NOT  [address] IND N/A 5/25/2017    COLONOSCOPY performed by Naseem Macdonald MD at [address] ESOPHAGOGASTRODUODENOSCOPY TRANSORAL DIAGNOSTIC N/A 5/24/2017    EGD ESOPHAGOGASTRODUODENOSCOPY performed by Naseem Macdonald MD at [address] Bilateral    [place]      fracture     TONSILLECTOMY      x2    TOOTH EXTRACTION      x4    ULNAR TUNNEL RELEASE      UPPER GASTROINTESTINAL ENDOSCOPY  05/23/2017    mild gastritis    UPPER GASTROINTESTINAL ENDOSCOPY N/A 7/10/2018    EGD BIOPSY performed by Librado Blanco MD at [address] EXTRACTION      x4       FAMILY HISTORY       Family History   Problem Relation Age of Onset    Diabetes Mother     High Blood Pressure Mother     Heart Disease Mother     Migraines Mother     Other Mother         diabetic neuropathy    High Blood Pressure Father        SOCIAL HISTORY       Social History     Socioeconomic History    Marital status:      Spouse name: Not on file    Number of children: Not on file    Years of education: Not on file    Highest education level: Not on file   Occupational History    Occupation: disability   Tobacco Use    Smoking status: Current Every Day Smoker     Packs/day: 1.00     Years: 30.00     Pack years: 30.00    Smokeless tobacco: Never Used   Vaping Use    Vaping Use: Never used   Substance and Sexual Activity    Alcohol use: No     Comment: previous heavy ETOH use; pt stopped around 2010    Drug use: No    Sexual activity: Not on file   Other Topics Concern    Not on file   Social History Narrative    Not on file     Social Determinants of Health     Financial Resource Strain: Low Risk     Difficulty of Paying Living Expenses: Not hard at all   Food Insecurity: No Food Insecurity    Worried About [address] in the Last Year: Never true    [address]  [employer] in the Last Year: Never true   Transportation Needs: No Transportation Needs    Lack of Transportation (Medical): No    Lack of Transportation (Non-Medical):  No   Physical Activity:     Days of Exercise per Week:     Minutes of Exercise per Session:    Stress:     Feeling of Stress :    Social Connections:     Frequency of Communication with Friends and Family:     Frequency of Social Gatherings with Friends and Family:     Attends Muslim Services:     Active Member of Clubs or Organizations:     Attends Club or Organization Meetings:     Marital Status:    Intimate Partner Violence:     Fear of Current or Ex-Partner:     Emotionally Abused:     Physically Abused:     Sexually Abused:            REVIEW OF SYSTEMS      Allergies   Allergen Reactions    Avelox [Moxifloxacin]     Ciprofloxacin Other (See Comments)    Levofloxacin     Lyrica [Pregabalin]     Metoclopramide Other (See Comments)     Severe confusion and paranoid activity    Neurontin [Gabapentin]     Other      Can not have epidural injections, steroids, nerve blocks, or burning of nerves due to being diabetic    Tetanus Toxoids     Tramadol     Tramadol Hcl        Current Facility-Administered Medications on File Prior to Encounter   Medication Dose Route Frequency Provider Last Rate Last Admin    lactated ringers infusion  75 mL/hr Intravenous Continuous Luis Mendez MD Current Outpatient Medications on File Prior to Encounter   Medication Sig Dispense Refill    oxyCODONE-acetaminophen (PERCOCET)  MG per tablet Take 1 tablet by mouth every 4 hours as needed for Pain for up to 30 days. No more than 6 per day 180 tablet 0    LORazepam (ATIVAN) 0.5 MG tablet take 1 tablet by mouth every 8 hours if needed for anxiety 90 tablet 1    VORTIoxetine HBr (TRINTELLIX) 20 MG TABS tablet Take 1 tablet by mouth daily 90 tablet 3    pantoprazole (PROTONIX) 40 MG tablet TAKE ONE TABLET BY MOUTH TWICE DAILY 60 tablet 3    lisinopril (PRINIVIL;ZESTRIL) 30 MG tablet Take 1 tablet by mouth daily (Patient taking differently: Take 5 mg by mouth daily ) 90 tablet 3    isosorbide mononitrate (IMDUR) 30 MG extended release tablet Take 1 tablet by mouth daily 90 tablet 3    atorvastatin (LIPITOR) 80 MG tablet Take 1 tablet by mouth nightly 90 tablet 3    clopidogrel (PLAVIX) 75 MG tablet Take 1 tablet by mouth daily 90 tablet 3    CONTOUR NEXT TEST strip use four times a day      ondansetron (ZOFRAN-ODT) 8 MG TBDP disintegrating tablet Take 8 mg by mouth every 12 hours as needed      naloxone 4 MG/0.1ML LIQD nasal spray 1 spray by Nasal route as needed for Opioid Reversal 1 each 5    nitroGLYCERIN (NITROSTAT) 0.4 MG SL tablet Place 1 tablet under the tongue every 5 minutes as needed for Chest pain Indications: Disease of the Arteries of the Heart Take 1 tablet every 5 minutes times three as needed for chest pain 25 tablet 2    Insulin Aspart (NOVOLOG SC) Inject  into the skin. Insulin pump         Negative except for what is mentioned in the HPI. GENERAL PHYSICAL EXAM     Vitals :   See vital signs in RN flow sheet. GENERAL APPEARANCE:   Ree Sifuentes is 48 y.o.,  male, not obese, nourished, conscious, alert. Does not appear to be distress or pain at this time. SKIN:  Warm, dry, no cyanosis or jaundice.               HEAD: Normocephalic, atraumatic, no swelling or tenderness. EYES:  Pupils equal, reactive to light. EARS:  No discharge, no marked hearing loss. NOSE:  No rhinorrhea, epistaxis or septal deformity. THROAT:  Not congested. No ulceration bleeding or discharge. NECK:  No stiffness, trachea central.  No palpable masses or L.N.                 CHEST:  Symmetrical and equal on expansion. HEART:  RRR S1 > S2. No audible murmurs or gallops. LUNGS:  Equal on expansion, normal breath sounds. No adventitious sounds. ABDOMEN:    Soft on palpation. No dysphagia, No localized tenderness. No guarding or rigidity. No palpable hepatosplenomegaly. LYMPHATICS:  No palpable cervical lymphadenopathy. LOCOMOTOR, BACK AND SPINE:  No tenderness or deformities. EXTREMITIES:  Symmetrical, no pretibial edema. Stephanies sign negative. No discoloration or ulcerations. NEUROLOGIC:  The patient is conscious, alert, oriented,Cranial nerve II-XII intact, taste and smell were not examined. No apparent focal sensory or motor deficits.              PROVISIONAL DIAGNOSES / SURGERY:      HERNIA INCISIONAL REPAIR OPEN [address]    INCISIONAL HERNIA    Patient Active Problem List    Diagnosis Date Noted    Unspecified inflammatory spondylopathy, cervical region ([facility] 01/22/2021    Major depressive disorder, recurrent episode with anxious distress ([address] 08/10/2020    Pancreatic insufficiency 01/15/2019    Cervical spondylosis 10/21/2017    Perforation of right tympanic membrane 02/14/2017    Chronic coronary artery disease 11/03/2015    Depression with anxiety 11/03/2015    Diabetic peripheral neuropathy ([facility] 11/03/2015    Essential hypertension 11/03/2015    Gastroparesis 11/03/2015    History of long-term treatment with high-risk medication 11/03/2015    Hyperlipidemia 11/03/2015    Impingement syndrome of left shoulder 11/03/2015    Injury of right ulnar nerve 11/03/2015    Migraine headache 11/03/2015    Type 1 diabetes mellitus with hyperglycemia ([address] 11/03/2015    Current every day smoker 11/03/2015    Ulnar nerve compression at multiple levels 12/17/2014    Degenerative cervical disc 12/17/2014    Arthropathy of cervical facet joint 12/17/2014    Neural foraminal stenosis of cervical spine 12/17/2014           MARTA AYALA, APRN - CNP on 8/10/2021 at 5:48 AM

## 2021-08-10 NOTE — OP NOTE
Operative Note      Patient: Radha Murillo  YOB: 1970  MRN: [id number]    Date of Procedure: 8/10/2021                Preoperative diagnosis: Symptomatic incisional hernia previous right hemicolectomy    Postoperative diagnosis: Same    Procedure: Open incisional hernia repair with 12 x 8 cm synthetic mesh. Lysis of greater omental adhesions for 15 minutes    Surgeon: Dr. Rain Schafer    Asst.: TARAH Canales    Anesthesia: General    Preparation: Chloraprep    EBL: Less than 10 mL    Specimen: None    Procedure: Informed consent was obtained. Preoperative antibiotics were given. Patient was taken to the operating room. General anesthesia was given. Perera catheter was placed. Abdomen was prepped and draped in usual sterile fashion. Timeout was done. Incision was made in the supraumbilical region at the site of the hernia. Incision was deepened with help of Bovie cautery. Hernia sac was entered. Patient was found to have greater omental adhesions and those were taken down using the LigaSure for about 15 minutes. After all the adhesions were freed up the hernia defect was measured. I decided to proceed with repair using a 12 x 8 cm synthetic mesh. Hemostasis was confirmed. Sponge needle instrument count was found to be correct. Mesh was inserted into the abdominal cavity and was secured in several different locations using interrupted Ethibond sutures. Satisfactory tension-free repair was achieved. Again hemostasis was confirmed. Sponge needle instrument count was found to be correct. Drew-Marie drain was left in the area of dissection was brought out and secured. Anel's was approximated using Vicryl suture. Skin was approximated using staples. Clean dressing was applied. Patient tolerated procedure well and was transferred to the recovery room in a stable condition.     -  Recommendations: Operative findings are discussed with the family.   Postoperative care recovery restrictions follow-up were all discussed. Prescriptions called in. Discharge instructions in the chart.

## 2021-08-10 NOTE — ANESTHESIA POSTPROCEDURE EVALUATION
Department of Anesthesiology  Postprocedure Note    Patient: Navin Waller  MRN: [id number]  YOB: 1970  Date of evaluation: 8/10/2021  Time:  11:15 AM     Procedure Summary     Date: 08/10/21 Room / Location: [address] [place] [address] [address] / [address]    Anesthesia Start: 0700 Anesthesia Stop: 5823    Procedure: HERNIA INCISIONAL REPAIR OPEN W/MESH (N/A Abdomen) Diagnosis: (INCISIONAL HERNIA (COVID TEST 8/5))    Surgeons: Will Morales MD Responsible Provider: Sylvester Chaves MD    Anesthesia Type: general ASA Status: 3          Anesthesia Type: general    Demond Phase I: Demond Score: 10    Demond Phase II: Demond Score: 10    Last vitals: Reviewed and per EMR flowsheets.        Anesthesia Post Evaluation    Comments: POST- ANESTHESIA EVALUATION       Pt Name: Navin Waller  MRN: [id number]  YOB: 1970  Date of evaluation: 8/10/2021  Time:  11:15 AM      BP (!) 168/90   Pulse 78   Temp 97.7 °F (36.5 °C) (Oral)   Resp 20   Ht 5' 8\" (1.727 m)   Wt 133 lb (60.3 kg)   SpO2 96%   BMI 20.22 kg/m²      Consciousness Level  Awake  Cardiopulmonary Status  Stable  Pain Adequately Treated YES  Nausea / Vomiting  NO  Adequate Hydration  YES  Anesthesia Related Complications NONE      Electronically signed by Sylvester Chaves MD on 8/10/2021 at 11:15 AM

## 2021-08-10 NOTE — PROGRESS NOTES
Physical Therapy Cancel Note      DATE: 8/10/2021    NAME: Gurpreet Garibay  MRN: [id number]   : 1970      Patient not seen this date for Physical Therapy due to: Other: Pt is having chest pain. RN defers PT eval at this time.       Electronically signed by Theo Klinefelter, PT on 8/10/2021 at 1:27 PM

## 2021-08-10 NOTE — ANESTHESIA PRE PROCEDURE
Department of Anesthesiology  Preprocedure Note       Name:  Floresita Jaramillo   Age:  48 y.o.  :  1970                                          MRN:  [id number]         Date:  8/10/2021      Surgeon: Manjinder Zhou):  Javad Regalado MD    Procedure: Procedure(s): HERNIA INCISIONAL REPAIR OPEN W/MESH    Medications prior to admission:   Prior to Admission medications    Medication Sig Start Date End Date Taking? Authorizing Provider   brexpiprazole (REXULTI) 0.5 MG TABS tablet Take 0.5 mg by mouth daily   Yes Historical Provider, MD   oxyCODONE (OXYCONTIN) 20 MG extended release tablet Take 1 tablet by mouth 2 times daily for 30 days. Intended supply: 30 days 21 Yes Jerome Luo MD   propranolol (INDERAL) 20 MG tablet Take 20 mg by mouth as needed Takes for migraines   Yes Historical Provider, MD   oxyCODONE-acetaminophen (PERCOCET)  MG per tablet Take 1 tablet by mouth every 4 hours as needed for Pain for up to 30 days. No more than 6 per day 21 Yes Jerome Luo MD   LORazepam (ATIVAN) 0.5 MG tablet take 1 tablet by mouth every 8 hours if needed for anxiety 21 Yes Jerome Luo MD   VORTIoxetine HBr (TRINTELLIX) 20 MG TABS tablet Take 1 tablet by mouth daily 21  Yes Jerome Luo MD   pantoprazole (PROTONIX) 40 MG tablet TAKE ONE TABLET BY MOUTH TWICE DAILY 21  Yes Jerome Luo MD   lisinopril (PRINIVIL;ZESTRIL) 30 MG tablet Take 1 tablet by mouth daily  Patient taking differently: Take 5 mg by mouth daily  21  Yes Jerome Luo MD   isosorbide mononitrate (IMDUR) 30 MG extended release tablet Take 1 tablet by mouth daily 21  Yes Jerome Luo MD   atorvastatin (LIPITOR) 80 MG tablet Take 1 tablet by mouth nightly 21  Yes Jerome Luo MD   Insulin Aspart (NOVOLOG SC) Inject  into the skin.  Insulin pump   Yes Historical Provider, MD   b complex vitamins capsule Take 1 capsule by mouth daily Historical Provider, MD   clopidogrel (PLAVIX) 75 MG tablet Take 1 tablet by mouth daily 2/11/21   Kev Cardenas MD   CONTOUR NEXT TEST strip use four times a day 1/13/21   Historical Provider, MD   ondansetron (ZOFRAN-ODT) 8 MG TBDP disintegrating tablet Take 8 mg by mouth every 12 hours as needed    Historical Provider, MD   naloxone 4 MG/0.1ML LIQD nasal spray 1 spray by Nasal route as needed for Opioid Reversal 12/11/19   Kev Cardenas MD   nitroGLYCERIN (NITROSTAT) 0.4 MG SL tablet Place 1 tablet under the tongue every 5 minutes as needed for Chest pain Indications: Disease of the Arteries of the Heart Take 1 tablet every 5 minutes times three as needed for chest pain 2/8/16   CLYDE Mishra - CNP       Current medications:    Current Facility-Administered Medications   Medication Dose Route Frequency Provider Last Rate Last Admin    0.9 % sodium chloride infusion   Intravenous Continuous Reita Breath,  mL/hr at 08/10/21 0611 New Bag at 08/10/21 0611    sodium chloride flush 0.9 % injection 5-40 mL  5-40 mL Intravenous 2 times per day Yarelyita MD Mary        sodium chloride flush 0.9 % injection 5-40 mL  5-40 mL Intravenous PRN Reita Breath, MD        lidocaine PF 1 % injection 1 mL  1 mL Intradermal Once PRN Yarelyita Breath, MD        ceFAZolin (ANCEF) 2000 mg in dextrose 5 % 50 mL IVPB  2,000 mg Intravenous Once Aba Persaud MD         Facility-Administered Medications Ordered in Other Encounters   Medication Dose Route Frequency Provider Last Rate Last Admin    lactated ringers infusion  75 mL/hr Intravenous Continuous Nuha Pimentel MD           Allergies:     Allergies   Allergen Reactions    Avelox [Moxifloxacin]     Ciprofloxacin Other (See Comments)    Levofloxacin     Lyrica [Pregabalin]     Metoclopramide Other (See Comments)     Severe confusion and paranoid activity    Neurontin [Gabapentin]     Other      Can not have epidural injections, steroids, nerve blocks, or burning of nerves due to being diabetic    Tetanus Toxoids     Tramadol     Tramadol Hcl        Problem List:    Patient Active Problem List   Diagnosis Code    Ulnar nerve compression at multiple levels G56.20    Degenerative cervical disc M50.30    Arthropathy of cervical facet joint M47.812    Neural foraminal stenosis of cervical spine M48.02    Cervical spondylosis M47.812    Chronic coronary artery disease I25.10    Depression with anxiety F41.8    Diabetic peripheral neuropathy (HCC) E11.42    Essential hypertension I10    Gastroparesis K31.84    History of long-term treatment with high-risk medication Z79.899    Hyperlipidemia E78.5    Impingement syndrome of left shoulder M75.42    Injury of right ulnar nerve S54. 01XA    Migraine headache G43.909    Type 1 diabetes mellitus with hyperglycemia ([facility]) E10.65    Current every day smoker F17.200    Perforation of right tympanic membrane H72.91    Pancreatic insufficiency K86.89    Major depressive disorder, recurrent episode with anxious distress ([facility]) F33.9    Unspecified inflammatory spondylopathy, cervical region [facility]) M46.92       Past Medical History:        Diagnosis Date    Anxiety     Arthritis     CAD (coronary artery disease)     Calcaneal apophysitis     Corns     Depression     Diabetes type I ([address]     Gastroparesis     GERD (gastroesophageal reflux disease)     Headache(784.0)     Hearing loss     Hyperglycemia     Hyperlipidemia     Hypertension     Intussusception ([address]     MI (myocardial infarction) ([address] jan 2014    Neuropathy     Osteoarthritis     Panic attacks     Temporomandibular joint disorder        Past Surgical History:        Procedure Laterality Date    ABDOMEN SURGERY      insulin pump    ABDOMINAL EXPLORATION SURGERY  05/28/2016    bowel resection Rt. colon &terminal ileum, intussuseption.      CARDIAC CATHETERIZATION  2014    CARPAL TUNNEL RELEASE 08/09/21    BMI:   Wt Readings from Last 3 Encounters:   08/10/21 133 lb (60.3 kg)   08/09/21 133 lb (60.3 kg)   08/02/21 133 lb (60.3 kg)     Body mass index is 20.22 kg/m².     CBC:   Lab Results   Component Value Date    WBC 7.3 08/02/2021    RBC 5.07 08/02/2021    RBC 4.16 01/21/2012    HGB 16.5 08/02/2021    HCT 48.2 08/02/2021    MCV 95.0 08/02/2021    RDW 13.5 08/02/2021     08/02/2021     01/21/2012       CMP:   Lab Results   Component Value Date     08/02/2021    K 4.5 08/02/2021     08/02/2021    CO2 30 08/02/2021    BUN 6 08/02/2021    CREATININE 0.54 08/02/2021    GFRAA >60 08/02/2021    LABGLOM >60 08/02/2021    GLUCOSE 181 08/02/2021    GLUCOSE 145 01/20/2012    PROT 6.4 05/23/2017    CALCIUM 9.1 08/02/2021    BILITOT 0.77 05/23/2017    ALKPHOS 100 05/23/2017    AST 19 05/23/2017    ALT 17 05/23/2017       POC Tests:   Recent Labs     08/10/21  0612   POCGLU 300*       Coags: No results found for: PROTIME, INR, APTT    HCG (If Applicable): No results found for: PREGTESTUR, PREGSERUM, HCG, HCGQUANT     ABGs: No results found for: PHART, PO2ART, NHW5OIH, AJN7SAF, BEART, V4UJDNVO     Type & Screen (If Applicable):  No results found for: LABABO, LABRH    Drug/Infectious Status (If Applicable):  No results found for: HIV, HEPCAB    COVID-19 Screening (If Applicable):   Lab Results   Component Value Date    COVID19 Not Detected 08/09/2021           Anesthesia Evaluation  Patient summary reviewed and Nursing notes reviewed no history of anesthetic complications:   Airway: Mallampati: II  TM distance: >3 FB   Neck ROM: full  Mouth opening: > = 3 FB Dental:    (+) edentulous      Pulmonary:Negative Pulmonary ROS and normal exam  breath sounds clear to auscultation  (+) current smoker                           Cardiovascular:    (+) hypertension:, past MI:, CAD:,       ECG reviewed  Rhythm: regular  Rate: normal  Echocardiogram reviewed                  Neuro/Psych:   (+) neuromuscular

## 2021-08-11 LAB
ABSOLUTE EOS #: 0 K/UL (ref 0–0.4)
ABSOLUTE IMMATURE GRANULOCYTE: ABNORMAL K/UL (ref 0–0.3)
ABSOLUTE LYMPH #: 0.82 K/UL (ref 1–4.8)
ABSOLUTE MONO #: 1.03 K/UL (ref 0.1–1.3)
ANION GAP SERPL CALCULATED.3IONS-SCNC: 23 MMOL/L (ref 9–17)
BASOPHILS # BLD: 0 % (ref 0–2)
BASOPHILS ABSOLUTE: 0 K/UL (ref 0–0.2)
BUN BLDV-MCNC: 14 MG/DL (ref 6–20)
BUN/CREAT BLD: ABNORMAL (ref 9–20)
CALCIUM SERPL-MCNC: 9.1 MG/DL (ref 8.6–10.4)
CHLORIDE BLD-SCNC: 98 MMOL/L (ref 98–107)
CO2: 18 MMOL/L (ref 20–31)
CREAT SERPL-MCNC: 1.01 MG/DL (ref 0.7–1.2)
DIFFERENTIAL TYPE: ABNORMAL
EKG ATRIAL RATE: 92 BPM
EKG P AXIS: 75 DEGREES
EKG P-R INTERVAL: 148 MS
EKG Q-T INTERVAL: 384 MS
EKG QRS DURATION: 90 MS
EKG QTC CALCULATION (BAZETT): 474 MS
EKG R AXIS: 60 DEGREES
EKG T AXIS: 56 DEGREES
EKG VENTRICULAR RATE: 92 BPM
EOSINOPHILS RELATIVE PERCENT: 0 % (ref 0–4)
GFR AFRICAN AMERICAN: >60 ML/MIN
GFR NON-AFRICAN AMERICAN: >60 ML/MIN
GFR SERPL CREATININE-BSD FRML MDRD: ABNORMAL ML/MIN/{1.73_M2}
GFR SERPL CREATININE-BSD FRML MDRD: ABNORMAL ML/MIN/{1.73_M2}
GLUCOSE BLD-MCNC: 177 MG/DL (ref 75–110)
GLUCOSE BLD-MCNC: 214 MG/DL (ref 75–110)
GLUCOSE BLD-MCNC: 422 MG/DL (ref 75–110)
GLUCOSE BLD-MCNC: 437 MG/DL (ref 75–110)
GLUCOSE BLD-MCNC: 446 MG/DL (ref 75–110)
GLUCOSE BLD-MCNC: 458 MG/DL (ref 70–99)
GLUCOSE BLD-MCNC: 537 MG/DL (ref 75–110)
HCT VFR BLD CALC: 43.7 % (ref 41–53)
HEMOGLOBIN: 14.5 G/DL (ref 13.5–17.5)
IMMATURE GRANULOCYTES: ABNORMAL %
LYMPHOCYTES # BLD: 4 % (ref 24–44)
MCH RBC QN AUTO: 32 PG (ref 26–34)
MCHC RBC AUTO-ENTMCNC: 33.1 G/DL (ref 31–37)
MCV RBC AUTO: 96.4 FL (ref 80–100)
MONOCYTES # BLD: 5 % (ref 1–7)
MORPHOLOGY: ABNORMAL
NRBC AUTOMATED: ABNORMAL PER 100 WBC
PDW BLD-RTO: 13.6 % (ref 11.5–14.9)
PLATELET # BLD: 177 K/UL (ref 150–450)
PLATELET ESTIMATE: ABNORMAL
PMV BLD AUTO: 8 FL (ref 6–12)
POTASSIUM SERPL-SCNC: 5.1 MMOL/L (ref 3.7–5.3)
RBC # BLD: 4.53 M/UL (ref 4.5–5.9)
RBC # BLD: ABNORMAL 10*6/UL
SEG NEUTROPHILS: 91 % (ref 36–66)
SEGMENTED NEUTROPHILS ABSOLUTE COUNT: 18.75 K/UL (ref 1.3–9.1)
SODIUM BLD-SCNC: 139 MMOL/L (ref 135–144)
WBC # BLD: 20.6 K/UL (ref 3.5–11)
WBC # BLD: ABNORMAL 10*3/UL

## 2021-08-11 PROCEDURE — 97165 OT EVAL LOW COMPLEX 30 MIN: CPT

## 2021-08-11 PROCEDURE — 85025 COMPLETE CBC W/AUTO DIFF WBC: CPT

## 2021-08-11 PROCEDURE — 93010 ELECTROCARDIOGRAM REPORT: CPT | Performed by: INTERNAL MEDICINE

## 2021-08-11 PROCEDURE — 97161 PT EVAL LOW COMPLEX 20 MIN: CPT

## 2021-08-11 PROCEDURE — 6370000000 HC RX 637 (ALT 250 FOR IP): Performed by: FAMILY MEDICINE

## 2021-08-11 PROCEDURE — 99239 HOSP IP/OBS DSCHRG MGMT >30: CPT | Performed by: PHYSICIAN ASSISTANT

## 2021-08-11 PROCEDURE — 99232 SBSQ HOSP IP/OBS MODERATE 35: CPT | Performed by: FAMILY MEDICINE

## 2021-08-11 PROCEDURE — 36415 COLL VENOUS BLD VENIPUNCTURE: CPT

## 2021-08-11 PROCEDURE — 1200000000 HC SEMI PRIVATE

## 2021-08-11 PROCEDURE — 82947 ASSAY GLUCOSE BLOOD QUANT: CPT

## 2021-08-11 PROCEDURE — 2500000003 HC RX 250 WO HCPCS: Performed by: SURGERY

## 2021-08-11 PROCEDURE — 6360000002 HC RX W HCPCS: Performed by: PHYSICIAN ASSISTANT

## 2021-08-11 PROCEDURE — 6360000002 HC RX W HCPCS: Performed by: SURGERY

## 2021-08-11 PROCEDURE — 80048 BASIC METABOLIC PNL TOTAL CA: CPT

## 2021-08-11 PROCEDURE — 2580000003 HC RX 258: Performed by: SURGERY

## 2021-08-11 RX ORDER — NICOTINE 21 MG/24HR
1 PATCH, TRANSDERMAL 24 HOURS TRANSDERMAL DAILY
Status: DISCONTINUED | OUTPATIENT
Start: 2021-08-11 | End: 2021-08-12 | Stop reason: HOSPADM

## 2021-08-11 RX ADMIN — ISOSORBIDE MONONITRATE 30 MG: 30 TABLET, EXTENDED RELEASE ORAL at 09:45

## 2021-08-11 RX ADMIN — HYDROMORPHONE HYDROCHLORIDE 1 MG: 1 INJECTION, SOLUTION INTRAMUSCULAR; INTRAVENOUS; SUBCUTANEOUS at 05:59

## 2021-08-11 RX ADMIN — LORAZEPAM 0.5 MG: 0.5 TABLET ORAL at 02:04

## 2021-08-11 RX ADMIN — INSULIN LISPRO 12 UNITS: 100 INJECTION, SOLUTION INTRAVENOUS; SUBCUTANEOUS at 06:31

## 2021-08-11 RX ADMIN — VORTIOXETINE 20 MG: 20 TABLET, FILM COATED ORAL at 11:05

## 2021-08-11 RX ADMIN — OXYCODONE HYDROCHLORIDE AND ACETAMINOPHEN 2 TABLET: 5; 325 TABLET ORAL at 12:09

## 2021-08-11 RX ADMIN — OXYCODONE HYDROCHLORIDE AND ACETAMINOPHEN 2 TABLET: 5; 325 TABLET ORAL at 16:44

## 2021-08-11 RX ADMIN — CEFAZOLIN 2000 MG: 10 INJECTION, POWDER, FOR SOLUTION INTRAVENOUS at 14:53

## 2021-08-11 RX ADMIN — CEFAZOLIN 2000 MG: 10 INJECTION, POWDER, FOR SOLUTION INTRAVENOUS at 05:59

## 2021-08-11 RX ADMIN — BREXPIPRAZOLE 0.5 MG: 1 TABLET ORAL at 11:05

## 2021-08-11 RX ADMIN — LISINOPRIL 5 MG: 5 TABLET ORAL at 09:45

## 2021-08-11 RX ADMIN — ATORVASTATIN CALCIUM 80 MG: 80 TABLET, FILM COATED ORAL at 20:55

## 2021-08-11 RX ADMIN — PANTOPRAZOLE SODIUM 40 MG: 40 TABLET, DELAYED RELEASE ORAL at 09:44

## 2021-08-11 RX ADMIN — INSULIN LISPRO 18 UNITS: 100 INJECTION, SOLUTION INTRAVENOUS; SUBCUTANEOUS at 12:43

## 2021-08-11 RX ADMIN — CEFAZOLIN 2000 MG: 10 INJECTION, POWDER, FOR SOLUTION INTRAVENOUS at 21:14

## 2021-08-11 RX ADMIN — ONDANSETRON 4 MG: 2 INJECTION INTRAMUSCULAR; INTRAVENOUS at 00:26

## 2021-08-11 RX ADMIN — PANTOPRAZOLE SODIUM 40 MG: 40 TABLET, DELAYED RELEASE ORAL at 20:55

## 2021-08-11 RX ADMIN — INSULIN LISPRO 2 UNITS: 100 INJECTION, SOLUTION INTRAVENOUS; SUBCUTANEOUS at 20:56

## 2021-08-11 RX ADMIN — OXYCODONE HYDROCHLORIDE 20 MG: 20 TABLET, FILM COATED, EXTENDED RELEASE ORAL at 21:00

## 2021-08-11 RX ADMIN — INSULIN LISPRO 12 UNITS: 100 INJECTION, SOLUTION INTRAVENOUS; SUBCUTANEOUS at 16:42

## 2021-08-11 RX ADMIN — ENOXAPARIN SODIUM 30 MG: 30 INJECTION SUBCUTANEOUS at 09:46

## 2021-08-11 RX ADMIN — HYDROMORPHONE HYDROCHLORIDE 1 MG: 1 INJECTION, SOLUTION INTRAMUSCULAR; INTRAVENOUS; SUBCUTANEOUS at 00:49

## 2021-08-11 RX ADMIN — FAMOTIDINE 20 MG: 10 INJECTION, SOLUTION INTRAVENOUS at 20:55

## 2021-08-11 RX ADMIN — CEFAZOLIN 2000 MG: 10 INJECTION, POWDER, FOR SOLUTION INTRAVENOUS at 00:11

## 2021-08-11 RX ADMIN — KETOROLAC TROMETHAMINE 15 MG: 30 INJECTION, SOLUTION INTRAMUSCULAR; INTRAVENOUS at 02:06

## 2021-08-11 RX ADMIN — OXYCODONE HYDROCHLORIDE 20 MG: 20 TABLET, FILM COATED, EXTENDED RELEASE ORAL at 09:44

## 2021-08-11 RX ADMIN — SODIUM CHLORIDE, PRESERVATIVE FREE 10 ML: 5 INJECTION INTRAVENOUS at 20:56

## 2021-08-11 RX ADMIN — OXYCODONE HYDROCHLORIDE 20 MG: 20 TABLET, FILM COATED, EXTENDED RELEASE ORAL at 00:11

## 2021-08-11 ASSESSMENT — PAIN DESCRIPTION - ONSET: ONSET: ON-GOING

## 2021-08-11 ASSESSMENT — PAIN SCALES - GENERAL
PAINLEVEL_OUTOF10: 5
PAINLEVEL_OUTOF10: 10
PAINLEVEL_OUTOF10: 8
PAINLEVEL_OUTOF10: 8
PAINLEVEL_OUTOF10: 7
PAINLEVEL_OUTOF10: 5
PAINLEVEL_OUTOF10: 10
PAINLEVEL_OUTOF10: 8
PAINLEVEL_OUTOF10: 3
PAINLEVEL_OUTOF10: 4
PAINLEVEL_OUTOF10: 5
PAINLEVEL_OUTOF10: 4
PAINLEVEL_OUTOF10: 8
PAINLEVEL_OUTOF10: 0
PAINLEVEL_OUTOF10: 10
PAINLEVEL_OUTOF10: 0

## 2021-08-11 ASSESSMENT — PAIN DESCRIPTION - ORIENTATION: ORIENTATION: RIGHT;LEFT

## 2021-08-11 ASSESSMENT — PAIN DESCRIPTION - FREQUENCY: FREQUENCY: CONTINUOUS

## 2021-08-11 ASSESSMENT — PAIN DESCRIPTION - LOCATION
LOCATION: SHOULDER
LOCATION: NECK
LOCATION: ABDOMEN

## 2021-08-11 ASSESSMENT — PAIN DESCRIPTION - PROGRESSION: CLINICAL_PROGRESSION: NOT CHANGED

## 2021-08-11 ASSESSMENT — PAIN DESCRIPTION - PAIN TYPE
TYPE: CHRONIC PAIN
TYPE: ACUTE PAIN
TYPE: ACUTE PAIN;SURGICAL PAIN

## 2021-08-11 ASSESSMENT — PAIN DESCRIPTION - DESCRIPTORS: DESCRIPTORS: ACHING

## 2021-08-11 NOTE — PROGRESS NOTES
Progress Note  8/11/2021 11:29 AM  Subjective:   Admit Date: 8/10/2021  PCP: Venita Benito MD  CC: hernia repair  Interval History: pt had hernia repair yesterday. Was having a lot of pain post op. Much improved today. Able to move and cough as needed. Talking normally- was not yesterday. (was whispering due to the pain)    Diet: ADULT DIET; Regular  Medications:   Scheduled Meds:   ceFAZolin  2,000 mg Intravenous Q8H    nicotine  1 patch Transdermal Daily    sodium chloride flush  10 mL Intravenous 2 times per day    famotidine (PEPCID) injection  20 mg Intravenous BID    enoxaparin  30 mg Subcutaneous Daily    atorvastatin  80 mg Oral Nightly    brexpiprazole  0.5 mg Oral Daily    isosorbide mononitrate  30 mg Oral Daily    lisinopril  5 mg Oral Daily    oxyCODONE  20 mg Oral BID    pantoprazole  40 mg Oral BID    VORTIoxetine HBr  20 mg Oral Daily    insulin lispro  0-12 Units Subcutaneous TID WC    insulin lispro  0-6 Units Subcutaneous Nightly     Continuous Infusions:   sodium chloride 75 mL/hr at 08/10/21 1139    sodium chloride      dextrose       CBC:   Recent Labs     08/11/21  0600   WBC 20.6*   HGB 14.5        BMP:    Recent Labs     08/11/21  0600      K 5.1   CL 98   CO2 18*   BUN 14   CREATININE 1.01   GLUCOSE 458*     Hepatic: No results for input(s): AST, ALT, ALB, BILITOT, ALKPHOS in the last 72 hours. Troponin: No results for input(s): TROPONINI in the last 72 hours. BNP: No results for input(s): BNP in the last 72 hours. Lipids: No results for input(s): CHOL, HDL in the last 72 hours. Invalid input(s): LDLCALCU  INR: No results for input(s): INR in the last 72 hours.     Objective:   Vitals: BP (!) 148/90   Pulse 108   Temp 98.8 °F (37.1 °C) (Oral)   Resp 18   Ht 5' 8\" (1.727 m)   Wt 133 lb (60.3 kg)   SpO2 98%   BMI 20.22 kg/m²   General appearance: alert and cooperative with exam  Neck: supple, symmetrical, trachea midline  Lungs: clear to auscultation bilaterally  Heart: regular rate and rhythm, S1, S2 normal, no murmur, click, rub or gallop  Abdomen: normal findings: binder on, soft, min tender  Extremities: extremities normal, atraumatic, no cyanosis or edema  Neurologic: Mental status: Alert, oriented, thought content appropriate    Assessment and Plan:   1. Post op Day #1- much improved- d/c home today  2. DM- insulin prn  3.  Smoker- nicotine patch    Patient Active Problem List:     Ulnar nerve compression at multiple levels     Degenerative cervical disc     Arthropathy of cervical facet joint     Neural foraminal stenosis of cervical spine     Cervical spondylosis     Chronic coronary artery disease     Depression with anxiety     Diabetic peripheral neuropathy (HCC)     Essential hypertension     Gastroparesis     History of long-term treatment with high-risk medication     Hyperlipidemia     Impingement syndrome of left shoulder     Injury of right ulnar nerve     Migraine headache     Type 1 diabetes mellitus with hyperglycemia (HCC)     Current every day smoker     Perforation of right tympanic membrane     Pancreatic insufficiency     Major depressive disorder, recurrent episode with anxious distress ([address]     Unspecified inflammatory spondylopathy, cervical region ([address]     Incisional hernia, without obstruction or gangrene      Electronically signed by Arias Lopez MD on 8/11/2021 at 11:29 AM

## 2021-08-11 NOTE — DISCHARGE INSTR - COC
Continuity of Care Form    Patient Name: Kristi Stewart   :  1970  MRN:  [id number]    Admit date:  8/10/2021  Discharge date:  ***    Code Status Order: Full Code   Advance Directives:     Admitting Physician:  Irene Montalvo MD  PCP: Kathrin Herrera MD    Discharging Nurse: [facility] Unit/Room#: 6437/9214-60  Discharging Unit Phone Number: ***    Emergency Contact:   Extended Emergency Contact Information  Primary Emergency Contact: Inga Amaya  Address: [address] [place] [address] [address]  Phone: [phone]  Work Phone: [phone]  Mobile Phone: [phone]  Relation: Spouse    Past Surgical History:  Past Surgical History:   Procedure Laterality Date    ABDOMEN SURGERY      insulin pump    ABDOMINAL EXPLORATION SURGERY  2016    bowel resection Rt. colon &terminal ileum, intussuseption.      CARDIAC CATHETERIZATION      CARPAL TUNNEL RELEASE      bilateral    COLONOSCOPY  2017    poor prep; diverticulosis    COLONOSCOPY N/A 7/10/2018    COLONOSCOPY POLYPECTOMY HOT BIOPSY performed by Irene Montalvo MD at [facility] N/A 2021    COLONOSCOPY DIAGNOSTIC performed by Irene Montalvo MD at [address]      right    HERNIA REPAIR      inguinal    [place] COLON CA SCRN NOT  [address] IND N/A 2017    COLONOSCOPY performed by Senait Ovalles MD at [address] ESOPHAGOGASTRODUODENOSCOPY TRANSORAL DIAGNOSTIC N/A 2017    EGD ESOPHAGOGASTRODUODENOSCOPY performed by Senait Ovalles MD at [facility] Bilateral     SHOULDER SURGERY      fracture     TONSILLECTOMY      x2    TOOTH EXTRACTION      x4    ULNAR TUNNEL RELEASE      UPPER GASTROINTESTINAL ENDOSCOPY  2017    mild gastritis    UPPER GASTROINTESTINAL ENDOSCOPY N/A 7/10/2018    EGD BIOPSY performed by Irene Montalvo MD at [place] [address] N/A 8/10/2021    [address] [place] [address] OPEN W/MESH performed by Jefry Bucio MD at [facility]      x4       Immunization History:   Immunization History   Administered Date(s) Administered    Influenza Virus Vaccine 08/28/2012, 10/09/2013, 10/01/2014, 11/04/2015    Influenza, Janas Rota, IM, PF (6 mo and older Fluzone, Flulaval, Fluarix, and 3 yrs and older Afluria) 11/15/2016, 09/13/2017, 09/27/2018, 11/10/2020    Pneumococcal Polysaccharide ([id number]) 01/03/2017       Active Problems:  Patient Active Problem List   Diagnosis Code    Ulnar nerve compression at multiple levels G56.20    Degenerative cervical disc M50.30    Arthropathy of cervical facet joint M47.812    Neural foraminal stenosis of cervical spine M48.02    Cervical spondylosis M47.812    Chronic coronary artery disease I25.10    Depression with anxiety F41.8    Diabetic peripheral neuropathy (HCC) E11.42    Essential hypertension I10    Gastroparesis K31.84    History of long-term treatment with high-risk medication Z79.899    Hyperlipidemia E78.5    Impingement syndrome of left shoulder M75.42    Injury of right ulnar nerve S54. 01XA    Migraine headache G43.909    Type 1 diabetes mellitus with hyperglycemia (HCC) E10.65    Current every day smoker F17.200    Perforation of right tympanic membrane H72.91    Pancreatic insufficiency K86.89    Major depressive disorder, recurrent episode with anxious distress (HCC) F33.9    Unspecified inflammatory spondylopathy, cervical region [facility]) M46.92    Incisional hernia, without obstruction or gangrene K43.2       Isolation/Infection:   Isolation          No Isolation        Patient Infection Status     Infection Onset Added Last Indicated Last Indicated By Review Planned Expiration Resolved Resolved By    None active    Resolved    COVID-19 Rule Out 08/09/21 08/09/21 08/09/21 COVID-19, Rapid (Ordered)   08/09/21 Rule-Out Test Resulted          Nurse Assessment:  Last Vital Signs: BP (!) 148/90 Pulse 108   Temp 98.8 °F (37.1 °C) (Oral)   Resp 18   Ht 5' 8\" (1.727 m)   Wt 133 lb (60.3 kg)   SpO2 98%   BMI 20.22 kg/m²     Last documented pain score (0-10 scale): Pain Level: 7  Last Weight:   Wt Readings from Last 1 Encounters:   08/10/21 133 lb (60.3 kg)     Mental Status:  {IP PT MENTAL STATUS:}    IV Access:  { REMBERTO IV ACCESS:151622768}    Nursing Mobility/ADLs:  Walking   {CHP DME MBKT:276409847}  Transfer  {CHP DME FIZJ:238771783}  Bathing  {CHP DME NZRP:332498349}  Dressing  {CHP DME RYUZ:445666476}  Toileting  {CHP DME LRKC:060349404}  Feeding  {CHP DME ZVAS:956994951}  Med Admin  {CHP DME OQMF:899098664}  Med Delivery   { REMBERTO MED Delivery:642599427}    Wound Care Documentation and Therapy:        Elimination:  Continence:   · Bowel: {YES / B}  · Bladder: {YES / RV:53913}  Urinary Catheter: {Urinary Catheter:788595048}   Colostomy/Ileostomy/Ileal Conduit: {YES / HO:67320}       Date of Last BM: ***    Intake/Output Summary (Last 24 hours) at 2021 1121  Last data filed at 2021 0605  Gross per 24 hour   Intake 1838 ml   Output 85 ml   Net 1753 ml     I/O last 3 completed shifts:   In: 3448 [P.O.:360; I.V.:3088]  Out: 0 [Urine:450; Drains:115; Blood:25]    Safety Concerns:     [address] [employer] Safety Concerns:226245730}    Impairments/Disabilities:      [address] [employer] Impairments/Disabilities:740508618}    Nutrition Therapy:  Current Nutrition Therapy:   [address] [employer] Diet List:617787152}    Routes of Feeding: {CHP DME Other Feedings:007272295}  Liquids: {Slp liquid thickness:29916}  Daily Fluid Restriction: {CHP DME Yes amt example:676933329}  Last Modified Barium Swallow with Video (Video Swallowing Test): {Done Not Done JWN:050734526}    Treatments at the Time of Hospital Discharge:   Respiratory Treatments: ***  Oxygen Therapy:  {Therapy; copd oxygen:84861}  Ventilator:    { CC Vent GTAL:163719256}    Rehab Therapies: {THERAPEUTIC INTERVENTION:3318586327}  Weight Bearing Status/Restrictions: [address] Tanya Jacob CC Weight Bearin}  Other Medical Equipment (for information only, NOT a DME order):  {EQUIPMENT:578183486}  Other Treatments: ***    Patient's personal belongings (please select all that are sent with patient):  {CHP DME Belongings:348464024}    RN SIGNATURE:  {Esignature:401705194}    CASE MANAGEMENT/SOCIAL WORK SECTION    Inpatient Status Date: ***    Readmission Risk Assessment Score:  Readmission Risk              Risk of Unplanned Readmission:  9           Discharging to Facility/ Agency   · Name:   · Address:  · Phone:  · Fax:    Dialysis Facility (if applicable)   · Name:  · Address:  · Dialysis Schedule:  · Phone:  · Fax:    / signature: {Esignature:628547758}    PHYSICIAN SECTION    Prognosis: {Prognosis:7120204247}    Condition at Discharge: [address] Patient Condition:050996011}    Rehab Potential (if transferring to Rehab): {Prognosis:9010147590}    Recommended Labs or Other Treatments After Discharge: ***    Physician Certification: I certify the above information and transfer of Nick Hernandez  is necessary for the continuing treatment of the diagnosis listed and that he requires {Admit to Appropriate Level of Care:85530} for {GREATER/LESS:286917859} 30 days.      Update Admission H&P: {CHP DME Changes in CBSOQ:002410412}    PHYSICIAN SIGNATURE:  {Esignature:849487116}
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
## 2021-08-11 NOTE — DISCHARGE SUMMARY
Physician Discharge Summary     Date of admission: 8/10/2021    Discharge date: 8/11/2021    Admission Diagnosis: Incisional hernia without obstruction or gangrene [K43.2]    Discharge Diagnosis: same    Brief Hospitalization Details:  Alethea Garcia is a 48 y.o. male who was admitted for postoperative management. s/p open incision hernia repair with mesh. Patient admitted for overnight observation/pain control. Patient does have known chronic pain issues. Postoperative pain was controlled. Bowels are moving. Tolerating regular diet. Ambulating. Urine output adequate. Patient is surgically stable for discharge to home. Discharge instructions discussed at length with patient and family at bedside. Prescriptions called in to pharmacy. Patient to follow up in office as outpatient. Current Discharge Medication List      START taking these medications    Details   cephALEXin (KEFLEX) 500 MG capsule 500 mgTake three times daily  Qty: 21 capsule, Refills: 0      ondansetron (ZOFRAN) 4 MG tablet Take every six hours as needed  Qty: 20 tablet, Refills: 0      HYDROcodone-acetaminophen (NORCO) 5-325 MG per tablet Take 1 tablet by mouth every 4 hours as needed for Pain for up to 5 days. . Take lowest dose possible to manage pain  Qty: 30 tablet, Refills: 0    Comments: Reduce doses taken as pain becomes manageable  Associated Diagnoses: Incisional hernia, without obstruction or gangrene      !! naloxone 4 MG/0.1ML LIQD nasal spray 1 spray by Nasal route as needed for Opioid Reversal  Qty: 1 each, Refills: 5      !! naloxone 4 MG/0.1ML LIQD nasal spray 1 spray by Nasal route as needed for Opioid Reversal  Qty: 1 each, Refills: 5       !! - Potential duplicate medications found. Please discuss with provider.       CONTINUE these medications which have NOT CHANGED    Details   brexpiprazole (REXULTI) 0.5 MG TABS tablet Take 0.5 mg by mouth daily      oxyCODONE (OXYCONTIN) 20 MG extended release tablet Take 1 tablet by mouth 2 times daily for 30 days. Intended supply: 30 days  Qty: 60 tablet, Refills: 0    Comments: Reduce doses taken as pain becomes manageable  Associated Diagnoses: Diabetic peripheral neuropathy ([address] Cervical radiculopathy      propranolol (INDERAL) 20 MG tablet Take 20 mg by mouth as needed Takes for migraines      oxyCODONE-acetaminophen (PERCOCET)  MG per tablet Take 1 tablet by mouth every 4 hours as needed for Pain for up to 30 days. No more than 6 per day  Qty: 180 tablet, Refills: 0    Comments: Reduce doses taken as pain becomes manageable  Associated Diagnoses: Degenerative cervical disc      LORazepam (ATIVAN) 0.5 MG tablet take 1 tablet by mouth every 8 hours if needed for anxiety  Qty: 90 tablet, Refills: 1    Associated Diagnoses: Depression with anxiety      VORTIoxetine HBr (TRINTELLIX) 20 MG TABS tablet Take 1 tablet by mouth daily  Qty: 90 tablet, Refills: 3      pantoprazole (PROTONIX) 40 MG tablet TAKE ONE TABLET BY MOUTH TWICE DAILY  Qty: 60 tablet, Refills: 3    Comments: Please consider 90 day supplies to promote better adherence  Associated Diagnoses: Esophagitis, acute      lisinopril (PRINIVIL;ZESTRIL) 30 MG tablet Take 1 tablet by mouth daily  Qty: 90 tablet, Refills: 3    Associated Diagnoses: Diabetic peripheral neuropathy (HCC)      isosorbide mononitrate (IMDUR) 30 MG extended release tablet Take 1 tablet by mouth daily  Qty: 90 tablet, Refills: 3    Associated Diagnoses: High risk medication use      atorvastatin (LIPITOR) 80 MG tablet Take 1 tablet by mouth nightly  Qty: 90 tablet, Refills: 3      Insulin Aspart (NOVOLOG SC) Inject  into the skin.  Insulin pump      b complex vitamins capsule Take 1 capsule by mouth daily      clopidogrel (PLAVIX) 75 MG tablet Take 1 tablet by mouth daily  Qty: 90 tablet, Refills: 3    Associated Diagnoses: High risk medication use      CONTOUR NEXT TEST strip use four times a day      ondansetron (ZOFRAN-ODT) 8 MG TBDP disintegrating tablet Take

## 2021-08-11 NOTE — CARE COORDINATION
ONGOING DISCHARGE PLAN:    Patient is alert and oriented x4. Spoke with patient regarding discharge plan and patient confirms that plan is still home without needs. Declined VNS. Spouse knows how to care for drains and is agreeable to plan. POD#1 open incisional hernia repair. Regular diet. Home later today. Will continue to follow for additional discharge needs.     Electronically signed by Teodoro Finn RN on 8/11/2021 at 10:37 AM

## 2021-08-11 NOTE — PLAN OF CARE
Problem: Falls - Risk of:  Goal: Will remain free from falls  Description: Will remain free from falls  8/11/2021 1718 by Duyen Vasquez RN  Outcome: Met This Shift  Note: Pt assessed as a fall risk this shift. Remains free from falls and accidental injury at this time. Fall precautions in place, including falling star sign and fall risk band on pt. Floor free from obstacles, and bed is locked and in lowest position. Adequate lighting provided. Pt encouraged to call before getting OOB for any need. Bed alarm activated. Will continue to monitor needs during hourly rounding, and reinforce education on use of call light. 8/11/2021 0515 by Shelton Morales RN  Outcome: Ongoing  Note: Patient remained free from falls all throughout shift. Bed locked, side rails up X2, belongings and call light within reach. Problem: Pain:  Goal: Pain level will decrease  Description: Pain level will decrease  8/11/2021 1718 by Duyen Vasquez RN  Outcome: Ongoing  Note: Pt medicated with pain medication prn. Assessed all pain characteristics including level, type, location, frequency, and onset. Non-pharmacologic interventions offered to pt as well. Pt states pain is tolerable at this time. Will continue to monitor.     8/11/2021 0515 by Shelton Morales RN  Outcome: Ongoing  Goal: Control of acute pain  Description: Control of acute pain  8/11/2021 0515 by Shelton Morales RN  Outcome: Ongoing  Goal: Control of chronic pain  Description: Control of chronic pain  8/11/2021 0515 by Shelton Morales RN  Outcome: Ongoing     Problem: Falls - Risk of:  Goal: Absence of physical injury  Description: Absence of physical injury  8/11/2021 0515 by Shelton Morales RN  Outcome: Ongoing

## 2021-08-11 NOTE — PROGRESS NOTES
[facility]                 PATIENT NAME: Shira Lyn     TODAY'S DATE: 8/11/2021, 11:53 AM    SUBJECTIVE:  POD#1  Pt seen and examined. Afebrile, VSS. Postop leukocytosis, hemoglobin stable. S/p open incisional hernia repair with mesh. Patient admitted for overnight observation/pain control. He did receive IV pain medications throughout the night. Patient states pain is much improved today. He had a BM. Tolerating diet, no N/V. Midline dressing clean, dry, intact. AVELINO x 1 serosanguinous, mostly bloody still. OBJECTIVE:   VITALS:  BP (!) 148/90   Pulse 108   Temp 98.8 °F (37.1 °C) (Oral)   Resp 18   Ht 5' 8\" (1.727 m)   Wt 133 lb (60.3 kg)   SpO2 98%   BMI 20.22 kg/m²      INTAKE/OUTPUT:      Intake/Output Summary (Last 24 hours) at 8/11/2021 1153  Last data filed at 8/11/2021 8660  Gross per 24 hour   Intake 1838 ml   Output 85 ml   Net 1753 ml                 CONSTITUTIONAL:  awake and alert.   No acute distress  HEART:   RRR  LUNGS:   Decreased air entry at bases, no wheezing   ABDOMEN:   Abdomen soft, incision/drain site tender, non-distended  EXTREMITIES:   No pedal edema    Data:  CBC:   Lab Results   Component Value Date    WBC 20.6 08/11/2021    RBC 4.53 08/11/2021    RBC 4.16 01/21/2012    HGB 14.5 08/11/2021    HCT 43.7 08/11/2021    MCV 96.4 08/11/2021    MCH 32.0 08/11/2021    MCHC 33.1 08/11/2021    RDW 13.6 08/11/2021     08/11/2021     01/21/2012    MPV 8.0 08/11/2021     BMP:    Lab Results   Component Value Date     08/11/2021    K 5.1 08/11/2021    CL 98 08/11/2021    CO2 18 08/11/2021    BUN 14 08/11/2021    LABALBU 4.1 05/23/2017    LABALBU 4.7 01/19/2012    CREATININE 1.01 08/11/2021    CALCIUM 9.1 08/11/2021    GFRAA >60 08/11/2021    LABGLOM >60 08/11/2021    GLUCOSE 458 08/11/2021    GLUCOSE 145 01/20/2012       Radiology Review:  No new images to review      ASSESSMENT     Active Problems:    Incisional hernia, without obstruction or gangrene  Resolved Problems:    * No resolved hospital problems. *      Plan  1. Diet as tolerated  2. Bowels are moving  3. PO pain control today  4. Surgically stable  5. Anticipate discharge later today if pain remains controlled with PO medications  6. Continue medical management   7. Patient was seen and examined. Pain is significantly improved. Tolerating diet. AVELINO drain is serosanguineous. Blood work reviewed. Repeat CBC in the morning. Likely discharge to home tomorrow. Patient's wife has already picked up the prescriptions.       Electronically signed by Collette Lopez PA-C  [id number] [address]

## 2021-08-11 NOTE — PLAN OF CARE
Problem: Pain:  Goal: Pain level will decrease  Description: Pain level will decrease  Outcome: Ongoing  Goal: Control of acute pain  Description: Control of acute pain  Outcome: Ongoing  Goal: Control of chronic pain  Description: Control of chronic pain  Outcome: Ongoing     Problem: Falls - Risk of:  Goal: Will remain free from falls  Description: Will remain free from falls  Outcome: Ongoing  Note: Patient remained free from falls all throughout shift. Bed locked, side rails up X2, belongings and call light within reach.     Goal: Absence of physical injury  Description: Absence of physical injury  Outcome: Ongoing
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
## 2021-08-12 VITALS
WEIGHT: 133 LBS | OXYGEN SATURATION: 96 % | BODY MASS INDEX: 20.16 KG/M2 | HEART RATE: 60 BPM | SYSTOLIC BLOOD PRESSURE: 159 MMHG | TEMPERATURE: 98 F | HEIGHT: 68 IN | RESPIRATION RATE: 16 BRPM | DIASTOLIC BLOOD PRESSURE: 91 MMHG

## 2021-08-12 LAB
ABSOLUTE EOS #: 0 K/UL (ref 0–0.4)
ABSOLUTE IMMATURE GRANULOCYTE: ABNORMAL K/UL (ref 0–0.3)
ABSOLUTE LYMPH #: 2.5 K/UL (ref 1–4.8)
ABSOLUTE MONO #: 0.7 K/UL (ref 0.1–1.3)
ANION GAP SERPL CALCULATED.3IONS-SCNC: 5 MMOL/L (ref 9–17)
BASOPHILS # BLD: 0 % (ref 0–2)
BASOPHILS ABSOLUTE: 0 K/UL (ref 0–0.2)
BUN BLDV-MCNC: 11 MG/DL (ref 6–20)
BUN/CREAT BLD: ABNORMAL (ref 9–20)
CALCIUM SERPL-MCNC: 8.4 MG/DL (ref 8.6–10.4)
CHLORIDE BLD-SCNC: 105 MMOL/L (ref 98–107)
CO2: 27 MMOL/L (ref 20–31)
CREAT SERPL-MCNC: 0.78 MG/DL (ref 0.7–1.2)
DIFFERENTIAL TYPE: ABNORMAL
EOSINOPHILS RELATIVE PERCENT: 0 % (ref 0–4)
GFR AFRICAN AMERICAN: >60 ML/MIN
GFR NON-AFRICAN AMERICAN: >60 ML/MIN
GFR SERPL CREATININE-BSD FRML MDRD: ABNORMAL ML/MIN/{1.73_M2}
GFR SERPL CREATININE-BSD FRML MDRD: ABNORMAL ML/MIN/{1.73_M2}
GLUCOSE BLD-MCNC: 65 MG/DL (ref 70–99)
GLUCOSE BLD-MCNC: 80 MG/DL (ref 75–110)
GLUCOSE BLD-MCNC: >600 MG/DL (ref 75–110)
HCT VFR BLD CALC: 38.1 % (ref 41–53)
HEMOGLOBIN: 12.9 G/DL (ref 13.5–17.5)
IMMATURE GRANULOCYTES: ABNORMAL %
LYMPHOCYTES # BLD: 23 % (ref 24–44)
MCH RBC QN AUTO: 32.2 PG (ref 26–34)
MCHC RBC AUTO-ENTMCNC: 34 G/DL (ref 31–37)
MCV RBC AUTO: 94.7 FL (ref 80–100)
MONOCYTES # BLD: 7 % (ref 1–7)
NRBC AUTOMATED: ABNORMAL PER 100 WBC
PDW BLD-RTO: 13.4 % (ref 11.5–14.9)
PLATELET # BLD: 158 K/UL (ref 150–450)
PLATELET ESTIMATE: ABNORMAL
PMV BLD AUTO: 7.6 FL (ref 6–12)
POTASSIUM SERPL-SCNC: 3.3 MMOL/L (ref 3.7–5.3)
RBC # BLD: 4.02 M/UL (ref 4.5–5.9)
RBC # BLD: ABNORMAL 10*6/UL
SEG NEUTROPHILS: 70 % (ref 36–66)
SEGMENTED NEUTROPHILS ABSOLUTE COUNT: 7.8 K/UL (ref 1.3–9.1)
SODIUM BLD-SCNC: 137 MMOL/L (ref 135–144)
WBC # BLD: 11.1 K/UL (ref 3.5–11)
WBC # BLD: ABNORMAL 10*3/UL

## 2021-08-12 PROCEDURE — 36415 COLL VENOUS BLD VENIPUNCTURE: CPT

## 2021-08-12 PROCEDURE — 6370000000 HC RX 637 (ALT 250 FOR IP): Performed by: FAMILY MEDICINE

## 2021-08-12 PROCEDURE — 6360000002 HC RX W HCPCS: Performed by: SURGERY

## 2021-08-12 PROCEDURE — 2580000003 HC RX 258: Performed by: FAMILY MEDICINE

## 2021-08-12 PROCEDURE — 85025 COMPLETE CBC W/AUTO DIFF WBC: CPT

## 2021-08-12 PROCEDURE — 6360000002 HC RX W HCPCS: Performed by: PHYSICIAN ASSISTANT

## 2021-08-12 PROCEDURE — 2500000003 HC RX 250 WO HCPCS: Performed by: SURGERY

## 2021-08-12 PROCEDURE — 6370000000 HC RX 637 (ALT 250 FOR IP): Performed by: PHYSICIAN ASSISTANT

## 2021-08-12 PROCEDURE — 82947 ASSAY GLUCOSE BLOOD QUANT: CPT

## 2021-08-12 PROCEDURE — 80048 BASIC METABOLIC PNL TOTAL CA: CPT

## 2021-08-12 RX ORDER — POTASSIUM CHLORIDE 7.45 MG/ML
10 INJECTION INTRAVENOUS PRN
Status: DISCONTINUED | OUTPATIENT
Start: 2021-08-12 | End: 2021-08-12 | Stop reason: HOSPADM

## 2021-08-12 RX ORDER — POTASSIUM CHLORIDE 20 MEQ/1
40 TABLET, EXTENDED RELEASE ORAL PRN
Status: DISCONTINUED | OUTPATIENT
Start: 2021-08-12 | End: 2021-08-12 | Stop reason: HOSPADM

## 2021-08-12 RX ADMIN — ISOSORBIDE MONONITRATE 30 MG: 30 TABLET, EXTENDED RELEASE ORAL at 08:01

## 2021-08-12 RX ADMIN — DEXTROSE MONOHYDRATE 100 ML/HR: 50 INJECTION, SOLUTION INTRAVENOUS at 05:43

## 2021-08-12 RX ADMIN — LISINOPRIL 5 MG: 5 TABLET ORAL at 08:01

## 2021-08-12 RX ADMIN — POTASSIUM CHLORIDE 40 MEQ: 1500 TABLET, EXTENDED RELEASE ORAL at 09:38

## 2021-08-12 RX ADMIN — OXYCODONE HYDROCHLORIDE 20 MG: 20 TABLET, FILM COATED, EXTENDED RELEASE ORAL at 08:01

## 2021-08-12 RX ADMIN — PANTOPRAZOLE SODIUM 40 MG: 40 TABLET, DELAYED RELEASE ORAL at 08:01

## 2021-08-12 RX ADMIN — FAMOTIDINE 20 MG: 10 INJECTION, SOLUTION INTRAVENOUS at 08:01

## 2021-08-12 RX ADMIN — CEFAZOLIN 2000 MG: 10 INJECTION, POWDER, FOR SOLUTION INTRAVENOUS at 05:44

## 2021-08-12 RX ADMIN — VORTIOXETINE 20 MG: 20 TABLET, FILM COATED ORAL at 08:01

## 2021-08-12 RX ADMIN — OXYCODONE HYDROCHLORIDE AND ACETAMINOPHEN 2 TABLET: 5; 325 TABLET ORAL at 04:16

## 2021-08-12 RX ADMIN — ENOXAPARIN SODIUM 30 MG: 30 INJECTION SUBCUTANEOUS at 08:01

## 2021-08-12 RX ADMIN — BREXPIPRAZOLE 0.5 MG: 1 TABLET ORAL at 08:01

## 2021-08-12 ASSESSMENT — PAIN SCALES - GENERAL
PAINLEVEL_OUTOF10: 6
PAINLEVEL_OUTOF10: 10
PAINLEVEL_OUTOF10: 0
PAINLEVEL_OUTOF10: 0

## 2021-08-12 ASSESSMENT — PAIN DESCRIPTION - PAIN TYPE: TYPE: ACUTE PAIN;SURGICAL PAIN

## 2021-08-12 NOTE — PLAN OF CARE
Problem: Pain:  Goal: Pain level will decrease  Description: Pain level will decrease  8/12/2021 0545 by Kei Morales RN  Outcome: Ongoing  Note: Patient will hv pain scores of below 3/10 will continue to monitor  8/11/2021 1718 by Bang Hercules RN  Outcome: Ongoing  Note: Pt medicated with pain medication prn. Assessed all pain characteristics including level, type, location, frequency, and onset. Non-pharmacologic interventions offered to pt as well. Pt states pain is tolerable at this time. Will continue to monitor. Goal: Control of acute pain  Description: Control of acute pain  Outcome: Ongoing  Goal: Control of chronic pain  Description: Control of chronic pain  Outcome: Ongoing     Problem: Falls - Risk of:  Goal: Will remain free from falls  Description: Will remain free from falls  8/12/2021 0545 by Kei Morales RN  Outcome: Ongoing  8/11/2021 1718 by Bang Hercules RN  Outcome: Met This Shift  Note: Pt assessed as a fall risk this shift. Remains free from falls and accidental injury at this time. Fall precautions in place, including falling star sign and fall risk band on pt. Floor free from obstacles, and bed is locked and in lowest position. Adequate lighting provided. Pt encouraged to call before getting OOB for any need. Bed alarm activated. Will continue to monitor needs during hourly rounding, and reinforce education on use of call light.    Goal: Absence of physical injury  Description: Absence of physical injury  Outcome: Ongoing

## 2021-08-12 NOTE — PROGRESS NOTES
[facility]                 PATIENT NAME: Elaine Allen     TODAY'S DATE: 8/12/2021, 10:30 AM    SUBJECTIVE:  POD#2  Pt seen and examined. Afebrile, VSS. Postop leukocytosis much improved, hemoglobin stable. Patient is feeling well. Pain controlled with PO medications. Bowels are moving. Tolerating diet, no N/V. Incision clean, dry, intact. AVELINO x 1 serosanguinous. OBJECTIVE:   VITALS:  BP (!) 159/91   Pulse 60   Temp 98 °F (36.7 °C) (Axillary)   Resp 16   Ht 5' 8\" (1.727 m)   Wt 133 lb (60.3 kg)   SpO2 96%   BMI 20.22 kg/m²      INTAKE/OUTPUT:      Intake/Output Summary (Last 24 hours) at 8/12/2021 1030  Last data filed at 8/12/2021 0546  Gross per 24 hour   Intake --   Output 90 ml   Net -90 ml                 CONSTITUTIONAL:  awake and alert. No acute distress  HEART:   RRR  LUNGS:   Decreased air entry at bases, no wheezing   ABDOMEN:   Abdomen soft, non-tender, non-distended  EXTREMITIES:   No pedal edema    Data:  CBC:   Lab Results   Component Value Date    WBC 11.1 08/12/2021    RBC 4.02 08/12/2021    RBC 4.16 01/21/2012    HGB 12.9 08/12/2021    HCT 38.1 08/12/2021    MCV 94.7 08/12/2021    MCH 32.2 08/12/2021    MCHC 34.0 08/12/2021    RDW 13.4 08/12/2021     08/12/2021     01/21/2012    MPV 7.6 08/12/2021     BMP:    Lab Results   Component Value Date     08/12/2021    K 3.3 08/12/2021     08/12/2021    CO2 27 08/12/2021    BUN 11 08/12/2021    LABALBU 4.1 05/23/2017    LABALBU 4.7 01/19/2012    CREATININE 0.78 08/12/2021    CALCIUM 8.4 08/12/2021    GFRAA >60 08/12/2021    LABGLOM >60 08/12/2021    GLUCOSE 65 08/12/2021    GLUCOSE 145 01/20/2012       Radiology Review:  No new images to review      ASSESSMENT     Active Problems:    Incisional hernia, without obstruction or gangrene  Resolved Problems:    * No resolved hospital problems. *      Plan  1. Diet as tolerated  2. Replace potassium per scale  3.  Patient is surgically stable for discharge after potassium is replaced  4. Discharge instructions discussed at length   5. Prescriptions called in to pharmacy and already picked up by family   10.  Patient to follow up in office as outpatient       Electronically signed by Jefry Arriaga PA-C  [id number] [address]

## 2021-08-13 ENCOUNTER — CARE COORDINATION (OUTPATIENT)
Dept: CASE MANAGEMENT | Age: 51
End: 2021-08-13

## 2021-08-13 DIAGNOSIS — K43.2 INCISIONAL HERNIA, WITHOUT OBSTRUCTION OR GANGRENE: Primary | ICD-10-CM

## 2021-08-13 NOTE — CARE COORDINATION
Re[facility] Transitions Initial Follow Up Call    Call within 2 business days of discharge: Yes    Patient: Rima Tapia Patient : 1970   MRN: [id number]  Reason for Admission: ncisional hernia, without obstruction or gangrene  Discharge Date: 21 RARS: Readmission Risk Score: 13      Last Discharge [facility]       Complaint Diagnosis Description Type Department Provider    8/10/21  Incisional hernia, without obstruction or gangrene Admission (Discharged) Brooklyn Kumar MD           Spoke with: Dionna Erickson introduced to the role of CTN. Patient states he feels like \"crap\" he has a pain level of 7 on a 0-10 pain scale, patient taking Vicodin as prescribed. He states bandage still on incision site he has not looked at that yet. Patient is eating and drinking well. Denies chest pain SOB, fever or chills. Normal bowel and bladder elimination. Patient states he has PCP f/u on the  and See's the surgeon on the . Medications reviewed by patient and taking as directed. No concerns noted at present time. Facility: [facility]    Non-face-to-face services provided:  Obtained and reviewed discharge summary and/or continuity of care documents  Transitions of Care Initial Call    Was this an external facility discharge? No     Challenges to be reviewed by the provider   Additional needs identified to be addressed with provider: No  none             Method of communication with provider : none      Advance Care Planning:   Does patient have an Advance Directive: reviewed and current, reviewed and needs to be updated, not on file; education provided, patient declined education, decision maker updated and referral to internal ACP facilitator. Was this a readmission?  Yes  Patient stated reason for admission: hernia  Patients top risk factors for readmission: depression, ineffective coping, level of motivation and polypharmacy    Care Transition Nurse (CTN) contacted the patient by telephone to perform post hospital discharge assessment. Verified name and  with patient as identifiers. Provided introduction to self, and explanation of the CTN role. CTN reviewed discharge instructions, medical action plan and red flags with patient who verbalized understanding. Patient given an opportunity to ask questions and does not have any further questions or concerns at this time. Were discharge instructions available to patient? Yes. Reviewed appropriate site of care based on symptoms and resources available to patient including: PCP, Specialist, Home health and When to call 911. The patient agrees to contact the PCP office for questions related to their healthcare. Medication reconciliation was performed with patient, who verbalizes understanding of administration of home medications. Advised obtaining a 90-day supply of all daily and as-needed medications. Covid Risk Education     Educated patient about risk for severe COVID-19 due to risk factors according to CDC guidelines. LPN CC reviewed discharge instructions, medical action plan and red flag symptoms with the patient who verbalized understanding. Discussed COVID vaccination status: No. Education provided on COVID-19 vaccination as appropriate. Discussed exposure protocols and quarantine with CDC Guidelines. Patient was given an opportunity to verbalize any questions and concerns and agrees to contact LPN CC or health care provider for questions related to their healthcare. Reviewed and educated patient on any new and changed medications related to discharge diagnosis. Was patient discharged with a pulse oximeter? No Discussed and confirmed pulse oximeter discharge instructions and when to notify provider or seek emergency care. LPN CC provided contact information. Plan for follow-up call in 3-5 days based on severity of symptoms and risk factors.   Plan for next call: symptom management-pain and medication management      Care Transitions 24 Hour Call    Schedule Follow Up Appointment with PCP: Declined  Do you have any ongoing symptoms?: Yes  Patient-reported symptoms: Pain  Interventions for patient-reported symptoms: Other  Do you have a copy of your discharge instructions?: Yes  Do you have all of your prescriptions and are they filled?: Yes  Have you been contacted by a [id number] [facility] Pharmacist?: No  Have you scheduled your follow up appointment?: Yes  How are you going to get to your appointment?: Car - family or friend to transport  Were you discharged with any Home Care or Post Acute Services: No  Do you have support at home?: Partner/Spouse/SO  Do you feel like you have everything you need to keep you well at home?: Yes  Are you an active caregiver in your home?: No  Care Transitions Interventions   Home Care Waiver: [facility]       Specialty Service Referral: Declined           Follow Up  Future Appointments   Date Time Provider Alfonso Marques   8/18/2021  2:20 PM Nuha Sheppard [facility]   8/24/2021 11:30 AM Romana Lederer, MD [facility]   8/30/2021  4:00 PM [facility] [place] [address]   8/30/2021  4:00 PM MD Gareth Valencia, N

## 2021-08-16 ENCOUNTER — TELEPHONE (OUTPATIENT)
Dept: PRIMARY CARE CLINIC | Age: 51
End: 2021-08-16

## 2021-08-16 DIAGNOSIS — M50.30 DEGENERATIVE CERVICAL DISC: ICD-10-CM

## 2021-08-16 RX ORDER — OXYCODONE AND ACETAMINOPHEN 10; 325 MG/1; MG/1
1 TABLET ORAL EVERY 4 HOURS PRN
Qty: 180 TABLET | Refills: 0 | Status: SHIPPED | OUTPATIENT
Start: 2021-08-16 | End: 2021-09-16 | Stop reason: SDUPTHER

## 2021-08-16 RX ORDER — FLUCONAZOLE 100 MG/1
100 TABLET ORAL DAILY
Qty: 10 TABLET | Refills: 0 | Status: SHIPPED | OUTPATIENT
Start: 2021-08-16 | End: 2021-08-26

## 2021-08-16 NOTE — TELEPHONE ENCOUNTER
[facility] Transitions Initial Follow Up Call    Outreach made within 2 business days of discharge: Yes    Patient: Bernie Sellers Patient : 1970   MRN: [id number]  Reason for Admission: There are no discharge diagnoses documented for the most recent discharge. Discharge Date: 21       Spoke with: Vivi Tapia    Discharge department/facility: [place] [address] Interactive Patient Contact:  Was patient able to fill all prescriptions: Yes  Was patient instructed to bring all medications to the follow-up visit: Yes  Is patient taking all medications as directed in the discharge summary?  Yes  Does patient understand their discharge instructions: Yes  Does patient have questions or concerns that need addressed prior to 7-14 day follow up office visit: no    Scheduled appointment with PCP within 7-14 days    Follow Up  Future Appointments   Date Time Provider Alfonso Marques   2021 11:30 AM Romana Lederer, MD [facility]   2021  4:00 PM [address] [place] [address] [address]   2021  4:00 PM Gardenia Waters MD [facility]   2021  1:40 PM DO Shana Gresham Neuro [facility] [place]

## 2021-08-16 NOTE — TELEPHONE ENCOUNTER
Pt thinks that he is getting thrush and asking if you would send in some Fluconazole to [facility]. [address] [place] [address] Rd listed.

## 2021-08-16 NOTE — TELEPHONE ENCOUNTER
Patient is asking about this again, I advised him that it was pended to Dr. Leslie Upton. Pt states that Dr. Stephani Negrete prescribed him Norco but its not helping so he took his percocet instead.

## 2021-08-18 ENCOUNTER — CARE COORDINATION (OUTPATIENT)
Dept: CASE MANAGEMENT | Age: 51
End: 2021-08-18

## 2021-08-18 NOTE — CARE COORDINATION
Re[facility] Transitions Follow Up Call    2021    Patient: Mary Paz  Patient : 1970   MRN: [id number]  Reason for Admission:  Hernia  Discharge Date: 21 RARS: Readmission Risk Score: 13         Spoke with: Raymon Hammond he states he is doing well his pain is till there but it is getting less . He denies chest pain, SOB, fever, chills. Eating and drinking fine and has FU/ with PCP next week  Care Transitions Follow Up Call    Needs to be reviewed by the provider   Additional needs identified to be addressed with provider: No  none             Method of communication with provider : none      Care Transition Nurse (CTN) contacted the patient by telephone to follow up after admission on 8/10/21. Verified name and  with patient as identifiers. Addressed changes since last contact: pain is more managable  Discussed follow-up appointments. If no appointment was previously scheduled, appointment scheduling offered: Yes. Is follow up appointment scheduled within 7 days of discharge? Yes. Advance Care Planning:   Does patient have an Advance Directive: reviewed and current, reviewed and needs to be updated, not on file; education provided, patient declined education, decision maker updated and referral to internal ACP facilitator. CTN reviewed discharge instructions, medical action plan and red flags with patient and discussed any barriers to care and/or understanding of plan of care after discharge. Discussed appropriate site of care based on symptoms and resources available to patient including: PCP and Specialist. The patient agrees to contact the PCP office for questions related to their healthcare. Patients top risk factors for readmission: ineffective coping and level of motivation  Interventions to address risk factors: Obtained and reviewed discharge summary and/or continuity of care documents          CTN provided contact information for future needs.  Plan for follow-up call in 7-10 days based on severity of symptoms and risk factors. Plan for next call: self management-pain decreasing        Care Transitions Subsequent and Final Call    Subsequent and Final Calls  Do you have any ongoing symptoms?: Yes  Onset of Patient-reported symptoms: Today  Patient-reported symptoms: Abdominal Pain  Interventions for patient-reported symptoms: Other  Have your medications changed?: No  Do you have any questions related to your medications?: No  Do you currently have any active services?: No  Do you have any needs or concerns that I can assist you with?: No  Identified Barriers: Lack of Education  Care Transitions Interventions   Home Care Waiver: Declined       Specialty Service Referral: Declined    Other Interventions:            Follow Up  Future Appointments   Date Time Provider Alfonso Marques   8/24/2021 11:30 AM [facility]   8/30/2021  4:00 PM [facility]  [facility]   8/30/2021  4:00 PM MD [facility]   9/27/2021  1:40 PM DO Shana Villanueva Neuro Ivette Diaz LPN

## 2021-08-19 NOTE — PROGRESS NOTES
Physician Progress Note      PATIENT:               Marita Arreguin  CSN #:                  [id number]  :                       1970  ADMIT DATE:       8/10/2021 5:17 AM  DISCH DATE:        2021 11:46 AM  RESPONDING  PROVIDER #:        Becky Ohara MD          QUERY TEXT:    Type I diabetic patient with insulin pump admitted for hernia repair, noted to   have blood sugar 400's to over 600 with CO2 of 18 and anion gap of 23 on   . If possible, please document in progress notes and discharge summary if   you were evaluating and/or treating any of the following: The medical record reflects the following:  Risk Factors: DM type 1 with gastroparesis; Per H & P and anesthesia notes   patient did not wear insulin pump as he was informed it would be in the way of   surgery;  Clinical Indicators:  0600 blood glucose 458 with CO2 18 and anion gap 23;   blood sugar 422 @ 06:22--pt given 12 units Humalog SC per sliding scale,   blood sugar 446 @ 09:19, 537 @ 11:21--18 units of Humalog SC given @ 12:43,   blood glucose >600 @ 13:52, patient began using his insulin pump @ 14:30,   blood glucose 437 @ 16:32--given 12 u Humalog per sliding scale, blood glucose   214 @ 2006  Treatment: more frequent blood sugar checks, additional insulin coverage as   above, nursing calls to providers for orders regarding same;  Options provided:  -- DKA in DM1  -- DM 1 with hyperglycemia  -- Other - I will add my own diagnosis  -- Disagree - Not applicable / Not valid  -- Disagree - Clinically unable to determine / Unknown  -- Refer to Clinical Documentation Reviewer    PROVIDER RESPONSE TEXT:    DM 1 with hyperglycemia.     Query created by: Mateusz Grullon on 2021 5:49 PM      Electronically signed by:  Becky Ohara MD 2021 7:44 AM

## 2021-08-20 ENCOUNTER — APPOINTMENT (OUTPATIENT)
Dept: CT IMAGING | Age: 51
End: 2021-08-20
Payer: COMMERCIAL

## 2021-08-20 ENCOUNTER — HOSPITAL ENCOUNTER (EMERGENCY)
Age: 51
Discharge: HOME | End: 2021-08-20
Attending: EMERGENCY MEDICINE
Payer: COMMERCIAL

## 2021-08-20 VITALS
DIASTOLIC BLOOD PRESSURE: 106 MMHG | HEIGHT: 68 IN | WEIGHT: 130 LBS | OXYGEN SATURATION: 98 % | TEMPERATURE: 97.9 F | SYSTOLIC BLOOD PRESSURE: 167 MMHG | HEART RATE: 68 BPM | BODY MASS INDEX: 19.7 KG/M2 | RESPIRATION RATE: 14 BRPM

## 2021-08-20 DIAGNOSIS — G89.18 POST-OP PAIN: Primary | ICD-10-CM

## 2021-08-20 LAB
ABSOLUTE EOS #: 0.1 K/UL (ref 0–0.4)
ABSOLUTE IMMATURE GRANULOCYTE: ABNORMAL K/UL (ref 0–0.3)
ABSOLUTE LYMPH #: 1.5 K/UL (ref 1–4.8)
ABSOLUTE MONO #: 0.5 K/UL (ref 0.1–1.3)
ALBUMIN SERPL-MCNC: 3.8 G/DL (ref 3.5–5.2)
ALBUMIN/GLOBULIN RATIO: ABNORMAL (ref 1–2.5)
ALP BLD-CCNC: 155 U/L (ref 40–129)
ALT SERPL-CCNC: 14 U/L (ref 5–41)
ANION GAP SERPL CALCULATED.3IONS-SCNC: 8 MMOL/L (ref 9–17)
AST SERPL-CCNC: 18 U/L
BASOPHILS # BLD: 1 % (ref 0–2)
BASOPHILS ABSOLUTE: 0.1 K/UL (ref 0–0.2)
BILIRUB SERPL-MCNC: 0.21 MG/DL (ref 0.3–1.2)
BILIRUBIN URINE: NEGATIVE
BUN BLDV-MCNC: 9 MG/DL (ref 6–20)
BUN/CREAT BLD: ABNORMAL (ref 9–20)
CALCIUM SERPL-MCNC: 8.8 MG/DL (ref 8.6–10.4)
CHLORIDE BLD-SCNC: 100 MMOL/L (ref 98–107)
CO2: 27 MMOL/L (ref 20–31)
COLOR: YELLOW
COMMENT UA: ABNORMAL
CREAT SERPL-MCNC: 0.57 MG/DL (ref 0.7–1.2)
DIFFERENTIAL TYPE: ABNORMAL
EOSINOPHILS RELATIVE PERCENT: 1 % (ref 0–4)
GFR AFRICAN AMERICAN: >60 ML/MIN
GFR NON-AFRICAN AMERICAN: >60 ML/MIN
GFR SERPL CREATININE-BSD FRML MDRD: ABNORMAL ML/MIN/{1.73_M2}
GFR SERPL CREATININE-BSD FRML MDRD: ABNORMAL ML/MIN/{1.73_M2}
GLUCOSE BLD-MCNC: 285 MG/DL (ref 70–99)
GLUCOSE URINE: ABNORMAL
HCT VFR BLD CALC: 43.9 % (ref 41–53)
HEMOGLOBIN: 15.1 G/DL (ref 13.5–17.5)
IMMATURE GRANULOCYTES: ABNORMAL %
KETONES, URINE: NEGATIVE
LACTIC ACID: 1.3 MMOL/L (ref 0.5–2.2)
LEUKOCYTE ESTERASE, URINE: NEGATIVE
LIPASE: 10 U/L (ref 13–60)
LYMPHOCYTES # BLD: 18 % (ref 24–44)
MCH RBC QN AUTO: 32.5 PG (ref 26–34)
MCHC RBC AUTO-ENTMCNC: 34.3 G/DL (ref 31–37)
MCV RBC AUTO: 94.7 FL (ref 80–100)
MONOCYTES # BLD: 7 % (ref 1–7)
NITRITE, URINE: NEGATIVE
NRBC AUTOMATED: ABNORMAL PER 100 WBC
PDW BLD-RTO: 13.6 % (ref 11.5–14.9)
PH UA: 6 (ref 5–8)
PLATELET # BLD: 260 K/UL (ref 150–450)
PLATELET ESTIMATE: ABNORMAL
PMV BLD AUTO: 6.9 FL (ref 6–12)
POTASSIUM SERPL-SCNC: 4.1 MMOL/L (ref 3.7–5.3)
PROTEIN UA: NEGATIVE
RBC # BLD: 4.64 M/UL (ref 4.5–5.9)
RBC # BLD: ABNORMAL 10*6/UL
SEG NEUTROPHILS: 73 % (ref 36–66)
SEGMENTED NEUTROPHILS ABSOLUTE COUNT: 5.8 K/UL (ref 1.3–9.1)
SODIUM BLD-SCNC: 135 MMOL/L (ref 135–144)
SPECIFIC GRAVITY UA: 1.04 (ref 1–1.03)
TOTAL PROTEIN: 6.4 G/DL (ref 6.4–8.3)
TURBIDITY: CLEAR
URINE HGB: NEGATIVE
UROBILINOGEN, URINE: NORMAL
WBC # BLD: 8 K/UL (ref 3.5–11)
WBC # BLD: ABNORMAL 10*3/UL

## 2021-08-20 PROCEDURE — 96374 THER/PROPH/DIAG INJ IV PUSH: CPT

## 2021-08-20 PROCEDURE — 2580000003 HC RX 258: Performed by: EMERGENCY MEDICINE

## 2021-08-20 PROCEDURE — 81003 URINALYSIS AUTO W/O SCOPE: CPT

## 2021-08-20 PROCEDURE — 80053 COMPREHEN METABOLIC PANEL: CPT

## 2021-08-20 PROCEDURE — 74177 CT ABD & PELVIS W/CONTRAST: CPT

## 2021-08-20 PROCEDURE — 83605 ASSAY OF LACTIC ACID: CPT

## 2021-08-20 PROCEDURE — 83690 ASSAY OF LIPASE: CPT

## 2021-08-20 PROCEDURE — 6360000004 HC RX CONTRAST MEDICATION: Performed by: EMERGENCY MEDICINE

## 2021-08-20 PROCEDURE — 36415 COLL VENOUS BLD VENIPUNCTURE: CPT

## 2021-08-20 PROCEDURE — 6360000002 HC RX W HCPCS: Performed by: EMERGENCY MEDICINE

## 2021-08-20 PROCEDURE — 99283 EMERGENCY DEPT VISIT LOW MDM: CPT

## 2021-08-20 PROCEDURE — 96375 TX/PRO/DX INJ NEW DRUG ADDON: CPT

## 2021-08-20 PROCEDURE — 85025 COMPLETE CBC W/AUTO DIFF WBC: CPT

## 2021-08-20 RX ORDER — ONDANSETRON 2 MG/ML
4 INJECTION INTRAMUSCULAR; INTRAVENOUS ONCE
Status: COMPLETED | OUTPATIENT
Start: 2021-08-20 | End: 2021-08-20

## 2021-08-20 RX ORDER — 0.9 % SODIUM CHLORIDE 0.9 %
80 INTRAVENOUS SOLUTION INTRAVENOUS ONCE
Status: COMPLETED | OUTPATIENT
Start: 2021-08-20 | End: 2021-08-20

## 2021-08-20 RX ORDER — MORPHINE SULFATE 4 MG/ML
4 INJECTION, SOLUTION INTRAMUSCULAR; INTRAVENOUS ONCE
Status: COMPLETED | OUTPATIENT
Start: 2021-08-20 | End: 2021-08-20

## 2021-08-20 RX ORDER — 0.9 % SODIUM CHLORIDE 0.9 %
1000 INTRAVENOUS SOLUTION INTRAVENOUS ONCE
Status: COMPLETED | OUTPATIENT
Start: 2021-08-20 | End: 2021-08-20

## 2021-08-20 RX ORDER — SODIUM CHLORIDE 0.9 % (FLUSH) 0.9 %
10 SYRINGE (ML) INJECTION PRN
Status: DISCONTINUED | OUTPATIENT
Start: 2021-08-20 | End: 2021-08-20 | Stop reason: HOSPADM

## 2021-08-20 RX ORDER — KETOROLAC TROMETHAMINE 30 MG/ML
30 INJECTION, SOLUTION INTRAMUSCULAR; INTRAVENOUS ONCE
Status: COMPLETED | OUTPATIENT
Start: 2021-08-20 | End: 2021-08-20

## 2021-08-20 RX ADMIN — KETOROLAC TROMETHAMINE 30 MG: 30 INJECTION, SOLUTION INTRAMUSCULAR; INTRAVENOUS at 15:06

## 2021-08-20 RX ADMIN — SODIUM CHLORIDE 1000 ML: 9 INJECTION, SOLUTION INTRAVENOUS at 13:23

## 2021-08-20 RX ADMIN — SODIUM CHLORIDE 80 ML: 9 INJECTION, SOLUTION INTRAVENOUS at 13:55

## 2021-08-20 RX ADMIN — SODIUM CHLORIDE, PRESERVATIVE FREE 10 ML: 5 INJECTION INTRAVENOUS at 13:55

## 2021-08-20 RX ADMIN — ONDANSETRON 4 MG: 2 INJECTION INTRAMUSCULAR; INTRAVENOUS at 13:23

## 2021-08-20 RX ADMIN — IOPAMIDOL 75 ML: 755 INJECTION, SOLUTION INTRAVENOUS at 13:55

## 2021-08-20 RX ADMIN — MORPHINE SULFATE 4 MG: 4 INJECTION INTRAVENOUS at 13:23

## 2021-08-20 ASSESSMENT — ENCOUNTER SYMPTOMS
ABDOMINAL PAIN: 1
EYE PAIN: 0
COLOR CHANGE: 0
SHORTNESS OF BREATH: 0
BACK PAIN: 0

## 2021-08-20 ASSESSMENT — PAIN DESCRIPTION - LOCATION: LOCATION: ABDOMEN

## 2021-08-20 ASSESSMENT — PAIN DESCRIPTION - PAIN TYPE: TYPE: ACUTE PAIN

## 2021-08-20 ASSESSMENT — PAIN SCALES - GENERAL
PAINLEVEL_OUTOF10: 8
PAINLEVEL_OUTOF10: 10

## 2021-08-20 ASSESSMENT — PAIN DESCRIPTION - FREQUENCY: FREQUENCY: CONTINUOUS

## 2021-08-20 ASSESSMENT — PAIN DESCRIPTION - ORIENTATION: ORIENTATION: RIGHT

## 2021-08-20 NOTE — ED PROVIDER NOTES
EMERGENCY DEPARTMENT ENCOUNTER    Pt Name: Luis Fernando Stroud  MRN: [id number]  [place] 1970  Date of evaluation: 8/20/21  CHIEF COMPLAINT       Chief Complaint   Patient presents with    Post-op Problem     right side of abd     HISTORY OF PRESENT ILLNESS   59-year-old male presents for complaint of abdominal pain. Patient had recent incisional hernia repair approximately 10 days ago, states since discharge from the hospital pain had been improving up until yesterday. Patient states then developed right-sided abdominal pain, describes it as sharp and stabbing, denies anything making it better or worse, denies any associated nausea or vomiting, reports has been having normal bowel movements, denies any difficulty urinating or pain with urination, denies any associated chest pain shortness of breath, denies any testicular pain. Patient reports he has been taking his regular pain medication without improvement of his pain, reports has been on his antibiotics as well. The history is provided by the patient. REVIEW OF SYSTEMS     Review of Systems   Constitutional: Negative for chills and fever. HENT: Negative for congestion and ear pain. Eyes: Negative for pain. Respiratory: Negative for shortness of breath. Cardiovascular: Negative for chest pain, palpitations and leg swelling. Gastrointestinal: Positive for abdominal pain. Genitourinary: Negative for dysuria and flank pain. Musculoskeletal: Negative for back pain. Skin: Negative for color change. Neurological: Negative for numbness and headaches. Psychiatric/Behavioral: Negative for confusion. All other systems reviewed and are negative.     PASTMEDICAL HISTORY     Past Medical History:   Diagnosis Date    Anxiety     Arthritis     CAD (coronary artery disease)     Calcaneal apophysitis     Corns     Depression     Diabetes type I (HCC)     Gastroparesis     GERD (gastroesophageal reflux disease)     Headache(784.0)  Hearing loss     Hyperglycemia     Hyperlipidemia     Hypertension     [facility]uss[facility]ception [facility]     MI (myocardial infarction) ([facility] [address] [facility] jan 2014    Neuropathy     Osteoarthritis     Panic attacks     Temporomandibular joint disorder      Past Problem List  Patient Active Problem List   Diagnosis Code    Ulnar nerve compression at multiple levels G56.20    Degenerative cervical disc M50.30    Arthropathy of cervical facet joint M47.812    Neural foraminal stenosis of cervical spine M48.02    Cervical spondylosis M47.812    Chronic coronary artery disease I25.10    Depression with anxiety F41.8    Diabetic peripheral neuropathy (HCC) E11.42    Essential hypertension I10    Gastroparesis K31.84    History of long-term treatment with high-risk medication Z79.899    Hyperlipidemia E78.5    Impingement syndrome of left shoulder M75.42    Injury of right ulnar nerve S54. 01XA    Migraine headache G43.909    Type 1 diabetes mellitus with hyperglycemia ([facility] E10.65    Current every day smoker F17.200    Perforation of right tympanic membrane H72.91    Pancreatic insufficiency K86.89    Major depressive disorder, recurrent episode with anxious distress ([facility]) F33.9    Unspecified inflammatory spondylopathy, cervical region ([facility] [address] M46.92    Incisional hernia, without obstruction or gangrene K43.2     SURGICAL HISTORY       Past Surgical History:   Procedure Laterality Date    ABDOMEN SURGERY      insulin pump    ABDOMINAL EXPLORATION SURGERY  05/28/2016    bowel resection Rt. colon &terminal ileum, intussuseption.      CARDIAC CATHETERIZATION  2014    CARPAL TUNNEL RELEASE      bilateral    COLONOSCOPY  05/23/2017    poor prep; diverticulosis    COLONOSCOPY N/A 7/10/2018    COLONOSCOPY POLYPECTOMY HOT BIOPSY performed by Barbara Abdullahi MD at [facility] N/A 8/9/2021    COLONOSCOPY DIAGNOSTIC performed by Barbara Abdullahi MD at [address]      right    HERNIA REPAIR      inguinal    OH COLON CA SCRN NOT HI RSK IND N/A 5/25/2017    COLONOSCOPY performed by Crissy Smith MD at [address] ESOPHAGOGASTRODUODENOSCOPY TRANSORAL DIAGNOSTIC N/A 5/24/2017    EGD ESOPHAGOGASTRODUODENOSCOPY performed by Crissy Smith MD at [facility] Bilateral     SHOULDER SURGERY      fracture     TONSILLECTOMY      x2    TOOTH EXTRACTION      x4    ULNAR TUNNEL RELEASE      UPPER GASTROINTESTINAL ENDOSCOPY  05/23/2017    mild gastritis    UPPER GASTROINTESTINAL ENDOSCOPY N/A 7/10/2018    EGD BIOPSY performed by Januayr Montiel MD at [facility] N/A 8/10/2021    HERNIA INCISIONAL REPAIR OPEN [address] performed by January Montiel MD at [address] EXTRACTION      x4     [address]       Discharge Medication List as of 8/20/2021  3:12 PM      CONTINUE these medications which have NOT CHANGED    Details   oxyCODONE-acetaminophen (PERCOCET)  MG per tablet Take 1 tablet by mouth every 4 hours as needed for Pain for up to 30 days. No more than 6 per day, Disp-180 tablet, R-0Normal      fluconazole (DIFLUCAN) 100 MG tablet Take 1 tablet by mouth daily for 10 days, Disp-10 tablet, R-0Normal      brexpiprazole (REXULTI) 0.5 MG TABS tablet Take 0.5 mg by mouth dailyHistorical Med      cephALEXin (KEFLEX) 500 MG capsule 500 mgTake three times daily, Disp-21 capsule, R-0Normal      ondansetron (ZOFRAN) 4 MG tablet Take every six hours as needed, Disp-20 tablet, R-0Normal      !! naloxone 4 MG/0.1ML LIQD nasal spray 1 spray by Nasal route as needed for Opioid Reversal, Disp-1 each, R-5Normal      !! naloxone 4 MG/0.1ML LIQD nasal spray 1 spray by Nasal route as needed for Opioid Reversal, Disp-1 each, R-5Normal      oxyCODONE (OXYCONTIN) 20 MG extended release tablet Take 1 tablet by mouth 2 times daily for 30 days.  Intended supply: 30 days, Disp-60 tablet, R-0Normal      propranolol (INDERAL) 20 MG tablet Take 20 mg by mouth as needed Takes for migrainesHistorical Med      b complex vitamins capsule Take 1 capsule by mouth dailyHistorical Med      VORTIoxetine HBr (TRINTELLIX) 20 MG TABS tablet Take 1 tablet by mouth daily, Disp-90 tablet, R-3Normal      pantoprazole (PROTONIX) 40 MG tablet TAKE ONE TABLET BY MOUTH TWICE DAILY, Disp-60 tablet, R-3Please consider 90 day supplies to promote better adherenceNormal      lisinopril (PRINIVIL;ZESTRIL) 30 MG tablet Take 1 tablet by mouth daily, Disp-90 tablet, R-3Normal      isosorbide mononitrate (IMDUR) 30 MG extended release tablet Take 1 tablet by mouth daily, Disp-90 tablet, R-3Normal      atorvastatin (LIPITOR) 80 MG tablet Take 1 tablet by mouth nightly, Disp-90 tablet, R-3Normal      clopidogrel (PLAVIX) 75 MG tablet Take 1 tablet by mouth daily, Disp-90 tablet, R-3Normal      CONTOUR NEXT TEST strip use four times a day, DAWHistorical Med      ondansetron (ZOFRAN-ODT) 8 MG TBDP disintegrating tablet Take 8 mg by mouth every 12 hours as neededHistorical Med      !! naloxone 4 MG/0.1ML LIQD nasal spray 1 spray by Nasal route as needed for Opioid Reversal, Disp-1 each, R-5Normal      nitroGLYCERIN (NITROSTAT) 0.4 MG SL tablet Place 1 tablet under the tongue every 5 minutes as needed for Chest pain Indications: Disease of the Arteries of the Heart Take 1 tablet every 5 minutes times three as needed for chest pain, Disp-25 tablet, R-2      Insulin Aspart (NOVOLOG SC) Inject  into the skin. Insulin pump       !! - Potential duplicate medications found. Please discuss with provider. ALLERGIES     is allergic to avelox [moxifloxacin], ciprofloxacin, levofloxacin, lyrica [pregabalin], metoclopramide, neurontin [gabapentin], other, tetanus toxoids, tramadol, and tramadol hcl. FAMILY HISTORY     He indicated that his mother is alive. He indicated that his father is alive. He indicated that his sister is alive. He indicated that his brother is alive.      SOCIAL HISTORY Social History     Tobacco Use    Smoking status: Current Every Day Smoker     Packs/day: 1.00     Years: 30.00     Pack years: 30.00    Smokeless tobacco: Never Used   Vaping Use    Vaping Use: Never used   Substance Use Topics    Alcohol use: No     Comment: previous heavy ETOH use; pt stopped around     Drug use: No     PHYSICAL EXAM     INITIAL VITALS: BP (!) 167/106   Pulse 68   Temp 97.9 °F (36.6 °C)   Resp 14   Ht 5' 8\" (1.727 m)   Wt 130 lb (59 kg)   SpO2 98%   BMI 19.77 kg/m²    Physical Exam  Vitals and nursing note reviewed. Constitutional:       Appearance: Normal appearance. HENT:      Head: Normocephalic and atraumatic. Right Ear: External ear normal.      Left Ear: External ear normal.      Nose: Nose normal.      Mouth/Throat:      Mouth: Mucous membranes are moist.   Eyes:      Pupils: Pupils are equal, round, and reactive to light. Cardiovascular:      Rate and Rhythm: Normal rate and regular rhythm. Pulses: Normal pulses. Heart sounds: Normal heart sounds. Pulmonary:      Effort: Pulmonary effort is normal.      Breath sounds: Normal breath sounds. Abdominal:      General: Abdomen is flat. A surgical scar is present. Palpations: Abdomen is soft. Tenderness: There is abdominal tenderness in the right upper quadrant and right lower quadrant. Comments: Surgical incision in place with staples, no surrounding erythema, no purulence noted, AVELINO drain in place, with minimal serosanguineous drainage   Musculoskeletal:         General: No tenderness. Normal range of motion. Cervical back: Neck supple. Skin:     General: Skin is warm and dry. Capillary Refill: Capillary refill takes less than 2 seconds. Neurological:      General: No focal deficit present. Mental Status: He is alert and oriented to person, place, and time.    Psychiatric:         Behavior: Behavior normal.         MEDICAL DECISION MAKIN-year-old male presents for complaint of abdominal pain. On initial exam patient in no acute distress, vitals are stable, will check basic labs, will check CT abdomen pelvis to rule out any signs of postop problem, will discuss with patient surgeon    Labs reviewed and unremarkable, CT was negative for acute process    Patient was reevaluated reports pain is mildly improved    Discussed with patient's surgeon Dr. Pedro Chopra, no further recommendations, would like to leave staples and AVELNIO drain in place and will see in the office next week    Discussed results with patient, discussed conversation with the surgeon, discussed continued use of his pain medication and need to finish his course of antibiotics, discussed need for follow-up with his PCP as well as a surgeon and strict return precautions, patient voiced understanding is comfortable with plan and discharge home    Patient/Guardian was informed of their diagnosis and told to follow up with PCP & General Surgery in 1-3 days. Patient demonstrates understanding and agreement with the plan. They were given the opportunity to ask questions and those questions were answered to the best of our ability with the available information. Patient/Guardian told to return to the ED for any new, worsening, changing or persistent symptoms. This dictation was prepared using Mind[employer] voice recognition software. CRITICAL CARE:       PROCEDURES:    Procedures    DIAGNOSTIC RESULTS   EKG:All EKG's are interpreted by the Emergency Department Physician who either signs or Co-signs this chart in the absence of a cardiologist.        RADIOLOGY:All plain film, CT, MRI, and formal ultrasound images (except ED bedside ultrasound) are read by the radiologist, see reports below, unless otherwisenoted in MDM or here. CT ABDOMEN PELVIS W IV CONTRAST Additional Contrast? None   Final Result   1. No acute process seen in the abdomen or pelvis.   Please note somewhat   limited assessment of the bowel due to lack of intra-abdominal fat. No   evidence of bowel obstruction. 2.  Postoperative changes in the ventral midline upper abdominal wall. No CT   findings of acute complication. 3.  Mild prominence of the common bile duct is similar to the previous study. If not done previously, correlation with lab values is recommended. If   clinically indicated, this could be further evaluated with MRCP or ERCP. LABS: All lab results were reviewed by myself, and all abnormals are listed below.   Labs Reviewed   CBC WITH AUTO DIFFERENTIAL - Abnormal; Notable for the following components:       Result Value    Seg Neutrophils 73 (*)     Lymphocytes 18 (*)     All other components within normal limits   COMPREHENSIVE METABOLIC PANEL W/ REFLEX TO MG FOR LOW K - Abnormal; Notable for the following components:    Glucose 285 (*)     CREATININE 0.57 (*)     Anion Gap 8 (*)     Alkaline Phosphatase 155 (*)     Total Bilirubin 0.21 (*)     All other components within normal limits   LIPASE - Abnormal; Notable for the following components:    Lipase 10 (*)     All other components within normal limits   URINALYSIS - Abnormal; Notable for the following components:    Glucose, Ur 3+ (*)     Specific Gravity, UA 1.044 (*)     All other components within normal limits   LACTIC ACID       EMERGENCY DEPARTMENTCOURSE:         Vitals:    Vitals:    08/20/21 1213 08/20/21 1509   BP: (!) 173/104 (!) 167/106   Pulse: 89 68   Resp: 14 14   Temp: 97.9 °F (36.6 °C)    SpO2: 98% 98%   Weight: 130 lb (59 kg)    Height: 5' 8\" (1.727 m)        The patient was given the following medications while in the emergency department:  Orders Placed This Encounter   Medications    0.9 % sodium chloride bolus    morphine sulfate (PF) injection 4 mg    ondansetron (ZOFRAN) injection 4 mg    iopamidol (ISOVUE-370) 76 % injection 75 mL    0.9 % sodium chloride bolus    sodium chloride flush 0.9 % injection 10 mL    ketorolac (TORADOL) injection 30 mg     CONSULTS:  IP CONSULT TO GENERAL SURGERY    FINAL IMPRESSION      1. Post-op pain          DISPOSITION/PLAN   DISPOSITION Decision To Discharge 08/20/2021 03:08:20 PM      PATIENT REFERRED TO:  Aneesh Gregory MD  [address]   [facility]  [phone]    Schedule an appointment as soon as possible for a visit       [facility]  [facility]  [phone]    As needed, If symptoms worsen    Naeem Dacosta MD  [address]  [phone]    Schedule an appointment as soon as possible for a visit       DISCHARGE MEDICATIONS:  Discharge Medication List as of 8/20/2021  3:12 PM        Ilya Gandhi DO  Attending Emergency Physician                  Ilya Gandhi DO  08/20/21 8628

## 2021-08-25 ENCOUNTER — CARE COORDINATION (OUTPATIENT)
Dept: CASE MANAGEMENT | Age: 51
End: 2021-08-25

## 2021-08-25 NOTE — CARE COORDINATION
[facility] Transitions Follow Up Call    2021    Patient: Elaine Allen  Patient : 1970   MRN: [id number]  Reason for Admission: Hernia  Discharge Date: 21 RARS: Readmission Risk Score: 15         Spoke with: Called to speak with patient for update with transition of care. Left HIPPA compliant voice message with contact information [phone] for a call  Back with an update. Care Transitions Subsequent and Final Call    Subsequent and Final Calls  Care Transitions Interventions  Other Interventions:            Follow Up  Future Appointments   Date Time Provider Alfonso Marques   2021  4:00 PM [facility]  [facility] [place]   2021  4:00 PM Azzie Epley, MD [facility] med/reha [facility]   9/15/2021 10:10 AM Angie Le MD [facility]   2021  1:40 PM DO Shana Navas Neuro Silvia Mcdonald LPN

## 2021-08-30 ENCOUNTER — HOSPITAL ENCOUNTER (OUTPATIENT)
Dept: NEUROLOGY | Age: 51
Discharge: HOME | End: 2021-08-30
Payer: COMMERCIAL

## 2021-08-30 DIAGNOSIS — G99.2 STENOSIS OF CERVICAL SPINE WITH MYELOPATHY (HCC): ICD-10-CM

## 2021-08-30 DIAGNOSIS — M48.02 STENOSIS OF CERVICAL SPINE WITH MYELOPATHY (HCC): ICD-10-CM

## 2021-08-30 DIAGNOSIS — E11.42 DIABETIC PERIPHERAL NEUROPATHY (HCC): ICD-10-CM

## 2021-08-30 DIAGNOSIS — M54.12 CERVICAL RADICULOPATHY: ICD-10-CM

## 2021-08-30 DIAGNOSIS — G62.9 PERIPHERAL POLYNEUROPATHY: ICD-10-CM

## 2021-08-30 PROCEDURE — 95912 NRV CNDJ TEST 11-12 STUDIES: CPT | Performed by: PHYSICAL MEDICINE & REHABILITATION

## 2021-08-30 PROCEDURE — 95886 MUSC TEST DONE W/N TEST COMP: CPT | Performed by: PHYSICAL MEDICINE & REHABILITATION

## 2021-08-30 RX ORDER — OXYCODONE HCL 20 MG/1
20 TABLET, FILM COATED, EXTENDED RELEASE ORAL 2 TIMES DAILY
Qty: 60 TABLET | Refills: 0 | Status: SHIPPED | OUTPATIENT
Start: 2021-08-30 | End: 2021-09-24 | Stop reason: SDUPTHER

## 2021-09-01 ENCOUNTER — CARE COORDINATION (OUTPATIENT)
Dept: CASE MANAGEMENT | Age: 51
End: 2021-09-01

## 2021-09-01 NOTE — CARE COORDINATION
[facility] Transitions Follow Up Call    2021    Patient: Gurpreet Garibay  Patient : 1970   MRN: [id number]  Reason for Admission:  Hernia  Discharge Date: 21 RARS: Readmission Risk Score: 13         Spoke with: Roma Conteh who states he is doing very well, he has no pain some discomfort denies any concerns and states he is good now. Care Transitions Follow Up Call    Needs to be reviewed by the provider   Additional needs identified to be addressed with provider: No  none             Method of communication with provider : none      Care Transition Nurse (CTN) contacted the patient by telephone to follow up after admission on 21. Verified name and  with patient as identifiers. Addressed changes since last contact: none  Discussed follow-up appointments. If no appointment was previously scheduled, appointment scheduling offered: Yes. Is follow up appointment scheduled within 7 days of discharge? Yes. Advance Care Planning:   Does patient have an Advance Directive: reviewed and current, reviewed and needs to be updated, not on file; education provided, patient declined education, decision maker updated and referral to internal ACP facilitator. CTN reviewed discharge instructions, medical action plan and red flags with patient and discussed any barriers to care and/or understanding of plan of care after discharge. Discussed appropriate site of care based on symptoms and resources available to patient including: PCP and Specialist. The patient agrees to contact the PCP office for questions related to their healthcare. Patients top risk factors for readmission: lack of knowledge about disease  Interventions to address risk factors: Obtained and reviewed discharge summary and/or continuity of care documents          CTN provided contact information for future needs. No further follow-up call indicated based on severity of symptoms and risk factors.             Care Transitions Subsequent and Final Call    Subsequent and Final Calls  Do you have any ongoing symptoms?: No  Have your medications changed?: No  Do you have any questions related to your medications?: No  Do you currently have any active services?: No  Do you have any needs or concerns that I can assist you with?: No  Identified Barriers: None  Care Transitions Interventions   Home Care Waiver: [facility]       Specialty Service Referral: Declined    Other Interventions:            Follow Up  Future Appointments   Date Time Provider Alfonso Marques   9/15/2021 10:10 AM [facility]   9/27/2021  1:40 PM DO Shana Veronica Neuro Ashley Preciado LPN

## 2021-09-13 ENCOUNTER — TELEPHONE (OUTPATIENT)
Dept: PRIMARY CARE CLINIC | Age: 51
End: 2021-09-13

## 2021-09-13 DIAGNOSIS — F41.8 DEPRESSION WITH ANXIETY: ICD-10-CM

## 2021-09-13 NOTE — TELEPHONE ENCOUNTER
----- Message from Karen sent at 9/10/2021  3:03 PM EDT -----  Subject: Message to Provider    QUESTIONS  Information for Provider? Wanted to let the PCP know that his mother   Damian Lobo, passed away at 1:00 this morning. Please call back and   advise if needed. ---------------------------------------------------------------------------  --------------  Khushbu RICHARDS  What is the best way for the office to contact you? OK to leave message on   voicemail  Preferred Call Back Phone Number? [phone]  ---------------------------------------------------------------------------  --------------  SCRIPT ANSWERS  Relationship to Patient?  Self

## 2021-09-14 RX ORDER — LORAZEPAM 0.5 MG/1
TABLET ORAL
Qty: 90 TABLET | Refills: 2 | Status: SHIPPED | OUTPATIENT
Start: 2021-09-14 | End: 2021-10-14

## 2021-09-15 ENCOUNTER — TELEPHONE (OUTPATIENT)
Dept: PRIMARY CARE CLINIC | Age: 51
End: 2021-09-15

## 2021-09-15 ENCOUNTER — OFFICE VISIT (OUTPATIENT)
Dept: PRIMARY CARE CLINIC | Age: 51
End: 2021-09-15
Payer: COMMERCIAL

## 2021-09-15 VITALS
WEIGHT: 127.6 LBS | SYSTOLIC BLOOD PRESSURE: 138 MMHG | OXYGEN SATURATION: 93 % | BODY MASS INDEX: 19.4 KG/M2 | DIASTOLIC BLOOD PRESSURE: 88 MMHG | HEART RATE: 103 BPM

## 2021-09-15 DIAGNOSIS — Z79.899 HIGH RISK MEDICATION USE: ICD-10-CM

## 2021-09-15 DIAGNOSIS — F11.23 OPIOID DEPENDENCE WITH WITHDRAWAL (HCC): ICD-10-CM

## 2021-09-15 DIAGNOSIS — M47.22 OSTEOARTHRITIS OF SPINE WITH RADICULOPATHY, CERVICAL REGION: Primary | ICD-10-CM

## 2021-09-15 DIAGNOSIS — M47.22 OSTEOARTHRITIS OF SPINE WITH RADICULOPATHY, CERVICAL REGION: ICD-10-CM

## 2021-09-15 LAB
ALCOHOL URINE: NORMAL
AMPHETAMINE SCREEN, URINE: NEGATIVE
BARBITURATE SCREEN, URINE: POSITIVE
BENZODIAZEPINE SCREEN, URINE: POSITIVE
BUPRENORPHINE URINE: NEGATIVE
COCAINE METABOLITE SCREEN URINE: NEGATIVE
FENTANYL SCREEN, URINE: NORMAL
GABAPENTIN SCREEN, URINE: NORMAL
MDMA URINE: NEGATIVE
METHADONE SCREEN, URINE: NEGATIVE
METHAMPHETAMINE, URINE: NEGATIVE
OPIATE SCREEN URINE: POSITIVE
OXYCODONE SCREEN URINE: POSITIVE
PHENCYCLIDINE SCREEN URINE: NORMAL
PROPOXYPHENE SCREEN, URINE: NORMAL
SYNTHETIC CANNABINOIDS(K2) SCREEN, URINE: NORMAL
THC SCREEN, URINE: POSITIVE
TRAMADOL SCREEN URINE: NORMAL
TRICYCLIC ANTIDEPRESSANTS, UR: NEGATIVE

## 2021-09-15 PROCEDURE — 4004F PT TOBACCO SCREEN RCVD TLK: CPT | Performed by: FAMILY MEDICINE

## 2021-09-15 PROCEDURE — G8420 CALC BMI NORM PARAMETERS: HCPCS | Performed by: FAMILY MEDICINE

## 2021-09-15 PROCEDURE — 99213 OFFICE O/P EST LOW 20 MIN: CPT | Performed by: FAMILY MEDICINE

## 2021-09-15 PROCEDURE — 80305 DRUG TEST PRSMV DIR OPT OBS: CPT | Performed by: FAMILY MEDICINE

## 2021-09-15 PROCEDURE — G8427 DOCREV CUR MEDS BY ELIG CLIN: HCPCS | Performed by: FAMILY MEDICINE

## 2021-09-15 PROCEDURE — 3017F COLORECTAL CA SCREEN DOC REV: CPT | Performed by: FAMILY MEDICINE

## 2021-09-15 RX ORDER — METHADONE HYDROCHLORIDE 10 MG/1
20 TABLET ORAL 4 TIMES DAILY
Qty: 40 TABLET | Refills: 0 | Status: SHIPPED | OUTPATIENT
Start: 2021-09-15 | End: 2021-09-15 | Stop reason: SDUPTHER

## 2021-09-15 RX ORDER — METHADONE HYDROCHLORIDE 10 MG/1
20 TABLET ORAL 4 TIMES DAILY
Qty: 40 TABLET | Refills: 0 | Status: SHIPPED | OUTPATIENT
Start: 2021-09-15 | End: 2021-09-20

## 2021-09-15 ASSESSMENT — PATIENT HEALTH QUESTIONNAIRE - PHQ9
SUM OF ALL RESPONSES TO PHQ QUESTIONS 1-9: 2
SUM OF ALL RESPONSES TO PHQ9 QUESTIONS 1 & 2: 2
2. FEELING DOWN, DEPRESSED OR HOPELESS: 1
1. LITTLE INTEREST OR PLEASURE IN DOING THINGS: 1

## 2021-09-15 ASSESSMENT — ENCOUNTER SYMPTOMS
WHEEZING: 0
COUGH: 0
VOMITING: 0
SHORTNESS OF BREATH: 0
EYE REDNESS: 0
NAUSEA: 0
ABDOMINAL PAIN: 0
RHINORRHEA: 0
EYE DISCHARGE: 0
DIARRHEA: 0
BACK PAIN: 1
SORE THROAT: 0

## 2021-09-15 NOTE — TELEPHONE ENCOUNTER
In his case the methadone is for pain as well as the withdrawal.  He has severe cervical disc disease it is not operable.   There is no other option at this point

## 2021-09-15 NOTE — PROGRESS NOTES
[address] [place] [address] PRIMARY CARE  [address] [place] [id number]  Dept: [phone]    Nick Hernandez is a 48 y.o. male Established patient, who presents today for his medical conditions/complaints as noted below. Chief Complaint   Patient presents with    Annual Exam       HPI:     HPI  Pt has appointment October 6th with endocrinology. No change in medication. No fever or chills. Mother just passed from \"twisted gut\". No melena or hematochezia. Pt states pain management and neurosurgery stated they have nothing to offer, dismissed patient. Patient had used some of his Vicodin from his hernia surgery. Denies any nausea or vomiting. Patient states going through withdrawal symptoms. Patient had EMG done. Results were reviewed. Has follow-up appointment with neurosurgery.       Reviewed prior notes None  Reviewed previous Labs and Imaging    LDL Calculated (mg/dL)   Date Value   04/16/2019 97   05/21/2018 66   01/05/2018 66       (goal LDL is <100)   AST (U/L)   Date Value   08/20/2021 18     ALT (U/L)   Date Value   08/20/2021 14     BUN (mg/dL)   Date Value   08/20/2021 9     Hemoglobin A1C (%)   Date Value   04/05/2021 9.7     TSH (mIU/L)   Date Value   07/27/2018 0.43     BP Readings from Last 3 Encounters:   09/15/21 (!) 140/94   08/20/21 (!) 167/106   08/12/21 (!) 159/91          (goal 120/80)    Past Medical History:   Diagnosis Date    Anxiety     Arthritis     CAD (coronary artery disease)     Calcaneal apophysitis     Corns     Depression     Diabetes type I ([address]     Gastroparesis     GERD (gastroesophageal reflux disease)     Headache(784.0)     Hearing loss     Hyperglycemia     Hyperlipidemia     Hypertension     Intussusception ([address]     MI (myocardial infarction) ([address] jan 2014    Neuropathy     Osteoarthritis     Panic attacks     Temporomandibular joint disorder       Past Surgical History:   Procedure Laterality Date    ABDOMEN SURGERY      insulin pump    ABDOMINAL EXPLORATION SURGERY  05/28/2016    bowel resection Rt. colon &terminal ileum, intussuseption.      CARDIAC CATHETERIZATION  2014    CARPAL TUNNEL RELEASE      bilateral    COLONOSCOPY  05/23/2017    poor prep; diverticulosis    COLONOSCOPY N/A 7/10/2018    COLONOSCOPY POLYPECTOMY HOT BIOPSY performed by Cedrick Luu MD at [address] COLONOSCOPY N/A 8/9/2021    COLONOSCOPY DIAGNOSTIC performed by Cedrick Luu MD at [address]      right    HERNIA REPAIR      inguinal    [place] COLON CA SCRN NOT  [address] IND N/A 5/25/2017    COLONOSCOPY performed by Elham Chavis MD at [address] ESOPHAGOGASTRODUODENOSCOPY TRANSORAL DIAGNOSTIC N/A 5/24/2017    EGD ESOPHAGOGASTRODUODENOSCOPY performed by Elham Chavis MD at [address] Bilateral    [place]      fracture     TONSILLECTOMY      x2    TOOTH EXTRACTION      x4    ULNAR TUNNEL RELEASE      UPPER GASTROINTESTINAL ENDOSCOPY  05/23/2017    mild gastritis    UPPER GASTROINTESTINAL ENDOSCOPY N/A 7/10/2018    EGD BIOPSY performed by Cedrick Luu MD at [place] [address] N/A 8/10/2021    HERNIA INCISIONAL REPAIR OPEN W/MESH performed by Cedrick Luu MD at [address] EXTRACTION      x4       Family History   Problem Relation Age of Onset    Diabetes Mother     High Blood Pressure Mother     Heart Disease Mother     Migraines Mother     Other Mother         diabetic neuropathy    High Blood Pressure Father        Social History     Tobacco Use    Smoking status: Current Every Day Smoker     Packs/day: 1.00     Years: 30.00     Pack years: 30.00    Smokeless tobacco: Never Used   Substance Use Topics    Alcohol use: No     Comment: previous heavy ETOH use; pt stopped around 2010      Current Outpatient Medications   Medication Sig Dispense Refill    methadone (DOLOPHINE) 10 MG tablet Take 2 tablets by mouth 4 times daily for 5 days. Pt mother passed this weekend. Had medications taken from hospital room 40 tablet 0    LORazepam (ATIVAN) 0.5 MG tablet take 1 tablet by mouth every 8 hours if needed for anxiety 90 tablet 2    oxyCODONE (OXYCONTIN) 20 MG extended release tablet Take 1 tablet by mouth 2 times daily for 30 days. Intended supply: 30 days 60 tablet 0    VORTIoxetine HBr (TRINTELLIX) 20 MG TABS tablet Take 1 tablet by mouth daily 30 tablet 0    oxyCODONE-acetaminophen (PERCOCET)  MG per tablet Take 1 tablet by mouth every 4 hours as needed for Pain for up to 30 days.  No more than 6 per day 180 tablet 0    brexpiprazole (REXULTI) 0.5 MG TABS tablet Take 0.5 mg by mouth daily      cephALEXin (KEFLEX) 500 MG capsule 500 mgTake three times daily 21 capsule 0    ondansetron (ZOFRAN) 4 MG tablet Take every six hours as needed 20 tablet 0    naloxone 4 MG/0.1ML LIQD nasal spray 1 spray by Nasal route as needed for Opioid Reversal 1 each 5    propranolol (INDERAL) 20 MG tablet Take 20 mg by mouth as needed Takes for migraines      b complex vitamins capsule Take 1 capsule by mouth daily      pantoprazole (PROTONIX) 40 MG tablet TAKE ONE TABLET BY MOUTH TWICE DAILY 60 tablet 3    lisinopril (PRINIVIL;ZESTRIL) 30 MG tablet Take 1 tablet by mouth daily (Patient taking differently: Take 5 mg by mouth daily ) 90 tablet 3    isosorbide mononitrate (IMDUR) 30 MG extended release tablet Take 1 tablet by mouth daily 90 tablet 3    atorvastatin (LIPITOR) 80 MG tablet Take 1 tablet by mouth nightly 90 tablet 3    clopidogrel (PLAVIX) 75 MG tablet Take 1 tablet by mouth daily 90 tablet 3    CONTOUR NEXT TEST strip use four times a day      nitroGLYCERIN (NITROSTAT) 0.4 MG SL tablet Place 1 tablet under the tongue every 5 minutes as needed for Chest pain Indications: Disease of the Arteries of the Heart Take 1 tablet every 5 minutes times three as needed for chest pain 25 tablet 2    Insulin Aspart (NOVOLOG tolerance/dependence & alternative treatments discussed. ;No signs of potential drug abuse or diversion identified. ;Random urine drug screen sent today. Chronic Pain > 50 MEDD Obtained or confirmed \"Consent for Opioid Use\" on file. Chronic Pain > 80 MEDD -   Chronic Pain > 120 MEDD Obtained or confirmed \"Medication Contract\" on file. Assessment:       Diagnosis Orders   1. Osteoarthritis of spine with radiculopathy, cervical region  methadone (DOLOPHINE) 10 MG tablet   2. Opioid dependence with withdrawal (HCC)  methadone (DOLOPHINE) 10 MG tablet   3. High risk medication use  POCT Rapid Drug Screen        Plan:    Methadone for 5 days until oxycodone scripts are due. Had a long discussion with patient regarding switching over to Suboxone to get off pain medications. When patient is ready, will refer to nurse practitioner. Patient to keep follow-up appointment with endocrinology and have note sent office. Urine drug screen today. Patient states does use some marijuana to help with appetite. Return in about 3 months (around 12/15/2021). Orders Placed This Encounter   Procedures    POCT Rapid Drug Screen     Orders Placed This Encounter   Medications    methadone (DOLOPHINE) 10 MG tablet     Sig: Take 2 tablets by mouth 4 times daily for 5 days. Pt mother passed this weekend. Had medications taken from hospital room     Dispense:  40 tablet     Refill:  0     Reduce doses taken as pain becomes manageable       Patient given educational materials - see patient instructions. Discussed use, benefit, and side effects of prescribed medications. All patient questions answered. Pt voiced understanding. Reviewed health maintenance. Instructed to continue current medications, diet andexercise. Patient agreed with treatment plan. Follow up as directed.      Electronicallysigned by Gerardo Miles MD on 9/15/2021 at 11:01 AM

## 2021-09-15 NOTE — TELEPHONE ENCOUNTER
[facility] calling. Pt was wanting to get his Methadone from them due to them having it in stock. Usually uses [facility]  and they are out. Pharmacist said that they can only prescribe for pain and pt made it clear that it is for withdrawal and that he was also going to get suboxone from the clinic. Asking if you want to prescribe something else?

## 2021-09-16 ENCOUNTER — OFFICE VISIT (OUTPATIENT)
Dept: PRIMARY CARE CLINIC | Age: 51
End: 2021-09-16
Payer: COMMERCIAL

## 2021-09-16 VITALS — SYSTOLIC BLOOD PRESSURE: 146 MMHG | DIASTOLIC BLOOD PRESSURE: 98 MMHG | WEIGHT: 129.4 LBS | BODY MASS INDEX: 19.68 KG/M2

## 2021-09-16 DIAGNOSIS — F11.20 UNCOMPLICATED OPIOID DEPENDENCE (HCC): Primary | ICD-10-CM

## 2021-09-16 DIAGNOSIS — Z79.899 MEDICATION MANAGEMENT: ICD-10-CM

## 2021-09-16 DIAGNOSIS — M50.30 DEGENERATIVE CERVICAL DISC: ICD-10-CM

## 2021-09-16 PROCEDURE — 99213 OFFICE O/P EST LOW 20 MIN: CPT | Performed by: NURSE PRACTITIONER

## 2021-09-16 PROCEDURE — 4004F PT TOBACCO SCREEN RCVD TLK: CPT | Performed by: NURSE PRACTITIONER

## 2021-09-16 PROCEDURE — G8427 DOCREV CUR MEDS BY ELIG CLIN: HCPCS | Performed by: NURSE PRACTITIONER

## 2021-09-16 PROCEDURE — G8420 CALC BMI NORM PARAMETERS: HCPCS | Performed by: NURSE PRACTITIONER

## 2021-09-16 PROCEDURE — 3017F COLORECTAL CA SCREEN DOC REV: CPT | Performed by: NURSE PRACTITIONER

## 2021-09-16 RX ORDER — OXYCODONE AND ACETAMINOPHEN 10; 325 MG/1; MG/1
1 TABLET ORAL EVERY 4 HOURS PRN
Qty: 180 TABLET | Refills: 0 | Status: SHIPPED | OUTPATIENT
Start: 2021-09-16 | End: 2021-09-17 | Stop reason: SDUPTHER

## 2021-09-16 ASSESSMENT — ENCOUNTER SYMPTOMS
CONSTIPATION: 1
BACK PAIN: 1

## 2021-09-16 NOTE — PROGRESS NOTES
[facility] CARE  [address] [place] [id number]  Dept: [phone]    Radha Murillo is a 48 y.o. male Established patient, who presents today for his medical conditions/complaints as noted below. Chief Complaint   Patient presents with    Other     discuss suboxone       HPI:     HPI  He is getting to a point that he may want to get medication. He has been on opioids for 10 years  He is currently taking methadone 10 - 2 tablets 4x/day  States methadone does little to help with pain but helps with withdrawal.  He having pain 8/10 in shoulders, neck and mid back and right elbow. He smokes marijuana occassionally   He quit drinking in 2008  He smokes 1 ppd. He has feeling of cold and legs and arms tighten up. He feels like has to go to the bathroom.     No street drugs  Reviewed prior notes Previous PCP  Reviewed previous Labs 8/20/21 9/15/21    LDL Calculated (mg/dL)   Date Value   04/16/2019 97   05/21/2018 66   01/05/2018 66       (goal LDL is <100)   AST (U/L)   Date Value   08/20/2021 18     ALT (U/L)   Date Value   08/20/2021 14     BUN (mg/dL)   Date Value   08/20/2021 9     Hemoglobin A1C (%)   Date Value   04/05/2021 9.7     TSH (mIU/L)   Date Value   07/27/2018 0.43     BP Readings from Last 3 Encounters:   09/16/21 (!) 146/98   09/15/21 138/88   08/20/21 (!) 167/106          (goal 120/80)    Past Medical History:   Diagnosis Date    Anxiety     Arthritis     CAD (coronary artery disease)     Calcaneal apophysitis     Corns     Depression     Diabetes type I ([address]     Gastroparesis     GERD (gastroesophageal reflux disease)     Headache(784.0)     Hearing loss     Hyperglycemia     Hyperlipidemia     Hypertension     Intussusception ([address]     MI (myocardial infarction) ([address] jan 2014    Neuropathy     Osteoarthritis     Panic attacks     Temporomandibular joint disorder       Past Surgical History:   Procedure Laterality Date    ABDOMEN SURGERY      insulin pump    ABDOMINAL EXPLORATION SURGERY  05/28/2016    bowel resection Rt. colon &terminal ileum, intussuseption.      CARDIAC CATHETERIZATION  2014    CARPAL TUNNEL RELEASE      bilateral    COLONOSCOPY  05/23/2017    poor prep; diverticulosis    COLONOSCOPY N/A 7/10/2018    COLONOSCOPY POLYPECTOMY HOT BIOPSY performed by Nataliya Goodwin MD at [address] COLONOSCOPY N/A 8/9/2021    COLONOSCOPY DIAGNOSTIC performed by Nataliya Goodwin MD at [address] [facility]      right    HERNIA REPAIR      inguinal    [place] COLON CA SCRN NOT  [address] IND N/A 5/25/2017    COLONOSCOPY performed by Erica Hall MD at [address] [facility] ESOPHAGOGASTRODUODENOSCOPY TRANSORAL DIAGNOSTIC N/A 5/24/2017    EGD ESOPHAGOGASTRODUODENOSCOPY performed by Erica Hall MD at [address] [facility] Bilateral    [place]      fracture     TONSILLECTOMY      x2    TOOTH EXTRACTION      x4    ULNAR TUNNEL RELEASE      UPPER GASTROINTESTINAL ENDOSCOPY  05/23/2017    mild gastritis    UPPER GASTROINTESTINAL ENDOSCOPY N/A 7/10/2018    EGD BIOPSY performed by Nataliya Goodwin MD at [place] [address] N/A 8/10/2021    HERNIA INCISIONAL REPAIR OPEN W/MESH performed by Nataliya Goodwin MD at [address] EXTRACTION      x4       Family History   Problem Relation Age of Onset    Diabetes Mother     High Blood Pressure Mother     Heart Disease Mother     Migraines Mother     Other Mother         diabetic neuropathy    High Blood Pressure Father        Social History     Tobacco Use    Smoking status: Current Every Day Smoker     Packs/day: 1.00     Years: 30.00     Pack years: 30.00    Smokeless tobacco: Never Used   Substance Use Topics    Alcohol use: No     Comment: previous heavy ETOH use; pt stopped around 2010      Current Outpatient Medications   Medication Sig Dispense Refill    methadone (DOLOPHINE) 10 MG tablet Take 2 tablets by mouth 4 times daily for 5 days. Pt mother passed this weekend. Had medications taken from hospital room 40 tablet 0    LORazepam (ATIVAN) 0.5 MG tablet take 1 tablet by mouth every 8 hours if needed for anxiety 90 tablet 2    oxyCODONE (OXYCONTIN) 20 MG extended release tablet Take 1 tablet by mouth 2 times daily for 30 days. Intended supply: 30 days 60 tablet 0    VORTIoxetine HBr (TRINTELLIX) 20 MG TABS tablet Take 1 tablet by mouth daily 30 tablet 0    brexpiprazole (REXULTI) 0.5 MG TABS tablet Take 0.5 mg by mouth daily      cephALEXin (KEFLEX) 500 MG capsule 500 mgTake three times daily 21 capsule 0    ondansetron (ZOFRAN) 4 MG tablet Take every six hours as needed 20 tablet 0    naloxone 4 MG/0.1ML LIQD nasal spray 1 spray by Nasal route as needed for Opioid Reversal 1 each 5    propranolol (INDERAL) 20 MG tablet Take 20 mg by mouth as needed Takes for migraines      b complex vitamins capsule Take 1 capsule by mouth daily      pantoprazole (PROTONIX) 40 MG tablet TAKE ONE TABLET BY MOUTH TWICE DAILY 60 tablet 3    lisinopril (PRINIVIL;ZESTRIL) 30 MG tablet Take 1 tablet by mouth daily (Patient taking differently: Take 5 mg by mouth daily ) 90 tablet 3    isosorbide mononitrate (IMDUR) 30 MG extended release tablet Take 1 tablet by mouth daily 90 tablet 3    atorvastatin (LIPITOR) 80 MG tablet Take 1 tablet by mouth nightly 90 tablet 3    clopidogrel (PLAVIX) 75 MG tablet Take 1 tablet by mouth daily 90 tablet 3    CONTOUR NEXT TEST strip use four times a day      nitroGLYCERIN (NITROSTAT) 0.4 MG SL tablet Place 1 tablet under the tongue every 5 minutes as needed for Chest pain Indications: Disease of the Arteries of the Heart Take 1 tablet every 5 minutes times three as needed for chest pain 25 tablet 2    Insulin Aspart (NOVOLOG SC) Inject  into the skin. Insulin pump       No current facility-administered medications for this visit.      Facility-Administered Medications Ordered in Other Visits   Medication Dose Route Frequency Provider Last Rate Last Admin    lactated ringers infusion  75 mL/hr IntraVENous Continuous Mikal Wheeler MD         Allergies   Allergen Reactions    Avelox [Moxifloxacin]     Ciprofloxacin Other (See Comments)    Levofloxacin     Lyrica [Pregabalin]     Metoclopramide Other (See Comments)     Severe confusion and paranoid activity    Neurontin [Gabapentin]     Other      Can not have epidural injections, steroids, nerve blocks, or burning of nerves due to being diabetic    Tetanus Toxoids     Tramadol     Tramadol Hcl        Health Maintenance   Topic Date Due    Hepatitis C screen  Never done    COVID-19 Vaccine (1) Never done    HIV screen  Never done    Hepatitis B vaccine (1 of 3 - Risk 3-dose series) Never done    Diabetic retinal exam  11/28/2019    Lipid screen  04/16/2020    Shingles Vaccine (1 of 2) Never done    Low dose CT lung screening  Never done    Diabetic microalbuminuria test  05/04/2021    A1C test (Diabetic or Prediabetic)  07/05/2021    Flu vaccine (1) 09/01/2021    Annual Wellness Visit (AWV)  Never done    Diabetic foot exam  01/26/2022    Colon cancer screen colonoscopy  02/09/2022    Potassium monitoring  08/20/2022    Creatinine monitoring  08/20/2022    Pneumococcal 0-64 years Vaccine (2 of 2 - PPSV23) 12/02/2035    Hepatitis A vaccine  Aged Out    Hib vaccine  Aged Out    Meningococcal (ACWY) vaccine  Aged Out       Subjective:      Review of Systems   Constitutional: Positive for fatigue. Gastrointestinal: Positive for constipation. Endocrine: Negative for polydipsia. Genitourinary: Negative for difficulty urinating and dysuria. Musculoskeletal: Positive for back pain and neck pain. Neurological: Negative for dizziness, light-headedness and headaches. Psychiatric/Behavioral: Positive for dysphoric mood and sleep disturbance. The patient is nervous/anxious.         Objective:     BP (!) 146/98 Wt 129 lb 6.4 oz (58.7 kg)   BMI 19.68 kg/m²   Physical Exam  Vitals and nursing note reviewed. Constitutional:       Appearance: Normal appearance. Cardiovascular:      Rate and Rhythm: Normal rate and regular rhythm. Heart sounds: Normal heart sounds. Abdominal:      General: Bowel sounds are normal.      Palpations: Abdomen is soft. Neurological:      Mental Status: He is alert. Assessment/Plan:   1. Uncomplicated opioid dependence ([address]  2. Medication management     Percocet due on Sunday. Extensive discussion on benefits of Suboxone and working towards getting off all opioids. He is not ready to start Suboxone at this time. Return in about 4 weeks (around 10/14/2021) for start suboxone. No orders of the defined types were placed in this encounter. No orders of the defined types were placed in this encounter. Patient given educational materials - see patient instructions. Discussed use, benefit, and side effects of prescribed medications. All patient questions answered. Pt voiced understanding. Reviewed health maintenance. Instructed to continue current medications, diet and exercise. Patient agreed with treatment plan. Follow up as directed.      Electronically signed by Greig Jeans, APRN - CNP on 9/16/2021 at 2:05 PM

## 2021-09-17 DIAGNOSIS — M50.30 DEGENERATIVE CERVICAL DISC: ICD-10-CM

## 2021-09-17 RX ORDER — OXYCODONE AND ACETAMINOPHEN 10; 325 MG/1; MG/1
1 TABLET ORAL EVERY 4 HOURS PRN
Qty: 180 TABLET | Refills: 0 | Status: SHIPPED | OUTPATIENT
Start: 2021-09-17 | End: 2021-10-11 | Stop reason: ALTCHOICE

## 2021-09-24 DIAGNOSIS — M54.12 CERVICAL RADICULOPATHY: ICD-10-CM

## 2021-09-24 DIAGNOSIS — E11.42 DIABETIC PERIPHERAL NEUROPATHY (HCC): ICD-10-CM

## 2021-09-24 RX ORDER — OXYCODONE HCL 20 MG/1
20 TABLET, FILM COATED, EXTENDED RELEASE ORAL 2 TIMES DAILY
Qty: 60 TABLET | Refills: 0 | Status: SHIPPED | OUTPATIENT
Start: 2021-09-24 | End: 2021-10-11 | Stop reason: ALTCHOICE

## 2021-09-24 RX ORDER — ONDANSETRON 4 MG/1
TABLET, FILM COATED ORAL
Qty: 20 TABLET | Refills: 0 | Status: SHIPPED | OUTPATIENT
Start: 2021-09-24 | End: 2021-11-04 | Stop reason: SDUPTHER

## 2021-09-27 ENCOUNTER — OFFICE VISIT (OUTPATIENT)
Dept: NEUROSURGERY | Age: 51
End: 2021-09-27
Payer: COMMERCIAL

## 2021-09-27 VITALS
BODY MASS INDEX: 19.97 KG/M2 | SYSTOLIC BLOOD PRESSURE: 146 MMHG | DIASTOLIC BLOOD PRESSURE: 97 MMHG | HEIGHT: 68 IN | HEART RATE: 102 BPM | WEIGHT: 131.8 LBS

## 2021-09-27 DIAGNOSIS — G56.21 NEUROPATHY OF RIGHT ULNAR NERVE AT WRIST: ICD-10-CM

## 2021-09-27 DIAGNOSIS — G56.21 CUBITAL TUNNEL SYNDROME ON RIGHT: ICD-10-CM

## 2021-09-27 DIAGNOSIS — G56.03 BILATERAL CARPAL TUNNEL SYNDROME: Primary | ICD-10-CM

## 2021-09-27 PROCEDURE — G8427 DOCREV CUR MEDS BY ELIG CLIN: HCPCS | Performed by: NEUROLOGICAL SURGERY

## 2021-09-27 PROCEDURE — G8420 CALC BMI NORM PARAMETERS: HCPCS | Performed by: NEUROLOGICAL SURGERY

## 2021-09-27 PROCEDURE — 99213 OFFICE O/P EST LOW 20 MIN: CPT | Performed by: NEUROLOGICAL SURGERY

## 2021-09-27 PROCEDURE — 4004F PT TOBACCO SCREEN RCVD TLK: CPT | Performed by: NEUROLOGICAL SURGERY

## 2021-09-27 PROCEDURE — 3017F COLORECTAL CA SCREEN DOC REV: CPT | Performed by: NEUROLOGICAL SURGERY

## 2021-09-27 NOTE — PROGRESS NOTES
[address] [facility] [address] [facility] [address]  Dept: [phone]    Patient:  Kp Goel  YOB: 1970  Date: 9/27/21    The patient is a 48 y.o. male who presents today for consult of the following problems:     Chief Complaint   Patient presents with    Follow-up             HPI:     Kp Goel is a 48 y.o. male on whom neurosurgical consultation was requested by Kev Cardenas MD for management of peripheral neuropathy. Patient has stable right upper extremity neuropathy in the ring finger pinky finger middle finger and some in the index finger. Patient states that the symptoms are intermittent along with change in position including flexion of the elbow as well as when he sleeps at night. Nocturnal symptoms 1-2 times per week. Some sparing of the thumb. Otherwise minimal symptoms in the left upper extremity. He is already had a right-sided cubital tunnel release as well as 2 carpal tunnel surgeries on the right hand. Denies any correlation with neck positioning. Unfortunately has had some headaches since the EMG with the paraspinal electrodes. .      History:     Past Medical History:   Diagnosis Date    Anxiety     Arthritis     CAD (coronary artery disease)     Calcaneal apophysitis     Corns     Depression     Diabetes type I ([address]     Gastroparesis     GERD (gastroesophageal reflux disease)     Headache(784.0)     Hearing loss     Hyperglycemia     Hyperlipidemia     Hypertension     Intussusception ([address]     MI (myocardial infarction) ([address] jan 2014    Neuropathy     Osteoarthritis     Panic attacks     Temporomandibular joint disorder      Past Surgical History:   Procedure Laterality Date    ABDOMEN SURGERY      insulin pump    ABDOMINAL EXPLORATION SURGERY  05/28/2016    bowel resection Rt. colon &terminal ileum, intussuseption.     [place] Self CARDIAC CATHETERIZATION  2014    CARPAL TUNNEL RELEASE      bilateral    COLONOSCOPY  05/23/2017    poor prep; diverticulosis    COLONOSCOPY N/A 7/10/2018    COLONOSCOPY POLYPECTOMY HOT BIOPSY performed by Barbara Abdullahi MD at [address] N/A 8/9/2021    COLONOSCOPY DIAGNOSTIC performed by Barbara Abdullahi MD at [address]      right    HERNIA REPAIR      inguinal    AK COLON CA SCRN NOT  [address] IND N/A 5/25/2017    COLONOSCOPY performed by Ilsa Aase, MD at [address] [facility] ESOPHAGOGASTRODUODENOSCOPY TRANSORAL DIAGNOSTIC N/A 5/24/2017    EGD ESOPHAGOGASTRODUODENOSCOPY performed by Ilsa Aase, MD at [facility] Bilateral     SHOULDER SURGERY      fracture     TONSILLECTOMY      x2    TOOTH EXTRACTION      x4    ULNAR TUNNEL RELEASE      UPPER GASTROINTESTINAL ENDOSCOPY  05/23/2017    mild gastritis    UPPER GASTROINTESTINAL ENDOSCOPY N/A 7/10/2018    EGD BIOPSY performed by Barbara Abdullahi MD at [place] [address] N/A 8/10/2021    HERNIA INCISIONAL REPAIR OPEN [address] performed by Barbara Abdullahi MD at [address] EXTRACTION      x4     Family History   Problem Relation Age of Onset    Diabetes Mother     High Blood Pressure Mother     Heart Disease Mother     Migraines Mother     Other Mother         diabetic neuropathy    High Blood Pressure Father      Current Outpatient Medications on File Prior to Visit   Medication Sig Dispense Refill    ondansetron (ZOFRAN) 4 MG tablet Take every six hours as needed 20 tablet 0    oxyCODONE (OXYCONTIN) 20 MG extended release tablet Take 1 tablet by mouth 2 times daily for 30 days. Intended supply: 30 days 60 tablet 0    oxyCODONE-acetaminophen (PERCOCET)  MG per tablet Take 1 tablet by mouth every 4 hours as needed for Pain for up to 30 days.  No more than 6 per day 180 tablet 0    LORazepam (ATIVAN) 0.5 MG tablet take 1 tablet by mouth every 8 hours if needed for anxiety 90 tablet 2    VORTIoxetine HBr (TRINTELLIX) 20 MG TABS tablet Take 1 tablet by mouth daily 30 tablet 0    brexpiprazole (REXULTI) 0.5 MG TABS tablet Take 0.5 mg by mouth daily      naloxone 4 MG/0.1ML LIQD nasal spray 1 spray by Nasal route as needed for Opioid Reversal 1 each 5    propranolol (INDERAL) 20 MG tablet Take 20 mg by mouth as needed Takes for migraines      b complex vitamins capsule Take 1 capsule by mouth daily      pantoprazole (PROTONIX) 40 MG tablet TAKE ONE TABLET BY MOUTH TWICE DAILY 60 tablet 3    lisinopril (PRINIVIL;ZESTRIL) 30 MG tablet Take 1 tablet by mouth daily (Patient taking differently: Take 5 mg by mouth daily ) 90 tablet 3    isosorbide mononitrate (IMDUR) 30 MG extended release tablet Take 1 tablet by mouth daily 90 tablet 3    atorvastatin (LIPITOR) 80 MG tablet Take 1 tablet by mouth nightly 90 tablet 3    clopidogrel (PLAVIX) 75 MG tablet Take 1 tablet by mouth daily 90 tablet 3    CONTOUR NEXT TEST strip use four times a day      nitroGLYCERIN (NITROSTAT) 0.4 MG SL tablet Place 1 tablet under the tongue every 5 minutes as needed for Chest pain Indications: Disease of the Arteries of the Heart Take 1 tablet every 5 minutes times three as needed for chest pain 25 tablet 2    cephALEXin (KEFLEX) 500 MG capsule 500 mgTake three times daily (Patient not taking: Reported on 9/27/2021) 21 capsule 0    Insulin Aspart (NOVOLOG SC) Inject  into the skin.  Insulin pump (Patient not taking: Reported on 9/27/2021)       Current Facility-Administered Medications on File Prior to Visit   Medication Dose Route Frequency Provider Last Rate Last Admin    lactated ringers infusion  75 mL/hr IntraVENous Continuous Conner Woodall MD         Social History     Tobacco Use    Smoking status: Current Every Day Smoker     Packs/day: 1.00     Years: 30.00     Pack years: 30.00    Smokeless tobacco: Never Used   Vaping Use    Vaping Use: Never used   Substance Use Topics  Alcohol use: No     Comment: previous heavy ETOH use; pt stopped around 2010    Drug use: No       Allergies   Allergen Reactions    Avelox [Moxifloxacin]     Ciprofloxacin Other (See Comments)    Levofloxacin     Lyrica [Pregabalin]     Metoclopramide Other (See Comments)     Severe confusion and paranoid activity    Neurontin [Gabapentin]     Other      Can not have epidural injections, steroids, nerve blocks, or burning of nerves due to being diabetic    Tetanus Toxoids     Tramadol     Tramadol Hcl        Review of Systems  Constitutional: Negative for activity change and appetite change. HENT: Negative for ear pain and facial swelling. Eyes: Negative for discharge and itching. Respiratory: Negative for choking and chest tightness. Cardiovascular: Negative for chest pain and leg swelling. Gastrointestinal: Negative for nausea and abdominal pain. Endocrine: Negative for cold intolerance and heat intolerance. Genitourinary: Negative for frequency and flank pain. Musculoskeletal: Negative for myalgias and joint swelling. Skin: Negative for rash and wound. Allergic/Immunologic: Negative for environmental allergies and food allergies. Hematological: Negative for adenopathy. Does not bruise/bleed easily. Psychiatric/Behavioral: Negative for self-injury. The patient is not nervous/anxious. Physical Exam:      BP (!) 146/97 (Site: Right Upper Arm, Position: Sitting, Cuff Size: Medium Adult)   Pulse 102   Ht 5' 8\" (1.727 m)   Wt 131 lb 12.8 oz (59.8 kg)   BMI 20.04 kg/m²   Estimated body mass index is 20.04 kg/m² as calculated from the following:    Height as of this encounter: 5' 8\" (1.727 m). Weight as of this encounter: 131 lb 12.8 oz (59.8 kg). General:  Mariposa Pierce is a 48y.o. year old male who appears his stated age. HEENT: Normocephalic atraumatic. Neck supple. Chest: regular rate; pulses equal  Abdomen: Soft nontender nondistended.  Normoactive bowel sounds. Ext: DP and PT pulses 2+, good cap refill  Neuro    Mentation  Appropriate affect  Registration intact  Orientation intact  3 item recall intact  Judgement intact to situation    Cranial Nerves:   Pupils equal and reactive to light  Extraocular motion intact  Face and shrug symmetric  Tongue midline  No dysarthria  v1-3 sensation symmetric, masseter tone symmetric  Hearing symmetric and intact to finger rub    Sensation:   Intact prior to provocation. Motor  L deltoid 5/5; R deltoid 5/5  L biceps 5/5; R biceps 5/5  L triceps 5/5; R triceps 5/5  L wrist extension 5/5; R wrist extension 5/5  L intrinsics 5/5; R intrinsics 5/5     L iliopsoas 5/5 , R iliopsoas 5/5  L quadriceps 5/5; R quadriceps 5/5  L Dorsiflexion 5/5; R dorsiflexion 5/5  L Plantarflexion 5/5; R plantarflexion 5/5  L EHL 5/5; R EHL 5/5    Reflexes  L Brachioradialis 2+/4; R brachioradialis 2+/4  L Biceps 2+/4; R Biceps 2+/4  L Triceps 2+/4; R Triceps 2+/4  L Patellar 2+/4: R Patellar 2+/4  L Achilles 2+/4; R Achilles 2+/4    hoffmans L: neg  hoffmans R: neg  Clonus L: neg  Clonus R: neg  Babinski L: up  Babinski R; up    Negative Tinel's at the carpal tunnel positive Tinel's at the right cubital tunnel. Positive Phalen's in the right greater than left hand mainly in the ulnar distribution but some in the median nerve distribution with the index finger involved. He did have decreased incision over the dorsum on the ulnar portion of the hand. Negative Benedictine negative Wartenberg. Some atrophy of the first interossei. Studies Review:     emg R ulnar and median neuropathy    Assessment and Plan:      1. Bilateral carpal tunnel syndrome    2. Neuropathy of right ulnar nerve at wrist    3.  Cubital tunnel syndrome on right          Plan: Patient likely has a combination of ulnar and median neuropathy which may be occurring proximal to the medial epicondyle as well as partially at Guyon's canal.  Has not had any conservative measures with splinting at this time. Will prescribe a right-sided carpal tunnel splint along with counseling regarding maintaining neutral positioning of the right elbow. Recommend using a towel or other home brace to keep it within the range of approximately 100 to 170 degrees. Followup: No follow-ups on file. Prescriptions Ordered:  No orders of the defined types were placed in this encounter. Orders Placed:  Orders Placed This Encounter   Procedures    Procare Comfort Wrist w/Thumb splint     Patient was prescribed a Procare Comfort Wrist and Thumb brace. The right wrist will require stabilization / immobilization from this semi-rigid / rigid orthosis to improve their function. The orthosis will assist in protecting the affected area, provide functional support and facilitate healing. Wear at night    The patient was educated and fit by a healthcare professional with expert knowledge and specialization in brace application while under the direct supervision of the treating physician. Verbal and written instructions for the use of and application of this item were provided. They were instructed to contact the office immediately should the brace result in increased pain, decreased sensation, increased swelling or worsening of the condition. Electronically signed by Makayla Griffin DO on 9/27/2021 at 2:12 PM    Please note that this chart was generated using voice recognition Dragon dictation software. Although every effort was made to ensure the accuracy of this automated transcription, some errors in transcription may have occurred.

## 2021-09-28 RX ORDER — BUTALBITAL, ACETAMINOPHEN AND CAFFEINE 50; 325; 40 MG/1; MG/1; MG/1
1 TABLET ORAL EVERY 4 HOURS PRN
Qty: 180 TABLET | Refills: 3 | Status: SHIPPED | OUTPATIENT
Start: 2021-09-28 | End: 2022-01-19 | Stop reason: SINTOL

## 2021-10-05 ENCOUNTER — TELEPHONE (OUTPATIENT)
Dept: PRIMARY CARE CLINIC | Age: 51
End: 2021-10-05

## 2021-10-05 NOTE — TELEPHONE ENCOUNTER
----- Message from Dariana Ge sent at 10/5/2021  9:50 AM EDT -----  Subject: Appointment Request    Reason for Call: Routine Existing Condition Follow Up    QUESTIONS  Type of Appointment? Established Patient  Reason for appointment request? Available appointments did not meet   patient need  Additional Information for Provider? Pt wants to be seen sooner than 10/20   to start suboxone. States he doesn't want to take pain medication any   longer. And would rather not wait any longer. ---------------------------------------------------------------------------  --------------  Cyrus Fearing INFO  What is the best way for the office to contact you? OK to leave message on   [place], OK to respond with electronic message via [facility] portal (only   for patients who have registered [facility] account)  Preferred Call Back Phone Number? [phone]  ---------------------------------------------------------------------------  --------------  SCRIPT ANSWERS  Relationship to Patient? Self  Have your symptoms changed? No  (Is the patient requesting to be seen urgently for their symptoms?)? No  Is this follow up request related to routine Diabetes Management? No  Are you having any new concerns about your existing condition? No  Have you been diagnosed with, awaiting test results for, or told that you   are suspected of having COVID-19 (Coronavirus)? (If patient has tested   negative or was tested as a requirement for work, school, or travel and   not based on symptoms, answer no)? No  Within the past two weeks have you developed any of the following symptoms   (answer no if symptoms have been present longer than 2 weeks or began   more than 2 weeks ago)? Fever or Chills, Cough, Shortness of breath or   difficulty breathing, Loss of taste or smell, Sore throat, Nasal   congestion, Sneezing or runny nose, Fatigue or generalized body aches   (answer no if pain is specific to a body part e.g. back pain), Diarrhea,   Headache? No  Have you had close contact with someone with COVID-19 in the last 14 days? No  (Service Expert  click yes below to proceed with [employer] As Usual   Scheduling)?  Yes

## 2021-10-11 ENCOUNTER — OFFICE VISIT (OUTPATIENT)
Dept: PRIMARY CARE CLINIC | Age: 51
End: 2021-10-11
Payer: COMMERCIAL

## 2021-10-11 VITALS
SYSTOLIC BLOOD PRESSURE: 138 MMHG | WEIGHT: 130.5 LBS | BODY MASS INDEX: 19.78 KG/M2 | HEART RATE: 59 BPM | HEIGHT: 68 IN | DIASTOLIC BLOOD PRESSURE: 78 MMHG | OXYGEN SATURATION: 90 %

## 2021-10-11 DIAGNOSIS — Z23 NEED FOR VACCINATION: Primary | ICD-10-CM

## 2021-10-11 DIAGNOSIS — Z79.899 MEDICATION MANAGEMENT: ICD-10-CM

## 2021-10-11 DIAGNOSIS — F11.20 UNCOMPLICATED OPIOID DEPENDENCE (HCC): ICD-10-CM

## 2021-10-11 DIAGNOSIS — Z79.899 HIGH RISK MEDICATION USE: ICD-10-CM

## 2021-10-11 DIAGNOSIS — F41.8 DEPRESSION WITH ANXIETY: ICD-10-CM

## 2021-10-11 LAB
ALCOHOL URINE: ABNORMAL
AMPHETAMINE SCREEN, URINE: NEGATIVE
BARBITURATE SCREEN, URINE: POSITIVE
BENZODIAZEPINE SCREEN, URINE: NEGATIVE
BUPRENORPHINE URINE: NEGATIVE
COCAINE METABOLITE SCREEN URINE: NEGATIVE
FENTANYL SCREEN, URINE: ABNORMAL
GABAPENTIN SCREEN, URINE: ABNORMAL
MDMA URINE: NEGATIVE
METHADONE SCREEN, URINE: POSITIVE
METHAMPHETAMINE, URINE: NEGATIVE
OPIATE SCREEN URINE: NEGATIVE
OXYCODONE SCREEN URINE: NEGATIVE
PHENCYCLIDINE SCREEN URINE: ABNORMAL
PROPOXYPHENE SCREEN, URINE: ABNORMAL
SYNTHETIC CANNABINOIDS(K2) SCREEN, URINE: ABNORMAL
THC SCREEN, URINE: POSITIVE
TRAMADOL SCREEN URINE: ABNORMAL
TRICYCLIC ANTIDEPRESSANTS, UR: NEGATIVE

## 2021-10-11 PROCEDURE — G8427 DOCREV CUR MEDS BY ELIG CLIN: HCPCS | Performed by: NURSE PRACTITIONER

## 2021-10-11 PROCEDURE — G8482 FLU IMMUNIZE ORDER/ADMIN: HCPCS | Performed by: NURSE PRACTITIONER

## 2021-10-11 PROCEDURE — 4004F PT TOBACCO SCREEN RCVD TLK: CPT | Performed by: NURSE PRACTITIONER

## 2021-10-11 PROCEDURE — 99213 OFFICE O/P EST LOW 20 MIN: CPT | Performed by: NURSE PRACTITIONER

## 2021-10-11 PROCEDURE — 80305 DRUG TEST PRSMV DIR OPT OBS: CPT | Performed by: NURSE PRACTITIONER

## 2021-10-11 PROCEDURE — G0008 ADMIN INFLUENZA VIRUS VAC: HCPCS | Performed by: NURSE PRACTITIONER

## 2021-10-11 PROCEDURE — G8420 CALC BMI NORM PARAMETERS: HCPCS | Performed by: NURSE PRACTITIONER

## 2021-10-11 PROCEDURE — 3017F COLORECTAL CA SCREEN DOC REV: CPT | Performed by: NURSE PRACTITIONER

## 2021-10-11 PROCEDURE — 90674 CCIIV4 VAC NO PRSV 0.5 ML IM: CPT | Performed by: NURSE PRACTITIONER

## 2021-10-11 RX ORDER — BUPRENORPHINE AND NALOXONE 8; 2 MG/1; MG/1
1 FILM, SOLUBLE BUCCAL; SUBLINGUAL 2 TIMES DAILY
Qty: 14 FILM | Refills: 0 | Status: SHIPPED | OUTPATIENT
Start: 2021-10-11 | End: 2021-10-18 | Stop reason: SDUPTHER

## 2021-10-11 ASSESSMENT — PATIENT HEALTH QUESTIONNAIRE - PHQ9
SUM OF ALL RESPONSES TO PHQ QUESTIONS 1-9: 0
1. LITTLE INTEREST OR PLEASURE IN DOING THINGS: 0
SUM OF ALL RESPONSES TO PHQ9 QUESTIONS 1 & 2: 0
2. FEELING DOWN, DEPRESSED OR HOPELESS: 0

## 2021-10-11 ASSESSMENT — ENCOUNTER SYMPTOMS
DIARRHEA: 1
SHORTNESS OF BREATH: 0
COUGH: 1

## 2021-10-11 NOTE — PROGRESS NOTES
[address] [address] PRIMARY CARE  [facility] [address]  [facility] [place] [address]  Dept: [phone]    Dena De Luna is a 48 y.o. male Established patient, who presents today for his medical conditions/complaints as noted below. Chief Complaint   Patient presents with    Medication Check     Would like to start Suboxone       HPI:     HPI  Patient would like to get off pain medication. He is currently taking oxycodone 20 mg 2x/day. He did not take any yesterday or today. He is currently taking methadone 20 mg methadone for withdrawal symptoms. He last took a methadone for withdrawal symptoms.'  He was having tremors and diarrhea. He also gets cold when in withdrawal. He gets what he calls \"jiggy legs\". No street drugs. He will smoke marijuana on occasion - states it has been  almost a month. He does not have a counselor. He gave Prescription pain medication to wife who will destroy them. He injured left foot 2 weeks ago while cleaning out his mom's house. He jumped out of a dumpster and landed on heel.          Reviewed prior notes Neurology  Reviewed previous      LDL Calculated (mg/dL)   Date Value   04/16/2019 97   05/21/2018 66   01/05/2018 66       (goal LDL is <100)   AST (U/L)   Date Value   08/20/2021 18     ALT (U/L)   Date Value   08/20/2021 14     BUN (mg/dL)   Date Value   08/20/2021 9     Hemoglobin A1C (%)   Date Value   04/05/2021 9.7     TSH (mIU/L)   Date Value   07/27/2018 0.43     BP Readings from Last 3 Encounters:   10/11/21 138/78   09/27/21 (!) 146/97   09/16/21 (!) 146/98          (goal 120/80)    Past Medical History:   Diagnosis Date    Anxiety     Arthritis     CAD (coronary artery disease)     Calcaneal apophysitis     Corns     Depression     Diabetes type I ([facility] [address]     Gastroparesis     GERD (gastroesophageal reflux disease)     Headache(784.0)     Hearing loss     Hyperglycemia     Hyperlipidemia     Hypertension     [facility]ception ([facility] [address]     MI (myocardial infarction) ([facility] [address] jan 2014    Neuropathy     Osteoarthritis     Panic attacks     Temporomandibular joint disorder       Past Surgical History:   Procedure Laterality Date    ABDOMEN SURGERY      insulin pump    ABDOMINAL EXPLORATION SURGERY  05/28/2016    bowel resection Rt. colon &terminal ileum, intussuseption.      CARDIAC CATHETERIZATION  2014    CARPAL TUNNEL RELEASE      bilateral    COLONOSCOPY  05/23/2017    poor prep; diverticulosis    COLONOSCOPY N/A 7/10/2018    COLONOSCOPY POLYPECTOMY HOT BIOPSY performed by Kayley Man MD at [address] COLONOSCOPY N/A 8/9/2021    COLONOSCOPY DIAGNOSTIC performed by Kayley Man MD at [address] [facility]      right    HERNIA REPAIR      inguinal    [place] [facility] NOT  [facility] N/A 5/25/2017    COLONOSCOPY performed by Emperatriz Zavala MD at [address] [place] [address] ESOPHAGOGASTRODUODENOSCOPY TRANSORAL DIAGNOSTIC N/A 5/24/2017    EGD ESOPHAGOGASTRODUODENOSCOPY performed by Emperatriz Zavala MD at [place] [address] Bilateral    [place]      fracture     TONSILLECTOMY      x2    TOOTH EXTRACTION      x4    ULNAR TUNNEL RELEASE      UPPER GASTROINTESTINAL ENDOSCOPY  05/23/2017    mild gastritis    UPPER GASTROINTESTINAL ENDOSCOPY N/A 7/10/2018    EGD BIOPSY performed by Kayley Man MD at [place] [address] N/A 8/10/2021    HERNIA INCISIONAL REPAIR OPEN W/MESH performed by Kayley Man MD at [address] EXTRACTION      x4       Family History   Problem Relation Age of Onset    Diabetes Mother     High Blood Pressure Mother     Heart Disease Mother     Migraines Mother     Other Mother         diabetic neuropathy    High Blood Pressure Father        Social History     Tobacco Use    Smoking status: Current Every Day Smoker     Packs/day: 1.00     Years: 30.00     Pack years: 30.00    Smokeless tobacco: Never Used   Substance Use Topics    Alcohol use: No     Comment: previous heavy ETOH use; pt stopped around 2010      Current Outpatient Medications   Medication Sig Dispense Refill    buprenorphine-naloxone (SUBOXONE) 8-2 MG FILM SL film Place 1 Film under the tongue 2 times daily for 7 days.  14 Film 0    butalbital-acetaminophen-caffeine (FIORICET, ESGIC) -40 MG per tablet Take 1 tablet by mouth every 4 hours as needed for Headaches 180 tablet 3    ondansetron (ZOFRAN) 4 MG tablet Take every six hours as needed 20 tablet 0    LORazepam (ATIVAN) 0.5 MG tablet take 1 tablet by mouth every 8 hours if needed for anxiety 90 tablet 2    VORTIoxetine HBr (TRINTELLIX) 20 MG TABS tablet Take 1 tablet by mouth daily 30 tablet 0    brexpiprazole (REXULTI) 0.5 MG TABS tablet Take 0.5 mg by mouth daily      cephALEXin (KEFLEX) 500 MG capsule 500 mgTake three times daily 21 capsule 0    naloxone 4 MG/0.1ML LIQD nasal spray 1 spray by Nasal route as needed for Opioid Reversal 1 each 5    propranolol (INDERAL) 20 MG tablet Take 20 mg by mouth as needed Takes for migraines      b complex vitamins capsule Take 1 capsule by mouth daily      pantoprazole (PROTONIX) 40 MG tablet TAKE ONE TABLET BY MOUTH TWICE DAILY 60 tablet 3    lisinopril (PRINIVIL;ZESTRIL) 30 MG tablet Take 1 tablet by mouth daily (Patient taking differently: Take 5 mg by mouth daily ) 90 tablet 3    isosorbide mononitrate (IMDUR) 30 MG extended release tablet Take 1 tablet by mouth daily 90 tablet 3    atorvastatin (LIPITOR) 80 MG tablet Take 1 tablet by mouth nightly 90 tablet 3    clopidogrel (PLAVIX) 75 MG tablet Take 1 tablet by mouth daily 90 tablet 3    CONTOUR NEXT TEST strip use four times a day      nitroGLYCERIN (NITROSTAT) 0.4 MG SL tablet Place 1 tablet under the tongue every 5 minutes as needed for Chest pain Indications: Disease of the Arteries of the Heart Take 1 tablet every 5 minutes times three as needed for chest pain 25 tablet 2    Insulin Aspart (NOVOLOG SC) Inject into the skin Insulin pump        No current facility-administered medications for this visit. Facility-Administered Medications Ordered in Other Visits   Medication Dose Route Frequency Provider Last Rate Last Admin    lactated ringers infusion  75 mL/hr IntraVENous Continuous Mikal Painting MD         Allergies   Allergen Reactions    Avelox [Moxifloxacin]     Ciprofloxacin Other (See Comments)    Levofloxacin     Lyrica [Pregabalin]     Metoclopramide Other (See Comments)     Severe confusion and paranoid activity    Neurontin [Gabapentin]     Other      Can not have epidural injections, steroids, nerve blocks, or burning of nerves due to being diabetic    Tetanus Toxoids     Tramadol     Tramadol Hcl        Health Maintenance   Topic Date Due    Hepatitis C screen  Never done    COVID-19 Vaccine (1) Never done    HIV screen  Never done    Hepatitis B vaccine (1 of 3 - Risk 3-dose series) Never done    Diabetic retinal exam  11/28/2019    Lipid screen  04/16/2020    Shingles Vaccine (1 of 2) Never done    Low dose CT lung screening  Never done    Diabetic microalbuminuria test  05/04/2021    A1C test (Diabetic or Prediabetic)  07/05/2021    Flu vaccine (1) 09/01/2021    Diabetic foot exam  01/26/2022    Colon cancer screen colonoscopy  02/09/2022    Potassium monitoring  08/20/2022    Creatinine monitoring  08/20/2022    Pneumococcal 0-64 years Vaccine (2 of 2 - PPSV23) 12/02/2035    Hepatitis A vaccine  Aged Out    Hib vaccine  Aged Out    Meningococcal (ACWY) vaccine  Aged Out       Subjective:      Review of Systems   Constitutional: Negative for appetite change, fatigue and fever. HENT: Negative for congestion and ear pain. Respiratory: Positive for cough (smokers cough). Negative for shortness of breath. Cardiovascular: Negative for chest pain, palpitations and leg swelling. Gastrointestinal: Positive for diarrhea.    Genitourinary: Negative for difficulty urinating and dysuria. Musculoskeletal: Positive for neck pain. Negative for gait problem. Left foot pain - heel area. Neurological: Positive for headaches. Psychiatric/Behavioral: Negative for sleep disturbance. The patient is nervous/anxious. Objective:     /78   Pulse 59   Ht 5' 8.04\" (1.728 m)   Wt 130 lb 8 oz (59.2 kg)   SpO2 90%   BMI 19.82 kg/m²   Physical Exam  Vitals and nursing note reviewed. Constitutional:       Appearance: Normal appearance. HENT:      Head: Normocephalic and atraumatic. Right Ear: External ear normal.      Left Ear: External ear normal.   Cardiovascular:      Rate and Rhythm: Normal rate and regular rhythm. Pulses:           Dorsalis pedis pulses are 2+ on the left side. Posterior tibial pulses are 1+ on the left side. Heart sounds: Normal heart sounds. Pulmonary:      Breath sounds: Normal breath sounds. Abdominal:      General: Bowel sounds are normal.      Palpations: Abdomen is soft. Musculoskeletal:        Feet:    Feet:      Comments: Left foot bruising and swelling. Skin:     General: Skin is warm and dry. Capillary Refill: Capillary refill takes less than 2 seconds. Neurological:      Mental Status: He is alert and oriented to person, place, and time. Psychiatric:         Mood and Affect: Mood normal.         Thought Content: Thought content normal.         Assessment/Plan:   1. Need for vaccination  -     INFLUENZA, MDCK QUADV, 2 YRS AND OLDER, IM, PF, PREFILL SYR OR SDV, 0.5ML (FLUCELVAX QUADV, PF)  2. Uncomplicated opioid dependence (HCC)  -     buprenorphine-naloxone (SUBOXONE) 8-2 MG FILM SL film; Place 1 Film under the tongue 2 times daily for 7 days. , Disp-14 Film, R-0Normal  -     [employer]  3. Medication management  -     buprenorphine-naloxone (SUBOXONE) 8-2 MG FILM SL film; Place 1 Film under the tongue 2 times daily for 7 days. , Disp-14 Film, R-0Normal  - POCT Rapid Drug Screen  4. High risk medication use  -     POCT Rapid Drug Screen  5. Depression with anxiety  -     [facility]     Suboxone 8/2 mg film - 2x day  Behavioral health counselor. Return in about 1 week (around 10/18/2021) for opioid dependence. Orders Placed This Encounter   Procedures    INFLUENZA, MDCK QUADV, 2 YRS AND OLDER, IM, PF, PREFILL SYR OR SDV, 0.5ML (FLUCELVAX QUADV, PF)   [facility]     Referral Priority:   Routine     Referral Type:   Eval and Treat     Referral Reason:   Specialty Services Required     Requested Specialty:   Behavioral Health     Number of Visits Requested:   1    POCT Rapid Drug Screen     Orders Placed This Encounter   Medications    buprenorphine-naloxone (SUBOXONE) 8-2 MG FILM SL film     Sig: Place 1 Film under the tongue 2 times daily for 7 days. Dispense:  14 Film     Refill:  0     NADEAN:  [id number]       Patient given educational materials - see patient instructions. Discussed use, benefit, and side effects of prescribed medications. All patient questions answered. Pt voiced understanding. Reviewed health maintenance. Instructed to continue current medications, diet and exercise. Patient agreed with treatment plan. Follow up as directed.      Electronically signed by CLYDE Crad CNP on 10/11/2021 at 10:02 AM

## 2021-10-18 ENCOUNTER — OFFICE VISIT (OUTPATIENT)
Dept: PRIMARY CARE CLINIC | Age: 51
End: 2021-10-18
Payer: COMMERCIAL

## 2021-10-18 VITALS
WEIGHT: 133.5 LBS | DIASTOLIC BLOOD PRESSURE: 86 MMHG | OXYGEN SATURATION: 98 % | SYSTOLIC BLOOD PRESSURE: 142 MMHG | BODY MASS INDEX: 20.27 KG/M2 | HEART RATE: 86 BPM

## 2021-10-18 DIAGNOSIS — F11.20 UNCOMPLICATED OPIOID DEPENDENCE (HCC): Primary | ICD-10-CM

## 2021-10-18 DIAGNOSIS — Z79.899 MEDICATION MANAGEMENT: ICD-10-CM

## 2021-10-18 DIAGNOSIS — Z79.899 HIGH RISK MEDICATION USE: ICD-10-CM

## 2021-10-18 LAB
ALCOHOL URINE: NEGATIVE
AMPHETAMINE SCREEN, URINE: NEGATIVE
BARBITURATE SCREEN, URINE: POSITIVE
BENZODIAZEPINE SCREEN, URINE: POSITIVE
BUPRENORPHINE URINE: NEGATIVE
COCAINE METABOLITE SCREEN URINE: NEGATIVE
FENTANYL SCREEN, URINE: NEGATIVE
GABAPENTIN SCREEN, URINE: NEGATIVE
MDMA URINE: NEGATIVE
METHADONE SCREEN, URINE: NEGATIVE
METHAMPHETAMINE, URINE: NEGATIVE
OPIATE SCREEN URINE: NEGATIVE
OXYCODONE SCREEN URINE: NEGATIVE
PHENCYCLIDINE SCREEN URINE: NEGATIVE
PROPOXYPHENE SCREEN, URINE: NEGATIVE
SYNTHETIC CANNABINOIDS(K2) SCREEN, URINE: NEGATIVE
THC SCREEN, URINE: POSITIVE
TRAMADOL SCREEN URINE: NEGATIVE
TRICYCLIC ANTIDEPRESSANTS, UR: NEGATIVE

## 2021-10-18 PROCEDURE — 3017F COLORECTAL CA SCREEN DOC REV: CPT | Performed by: NURSE PRACTITIONER

## 2021-10-18 PROCEDURE — 4004F PT TOBACCO SCREEN RCVD TLK: CPT | Performed by: NURSE PRACTITIONER

## 2021-10-18 PROCEDURE — G8482 FLU IMMUNIZE ORDER/ADMIN: HCPCS | Performed by: NURSE PRACTITIONER

## 2021-10-18 PROCEDURE — G8420 CALC BMI NORM PARAMETERS: HCPCS | Performed by: NURSE PRACTITIONER

## 2021-10-18 PROCEDURE — 80305 DRUG TEST PRSMV DIR OPT OBS: CPT | Performed by: NURSE PRACTITIONER

## 2021-10-18 PROCEDURE — 99213 OFFICE O/P EST LOW 20 MIN: CPT | Performed by: NURSE PRACTITIONER

## 2021-10-18 PROCEDURE — G8427 DOCREV CUR MEDS BY ELIG CLIN: HCPCS | Performed by: NURSE PRACTITIONER

## 2021-10-18 RX ORDER — BUPRENORPHINE AND NALOXONE 8; 2 MG/1; MG/1
1 FILM, SOLUBLE BUCCAL; SUBLINGUAL 2 TIMES DAILY
Qty: 14 FILM | Refills: 0 | Status: SHIPPED | OUTPATIENT
Start: 2021-10-18 | End: 2021-10-25 | Stop reason: SDUPTHER

## 2021-10-18 ASSESSMENT — ENCOUNTER SYMPTOMS
RHINORRHEA: 0
BACK PAIN: 1
EYE REDNESS: 0
NAUSEA: 0
EYE DISCHARGE: 0
VOMITING: 0
SHORTNESS OF BREATH: 0
DIARRHEA: 0
SORE THROAT: 0
COUGH: 0
WHEEZING: 0
ABDOMINAL PAIN: 1

## 2021-10-18 NOTE — PROGRESS NOTES
[address] PRIMARY CARE  [address]  [address] [facility] [address]  Dept: [phone]    Bard León is a 48 y.o. male Established patient, who presents today for his medical conditions/complaints as noted below. Chief Complaint   Patient presents with    Follow-up       HPI:     HPI  He is using suboxone - 1 film 2x/day (morning and evening)  No withdrawal symptoms. No side effects. No cravings. He felt good all week. Pain is tolerable - 6/10. He is still having heel pain - left foot. [employer] called last week and said would call back, but they have not called.   He will not see Dr. Savanna Vazquez because he is psychiatrist and he will be seeing a psychologist.    Reviewed prior notes None  Reviewed previous Labs, Imaging and Hospital Records    LDL Calculated (mg/dL)   Date Value   04/16/2019 97   05/21/2018 66   01/05/2018 66       (goal LDL is <100)   AST (U/L)   Date Value   08/20/2021 18     ALT (U/L)   Date Value   08/20/2021 14     BUN (mg/dL)   Date Value   08/20/2021 9     Hemoglobin A1C (%)   Date Value   04/05/2021 9.7     TSH (mIU/L)   Date Value   07/27/2018 0.43     BP Readings from Last 3 Encounters:   10/18/21 (!) 142/86   10/11/21 138/78   09/27/21 (!) 146/97          (goal 120/80)    Past Medical History:   Diagnosis Date    Anxiety     Arthritis     CAD (coronary artery disease)     Calcaneal apophysitis     Corns     Depression     Diabetes type I ([address]     Gastroparesis     GERD (gastroesophageal reflux disease)     Headache(784.0)     Hearing loss     Hyperglycemia     Hyperlipidemia     Hypertension     Intussusception ([address]     MI (myocardial infarction) ([address] jan 2014    Neuropathy     Osteoarthritis     Panic attacks     Temporomandibular joint disorder       Past Surgical History:   Procedure Laterality Date    ABDOMEN SURGERY      insulin pump    ABDOMINAL EXPLORATION SURGERY  05/28/2016    bowel resection Rt. colon &terminal ileum, intussuseption.  CARDIAC CATHETERIZATION  2014    CARPAL TUNNEL RELEASE      bilateral    COLONOSCOPY  05/23/2017    poor prep; diverticulosis    COLONOSCOPY N/A 7/10/2018    COLONOSCOPY POLYPECTOMY HOT BIOPSY performed by Ana Chauhan MD at [address] [facility] COLONOSCOPY N/A 8/9/2021    COLONOSCOPY DIAGNOSTIC performed by Ana Chauhan MD at [address]      right    HERNIA REPAIR      inguinal    [place] COLON CA SCRN NOT  [address] IND N/A 5/25/2017    COLONOSCOPY performed by Donna Aquino MD at [address] ESOPHAGOGASTRODUODENOSCOPY TRANSORAL DIAGNOSTIC N/A 5/24/2017    EGD ESOPHAGOGASTRODUODENOSCOPY performed by Donna Aquino MD at [facility] Bilateral    [place]      fracture     TONSILLECTOMY      x2    TOOTH EXTRACTION      x4    ULNAR TUNNEL RELEASE      UPPER GASTROINTESTINAL ENDOSCOPY  05/23/2017    mild gastritis    UPPER GASTROINTESTINAL ENDOSCOPY N/A 7/10/2018    EGD BIOPSY performed by Ana Chauhan MD at [place] [address] N/A 8/10/2021    HERNIA INCISIONAL REPAIR OPEN W/MESH performed by Ana Chauhan MD at [address] EXTRACTION      x4       Family History   Problem Relation Age of Onset    Diabetes Mother     High Blood Pressure Mother     Heart Disease Mother     Migraines Mother     Other Mother         diabetic neuropathy    High Blood Pressure Father        Social History     Tobacco Use    Smoking status: Current Every Day Smoker     Packs/day: 1.00     Years: 30.00     Pack years: 30.00    Smokeless tobacco: Never Used   Substance Use Topics    Alcohol use: No     Comment: previous heavy ETOH use; pt stopped around 2010      Current Outpatient Medications   Medication Sig Dispense Refill    buprenorphine-naloxone (SUBOXONE) 8-2 MG FILM SL film Place 1 Film under the tongue 2 times daily for 7 days.  14 Film 0    butalbital-acetaminophen-caffeine (FIORICET, ESGIC) -40 MG per tablet Take 1 tablet by mouth every 4 hours as needed for Headaches 180 tablet 3    ondansetron (ZOFRAN) 4 MG tablet Take every six hours as needed 20 tablet 0    VORTIoxetine HBr (TRINTELLIX) 20 MG TABS tablet Take 1 tablet by mouth daily 30 tablet 0    brexpiprazole (REXULTI) 0.5 MG TABS tablet Take 0.5 mg by mouth daily      cephALEXin (KEFLEX) 500 MG capsule 500 mgTake three times daily 21 capsule 0    naloxone 4 MG/0.1ML LIQD nasal spray 1 spray by Nasal route as needed for Opioid Reversal 1 each 5    propranolol (INDERAL) 20 MG tablet Take 20 mg by mouth as needed Takes for migraines      b complex vitamins capsule Take 1 capsule by mouth daily      pantoprazole (PROTONIX) 40 MG tablet TAKE ONE TABLET BY MOUTH TWICE DAILY 60 tablet 3    lisinopril (PRINIVIL;ZESTRIL) 30 MG tablet Take 1 tablet by mouth daily (Patient taking differently: Take 5 mg by mouth daily ) 90 tablet 3    isosorbide mononitrate (IMDUR) 30 MG extended release tablet Take 1 tablet by mouth daily 90 tablet 3    atorvastatin (LIPITOR) 80 MG tablet Take 1 tablet by mouth nightly 90 tablet 3    clopidogrel (PLAVIX) 75 MG tablet Take 1 tablet by mouth daily 90 tablet 3    CONTOUR NEXT TEST strip use four times a day      nitroGLYCERIN (NITROSTAT) 0.4 MG SL tablet Place 1 tablet under the tongue every 5 minutes as needed for Chest pain Indications: Disease of the Arteries of the Heart Take 1 tablet every 5 minutes times three as needed for chest pain 25 tablet 2    Insulin Aspart (NOVOLOG SC) Inject into the skin Insulin pump        No current facility-administered medications for this visit.      Facility-Administered Medications Ordered in Other Visits   Medication Dose Route Frequency Provider Last Rate Last Admin    lactated ringers infusion  75 mL/hr IntraVENous Continuous Mikal Louie MD         Allergies   Allergen Reactions    Avelox [Moxifloxacin]     Ciprofloxacin Other (See Comments)    Levofloxacin     Lyrica [Pregabalin]     Metoclopramide Other (See Comments)     Severe confusion and paranoid activity    Neurontin [Gabapentin]     Other      Can not have epidural injections, steroids, nerve blocks, or burning of nerves due to being diabetic    Tetanus Toxoids     Tramadol     Tramadol Hcl        Health Maintenance   Topic Date Due    Hepatitis C screen  Never done    COVID-19 Vaccine (1) Never done    HIV screen  Never done    Hepatitis B vaccine (1 of 3 - Risk 3-dose series) Never done    Diabetic retinal exam  11/28/2019    Lipid screen  04/16/2020    Shingles Vaccine (1 of 2) Never done    Low dose CT lung screening  Never done    Diabetic microalbuminuria test  05/04/2021    A1C test (Diabetic or Prediabetic)  07/05/2021    Diabetic foot exam  01/26/2022    Colon cancer screen colonoscopy  02/09/2022    Potassium monitoring  08/20/2022    Creatinine monitoring  08/20/2022    Pneumococcal 0-64 years Vaccine (2 of 2 - PPSV23) 12/02/2035    Flu vaccine  Completed    Hepatitis A vaccine  Aged Out    Hib vaccine  Aged Out    Meningococcal (ACWY) vaccine  Aged Out       Subjective:      Review of Systems   Constitutional: Negative for chills and fever. HENT: Negative for rhinorrhea and sore throat. Eyes: Negative for discharge and redness. Respiratory: Negative for cough, shortness of breath and wheezing. Cardiovascular: Negative for chest pain and palpitations. Gastrointestinal: Positive for abdominal pain. Negative for diarrhea, nausea and vomiting. Genitourinary: Negative for dysuria and frequency. Musculoskeletal: Positive for back pain. Negative for arthralgias and myalgias. Skin: Negative for rash. Neurological: Negative for dizziness, light-headedness and headaches. Psychiatric/Behavioral: Positive for decreased concentration and suicidal ideas. Negative for sleep disturbance.  The patient is not nervous/anxious. Objective:     BP (!) 142/86   Pulse 86   Wt 133 lb 8 oz (60.6 kg)   SpO2 98%   BMI 20.27 kg/m²   Physical Exam  Vitals and nursing note reviewed. Constitutional:       General: He is not in acute distress. Appearance: He is well-developed. He is not ill-appearing. HENT:      Head: Normocephalic and atraumatic. Right Ear: External ear normal.      Left Ear: External ear normal.   Eyes:      General: No scleral icterus. Right eye: No discharge. Left eye: No discharge. Conjunctiva/sclera: Conjunctivae normal.      Pupils: Pupils are equal, round, and reactive to light. Neck:      Thyroid: No thyromegaly. Trachea: No tracheal deviation. Cardiovascular:      Rate and Rhythm: Normal rate and regular rhythm. Heart sounds: Normal heart sounds. Pulmonary:      Effort: Pulmonary effort is normal. No respiratory distress. Breath sounds: Normal breath sounds. No wheezing. Abdominal:      General: Bowel sounds are normal.      Palpations: Abdomen is soft. Musculoskeletal:      Lumbar back: Tenderness and bony tenderness present. Decreased range of motion. Lymphadenopathy:      Cervical: No cervical adenopathy. Skin:     General: Skin is warm. Findings: No rash. Neurological:      Mental Status: He is alert and oriented to person, place, and time. Psychiatric:         Mood and Affect: Mood normal.         Behavior: Behavior normal.         Thought Content: Thought content normal.         Assessment/Plan:   1. Uncomplicated opioid dependence (HCC)  -     buprenorphine-naloxone (SUBOXONE) 8-2 MG FILM SL film; Place 1 Film under the tongue 2 times daily for 7 days. , Disp-14 Film, R-0Normal  2. Medication management  -     buprenorphine-naloxone (SUBOXONE) 8-2 MG FILM SL film; Place 1 Film under the tongue 2 times daily for 7 days. , Disp-14 Film, R-0Normal  3.  High risk medication use  -     POCT Rapid Drug Screen Suboxone 8/2 mg film - 2x day  Behavioral health counselor. Return in about 1 week (around 10/25/2021) for suboxone check. Orders Placed This Encounter   Procedures    POCT Rapid Drug Screen     Orders Placed This Encounter   Medications    buprenorphine-naloxone (SUBOXONE) 8-2 MG FILM SL film     Sig: Place 1 Film under the tongue 2 times daily for 7 days. Dispense:  14 Film     Refill:  0     NADEAN:  [id number]       Patient given educational materials - see patient instructions. Discussed use, benefit, and side effects of prescribed medications. All patient questions answered. Pt voiced understanding. Reviewed health maintenance. Instructed to continue current medications, diet and exercise. Patient agreed with treatment plan. Follow up as directed. Electronically signed by CLYDE Villalobos CNP on 10/18/2021 at 10: AM

## 2021-10-19 LAB
AVERAGE GLUCOSE: NORMAL
HBA1C MFR BLD: 9.5 %

## 2021-10-25 ENCOUNTER — OFFICE VISIT (OUTPATIENT)
Dept: PRIMARY CARE CLINIC | Age: 51
End: 2021-10-25
Payer: COMMERCIAL

## 2021-10-25 VITALS
HEART RATE: 100 BPM | SYSTOLIC BLOOD PRESSURE: 148 MMHG | BODY MASS INDEX: 20.81 KG/M2 | OXYGEN SATURATION: 99 % | WEIGHT: 137 LBS | DIASTOLIC BLOOD PRESSURE: 90 MMHG

## 2021-10-25 DIAGNOSIS — F11.20 UNCOMPLICATED OPIOID DEPENDENCE (HCC): Primary | ICD-10-CM

## 2021-10-25 DIAGNOSIS — Z79.899 HIGH RISK MEDICATION USE: ICD-10-CM

## 2021-10-25 DIAGNOSIS — Z79.899 MEDICATION MANAGEMENT: ICD-10-CM

## 2021-10-25 PROCEDURE — G8420 CALC BMI NORM PARAMETERS: HCPCS | Performed by: NURSE PRACTITIONER

## 2021-10-25 PROCEDURE — G8482 FLU IMMUNIZE ORDER/ADMIN: HCPCS | Performed by: NURSE PRACTITIONER

## 2021-10-25 PROCEDURE — 80305 DRUG TEST PRSMV DIR OPT OBS: CPT | Performed by: NURSE PRACTITIONER

## 2021-10-25 PROCEDURE — 4004F PT TOBACCO SCREEN RCVD TLK: CPT | Performed by: NURSE PRACTITIONER

## 2021-10-25 PROCEDURE — 99213 OFFICE O/P EST LOW 20 MIN: CPT | Performed by: NURSE PRACTITIONER

## 2021-10-25 PROCEDURE — G8427 DOCREV CUR MEDS BY ELIG CLIN: HCPCS | Performed by: NURSE PRACTITIONER

## 2021-10-25 PROCEDURE — 3017F COLORECTAL CA SCREEN DOC REV: CPT | Performed by: NURSE PRACTITIONER

## 2021-10-25 RX ORDER — BUPRENORPHINE AND NALOXONE 8; 2 MG/1; MG/1
1 FILM, SOLUBLE BUCCAL; SUBLINGUAL 2 TIMES DAILY
Qty: 28 FILM | Refills: 0 | Status: SHIPPED | OUTPATIENT
Start: 2021-10-25 | End: 2021-11-08 | Stop reason: SDUPTHER

## 2021-10-25 ASSESSMENT — ENCOUNTER SYMPTOMS
EYE REDNESS: 0
WHEEZING: 0
SORE THROAT: 0
SHORTNESS OF BREATH: 0
ABDOMINAL PAIN: 0
BACK PAIN: 1
DIARRHEA: 0
VOMITING: 0
RHINORRHEA: 0
COUGH: 0
EYE DISCHARGE: 0
NAUSEA: 0

## 2021-10-25 NOTE — PROGRESS NOTES
[address] [address] PRIMARY CARE  [facility] [address]  [facility] [place] [address]  Dept: [phone]    Shameka Yeung is a 48 y.o. male Established patient, who presents today for his medical conditions/complaints as noted below. Chief Complaint   Patient presents with    Medication Check       HPI:     HPI    He is using suboxone - 1 film 2x/day (morning and evening)  No withdrawal symptoms. No side effects. No constipation. No lightheadedness or dizziness except when sugar is low. No cravings. He felt good all week. States pain is getting better - 4/10. He is still having some left heel pain - feels like it is bruised. Tolerable. No street drugs. Probably some THC in system - last used 3 weeks ago. 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       Reviewed prior notes None  Reviewed previous      LDL Calculated (mg/dL)   Date Value   04/16/2019 97   05/21/2018 66   01/05/2018 66       (goal LDL is <100)   AST (U/L)   Date Value   08/20/2021 18     ALT (U/L)   Date Value   08/20/2021 14     BUN (mg/dL)   Date Value   08/20/2021 9     Hemoglobin A1C (%)   Date Value   04/05/2021 9.7     TSH (mIU/L)   Date Value   07/27/2018 0.43     BP Readings from Last 3 Encounters:   10/25/21 (!) 148/90   10/18/21 (!) 142/86   10/11/21 138/78          (goal 120/80)    Past Medical History:   Diagnosis Date    Anxiety     Arthritis     CAD (coronary artery disease)     Calcaneal apophysitis     Corns     Depression     Diabetes type I ([address]     Gastroparesis     GERD (gastroesophageal reflux disease)     Headache(784.0)     Hearing loss     Hyperglycemia     Hyperlipidemia     Hypertension     Intussusception ([address]     MI (myocardial infarction) ([address] jan 2014    Neuropathy     Osteoarthritis     Panic attacks     Temporomandibular joint disorder       Past Surgical History: Procedure Laterality Date    ABDOMEN SURGERY      insulin pump    ABDOMINAL EXPLORATION SURGERY  05/28/2016    bowel resection Rt. colon &terminal ileum, intussuseption.      CARDIAC CATHETERIZATION  2014    CARPAL TUNNEL RELEASE      bilateral    COLONOSCOPY  05/23/2017    poor prep; diverticulosis    COLONOSCOPY N/A 7/10/2018    COLONOSCOPY POLYPECTOMY HOT BIOPSY performed by Rahul Coronado MD at [address] COLONOSCOPY N/A 8/9/2021    COLONOSCOPY DIAGNOSTIC performed by Rahul Coronado MD at [address]      right    HERNIA REPAIR      inguinal    DE COLON CA SCRN NOT  [address] IND N/A 5/25/2017    COLONOSCOPY performed by Izzy Maddox MD at [address] [facility] ESOPHAGOGASTRODUODENOSCOPY TRANSORAL DIAGNOSTIC N/A 5/24/2017    EGD ESOPHAGOGASTRODUODENOSCOPY performed by Izzy Maddox MD at [address] Bilateral    [place]      fracture     TONSILLECTOMY      x2    TOOTH EXTRACTION      x4    ULNAR TUNNEL RELEASE      UPPER GASTROINTESTINAL ENDOSCOPY  05/23/2017    mild gastritis    UPPER GASTROINTESTINAL ENDOSCOPY N/A 7/10/2018    EGD BIOPSY performed by Rahul Coronado MD at [address] [facility] [address] N/A 8/10/2021    HERNIA INCISIONAL REPAIR OPEN W/MESH performed by Rahul Coronado MD at [address] EXTRACTION      x4       Family History   Problem Relation Age of Onset    Diabetes Mother     High Blood Pressure Mother     Heart Disease Mother     Migraines Mother     Other Mother         diabetic neuropathy    High Blood Pressure Father        Social History     Tobacco Use    Smoking status: Current Every Day Smoker     Packs/day: 1.00     Years: 30.00     Pack years: 30.00    Smokeless tobacco: Never Used   Substance Use Topics    Alcohol use: No     Comment: previous heavy ETOH use; pt stopped around 2010      Current Outpatient Medications   Medication Sig Dispense Refill    buprenorphine-naloxone (SUBOXONE) 8-2 MG FILM SL film Place 1 Film under the tongue 2 times daily for 14 days. 28 Film 0    butalbital-acetaminophen-caffeine (FIORICET, ESGIC) -40 MG per tablet Take 1 tablet by mouth every 4 hours as needed for Headaches 180 tablet 3    ondansetron (ZOFRAN) 4 MG tablet Take every six hours as needed 20 tablet 0    VORTIoxetine HBr (TRINTELLIX) 20 MG TABS tablet Take 1 tablet by mouth daily 30 tablet 0    brexpiprazole (REXULTI) 0.5 MG TABS tablet Take 0.5 mg by mouth daily      cephALEXin (KEFLEX) 500 MG capsule 500 mgTake three times daily 21 capsule 0    naloxone 4 MG/0.1ML LIQD nasal spray 1 spray by Nasal route as needed for Opioid Reversal 1 each 5    propranolol (INDERAL) 20 MG tablet Take 20 mg by mouth as needed Takes for migraines      b complex vitamins capsule Take 1 capsule by mouth daily      pantoprazole (PROTONIX) 40 MG tablet TAKE ONE TABLET BY MOUTH TWICE DAILY 60 tablet 3    lisinopril (PRINIVIL;ZESTRIL) 30 MG tablet Take 1 tablet by mouth daily (Patient taking differently: Take 5 mg by mouth daily ) 90 tablet 3    isosorbide mononitrate (IMDUR) 30 MG extended release tablet Take 1 tablet by mouth daily 90 tablet 3    atorvastatin (LIPITOR) 80 MG tablet Take 1 tablet by mouth nightly 90 tablet 3    clopidogrel (PLAVIX) 75 MG tablet Take 1 tablet by mouth daily 90 tablet 3    CONTOUR NEXT TEST strip use four times a day      Insulin Aspart (NOVOLOG SC) Inject into the skin Insulin pump       nitroGLYCERIN (NITROSTAT) 0.4 MG SL tablet Place 1 tablet under the tongue every 5 minutes as needed for Chest pain Indications: Disease of the Arteries of the Heart Take 1 tablet every 5 minutes times three as needed for chest pain (Patient not taking: Reported on 10/25/2021) 25 tablet 2     No current facility-administered medications for this visit.      Facility-Administered Medications Ordered in Other Visits   Medication Dose Route Frequency Provider Last Rate Last Admin    lactated ringers infusion  75 mL/hr IntraVENous Continuous Mikal Pearce MD         Allergies   Allergen Reactions    Avelox [Moxifloxacin]     Ciprofloxacin Other (See Comments)    Levofloxacin     Lyrica [Pregabalin]     Metoclopramide Other (See Comments)     Severe confusion and paranoid activity    Neurontin [Gabapentin]     Other      Can not have epidural injections, steroids, nerve blocks, or burning of nerves due to being diabetic    Tetanus Toxoids     Tramadol     Tramadol Hcl        Health Maintenance   Topic Date Due    Hepatitis C screen  Never done    COVID-19 Vaccine (1) Never done    HIV screen  Never done    Hepatitis B vaccine (1 of 3 - Risk 3-dose series) Never done    Diabetic retinal exam  11/28/2019    Lipid screen  04/16/2020    Shingles Vaccine (1 of 2) Never done    Low dose CT lung screening  Never done    Diabetic microalbuminuria test  05/04/2021    A1C test (Diabetic or Prediabetic)  07/05/2021    Diabetic foot exam  01/26/2022    Colon cancer screen colonoscopy  02/09/2022    Potassium monitoring  08/20/2022    Creatinine monitoring  08/20/2022    Pneumococcal 0-64 years Vaccine (2 of 2 - PPSV23) 12/02/2035    Flu vaccine  Completed    Hepatitis A vaccine  Aged Out    Hib vaccine  Aged Out    Meningococcal (ACWY) vaccine  Aged Out       Subjective:      Review of Systems   Constitutional: Negative for chills and fever. HENT: Negative for rhinorrhea and sore throat. Eyes: Negative for discharge and redness. Respiratory: Negative for cough, shortness of breath and wheezing. Cardiovascular: Negative for chest pain and palpitations. Gastrointestinal: Negative for abdominal pain, diarrhea, nausea and vomiting. Genitourinary: Negative for dysuria and frequency. Musculoskeletal: Positive for back pain. Negative for arthralgias and myalgias. Left heel pain   Neurological: Positive for light-headedness. Negative for dizziness and headaches. Psychiatric/Behavioral: Negative for dysphoric mood and sleep disturbance. Objective:     BP (!) 148/90   Pulse 100   Wt 137 lb (62.1 kg)   SpO2 99%   BMI 20.81 kg/m²   Physical Exam  Vitals and nursing note reviewed. Constitutional:       Appearance: Normal appearance. HENT:      Head: Normocephalic and atraumatic. Eyes:      Extraocular Movements: Extraocular movements intact. Conjunctiva/sclera: Conjunctivae normal.   Cardiovascular:      Rate and Rhythm: Normal rate and regular rhythm. Heart sounds: Normal heart sounds. Comments: No carotid bruit  Pulmonary:      Breath sounds: Normal breath sounds. Abdominal:      General: Bowel sounds are normal.      Palpations: Abdomen is soft. Musculoskeletal:      Right lower leg: No edema. Left lower leg: No edema. Skin:     General: Skin is warm and dry. Capillary Refill: Capillary refill takes less than 2 seconds. Neurological:      Mental Status: He is alert and oriented to person, place, and time. Psychiatric:         Mood and Affect: Mood normal.         Thought Content: Thought content normal.         Judgment: Judgment normal.       Controlled substances monitoring: possible medication side effects, risk of tolerance and/or dependence, and alternative treatments discussed, no signs of potential drug abuse or diversion identified, OARRS report reviewed today- activity consistent with treatment plan and random urine drug screen sent today. Assessment/Plan:   1. Uncomplicated opioid dependence (HCC)  -     buprenorphine-naloxone (SUBOXONE) 8-2 MG FILM SL film; Place 1 Film under the tongue 2 times daily for 14 days. , Disp-28 Film, R-0Normal  -     [address] [place] [address]  2. Medication management  -     buprenorphine-naloxone (SUBOXONE) 8-2 MG FILM SL film; Place 1 Film under the tongue 2 times daily for 14 days. , Disp-28 Film, R-0Normal  3.  High risk medication use  -     POCT Rapid Drug Screen     Suboxone 8/2 mg film - 2x day  Behavioral health counselor. Order reentered and will follow up with phone call to help with connection to get schedule. Patient interested in cutting back - will discuss next visit. Return in about 2 weeks (around 11/8/2021). Orders Placed This Encounter   Procedures   [address]     Referral Priority:   Routine     Referral Type:   Eval and Treat     Referral Reason:   Specialty Services Required     Requested Specialty:   Behavioral Health     Number of Visits Requested:   1    POCT Rapid Drug Screen     Orders Placed This Encounter   Medications    buprenorphine-naloxone (SUBOXONE) 8-2 MG FILM SL film     Sig: Place 1 Film under the tongue 2 times daily for 14 days. Dispense:  28 Film     Refill:  0     NADEAN:  [id number]       Patient given educational materials - see patient instructions. Discussed use, benefit, and side effects of prescribed medications. All patient questions answered. Pt voiced understanding. Reviewed health maintenance. Instructed to continue current medications, diet and exercise. Patient agreed with treatment plan. Follow up as directed.      Electronically signed by CLYDE Nino CNP on 10/25/2021 at 9:40 AM

## 2021-10-25 NOTE — PATIENT INSTRUCTIONS
Suboxone 8/2 mg film - 2x day  Behavioral health counselor. Order reentered and will follow up with phone call to help with connection to get schedule. Patient interested in cutting back - will discuss next visit.

## 2021-11-04 RX ORDER — ONDANSETRON 4 MG/1
TABLET, FILM COATED ORAL
Qty: 20 TABLET | Refills: 0 | Status: SHIPPED | OUTPATIENT
Start: 2021-11-04 | End: 2022-04-07 | Stop reason: SDUPTHER

## 2021-11-08 ENCOUNTER — OFFICE VISIT (OUTPATIENT)
Dept: PRIMARY CARE CLINIC | Age: 51
End: 2021-11-08
Payer: COMMERCIAL

## 2021-11-08 ENCOUNTER — TELEPHONE (OUTPATIENT)
Dept: PRIMARY CARE CLINIC | Age: 51
End: 2021-11-08

## 2021-11-08 VITALS
OXYGEN SATURATION: 98 % | WEIGHT: 137.3 LBS | SYSTOLIC BLOOD PRESSURE: 138 MMHG | DIASTOLIC BLOOD PRESSURE: 86 MMHG | BODY MASS INDEX: 20.85 KG/M2 | HEART RATE: 86 BPM

## 2021-11-08 DIAGNOSIS — Z79.899 HIGH RISK MEDICATION USE: ICD-10-CM

## 2021-11-08 DIAGNOSIS — Z79.899 MEDICATION MANAGEMENT: ICD-10-CM

## 2021-11-08 DIAGNOSIS — F11.20 UNCOMPLICATED OPIOID DEPENDENCE (HCC): Primary | ICD-10-CM

## 2021-11-08 PROCEDURE — 3017F COLORECTAL CA SCREEN DOC REV: CPT | Performed by: NURSE PRACTITIONER

## 2021-11-08 PROCEDURE — G8427 DOCREV CUR MEDS BY ELIG CLIN: HCPCS | Performed by: NURSE PRACTITIONER

## 2021-11-08 PROCEDURE — G8482 FLU IMMUNIZE ORDER/ADMIN: HCPCS | Performed by: NURSE PRACTITIONER

## 2021-11-08 PROCEDURE — 99213 OFFICE O/P EST LOW 20 MIN: CPT | Performed by: NURSE PRACTITIONER

## 2021-11-08 PROCEDURE — 80305 DRUG TEST PRSMV DIR OPT OBS: CPT | Performed by: NURSE PRACTITIONER

## 2021-11-08 PROCEDURE — 4004F PT TOBACCO SCREEN RCVD TLK: CPT | Performed by: NURSE PRACTITIONER

## 2021-11-08 PROCEDURE — G8420 CALC BMI NORM PARAMETERS: HCPCS | Performed by: NURSE PRACTITIONER

## 2021-11-08 RX ORDER — BUPRENORPHINE AND NALOXONE 8; 2 MG/1; MG/1
1 FILM, SOLUBLE BUCCAL; SUBLINGUAL 2 TIMES DAILY
Qty: 28 FILM | Refills: 0 | Status: SHIPPED | OUTPATIENT
Start: 2021-11-08 | End: 2021-11-22 | Stop reason: SDUPTHER

## 2021-11-08 ASSESSMENT — ENCOUNTER SYMPTOMS
BACK PAIN: 1
COUGH: 1
CONSTIPATION: 0

## 2021-11-08 NOTE — TELEPHONE ENCOUNTER
Please call pharmacy for direction clarification for suboxone.  Please call [facility] at [facility] [phone]

## 2021-11-08 NOTE — PROGRESS NOTES
[address]  PRIMARY CARE  [facility] [address]  [facility] [place] [address]  Dept: [phone]    Pollyann Kanner is a 48 y.o. male Established patient, who presents today for his medical conditions/complaints as noted below. Chief Complaint   Patient presents with    Medication Check       HPI:     HPI  He is using suboxone - 1 film 2x/day (morning and evening)  No withdrawal symptoms. Only minor side effect is  Minor insomnia - trouble falling asleep and getting to sleep. No constipation. No lightheadedness or dizziness except when sugar is low. No cravings. He felt good all week. States pain is getting better - 6/10 back pain  Left heel is heeling up good. Tolerable.   He is not having any trouble with feeling no energy. No street drugs. Probably some THC in system - last used Saturday. He has not been eating well and it helps with appetite. He did contact behavioral health and he is waiting for call back. He ask if UBRELVY would be effective for migraines.     Reviewed prior notes None  Reviewed previous      LDL Calculated (mg/dL)   Date Value   04/16/2019 97   05/21/2018 66   01/05/2018 66       (goal LDL is <100)   AST (U/L)   Date Value   08/20/2021 18     ALT (U/L)   Date Value   08/20/2021 14     BUN (mg/dL)   Date Value   08/20/2021 9     Hemoglobin A1C (%)   Date Value   04/05/2021 9.7     TSH (mIU/L)   Date Value   07/27/2018 0.43     BP Readings from Last 3 Encounters:   11/08/21 138/86   10/25/21 (!) 148/90   10/18/21 (!) 142/86          (goal 120/80)    Past Medical History:   Diagnosis Date    Anxiety     Arthritis     CAD (coronary artery disease)     Calcaneal apophysitis     Corns     Depression     Diabetes type I ([address]     Gastroparesis     GERD (gastroesophageal reflux disease)     Headache(784.0)     Hearing loss     Hyperglycemia     Hyperlipidemia     Hypertension     Intussusception (HCC)     MI (myocardial infarction) [facility] jan 2014    Neuropathy     Osteoarthritis     Panic attacks     Temporomandibular joint disorder       Past Surgical History:   Procedure Laterality Date    ABDOMEN SURGERY      insulin pump    ABDOMINAL EXPLORATION SURGERY  05/28/2016    bowel resection Rt. colon &terminal ileum, intussuseption.      CARDIAC CATHETERIZATION  2014    CARPAL TUNNEL RELEASE      bilateral    COLONOSCOPY  05/23/2017    poor prep; diverticulosis    COLONOSCOPY N/A 7/10/2018    COLONOSCOPY POLYPECTOMY HOT BIOPSY performed by Maite Rose MD at [address] COLONOSCOPY N/A 8/9/2021    COLONOSCOPY DIAGNOSTIC performed by Maite Rose MD at [address] [facility]      right    HERNIA REPAIR      inguinal    MA COLON CA SCRN NOT  [address] IND N/A 5/25/2017    COLONOSCOPY performed by Mikal Fofana MD at [address] [facility] ESOPHAGOGASTRODUODENOSCOPY TRANSORAL DIAGNOSTIC N/A 5/24/2017    EGD ESOPHAGOGASTRODUODENOSCOPY performed by Mikal Fofana MD at [address] [facility] Bilateral    [place]      fracture     TONSILLECTOMY      x2    TOOTH EXTRACTION      x4    ULNAR TUNNEL RELEASE      UPPER GASTROINTESTINAL ENDOSCOPY  05/23/2017    mild gastritis    UPPER GASTROINTESTINAL ENDOSCOPY N/A 7/10/2018    EGD BIOPSY performed by Maite Rose MD at [place] [address] N/A 8/10/2021    HERNIA INCISIONAL REPAIR OPEN W/MESH performed by Maite Rose MD at [address] EXTRACTION      x4       Family History   Problem Relation Age of Onset    Diabetes Mother     High Blood Pressure Mother     Heart Disease Mother     Migraines Mother     Other Mother         diabetic neuropathy    High Blood Pressure Father        Social History     Tobacco Use    Smoking status: Current Every Day Smoker     Packs/day: 1.00     Years: 30.00     Pack years: 30.00    Smokeless tobacco: Never Used   Substance Use Topics    Alcohol use: No     Comment: previous heavy ETOH use; pt stopped around 2010      Current Outpatient Medications   Medication Sig Dispense Refill    buprenorphine-naloxone (SUBOXONE) 8-2 MG FILM SL film Place 1 Film under the tongue 2 times daily for 14 days.  (Patient taking differently take 1/2 film 2x/day and 1/2 film 2x/day as needed for pain or withdrawals.) 28 Film 0    ondansetron (ZOFRAN) 4 MG tablet Take every six hours as needed 20 tablet 0    butalbital-acetaminophen-caffeine (FIORICET, ESGIC) -40 MG per tablet Take 1 tablet by mouth every 4 hours as needed for Headaches 180 tablet 3    VORTIoxetine HBr (TRINTELLIX) 20 MG TABS tablet Take 1 tablet by mouth daily 30 tablet 0    brexpiprazole (REXULTI) 0.5 MG TABS tablet Take 0.5 mg by mouth daily      cephALEXin (KEFLEX) 500 MG capsule 500 mgTake three times daily 21 capsule 0    naloxone 4 MG/0.1ML LIQD nasal spray 1 spray by Nasal route as needed for Opioid Reversal 1 each 5    propranolol (INDERAL) 20 MG tablet Take 20 mg by mouth as needed Takes for migraines      b complex vitamins capsule Take 1 capsule by mouth daily      pantoprazole (PROTONIX) 40 MG tablet TAKE ONE TABLET BY MOUTH TWICE DAILY 60 tablet 3    lisinopril (PRINIVIL;ZESTRIL) 30 MG tablet Take 1 tablet by mouth daily (Patient taking differently: Take 5 mg by mouth daily ) 90 tablet 3    isosorbide mononitrate (IMDUR) 30 MG extended release tablet Take 1 tablet by mouth daily 90 tablet 3    atorvastatin (LIPITOR) 80 MG tablet Take 1 tablet by mouth nightly 90 tablet 3    clopidogrel (PLAVIX) 75 MG tablet Take 1 tablet by mouth daily 90 tablet 3    CONTOUR NEXT TEST strip use four times a day      nitroGLYCERIN (NITROSTAT) 0.4 MG SL tablet Place 1 tablet under the tongue every 5 minutes as needed for Chest pain Indications: Disease of the Arteries of the Heart Take 1 tablet every 5 minutes times three as needed for chest pain 25 tablet 2    Insulin Aspart (NOVOLOG SC) Inject into the skin Insulin pump No current facility-administered medications for this visit. Facility-Administered Medications Ordered in Other Visits   Medication Dose Route Frequency Provider Last Rate Last Admin    lactated ringers infusion  75 mL/hr IntraVENous Continuous Mikal Lakhani MD         Allergies   Allergen Reactions    Avelox [Moxifloxacin]     Ciprofloxacin Other (See Comments)    Levofloxacin     Lyrica [Pregabalin]     Metoclopramide Other (See Comments)     Severe confusion and paranoid activity    Neurontin [Gabapentin]     Other      Can not have epidural injections, steroids, nerve blocks, or burning of nerves due to being diabetic    Tetanus Toxoids     Tramadol     Tramadol Hcl        Health Maintenance   Topic Date Due    Hepatitis C screen  Never done    COVID-19 Vaccine (1) Never done    HIV screen  Never done    Hepatitis B vaccine (1 of 3 - Risk 3-dose series) Never done    Diabetic retinal exam  11/28/2019    Lipid screen  04/16/2020    Shingles Vaccine (1 of 2) Never done    Low dose CT lung screening  Never done    Diabetic microalbuminuria test  05/04/2021    A1C test (Diabetic or Prediabetic)  07/05/2021    Diabetic foot exam  01/26/2022    Colon cancer screen colonoscopy  02/09/2022    Potassium monitoring  08/20/2022    Creatinine monitoring  08/20/2022    Pneumococcal 0-64 years Vaccine (2 of 2 - PPSV23) 12/02/2035    Flu vaccine  Completed    Hepatitis A vaccine  Aged Out    Hib vaccine  Aged Out    Meningococcal (ACWY) vaccine  Aged Out       Subjective:      Review of Systems   Constitutional: Negative for chills and fever. HENT: Negative for congestion and nosebleeds. Respiratory: Positive for cough. Cardiovascular: Negative for chest pain, palpitations and leg swelling. Gastrointestinal: Negative for constipation. Genitourinary: Negative for difficulty urinating and dysuria. Musculoskeletal: Positive for back pain.    Neurological: Positive for screen sent today. Assessment/Plan:   1. Uncomplicated opioid dependence (HCC)  -     buprenorphine-naloxone (SUBOXONE) 8-2 MG FILM SL film; Place 1 Film under the tongue 2 times daily for 14 days. (Patient taking differently take 1/2 film 2x/day and 1/2 film 2x/day as needed for pain or withdrawals.), Disp-28 Film, R-0Normal  2. Medication management  -     buprenorphine-naloxone (SUBOXONE) 8-2 MG FILM SL film; Place 1 Film under the tongue 2 times daily for 14 days. (Patient taking differently take 1/2 film 2x/day and 1/2 film 2x/day as needed for pain or withdrawals.), Disp-28 Film, R-0Normal  3. High risk medication use  -     POCT Rapid Drug Screen     Suboxone -  start weaning by taking 1/2 film 2x/day routine and 1/2 film 2x/day as needed for pain or withdrawals. He ask if UBRELVY would be effective for migraines. Return in about 2 weeks (around 11/22/2021) for opioid dependence . Orders Placed This Encounter   Procedures    POCT Rapid Drug Screen     Orders Placed This Encounter   Medications    buprenorphine-naloxone (SUBOXONE) 8-2 MG FILM SL film     Sig: Place 1 Film under the tongue 2 times daily for 14 days. (Patient taking differently take 1/2 film 2x/day and 1/2 film 2x/day as needed for pain or withdrawals.)     Dispense:  28 Film     Refill:  0     NADEAN:  [id number]       Patient given educational materials - see patient instructions. Discussed use, benefit, and side effects of prescribed medications. All patient questions answered. Pt voiced understanding. Reviewed health maintenance. Instructed to continue current medications, diet and exercise. Patient agreed with treatment plan. Follow up as directed.      Electronically signed by CLYDE Watkins CNP on 11/8/2021 at 9:44 AM

## 2021-11-08 NOTE — PATIENT INSTRUCTIONS
Suboxone -  start weaning by taking1/2 film 2x/day routine and 1/2 film 2x/day as needed for pain or withdrawals.

## 2021-11-22 ENCOUNTER — OFFICE VISIT (OUTPATIENT)
Dept: PRIMARY CARE CLINIC | Age: 51
End: 2021-11-22
Payer: COMMERCIAL

## 2021-11-22 VITALS
DIASTOLIC BLOOD PRESSURE: 98 MMHG | BODY MASS INDEX: 20.12 KG/M2 | SYSTOLIC BLOOD PRESSURE: 158 MMHG | HEART RATE: 101 BPM | WEIGHT: 132.5 LBS | OXYGEN SATURATION: 94 %

## 2021-11-22 DIAGNOSIS — Z79.899 MEDICATION MANAGEMENT: Primary | ICD-10-CM

## 2021-11-22 DIAGNOSIS — Z79.899 HIGH RISK MEDICATION USE: ICD-10-CM

## 2021-11-22 DIAGNOSIS — F11.20 UNCOMPLICATED OPIOID DEPENDENCE (HCC): ICD-10-CM

## 2021-11-22 PROCEDURE — 80305 DRUG TEST PRSMV DIR OPT OBS: CPT | Performed by: NURSE PRACTITIONER

## 2021-11-22 PROCEDURE — 3017F COLORECTAL CA SCREEN DOC REV: CPT | Performed by: NURSE PRACTITIONER

## 2021-11-22 PROCEDURE — G8482 FLU IMMUNIZE ORDER/ADMIN: HCPCS | Performed by: NURSE PRACTITIONER

## 2021-11-22 PROCEDURE — 4004F PT TOBACCO SCREEN RCVD TLK: CPT | Performed by: NURSE PRACTITIONER

## 2021-11-22 PROCEDURE — G8427 DOCREV CUR MEDS BY ELIG CLIN: HCPCS | Performed by: NURSE PRACTITIONER

## 2021-11-22 PROCEDURE — G8420 CALC BMI NORM PARAMETERS: HCPCS | Performed by: NURSE PRACTITIONER

## 2021-11-22 PROCEDURE — 99213 OFFICE O/P EST LOW 20 MIN: CPT | Performed by: NURSE PRACTITIONER

## 2021-11-22 RX ORDER — BUPRENORPHINE AND NALOXONE 8; 2 MG/1; MG/1
1 FILM, SOLUBLE BUCCAL; SUBLINGUAL 2 TIMES DAILY
Qty: 60 FILM | Refills: 0 | Status: SHIPPED | OUTPATIENT
Start: 2021-11-22 | End: 2021-12-06 | Stop reason: SDUPTHER

## 2021-11-22 ASSESSMENT — ENCOUNTER SYMPTOMS
EYE PAIN: 0
COUGH: 0
SINUS PAIN: 0
DIARRHEA: 0
EYE REDNESS: 0
NAUSEA: 0
CONSTIPATION: 0
SHORTNESS OF BREATH: 0
SINUS PRESSURE: 0

## 2021-12-03 ENCOUNTER — TELEPHONE (OUTPATIENT)
Dept: PRIMARY CARE CLINIC | Age: 51
End: 2021-12-03

## 2021-12-03 NOTE — TELEPHONE ENCOUNTER
----- Message from Sam Mooney sent at 12/3/2021 10:06 AM EST -----  Subject: Appointment Request    Reason for Call: Routine Existing Condition Follow Up    QUESTIONS  Type of Appointment? Established Patient  Reason for appointment request? No appointments available during search  Additional Information for Provider? The pt is currently scheduled 12/23   for a fu and to discuss meds. The pt stated that he'd like to be seen   sooner if possible due to the pain that he's experiencing. Please call if   there's any way possible for him to be seen sooner. ---------------------------------------------------------------------------  --------------  Datalot  What is the best way for the office to contact you? OK to leave message on   voicemail  Preferred Call Back Phone Number? [phone]  ---------------------------------------------------------------------------  --------------  SCRIPT ANSWERS  Relationship to Patient? Self  Have your symptoms changed? No  Is this follow up request related to routine Diabetes Management? No  Have you been diagnosed with, awaiting test results for, or told that you   are suspected of having COVID-19 (Coronavirus)? (If patient has tested   negative or was tested as a requirement for work, school, or travel and   not based on symptoms, answer no)? No  Within the past two weeks have you developed any of the following symptoms   (answer no if symptoms have been present longer than 2 weeks or began   more than 2 weeks ago)? Fever or Chills, Cough, Shortness of breath or   difficulty breathing, Loss of taste or smell, Sore throat, Nasal   congestion, Sneezing or runny nose, Fatigue or generalized body aches   (answer no if pain is specific to a body part e.g. back pain), Diarrhea,   Headache? No  Have you had close contact with someone with COVID-19 in the last 14 days? No  (Service Expert  click yes below to proceed with MOOI As Usual   Scheduling)?  Yes

## 2021-12-06 ENCOUNTER — TELEPHONE (OUTPATIENT)
Dept: PRIMARY CARE CLINIC | Age: 51
End: 2021-12-06

## 2021-12-06 ENCOUNTER — OFFICE VISIT (OUTPATIENT)
Dept: PRIMARY CARE CLINIC | Age: 51
End: 2021-12-06
Payer: COMMERCIAL

## 2021-12-06 VITALS
OXYGEN SATURATION: 99 % | WEIGHT: 136.6 LBS | DIASTOLIC BLOOD PRESSURE: 108 MMHG | HEART RATE: 99 BPM | BODY MASS INDEX: 20.75 KG/M2 | SYSTOLIC BLOOD PRESSURE: 160 MMHG

## 2021-12-06 DIAGNOSIS — M48.02 NEURAL FORAMINAL STENOSIS OF CERVICAL SPINE: Primary | ICD-10-CM

## 2021-12-06 DIAGNOSIS — F11.20 UNCOMPLICATED OPIOID DEPENDENCE (HCC): ICD-10-CM

## 2021-12-06 DIAGNOSIS — I10 ESSENTIAL HYPERTENSION: ICD-10-CM

## 2021-12-06 DIAGNOSIS — Z79.899 MEDICATION MANAGEMENT: ICD-10-CM

## 2021-12-06 DIAGNOSIS — E10.65 TYPE 1 DIABETES MELLITUS WITH HYPERGLYCEMIA (HCC): ICD-10-CM

## 2021-12-06 PROCEDURE — 2022F DILAT RTA XM EVC RTNOPTHY: CPT | Performed by: FAMILY MEDICINE

## 2021-12-06 PROCEDURE — G8427 DOCREV CUR MEDS BY ELIG CLIN: HCPCS | Performed by: FAMILY MEDICINE

## 2021-12-06 PROCEDURE — 3017F COLORECTAL CA SCREEN DOC REV: CPT | Performed by: FAMILY MEDICINE

## 2021-12-06 PROCEDURE — G8482 FLU IMMUNIZE ORDER/ADMIN: HCPCS | Performed by: FAMILY MEDICINE

## 2021-12-06 PROCEDURE — 4004F PT TOBACCO SCREEN RCVD TLK: CPT | Performed by: FAMILY MEDICINE

## 2021-12-06 PROCEDURE — 99213 OFFICE O/P EST LOW 20 MIN: CPT | Performed by: FAMILY MEDICINE

## 2021-12-06 PROCEDURE — 3046F HEMOGLOBIN A1C LEVEL >9.0%: CPT | Performed by: FAMILY MEDICINE

## 2021-12-06 PROCEDURE — G8420 CALC BMI NORM PARAMETERS: HCPCS | Performed by: FAMILY MEDICINE

## 2021-12-06 RX ORDER — LORAZEPAM 0.5 MG/1
TABLET ORAL
COMMUNITY
Start: 2021-11-10 | End: 2021-12-09 | Stop reason: SDUPTHER

## 2021-12-06 RX ORDER — BUPRENORPHINE AND NALOXONE 8; 2 MG/1; MG/1
1 FILM, SOLUBLE BUCCAL; SUBLINGUAL 3 TIMES DAILY
Qty: 90 FILM | Refills: 0 | Status: SHIPPED | OUTPATIENT
Start: 2021-12-06 | End: 2022-01-19 | Stop reason: SDUPTHER

## 2021-12-06 ASSESSMENT — ENCOUNTER SYMPTOMS
SHORTNESS OF BREATH: 0
RHINORRHEA: 0
WHEEZING: 0
ABDOMINAL PAIN: 0
EYE REDNESS: 0
COUGH: 0
SORE THROAT: 0
EYE DISCHARGE: 0
VOMITING: 0
DIARRHEA: 0
NAUSEA: 0

## 2021-12-06 NOTE — PROGRESS NOTES
[facility] CARE  [address] [address]  [address] [address]  Dept: [phone]    Eleanor Suarez is a 46 y.o. male Established patient, who presents today for his medical conditions/complaints as noted below. Chief Complaint   Patient presents with    Shoulder Pain     Pt here for pain radiating from shoulder that isn't getting better. HPI:     HPI  Pt states having severe pain in neck, shoulders, and lower back. States pain management won't do anything due to insulin pump. Has been using suboxone with good results on withdrawal.  Last seen Dr. Lesly Escalera for pain management. Seen  Neurosurgery at [facility]. States they are not willing to do surgery. Neurosurgery locally apparently focused on his wrist and his hands. Patient complaining mostly of the pain however in his neck and shoulders. States worse with head movement. Unable to keep his arms above his head without getting severe discomfort. MRI results and EMG were reviewed  Patient not checking his blood pressure outside the office  Continues to see endocrinology for his diabetes. Patient had been tried on both Lyrica and Neurontin. Developed slurred speech, altered mental status and dizziness.   Not able to tolerate medication    Reviewed prior notes Neurosurgery  Reviewed previous Labs and Imaging    LDL Calculated (mg/dL)   Date Value   04/16/2019 97   05/21/2018 66   01/05/2018 66       (goal LDL is <100)   AST (U/L)   Date Value   08/20/2021 18     ALT (U/L)   Date Value   08/20/2021 14     BUN (mg/dL)   Date Value   08/20/2021 9     Hemoglobin A1C (%)   Date Value   10/19/2021 9.5     TSH (mIU/L)   Date Value   07/27/2018 0.43     BP Readings from Last 3 Encounters:   12/06/21 (!) 170/100   11/22/21 (!) 158/98   11/08/21 138/86          (goal 120/80)    Past Medical History:   Diagnosis Date    Anxiety     Arthritis     CAD (coronary artery disease)     Calcaneal apophysitis     Corns     Depression     Diabetes type I ([facility]     Gastroparesis     GERD (gastroesophageal reflux disease)     Headache(784.0)     Hearing loss     Hyperglycemia     Hyperlipidemia     Hypertension     Intussusception ([facility]     MI (myocardial infarction) ([facility] jan 2014    Neuropathy     Osteoarthritis     Panic attacks     Temporomandibular joint disorder       Past Surgical History:   Procedure Laterality Date    ABDOMEN SURGERY      insulin pump    ABDOMINAL EXPLORATION SURGERY  05/28/2016    bowel resection Rt. colon &terminal ileum, intussuseption.      CARDIAC CATHETERIZATION  2014    CARPAL TUNNEL RELEASE      bilateral    COLONOSCOPY  05/23/2017    poor prep; diverticulosis    COLONOSCOPY N/A 7/10/2018    COLONOSCOPY POLYPECTOMY HOT BIOPSY performed by Moriah Yao MD at [facility] COLONOSCOPY N/A 8/9/2021    COLONOSCOPY DIAGNOSTIC performed by Moriah Yao MD at [facility]      right    HERNIA REPAIR      inguinal    [place] COLON CA SCRN NOT  [address] IND N/A 5/25/2017    COLONOSCOPY performed by Kymberly Guan MD at [address] ESOPHAGOGASTRODUODENOSCOPY TRANSORAL DIAGNOSTIC N/A 5/24/2017    EGD ESOPHAGOGASTRODUODENOSCOPY performed by Kymberly Guan MD at [facility] [address]    [place]      fracture     TONSILLECTOMY      x2    TOOTH EXTRACTION      x4    ULNAR TUNNEL RELEASE      UPPER GASTROINTESTINAL ENDOSCOPY  05/23/2017    mild gastritis    UPPER GASTROINTESTINAL ENDOSCOPY N/A 7/10/2018    EGD BIOPSY performed by Moriah Yao MD at [place] [address] N/A 8/10/2021    HERNIA INCISIONAL REPAIR OPEN W/MESH performed by Moriah Yao MD at [address] EXTRACTION      x4       Family History   Problem Relation Age of Onset    Diabetes Mother     High Blood Pressure Mother     Heart Disease Mother     Migraines Mother     Other Mother         diabetic neuropathy    High Blood Pressure Father        Social History     Tobacco Use    Smoking status: Current Every Day Smoker     Packs/day: 1.00     Years: 30.00     Pack years: 30.00    Smokeless tobacco: Never Used   Substance Use Topics    Alcohol use: No     Comment: previous heavy ETOH use; pt stopped around 2010      Current Outpatient Medications   Medication Sig Dispense Refill    HUMALOG 100 UNIT/ML injection vial INJECT UP TO 70 UNITS VIA PUMP AS DIRECTED      LORazepam (ATIVAN) 0.5 MG tablet take 1 tablet by mouth every 8 hours if needed for anxiety      buprenorphine-naloxone (SUBOXONE) 8-2 MG FILM SL film Place 1 Film under the tongue 2 times daily for 30 days.  (Patient taking differently take 1 film in the morning and 1/2 film in the evening and  1/2 film daily as needed for pain or withdrawals.) 60 Film 0    ondansetron (ZOFRAN) 4 MG tablet Take every six hours as needed 20 tablet 0    butalbital-acetaminophen-caffeine (FIORICET, ESGIC) -40 MG per tablet Take 1 tablet by mouth every 4 hours as needed for Headaches 180 tablet 3    VORTIoxetine HBr (TRINTELLIX) 20 MG TABS tablet Take 1 tablet by mouth daily 30 tablet 0    brexpiprazole (REXULTI) 0.5 MG TABS tablet Take 0.5 mg by mouth daily      cephALEXin (KEFLEX) 500 MG capsule 500 mgTake three times daily 21 capsule 0    naloxone 4 MG/0.1ML LIQD nasal spray 1 spray by Nasal route as needed for Opioid Reversal 1 each 5    propranolol (INDERAL) 20 MG tablet Take 20 mg by mouth as needed Takes for migraines      b complex vitamins capsule Take 1 capsule by mouth daily      pantoprazole (PROTONIX) 40 MG tablet TAKE ONE TABLET BY MOUTH TWICE DAILY 60 tablet 3    lisinopril (PRINIVIL;ZESTRIL) 30 MG tablet Take 1 tablet by mouth daily (Patient taking differently: Take 5 mg by mouth daily ) 90 tablet 3    isosorbide mononitrate (IMDUR) 30 MG extended release tablet Take 1 tablet by mouth daily 90 tablet 3    atorvastatin (LIPITOR) 80 MG tablet Take 1 tablet by mouth nightly 90 tablet 3    clopidogrel (PLAVIX) 75 MG tablet Take 1 tablet by mouth daily 90 tablet 3    CONTOUR NEXT TEST strip use four times a day      nitroGLYCERIN (NITROSTAT) 0.4 MG SL tablet Place 1 tablet under the tongue every 5 minutes as needed for Chest pain Indications: Disease of the Arteries of the Heart Take 1 tablet every 5 minutes times three as needed for chest pain 25 tablet 2    Insulin Aspart (NOVOLOG SC) Inject into the skin Insulin pump        No current facility-administered medications for this visit.      Facility-Administered Medications Ordered in Other Visits   Medication Dose Route Frequency Provider Last Rate Last Admin    lactated ringers infusion  75 mL/hr IntraVENous Continuous Mikal Mercado MD         Allergies   Allergen Reactions    Avelox [Moxifloxacin]     Ciprofloxacin Other (See Comments)    Levofloxacin     Lyrica [Pregabalin]     Metoclopramide Other (See Comments)     Severe confusion and paranoid activity    Neurontin [Gabapentin]     Other      Can not have epidural injections, steroids, nerve blocks, or burning of nerves due to being diabetic    Tetanus Toxoids     Tramadol     Tramadol Hcl        Health Maintenance   Topic Date Due    Hepatitis C screen  Never done    COVID-19 Vaccine (1) Never done    HIV screen  Never done    Hepatitis B vaccine (1 of 3 - Risk 3-dose series) Never done    Diabetic retinal exam  11/28/2019    Lipid screen  04/16/2020    Shingles Vaccine (1 of 2) Never done    Low dose CT lung screening  Never done    Diabetic microalbuminuria test  05/04/2021    A1C test (Diabetic or Prediabetic)  01/19/2022    Colon cancer screen colonoscopy  02/09/2022    Potassium monitoring  08/20/2022    Creatinine monitoring  08/20/2022    Diabetic foot exam  10/19/2022    Pneumococcal 0-64 years Vaccine (2 of 2 - PPSV23) 12/02/2035    Flu vaccine  Completed    Hepatitis A vaccine  Aged Out    Hib vaccine  Aged C/ David Michael 19 Meningococcal (ACWY) vaccine  Aged Out       Subjective:      Review of Systems   Constitutional: Negative for chills and fever. HENT: Negative for rhinorrhea and sore throat. Eyes: Negative for discharge and redness. Respiratory: Negative for cough, shortness of breath and wheezing. Cardiovascular: Negative for chest pain and palpitations. Gastrointestinal: Negative for abdominal pain, diarrhea, nausea and vomiting. Genitourinary: Negative for dysuria and frequency. Musculoskeletal: Negative for arthralgias and myalgias. Neurological: Positive for weakness and numbness. Negative for dizziness, light-headedness and headaches. Psychiatric/Behavioral: Negative for sleep disturbance. Objective:     BP (!) 170/100   Pulse 99   Wt 136 lb 9.6 oz (62 kg)   SpO2 99%   BMI 20.75 kg/m²   Physical Exam  Vitals and nursing note reviewed. Constitutional:       General: He is not in acute distress. Appearance: He is well-developed. He is not ill-appearing. HENT:      Head: Normocephalic and atraumatic. Right Ear: External ear normal.      Left Ear: External ear normal.   Eyes:      General: No scleral icterus. Right eye: No discharge. Left eye: No discharge. Conjunctiva/sclera: Conjunctivae normal.      Pupils: Pupils are equal, round, and reactive to light. Neck:      Thyroid: No thyromegaly. Trachea: No tracheal deviation. Cardiovascular:      Rate and Rhythm: Normal rate and regular rhythm. Heart sounds: Normal heart sounds. Pulmonary:      Effort: Pulmonary effort is normal. No respiratory distress. Breath sounds: Normal breath sounds. No wheezing. Lymphadenopathy:      Cervical: No cervical adenopathy. Skin:     General: Skin is warm. Findings: No rash. Neurological:      Mental Status: He is alert and oriented to person, place, and time.    Psychiatric:         Mood and Affect: Mood normal.         Behavior: Behavior normal. Thought Content: Thought content normal.         Assessment:       Diagnosis Orders   1. Neural foraminal stenosis of cervical spine  External Referral To Neurosurgery   2. Type 1 diabetes mellitus with hyperglycemia (HCC)     3. Essential hypertension          Plan:    Referral to [facility] clinic neurosurgery for second opinion. Believe most of patient's symptoms are truly coming from his neck, however local neurosurgeons are hesitant to intervene  Pain management will not intervene due to elevated A1c's  We will discuss with physician assistant regarding whether or not Suboxone can be increased  Keep follow-up appointment with endocrinology for diabetes mellitus    Return in about 3 months (around 3/6/2022). Orders Placed This Encounter   Procedures    External Referral To Neurosurgery     Referral Priority:   Routine     Referral Type:   Eval and Treat     Referral Reason:   Specialty Services Required     Requested Specialty:   Neurosurgery     Number of Visits Requested:   1     No orders of the defined types were placed in this encounter. Patient given educational materials - see patient instructions. Discussed use, benefit, and side effects of prescribed medications. All patient questions answered. Pt voiced understanding. Reviewed health maintenance. Instructed to continue current medications, diet andexercise. Patient agreed with treatment plan. Follow up as directed.      Electronicallysigned by Reyna Cline MD on 12/6/2021 at 11:02 AM

## 2021-12-06 NOTE — TELEPHONE ENCOUNTER
New prescription of Suboxone sent to pharmacy.   Prescription reads Suboxone 3 times per day - patient taking differently take 1 film in the morning and 1 film in the evening and  1/2 film 2x daily as needed for pain or withdrawals.)  Please update patient

## 2021-12-06 NOTE — TELEPHONE ENCOUNTER
Patient continues to have extreme pain. States Suboxone has been helping with his withdrawal tremendously but not with his discomfort. Patient is not able to take gabapentin or Neurontin. Wondering if Suboxone can be increased?

## 2021-12-09 DIAGNOSIS — F41.8 DEPRESSION WITH ANXIETY: Primary | ICD-10-CM

## 2021-12-09 RX ORDER — LORAZEPAM 0.5 MG/1
0.5 TABLET ORAL EVERY 8 HOURS PRN
Qty: 90 TABLET | Refills: 0 | Status: SHIPPED | OUTPATIENT
Start: 2021-12-09 | End: 2022-01-07 | Stop reason: SDUPTHER

## 2022-01-07 DIAGNOSIS — F41.8 DEPRESSION WITH ANXIETY: ICD-10-CM

## 2022-01-07 RX ORDER — LORAZEPAM 0.5 MG/1
0.5 TABLET ORAL EVERY 8 HOURS PRN
Qty: 90 TABLET | Refills: 0 | Status: SHIPPED | OUTPATIENT
Start: 2022-01-07 | End: 2022-02-03 | Stop reason: SDUPTHER

## 2022-01-19 ENCOUNTER — OFFICE VISIT (OUTPATIENT)
Dept: PRIMARY CARE CLINIC | Age: 52
End: 2022-01-19
Payer: COMMERCIAL

## 2022-01-19 VITALS
SYSTOLIC BLOOD PRESSURE: 130 MMHG | BODY MASS INDEX: 19.94 KG/M2 | WEIGHT: 131.3 LBS | DIASTOLIC BLOOD PRESSURE: 88 MMHG | OXYGEN SATURATION: 99 % | HEART RATE: 119 BPM

## 2022-01-19 DIAGNOSIS — M48.02 NEURAL FORAMINAL STENOSIS OF CERVICAL SPINE: ICD-10-CM

## 2022-01-19 DIAGNOSIS — F11.23 OPIOID DEPENDENCE WITH WITHDRAWAL (HCC): Primary | ICD-10-CM

## 2022-01-19 DIAGNOSIS — Z79.899 HIGH RISK MEDICATION USE: ICD-10-CM

## 2022-01-19 DIAGNOSIS — F11.20 UNCOMPLICATED OPIOID DEPENDENCE (HCC): ICD-10-CM

## 2022-01-19 DIAGNOSIS — E10.65 TYPE 1 DIABETES MELLITUS WITH HYPERGLYCEMIA (HCC): ICD-10-CM

## 2022-01-19 DIAGNOSIS — F33.9 MAJOR DEPRESSIVE DISORDER, RECURRENT EPISODE WITH ANXIOUS DISTRESS (HCC): ICD-10-CM

## 2022-01-19 DIAGNOSIS — M48.02 STENOSIS OF CERVICAL SPINE WITH MYELOPATHY (HCC): ICD-10-CM

## 2022-01-19 DIAGNOSIS — G99.2 STENOSIS OF CERVICAL SPINE WITH MYELOPATHY (HCC): ICD-10-CM

## 2022-01-19 DIAGNOSIS — Z79.899 MEDICATION MANAGEMENT: ICD-10-CM

## 2022-01-19 DIAGNOSIS — M46.92 UNSPECIFIED INFLAMMATORY SPONDYLOPATHY, CERVICAL REGION (HCC): ICD-10-CM

## 2022-01-19 LAB
ALCOHOL URINE: NEGATIVE
AMPHETAMINE SCREEN, URINE: NEGATIVE
BARBITURATE SCREEN, URINE: NEGATIVE
BENZODIAZEPINE SCREEN, URINE: POSITIVE
BUPRENORPHINE URINE: NEGATIVE
COCAINE METABOLITE SCREEN URINE: NEGATIVE
FENTANYL SCREEN, URINE: NEGATIVE
GABAPENTIN SCREEN, URINE: NEGATIVE
MDMA URINE: NEGATIVE
METHADONE SCREEN, URINE: NEGATIVE
METHAMPHETAMINE, URINE: NEGATIVE
OPIATE SCREEN URINE: NEGATIVE
OXYCODONE SCREEN URINE: NEGATIVE
PHENCYCLIDINE SCREEN URINE: NEGATIVE
PROPOXYPHENE SCREEN, URINE: NEGATIVE
SYNTHETIC CANNABINOIDS(K2) SCREEN, URINE: NEGATIVE
THC SCREEN, URINE: POSITIVE
TRAMADOL SCREEN URINE: NEGATIVE
TRICYCLIC ANTIDEPRESSANTS, UR: NEGATIVE

## 2022-01-19 PROCEDURE — G8427 DOCREV CUR MEDS BY ELIG CLIN: HCPCS | Performed by: NURSE PRACTITIONER

## 2022-01-19 PROCEDURE — 80305 DRUG TEST PRSMV DIR OPT OBS: CPT | Performed by: NURSE PRACTITIONER

## 2022-01-19 PROCEDURE — 2022F DILAT RTA XM EVC RTNOPTHY: CPT | Performed by: NURSE PRACTITIONER

## 2022-01-19 PROCEDURE — G8420 CALC BMI NORM PARAMETERS: HCPCS | Performed by: NURSE PRACTITIONER

## 2022-01-19 PROCEDURE — 3017F COLORECTAL CA SCREEN DOC REV: CPT | Performed by: NURSE PRACTITIONER

## 2022-01-19 PROCEDURE — 4004F PT TOBACCO SCREEN RCVD TLK: CPT | Performed by: NURSE PRACTITIONER

## 2022-01-19 PROCEDURE — G8482 FLU IMMUNIZE ORDER/ADMIN: HCPCS | Performed by: NURSE PRACTITIONER

## 2022-01-19 PROCEDURE — 99214 OFFICE O/P EST MOD 30 MIN: CPT | Performed by: NURSE PRACTITIONER

## 2022-01-19 PROCEDURE — 3046F HEMOGLOBIN A1C LEVEL >9.0%: CPT | Performed by: NURSE PRACTITIONER

## 2022-01-19 RX ORDER — BUPRENORPHINE AND NALOXONE 8; 2 MG/1; MG/1
1 FILM, SOLUBLE BUCCAL; SUBLINGUAL 3 TIMES DAILY
Qty: 90 FILM | Refills: 0 | Status: SHIPPED | OUTPATIENT
Start: 2022-01-19 | End: 2022-08-29 | Stop reason: SDUPTHER

## 2022-01-19 ASSESSMENT — ENCOUNTER SYMPTOMS
COUGH: 0
BACK PAIN: 1
DIARRHEA: 0
CONSTIPATION: 0
CHOKING: 0

## 2022-01-19 NOTE — PATIENT INSTRUCTIONS
Continue suboxone 3 strips daily  Follow up with [facility].   Keep follow-up appointment with endocrinology for diabetes mellitus

## 2022-01-19 NOTE — PROGRESS NOTES
[facility]  [address]  [address] [place] [address]  Dept: [phone]    Ana Ramey is a 46 y.o. male Established patient, who presents today for his medical conditions/complaints as noted below. Chief Complaint   Patient presents with    Medication Check       HPI:     HPI  He using suboxone 3x/day - he takes one the morning and 1/2 at lunch and 1/2 in afternoon and 1 in the evening. He is not having any cravings for pain medications. No diarrhea. NO tremors. He is having some insomnia but that is normal for him. He is not getting any pain relief. His pain is always 9-10/10 since the beginning of December. Cervical region and shoulders and right elbow. He has difficulty getting holding arms up. When he can rest arms the pain is tolerable - more like an ache. He is on insulin pump and blood sugars not well controlled. Therefore neurosurgeons do not want intervene with him. He had referral to [facility] but due to Covid things are on hold. He did have contact with them but states the last message left was that he will likely not be able to be seen until after February. He is not using Fioricet because it increased headache. Left heel is feeling better.     Reviewed prior notes Previous PCP  Reviewed previous Labs and Imaging    LDL Calculated (mg/dL)   Date Value   04/16/2019 97   05/21/2018 66   01/05/2018 66       (goal LDL is <100)   AST (U/L)   Date Value   08/20/2021 18     ALT (U/L)   Date Value   08/20/2021 14     BUN (mg/dL)   Date Value   08/20/2021 9     Hemoglobin A1C (%)   Date Value   10/19/2021 9.5     TSH (mIU/L)   Date Value   07/27/2018 0.43     BP Readings from Last 3 Encounters:   01/19/22 130/88   12/06/21 (!) 160/108   11/22/21 (!) 158/98          (goal 120/80)    Past Medical History:   Diagnosis Date    Anxiety     Arthritis     CAD (coronary artery disease)     Calcaneal apophysitis     Corns     Depression     Diabetes type I [facility] [address]     Gastroparesis     GERD (gastroesophageal reflux disease)     Headache(784.0)     Hearing loss     Hyperglycemia     Hyperlipidemia     Hypertension     Intussusception ([facility] [address]     MI (myocardial infarction) ([facility] [address] jan 2014    Neuropathy     Osteoarthritis     Panic attacks     Temporomandibular joint disorder       Past Surgical History:   Procedure Laterality Date    ABDOMEN SURGERY      insulin pump    ABDOMINAL EXPLORATION SURGERY  05/28/2016    bowel resection Rt. colon &terminal ileum, intussuseption.      CARDIAC CATHETERIZATION  2014    CARPAL TUNNEL RELEASE      bilateral    COLONOSCOPY  05/23/2017    poor prep; diverticulosis    COLONOSCOPY N/A 7/10/2018    COLONOSCOPY POLYPECTOMY HOT BIOPSY performed by Kerri Gonzales MD at [facility] COLONOSCOPY N/A 8/9/2021    COLONOSCOPY DIAGNOSTIC performed by Kerri Gonzales MD at [facility]      right    HERNIA REPAIR      inguinal    SD [facility] N/A 5/25/2017    COLONOSCOPY performed by Saad Patrick MD at [facility] ESOPHAGOGASTRODUODENOSCOPY TRANSORAL DIAGNOSTIC N/A 5/24/2017    EGD ESOPHAGOGASTRODUODENOSCOPY performed by Saad Patrick MD at [facility] Bilateral    [place]      fracture     TONSILLECTOMY      x2    TOOTH EXTRACTION      x4    ULNAR TUNNEL RELEASE      UPPER GASTROINTESTINAL ENDOSCOPY  05/23/2017    mild gastritis    UPPER GASTROINTESTINAL ENDOSCOPY N/A 7/10/2018    EGD BIOPSY performed by Kerri Gonzales MD at [place] [address] N/A 8/10/2021    HERNIA INCISIONAL REPAIR OPEN W/MESH performed by Kerri Gonzales MD at [address] EXTRACTION      x4       Family History   Problem Relation Age of Onset    Diabetes Mother     High Blood Pressure Mother     Heart Disease Mother     Migraines Mother     Other Mother         diabetic neuropathy    High Blood Pressure Father        Social History     Tobacco Use    Smoking status: Current Every Day Smoker     Packs/day: 1.00     Years: 30.00     Pack years: 30.00    Smokeless tobacco: Never Used   Substance Use Topics    Alcohol use: No     Comment: previous heavy ETOH use; pt stopped around 2010      Current Outpatient Medications   Medication Sig Dispense Refill    buprenorphine-naloxone (SUBOXONE) 8-2 MG FILM SL film Place 1 Film under the tongue 3 times daily for 30 days. (Patient taking differently take 1 film in the morning and 1 film in the evening and  1/2 film 2x daily as needed for pain or withdrawals.) 90 Film 0    LORazepam (ATIVAN) 0.5 MG tablet Take 1 tablet by mouth every 8 hours as needed for Anxiety for up to 30 days.  90 tablet 0    HUMALOG 100 UNIT/ML injection vial INJECT UP TO 70 UNITS VIA PUMP AS DIRECTED      ondansetron (ZOFRAN) 4 MG tablet Take every six hours as needed 20 tablet 0    VORTIoxetine HBr (TRINTELLIX) 20 MG TABS tablet Take 1 tablet by mouth daily 30 tablet 0    brexpiprazole (REXULTI) 0.5 MG TABS tablet Take 0.5 mg by mouth daily      cephALEXin (KEFLEX) 500 MG capsule 500 mgTake three times daily 21 capsule 0    naloxone 4 MG/0.1ML LIQD nasal spray 1 spray by Nasal route as needed for Opioid Reversal 1 each 5    propranolol (INDERAL) 20 MG tablet Take 20 mg by mouth as needed Takes for migraines      b complex vitamins capsule Take 1 capsule by mouth daily      pantoprazole (PROTONIX) 40 MG tablet TAKE ONE TABLET BY MOUTH TWICE DAILY 60 tablet 3    lisinopril (PRINIVIL;ZESTRIL) 30 MG tablet Take 1 tablet by mouth daily (Patient taking differently: Take 5 mg by mouth daily ) 90 tablet 3    isosorbide mononitrate (IMDUR) 30 MG extended release tablet Take 1 tablet by mouth daily 90 tablet 3    atorvastatin (LIPITOR) 80 MG tablet Take 1 tablet by mouth nightly 90 tablet 3    clopidogrel (PLAVIX) 75 MG tablet Take 1 tablet by mouth daily 90 tablet 3    CONTOUR NEXT TEST strip use four times a day      nitroGLYCERIN (NITROSTAT) 0.4 MG SL tablet Place 1 tablet under the tongue every 5 minutes as needed for Chest pain Indications: Disease of the Arteries of the Heart Take 1 tablet every 5 minutes times three as needed for chest pain 25 tablet 2    Insulin Aspart (NOVOLOG SC) Inject into the skin Insulin pump        No current facility-administered medications for this visit.      Facility-Administered Medications Ordered in Other Visits   Medication Dose Route Frequency Provider Last Rate Last Admin    lactated ringers infusion  75 mL/hr IntraVENous Continuous Mikal Deshpande MD         Allergies   Allergen Reactions    Avelox [Moxifloxacin]     Ciprofloxacin Other (See Comments)    Levofloxacin     Lyrica [Pregabalin]     Metoclopramide Other (See Comments)     Severe confusion and paranoid activity    Neurontin [Gabapentin]     Other      Can not have epidural injections, steroids, nerve blocks, or burning of nerves due to being diabetic    Tetanus Toxoids     Tramadol     Tramadol Hcl        Health Maintenance   Topic Date Due    Hepatitis C screen  Never done    COVID-19 Vaccine (1) Never done    HIV screen  Never done    Hepatitis B vaccine (1 of 3 - Risk 3-dose series) Never done    Diabetic retinal exam  11/28/2019    Lipid screen  04/16/2020    Shingles Vaccine (1 of 2) Never done    Low dose CT lung screening  Never done    Diabetic microalbuminuria test  05/04/2021    A1C test (Diabetic or Prediabetic)  01/19/2022    Colon cancer screen colonoscopy  02/09/2022    Potassium monitoring  08/20/2022    Creatinine monitoring  08/20/2022    Depression Monitoring  10/11/2022    Diabetic foot exam  10/19/2022    Pneumococcal 0-64 years Vaccine (2 of 2 - PPSV23) 12/02/2035    Flu vaccine  Completed    Hepatitis A vaccine  Aged Out    Hib vaccine  Aged Out    Meningococcal (ACWY) vaccine  Aged Out       Subjective:      Review of Systems Constitutional: Negative for chills, fatigue and fever. HENT: Negative for congestion. Respiratory: Negative for cough and choking. Gastrointestinal: Negative for constipation and diarrhea. Genitourinary: Negative for difficulty urinating and dysuria. Musculoskeletal: Positive for arthralgias, back pain and neck pain. Skin: Negative for rash and wound. Bumps on abdominal inscision   Neurological: Positive for weakness. Negative for dizziness and light-headedness. Psychiatric/Behavioral: Positive for sleep disturbance. Negative for dysphoric mood. The patient is not nervous/anxious. Objective:     /88   Pulse 119   Wt 131 lb 4.8 oz (59.6 kg)   SpO2 99%   BMI 19.94 kg/m²   Physical Exam  Vitals and nursing note reviewed. Constitutional:       Appearance: Normal appearance. HENT:      Head: Normocephalic and atraumatic. Right Ear: External ear normal.      Left Ear: External ear normal.   Cardiovascular:      Rate and Rhythm: Normal rate and regular rhythm. Pulmonary:      Effort: Pulmonary effort is normal.      Breath sounds: Normal breath sounds. Abdominal:      General: Bowel sounds are normal.      Palpations: Abdomen is soft. Musculoskeletal:      Right shoulder: Tenderness, bony tenderness and crepitus present. Decreased strength. Left shoulder: Tenderness, bony tenderness and crepitus present. Decreased strength. Cervical back: Bony tenderness present. Pain with movement present. Skin:     General: Skin is warm and dry. Neurological:      Mental Status: He is alert and oriented to person, place, and time. Motor: No weakness. Psychiatric:         Mood and Affect: Mood normal.         Thought Content: Thought content normal.         Judgment: Judgment normal.         Assessment/Plan:   1. Opioid dependence with withdrawal (HCC)   - All symptoms controlled- pain is not  - Continue Suboxone 8-2 mg film 3 films per day.   2. Neural foraminal stenosis of cervical spine  - Cervical pain continues at 8-10 daily  - Follow-up at [facility] clinic. 3. High risk medication use  -     POCT Rapid Drug Screen  -Completed today  4. Unspecified inflammatory spondylopathy, cervical region [facility])  - Cervical pain continues at 8-10 daily  - Follow-up at [facility] clinic. 5. Major depressive disorder, recurrent episode with anxious distress ([facility] [address]  -Struggles with depression somewhat related to pain  - Trintellix controls. 6. Type 1 diabetes mellitus with hyperglycemia ([facility] [address]  - Not well controlled  - Has follow-up with endocrinologist next week. 7. Stenosis of cervical spine with myelopathy (HCC)  - Cervical pain continues at 8-10 daily  - Follow-up at [facility] clinic. 8. Medication management  -     buprenorphine-naloxone (SUBOXONE) 8-2 MG FILM SL film; Place 1 Film under the tongue 3 times daily for 30 days. (Patient taking differently take 1 film in the morning and 1 film in the evening and  1/2 film 2x daily as needed for pain or withdrawals.), Disp-90 Film, R-0Normal  9. Uncomplicated opioid dependence (HCC)  -     buprenorphine-naloxone (SUBOXONE) 8-2 MG FILM SL film; Place 1 Film under the tongue 3 times daily for 30 days. (Patient taking differently take 1 film in the morning and 1 film in the evening and  1/2 film 2x daily as needed for pain or withdrawals.), Disp-90 Film, R-0Normal     Continue suboxone 3 strips daily -he is at recommended maximum dose of Suboxone  [facility]. Keep follow-up appointment with endocrinology for diabetes mellitus  Return in about 4 weeks (around 2/16/2022) for opioid dependence. Orders Placed This Encounter   Procedures    POCT Rapid Drug Screen     Orders Placed This Encounter   Medications    buprenorphine-naloxone (SUBOXONE) 8-2 MG FILM SL film     Sig: Place 1 Film under the tongue 3 times daily for 30 days.  (Patient taking differently take 1 film in the morning and 1 film in the evening and  1/2 film 2x daily as needed for pain or withdrawals.)     Dispense:  90 Film     Refill:  0     NADEAN:  [id number]       Patient given educational materials - see patient instructions. Discussed use, benefit, and side effects of prescribed medications. All patient questions answered. Pt voiced understanding. Reviewed health maintenance. Instructed to continue current medications, diet and exercise. Patient agreed with treatment plan. Follow up as directed.      Electronically signed by CLYDE Begum CNP on 1/19/2022 at 9:46 AM

## 2022-01-24 RX ORDER — BREXPIPRAZOLE 0.5 MG/1
TABLET ORAL
Qty: 90 TABLET | Refills: 3 | Status: SHIPPED | OUTPATIENT
Start: 2022-01-24 | End: 2022-04-07

## 2022-01-25 ENCOUNTER — OFFICE VISIT (OUTPATIENT)
Dept: PRIMARY CARE CLINIC | Age: 52
End: 2022-01-25
Payer: COMMERCIAL

## 2022-01-25 VITALS
HEART RATE: 108 BPM | HEIGHT: 68 IN | OXYGEN SATURATION: 93 % | WEIGHT: 132 LBS | BODY MASS INDEX: 20 KG/M2 | SYSTOLIC BLOOD PRESSURE: 142 MMHG | DIASTOLIC BLOOD PRESSURE: 94 MMHG

## 2022-01-25 DIAGNOSIS — Z13.220 ENCOUNTER FOR LIPID SCREENING FOR CARDIOVASCULAR DISEASE: ICD-10-CM

## 2022-01-25 DIAGNOSIS — Z12.5 SCREENING PSA (PROSTATE SPECIFIC ANTIGEN): ICD-10-CM

## 2022-01-25 DIAGNOSIS — M50.30 DEGENERATIVE CERVICAL DISC: Primary | ICD-10-CM

## 2022-01-25 DIAGNOSIS — Z13.6 ENCOUNTER FOR LIPID SCREENING FOR CARDIOVASCULAR DISEASE: ICD-10-CM

## 2022-01-25 DIAGNOSIS — Z00.00 ANNUAL PHYSICAL EXAM: ICD-10-CM

## 2022-01-25 PROCEDURE — 3017F COLORECTAL CA SCREEN DOC REV: CPT | Performed by: FAMILY MEDICINE

## 2022-01-25 PROCEDURE — 4004F PT TOBACCO SCREEN RCVD TLK: CPT | Performed by: FAMILY MEDICINE

## 2022-01-25 PROCEDURE — G8427 DOCREV CUR MEDS BY ELIG CLIN: HCPCS | Performed by: FAMILY MEDICINE

## 2022-01-25 PROCEDURE — G8420 CALC BMI NORM PARAMETERS: HCPCS | Performed by: FAMILY MEDICINE

## 2022-01-25 PROCEDURE — G8482 FLU IMMUNIZE ORDER/ADMIN: HCPCS | Performed by: FAMILY MEDICINE

## 2022-01-25 PROCEDURE — 99214 OFFICE O/P EST MOD 30 MIN: CPT | Performed by: FAMILY MEDICINE

## 2022-01-25 RX ORDER — OXYCODONE AND ACETAMINOPHEN 10; 325 MG/1; MG/1
1 TABLET ORAL EVERY 4 HOURS PRN
Qty: 180 TABLET | Refills: 0 | Status: SHIPPED | OUTPATIENT
Start: 2022-01-25 | End: 2022-02-16 | Stop reason: SDUPTHER

## 2022-01-25 SDOH — ECONOMIC STABILITY: FOOD INSECURITY: WITHIN THE PAST 12 MONTHS, YOU WORRIED THAT YOUR FOOD WOULD RUN OUT BEFORE YOU GOT MONEY TO BUY MORE.: NEVER TRUE

## 2022-01-25 SDOH — ECONOMIC STABILITY: FOOD INSECURITY: WITHIN THE PAST 12 MONTHS, THE FOOD YOU BOUGHT JUST DIDN'T LAST AND YOU DIDN'T HAVE MONEY TO GET MORE.: NEVER TRUE

## 2022-01-25 ASSESSMENT — SOCIAL DETERMINANTS OF HEALTH (SDOH): HOW HARD IS IT FOR YOU TO PAY FOR THE VERY BASICS LIKE FOOD, HOUSING, MEDICAL CARE, AND HEATING?: NOT HARD AT ALL

## 2022-01-25 ASSESSMENT — ENCOUNTER SYMPTOMS
DIARRHEA: 0
SHORTNESS OF BREATH: 0
EYE REDNESS: 0
RHINORRHEA: 0
ABDOMINAL PAIN: 0
WHEEZING: 0
SORE THROAT: 0
VOMITING: 0
EYE DISCHARGE: 0
COUGH: 0
NAUSEA: 0

## 2022-01-25 NOTE — PROGRESS NOTES
[facility] PRIMARY CARE  [address] [address] [place]  [address] [address]  Dept: [phone]    David Stanley is a 46 y.o. male Established patient, who presents today for his medical conditions/complaints as noted below. Chief Complaint   Patient presents with    Medication Check     Pt here today to talk about medications. Pt states suboxone wasn't helping his pain,       HPI:     HPI  Pt saw endcrinology, was 9.8 this am.  No change in medication. Nate Awkward No chest pain or sob. Pt saw Ty Hussein yesterday, told nothing else can be done for suboxone. Pt wanting to go back on percocet. States its the only thing that will help. Pt is in shoulders, right elbow, neck. States no one here will touch him due to co morbidities. [address] is now shut down to elective procedures. Pt has been on Lyrica, neurontin, cymbalta in the past. Pt states methadone did not help his pain either. Pt had seen Dr. Milka Saunders, states told need surgery but won't do it due to elevated a1c. Seen Dr. Ivette Ghotra, pain management, won't do anything due to elevated a1c. Seen Dr. Jed Chavez, states he had nothing to offer.        Reviewed prior notes None  Reviewed previous Labs    LDL Calculated (mg/dL)   Date Value   04/16/2019 97   05/21/2018 66   01/05/2018 66       (goal LDL is <100)   AST (U/L)   Date Value   08/20/2021 18     ALT (U/L)   Date Value   08/20/2021 14     BUN (mg/dL)   Date Value   08/20/2021 9     Hemoglobin A1C (%)   Date Value   10/19/2021 9.5     TSH (mIU/L)   Date Value   07/27/2018 0.43     BP Readings from Last 3 Encounters:   01/25/22 (!) 142/94   01/19/22 130/88   12/06/21 (!) 160/108          (goal 120/80)    Past Medical History:   Diagnosis Date    Anxiety     Arthritis     CAD (coronary artery disease)     Calcaneal apophysitis     Corns     Depression     Diabetes type I ([facility] [address]     Gastroparesis     GERD (gastroesophageal reflux disease)     Headache(784.0)     Hearing loss     Hyperglycemia     Hyperlipidemia     Hypertension     Intussusception ([facility] [address]     MI (myocardial infarction) ([facility] [address] jan 2014    Neuropathy     Osteoarthritis     Panic attacks     Temporomandibular joint disorder       Past Surgical History:   Procedure Laterality Date    ABDOMEN SURGERY      insulin pump    ABDOMINAL EXPLORATION SURGERY  05/28/2016    bowel resection Rt. colon &terminal ileum, intussuseption.      CARDIAC CATHETERIZATION  2014    CARPAL TUNNEL RELEASE      bilateral    COLONOSCOPY  05/23/2017    poor prep; diverticulosis    COLONOSCOPY N/A 7/10/2018    COLONOSCOPY POLYPECTOMY HOT BIOPSY performed by Evelyn Crespo MD at [address] COLONOSCOPY N/A 8/9/2021    COLONOSCOPY DIAGNOSTIC performed by Evelyn Crespo MD at [address] [facility]      right    HERNIA REPAIR      inguinal    IA COLON CA SCRN NOT  [address] IND N/A 5/25/2017    COLONOSCOPY performed by Yin Robles MD at [address] [facility] ESOPHAGOGASTRODUODENOSCOPY TRANSORAL DIAGNOSTIC N/A 5/24/2017    EGD ESOPHAGOGASTRODUODENOSCOPY performed by Yin Robles MD at [place] [address] Bilateral    [place]      fracture     TONSILLECTOMY      x2    TOOTH EXTRACTION      x4    ULNAR TUNNEL RELEASE      UPPER GASTROINTESTINAL ENDOSCOPY  05/23/2017    mild gastritis    UPPER GASTROINTESTINAL ENDOSCOPY N/A 7/10/2018    EGD BIOPSY performed by Evelyn Crespo MD at [place] [address] N/A 8/10/2021    HERNIA INCISIONAL REPAIR OPEN W/MESH performed by Evelyn Crespo MD at [address] EXTRACTION      x4       Family History   Problem Relation Age of Onset    Diabetes Mother     High Blood Pressure Mother     Heart Disease Mother     Migraines Mother     Other Mother         diabetic neuropathy    High Blood Pressure Father        Social History     Tobacco Use    Smoking status: Current Every Day Smoker     Packs/day: 1.00     Years: 30.00 Pack years: 30.00    Smokeless tobacco: Never Used   Substance Use Topics    Alcohol use: No     Comment: previous heavy ETOH use; pt stopped around 2010      Current Outpatient Medications   Medication Sig Dispense Refill    oxyCODONE-acetaminophen (PERCOCET)  MG per tablet Take 1 tablet by mouth every 4 hours as needed for Pain for up to 30 days. No more than 6 per day 180 tablet 0    REXULTI 0.5 MG TABS tablet TAKE 1 TABLET DAILY 90 tablet 3    buprenorphine-naloxone (SUBOXONE) 8-2 MG FILM SL film Place 1 Film under the tongue 3 times daily for 30 days. (Patient taking differently take 1 film in the morning and 1 film in the evening and  1/2 film 2x daily as needed for pain or withdrawals.) 90 Film 0    LORazepam (ATIVAN) 0.5 MG tablet Take 1 tablet by mouth every 8 hours as needed for Anxiety for up to 30 days.  90 tablet 0    HUMALOG 100 UNIT/ML injection vial INJECT UP TO 70 UNITS VIA PUMP AS DIRECTED      ondansetron (ZOFRAN) 4 MG tablet Take every six hours as needed 20 tablet 0    VORTIoxetine HBr (TRINTELLIX) 20 MG TABS tablet Take 1 tablet by mouth daily 30 tablet 0    naloxone 4 MG/0.1ML LIQD nasal spray 1 spray by Nasal route as needed for Opioid Reversal 1 each 5    propranolol (INDERAL) 20 MG tablet Take 20 mg by mouth as needed Takes for migraines      b complex vitamins capsule Take 1 capsule by mouth daily      pantoprazole (PROTONIX) 40 MG tablet TAKE ONE TABLET BY MOUTH TWICE DAILY 60 tablet 3    lisinopril (PRINIVIL;ZESTRIL) 30 MG tablet Take 1 tablet by mouth daily (Patient taking differently: Take 5 mg by mouth daily ) 90 tablet 3    isosorbide mononitrate (IMDUR) 30 MG extended release tablet Take 1 tablet by mouth daily 90 tablet 3    atorvastatin (LIPITOR) 80 MG tablet Take 1 tablet by mouth nightly 90 tablet 3    clopidogrel (PLAVIX) 75 MG tablet Take 1 tablet by mouth daily 90 tablet 3    CONTOUR NEXT TEST strip use four times a day      nitroGLYCERIN (NITROSTAT) 0.4 MG SL tablet Place 1 tablet under the tongue every 5 minutes as needed for Chest pain Indications: Disease of the Arteries of the Heart Take 1 tablet every 5 minutes times three as needed for chest pain 25 tablet 2    Insulin Aspart (NOVOLOG SC) Inject into the skin Insulin pump        No current facility-administered medications for this visit. Facility-Administered Medications Ordered in Other Visits   Medication Dose Route Frequency Provider Last Rate Last Admin    lactated ringers infusion  75 mL/hr IntraVENous Continuous Mikal Silva MD         Allergies   Allergen Reactions    Avelox [Moxifloxacin]     Ciprofloxacin Other (See Comments)    Levofloxacin     Lyrica [Pregabalin]     Metoclopramide Other (See Comments)     Severe confusion and paranoid activity    Neurontin [Gabapentin]     Other      Can not have epidural injections, steroids, nerve blocks, or burning of nerves due to being diabetic    Tetanus Toxoids     Tramadol     Tramadol Hcl        Health Maintenance   Topic Date Due    Hepatitis C screen  Never done    COVID-19 Vaccine (1) Never done    HIV screen  Never done    Hepatitis B vaccine (1 of 3 - Risk 3-dose series) Never done    Diabetic retinal exam  11/28/2019    Lipid screen  04/16/2020    Shingles Vaccine (1 of 2) Never done    Low dose CT lung screening  Never done    Diabetic microalbuminuria test  05/04/2021    A1C test (Diabetic or Prediabetic)  01/19/2022    Colon cancer screen colonoscopy  02/09/2022    Potassium monitoring  08/20/2022    Creatinine monitoring  08/20/2022    Depression Monitoring  10/11/2022    Diabetic foot exam  10/19/2022    Pneumococcal 0-64 years Vaccine (2 of 2 - PPSV23) 12/02/2035    Flu vaccine  Completed    Hepatitis A vaccine  Aged Out    Hib vaccine  Aged Out    Meningococcal (ACWY) vaccine  Aged Out       Subjective:      Review of Systems   Constitutional: Negative for chills and fever.    HENT: Negative for rhinorrhea and sore throat. Eyes: Negative for discharge and redness. Respiratory: Negative for cough, shortness of breath and wheezing. Cardiovascular: Negative for chest pain and palpitations. Gastrointestinal: Negative for abdominal pain, diarrhea, nausea and vomiting. Genitourinary: Negative for dysuria and frequency. Musculoskeletal: Positive for arthralgias and neck pain. Negative for myalgias. Neurological: Negative for dizziness, light-headedness and headaches. Psychiatric/Behavioral: Negative for sleep disturbance. Objective:   Blood work ordered  [place] vaccine discussed  We will DC Suboxone due to not being effective. Change back to Percocet 10/3/2025 1 every 4 as needed. Still trying to get patient into [facility] clinic. Phone number given    BP (!) 142/94   Pulse 108   Ht 5' 8.04\" (1.728 m)   Wt 132 lb (59.9 kg)   SpO2 93%   BMI 20.05 kg/m²   Physical Exam  Vitals and nursing note reviewed. Constitutional:       General: He is not in acute distress. Appearance: He is well-developed. He is not ill-appearing. HENT:      Head: Normocephalic and atraumatic. Right Ear: External ear normal.      Left Ear: External ear normal.   Eyes:      General: No scleral icterus. Right eye: No discharge. Left eye: No discharge. Conjunctiva/sclera: Conjunctivae normal.      Pupils: Pupils are equal, round, and reactive to light. Neck:      Thyroid: No thyromegaly. Trachea: No tracheal deviation. Cardiovascular:      Rate and Rhythm: Normal rate and regular rhythm. Heart sounds: Normal heart sounds. Pulmonary:      Effort: Pulmonary effort is normal. No respiratory distress. Breath sounds: Normal breath sounds. No wheezing. Lymphadenopathy:      Cervical: No cervical adenopathy. Skin:     General: Skin is warm. Findings: No rash. Neurological:      Mental Status: He is alert and oriented to person, place, and time. Psychiatric:         Mood and Affect: Mood normal.         Behavior: Behavior normal.         Thought Content: Thought content normal.         Assessment:       Diagnosis Orders   1. Degenerative cervical disc  oxyCODONE-acetaminophen (PERCOCET)  MG per tablet   2. Screening PSA (prostate specific antigen)  PSA Screening   3. Annual physical exam  Hepatic Function Panel    Basic Metabolic Panel, Fasting   4. Encounter for lipid screening for cardiovascular disease  Lipid, Fasting        Plan:    Changed back to Percocet 10/3/2025 1 every 4 as needed  DC Suboxone  Blood work ordered  Covid vaccine discussed    Return in about 3 months (around 4/25/2022). Orders Placed This Encounter   Procedures    PSA Screening     Standing Status:   Future     Standing Expiration Date:   1/25/2023    Lipid, Fasting     Standing Status:   Future     Standing Expiration Date:   1/25/2023    Hepatic Function Panel     Standing Status:   Future     Standing Expiration Date:   1/25/2023    Basic Metabolic Panel, Fasting     Standing Status:   Future     Standing Expiration Date:   1/25/2023     Orders Placed This Encounter   Medications    oxyCODONE-acetaminophen (PERCOCET)  MG per tablet     Sig: Take 1 tablet by mouth every 4 hours as needed for Pain for up to 30 days. No more than 6 per day     Dispense:  180 tablet     Refill:  0     Reduce doses taken as pain becomes manageable       Patient given educational materials - see patient instructions. Discussed use, benefit, and side effects of prescribed medications. All patient questions answered. Pt voiced understanding. Reviewed health maintenance. Instructed to continue current medications, diet andexercise. Patient agreed with treatment plan. Follow up as directed.      Electronicallysigned by Keya Cade MD on 1/25/2022 at 11:10 AM

## 2022-02-03 DIAGNOSIS — F41.8 DEPRESSION WITH ANXIETY: ICD-10-CM

## 2022-02-03 RX ORDER — LORAZEPAM 0.5 MG/1
0.5 TABLET ORAL EVERY 8 HOURS PRN
Qty: 90 TABLET | Refills: 0 | Status: SHIPPED | OUTPATIENT
Start: 2022-02-03 | End: 2022-03-04 | Stop reason: SDUPTHER

## 2022-02-16 DIAGNOSIS — M50.30 DEGENERATIVE CERVICAL DISC: ICD-10-CM

## 2022-02-16 RX ORDER — OXYCODONE AND ACETAMINOPHEN 10; 325 MG/1; MG/1
1 TABLET ORAL EVERY 4 HOURS PRN
Qty: 180 TABLET | Refills: 0 | Status: SHIPPED | OUTPATIENT
Start: 2022-02-16 | End: 2022-03-17 | Stop reason: SDUPTHER

## 2022-03-04 DIAGNOSIS — F41.8 DEPRESSION WITH ANXIETY: ICD-10-CM

## 2022-03-04 RX ORDER — VORTIOXETINE 20 MG/1
TABLET, FILM COATED ORAL
Qty: 90 TABLET | Refills: 3 | Status: SHIPPED | OUTPATIENT
Start: 2022-03-04

## 2022-03-04 RX ORDER — LORAZEPAM 0.5 MG/1
0.5 TABLET ORAL EVERY 8 HOURS PRN
Qty: 90 TABLET | Refills: 0 | Status: SHIPPED | OUTPATIENT
Start: 2022-03-04 | End: 2022-04-01 | Stop reason: SDUPTHER

## 2022-03-17 DIAGNOSIS — M50.30 DEGENERATIVE CERVICAL DISC: ICD-10-CM

## 2022-03-17 RX ORDER — OXYCODONE AND ACETAMINOPHEN 10; 325 MG/1; MG/1
1 TABLET ORAL EVERY 4 HOURS PRN
Qty: 180 TABLET | Refills: 0 | Status: SHIPPED | OUTPATIENT
Start: 2022-03-17 | End: 2022-04-16

## 2022-04-01 DIAGNOSIS — F41.8 DEPRESSION WITH ANXIETY: ICD-10-CM

## 2022-04-01 RX ORDER — LORAZEPAM 0.5 MG/1
0.5 TABLET ORAL EVERY 8 HOURS PRN
Qty: 90 TABLET | Refills: 0 | Status: SHIPPED | OUTPATIENT
Start: 2022-04-01 | End: 2022-05-01

## 2022-04-07 ENCOUNTER — OFFICE VISIT (OUTPATIENT)
Dept: PRIMARY CARE CLINIC | Age: 52
End: 2022-04-07
Payer: COMMERCIAL

## 2022-04-07 VITALS
OXYGEN SATURATION: 98 % | DIASTOLIC BLOOD PRESSURE: 88 MMHG | HEIGHT: 68 IN | BODY MASS INDEX: 21.7 KG/M2 | HEART RATE: 97 BPM | WEIGHT: 143.2 LBS | SYSTOLIC BLOOD PRESSURE: 124 MMHG

## 2022-04-07 DIAGNOSIS — Z13.220 ENCOUNTER FOR LIPID SCREENING FOR CARDIOVASCULAR DISEASE: ICD-10-CM

## 2022-04-07 DIAGNOSIS — I10 ESSENTIAL HYPERTENSION: ICD-10-CM

## 2022-04-07 DIAGNOSIS — Z12.5 SCREENING PSA (PROSTATE SPECIFIC ANTIGEN): ICD-10-CM

## 2022-04-07 DIAGNOSIS — E10.65 TYPE 1 DIABETES MELLITUS WITH HYPERGLYCEMIA (HCC): ICD-10-CM

## 2022-04-07 DIAGNOSIS — Z13.6 ENCOUNTER FOR LIPID SCREENING FOR CARDIOVASCULAR DISEASE: ICD-10-CM

## 2022-04-07 DIAGNOSIS — M46.92 UNSPECIFIED INFLAMMATORY SPONDYLOPATHY, CERVICAL REGION (HCC): Primary | ICD-10-CM

## 2022-04-07 PROCEDURE — 3046F HEMOGLOBIN A1C LEVEL >9.0%: CPT | Performed by: FAMILY MEDICINE

## 2022-04-07 PROCEDURE — 4004F PT TOBACCO SCREEN RCVD TLK: CPT | Performed by: FAMILY MEDICINE

## 2022-04-07 PROCEDURE — G8420 CALC BMI NORM PARAMETERS: HCPCS | Performed by: FAMILY MEDICINE

## 2022-04-07 PROCEDURE — 2022F DILAT RTA XM EVC RTNOPTHY: CPT | Performed by: FAMILY MEDICINE

## 2022-04-07 PROCEDURE — 99214 OFFICE O/P EST MOD 30 MIN: CPT | Performed by: FAMILY MEDICINE

## 2022-04-07 PROCEDURE — 3017F COLORECTAL CA SCREEN DOC REV: CPT | Performed by: FAMILY MEDICINE

## 2022-04-07 PROCEDURE — G8427 DOCREV CUR MEDS BY ELIG CLIN: HCPCS | Performed by: FAMILY MEDICINE

## 2022-04-07 RX ORDER — ONDANSETRON 4 MG/1
TABLET, FILM COATED ORAL
Qty: 90 TABLET | Refills: 1 | Status: SHIPPED | OUTPATIENT
Start: 2022-04-07 | End: 2022-04-14 | Stop reason: SDUPTHER

## 2022-04-07 ASSESSMENT — ENCOUNTER SYMPTOMS
ABDOMINAL PAIN: 0
EYE DISCHARGE: 0
RHINORRHEA: 0
COUGH: 0
WHEEZING: 0
SHORTNESS OF BREATH: 0
DIARRHEA: 0
SORE THROAT: 0
VOMITING: 0
EYE REDNESS: 0
NAUSEA: 0

## 2022-04-07 ASSESSMENT — PATIENT HEALTH QUESTIONNAIRE - PHQ9
SUM OF ALL RESPONSES TO PHQ QUESTIONS 1-9: 2
SUM OF ALL RESPONSES TO PHQ9 QUESTIONS 1 & 2: 1
4. FEELING TIRED OR HAVING LITTLE ENERGY: 0
10. IF YOU CHECKED OFF ANY PROBLEMS, HOW DIFFICULT HAVE THESE PROBLEMS MADE IT FOR YOU TO DO YOUR WORK, TAKE CARE OF THINGS AT HOME, OR GET ALONG WITH OTHER PEOPLE: 0
6. FEELING BAD ABOUT YOURSELF - OR THAT YOU ARE A FAILURE OR HAVE LET YOURSELF OR YOUR FAMILY DOWN: 0
SUM OF ALL RESPONSES TO PHQ QUESTIONS 1-9: 2
9. THOUGHTS THAT YOU WOULD BE BETTER OFF DEAD, OR OF HURTING YOURSELF: 0
SUM OF ALL RESPONSES TO PHQ QUESTIONS 1-9: 2
8. MOVING OR SPEAKING SO SLOWLY THAT OTHER PEOPLE COULD HAVE NOTICED. OR THE OPPOSITE, BEING SO FIGETY OR RESTLESS THAT YOU HAVE BEEN MOVING AROUND A LOT MORE THAN USUAL: 0
SUM OF ALL RESPONSES TO PHQ QUESTIONS 1-9: 2
3. TROUBLE FALLING OR STAYING ASLEEP: 0
7. TROUBLE CONCENTRATING ON THINGS, SUCH AS READING THE NEWSPAPER OR WATCHING TELEVISION: 0
2. FEELING DOWN, DEPRESSED OR HOPELESS: 1
5. POOR APPETITE OR OVEREATING: 1
1. LITTLE INTEREST OR PLEASURE IN DOING THINGS: 0

## 2022-04-07 ASSESSMENT — COLUMBIA-SUICIDE SEVERITY RATING SCALE - C-SSRS
1. WITHIN THE PAST MONTH, HAVE YOU WISHED YOU WERE DEAD OR WISHED YOU COULD GO TO SLEEP AND NOT WAKE UP?: NO
2. HAVE YOU ACTUALLY HAD ANY THOUGHTS OF KILLING YOURSELF?: NO
6. HAVE YOU EVER DONE ANYTHING, STARTED TO DO ANYTHING, OR PREPARED TO DO ANYTHING TO END YOUR LIFE?: NO

## 2022-04-07 NOTE — PROGRESS NOTES
[facility] PRIMARY CARE  [address] [address]  [address] [address]  Dept: [phone]    Ree Sifuentes is a 46 y.o. male Established patient, who presents today for his medical conditions/complaints as noted below. Chief Complaint   Patient presents with    Diabetes     1/25 a1c was 9.8    Depression     Follow up on all medications        HPI:     HPI  Pt states last a1c 9.8, sees endocrinololgy. No change with pain medication. Still with severe pain in neck, shoulders, and right elbow. Last seen Dr. Jose Saini pain management, states he had nothing to offer. No one will do anything due to elevated sugars. Pt states feels depressed. No thoughts of hurting self or others. Patient feeling mostly depressed because he cannot do anything around the house to help out the wife. States having too much pain and discomfort. Has seen multiple pain management with nobody having anything to offer. Nobody will prescribe this medication as well. Patient sees endocrinology for his diabetes mellitus  Patient not checking his blood pressure outside the office. Patient states in the past his Rexulti was increased to 1 mg but developed a movement disorder. Psychiatry had decreased it back to 0.5 mg. Patient states has not been able to find a psychiatrist.  His last 1 was by virtual visits only and is no longer in the area.   Blood pressures reviewed    Reviewed prior notes Endocrinology  Reviewed previous Labs    LDL Calculated (mg/dL)   Date Value   04/16/2019 97   05/21/2018 66   01/05/2018 66       (goal LDL is <100)   AST (U/L)   Date Value   08/20/2021 18     ALT (U/L)   Date Value   08/20/2021 14     BUN (mg/dL)   Date Value   08/20/2021 9     Hemoglobin A1C (%)   Date Value   10/19/2021 9.5     TSH (mIU/L)   Date Value   07/27/2018 0.43     BP Readings from Last 3 Encounters:   04/07/22 124/88   01/25/22 (!) 142/94   01/19/22 130/88          (goal 120/80)    Past Medical History:   Diagnosis Date    Anxiety     Arthritis     CAD (coronary artery disease)     Calcaneal apophysitis     Corns     Depression     Diabetes [facility] ([facility] [address]     Gastroparesis     GERD (gastroesophageal reflux disease)     Headache(784.0)     Hearing loss     Hyperglycemia     Hyperlipidemia     Hypertension     Intussusception ([facility] [address]     MI (myocardial infarction) ([facility] [address] jan 2014    Neuropathy     Osteoarthritis     Panic attacks     Temporomandibular joint disorder       Past Surgical History:   Procedure Laterality Date    ABDOMEN SURGERY      insulin pump    ABDOMINAL EXPLORATION SURGERY  05/28/2016    bowel resection Rt. colon &terminal ileum, intussuseption.      CARDIAC CATHETERIZATION  2014    CARPAL TUNNEL RELEASE      bilateral    COLONOSCOPY  05/23/2017    poor prep; diverticulosis    COLONOSCOPY N/A 7/10/2018    COLONOSCOPY POLYPECTOMY HOT BIOPSY performed by Thor Upton MD at [facility] N/A 8/9/2021    COLONOSCOPY DIAGNOSTIC performed by Thor Upton MD at [address]      right    HERNIA REPAIR      inguinal    ID COLON CA SCRN NOT  [address] [place] [address] IND N/A 5/25/2017    COLONOSCOPY performed by Zari Yoo MD at [address] ESOPHAGOGASTRODUODENOSCOPY TRANSORAL DIAGNOSTIC N/A 5/24/2017    EGD ESOPHAGOGASTRODUODENOSCOPY performed by Zari Yoo MD at [facility] Bilateral     SHOULDER SURGERY      fracture     TONSILLECTOMY      x2    TOOTH EXTRACTION      x4    ULNAR TUNNEL RELEASE      UPPER GASTROINTESTINAL ENDOSCOPY  05/23/2017    mild gastritis    UPPER GASTROINTESTINAL ENDOSCOPY N/A 7/10/2018    EGD BIOPSY [facility]pton MD at [facility] N/A 8/10/2021    HERNIA INCISIONAL REPAIR OPEN W/MESH performed by Thor Upton MD at [address] EXTRACTION      x4       Family History   Problem Relation Age of Onset    Diabetes Mother     High Blood Pressure Mother     Heart Disease Mother    Parmjit Self Migraines Mother     Other Mother         diabetic neuropathy    High Blood Pressure Father        Social History     Tobacco Use    Smoking status: Current Every Day Smoker     Packs/day: 1.00     Years: 30.00     Pack years: 30.00    Smokeless tobacco: Never Used   Substance Use Topics    Alcohol use: No     Comment: previous heavy ETOH use; pt stopped around 2010      Current Outpatient Medications   Medication Sig Dispense Refill    brexpiprazole (REXULTI) 1 MG TABS tablet Take 1 tablet by mouth daily 90 tablet 3    ondansetron (ZOFRAN) 4 MG tablet Take every six hours as needed 90 tablet 1    LORazepam (ATIVAN) 0.5 MG tablet Take 1 tablet by mouth every 8 hours as needed for Anxiety for up to 30 days. 90 tablet 0    oxyCODONE-acetaminophen (PERCOCET)  MG per tablet Take 1 tablet by mouth every 4 hours as needed for Pain for up to 30 days.  No more than 6 per day 180 tablet 0    TRINTELLIX 20 MG TABS tablet TAKE 1 TABLET DAILY 90 tablet 3    HUMALOG 100 UNIT/ML injection vial INJECT UP TO 70 UNITS VIA PUMP AS DIRECTED      naloxone 4 MG/0.1ML LIQD nasal spray 1 spray by Nasal route as needed for Opioid Reversal 1 each 5    propranolol (INDERAL) 20 MG tablet Take 20 mg by mouth as needed Takes for migraines      b complex vitamins capsule Take 1 capsule by mouth daily      pantoprazole (PROTONIX) 40 MG tablet TAKE ONE TABLET BY MOUTH TWICE DAILY 60 tablet 3    lisinopril (PRINIVIL;ZESTRIL) 30 MG tablet Take 1 tablet by mouth daily (Patient taking differently: Take 5 mg by mouth daily ) 90 tablet 3    isosorbide mononitrate (IMDUR) 30 MG extended release tablet Take 1 tablet by mouth daily 90 tablet 3    atorvastatin (LIPITOR) 80 MG tablet Take 1 tablet by mouth nightly 90 tablet 3    clopidogrel (PLAVIX) 75 MG tablet Take 1 tablet by mouth daily 90 tablet 3    CONTOUR NEXT TEST strip use four times a day      nitroGLYCERIN (NITROSTAT) 0.4 MG SL tablet Place 1 tablet under the tongue every 5 minutes as needed for Chest pain Indications: Disease of the Arteries of the Heart Take 1 tablet every 5 minutes times three as needed for chest pain 25 tablet 2     No current facility-administered medications for this visit. Facility-Administered Medications Ordered in Other Visits   Medication Dose Route Frequency Provider Last Rate Last Admin    lactated ringers infusion  75 mL/hr IntraVENous Continuous Kiran Diane Fothergill, MD         Allergies   Allergen Reactions    Avelox [Moxifloxacin]     Ciprofloxacin Other (See Comments)    Levofloxacin     Lyrica [Pregabalin]     Metoclopramide Other (See Comments)     Severe confusion and paranoid activity    Neurontin [Gabapentin]     Other      Can not have epidural injections, steroids, nerve blocks, or burning of nerves due to being diabetic    Tetanus Toxoids     Tramadol     Tramadol Hcl        Health Maintenance   Topic Date Due    Hepatitis C screen  Never done    COVID-19 Vaccine (1) Never done    HIV screen  Never done    Hepatitis B vaccine (1 of 3 - Risk 3-dose series) Never done    Diabetic retinal exam  11/28/2019    Lipid screen  04/16/2020    Shingles Vaccine (1 of 2) Never done    Low dose CT lung screening  Never done    Diabetic microalbuminuria test  05/04/2021    A1C test (Diabetic or Prediabetic)  01/19/2022    Colorectal Cancer Screen  02/09/2022    Potassium monitoring  08/20/2022    Creatinine monitoring  08/20/2022    Depression Monitoring  10/11/2022    Diabetic foot exam  10/19/2022    Pneumococcal 0-64 years Vaccine (2 of 2 - PPSV23) 12/02/2035    Flu vaccine  Completed    Hepatitis A vaccine  Aged Out    Hib vaccine  Aged Out    Meningococcal (ACWY) vaccine  Aged Out       Subjective:      Review of Systems   Constitutional: Negative for chills and fever. HENT: Negative for rhinorrhea and sore throat. Eyes: Negative for discharge and redness. disease  Lipid, Fasting   3. Screening PSA (prostate specific antigen)  PSA Screening   4. Type 1 diabetes mellitus with hyperglycemia (HCC)     5. Essential hypertension          Plan:    Patient to follow-up with endocrinology regarding his diabetes. Continues to have suboptimal control. Continue have difficulty getting blood work from endocrinology  Patient currently having ongoing depression. We will try to increase Rexulti to 1 mg but may need to decrease if movement disorder comes back  Again discussed with patient importance of trying to decrease pain medication. We have tried methadone, Suboxone, pain management, and nothing has worked. Patient is functional only on the regimen that he is currently on. Patient is aware of addiction and tolerance. Return in about 3 months (around 7/7/2022). Orders Placed This Encounter   Procedures    Lipid, Fasting     Standing Status:   Future     Standing Expiration Date:   4/7/2023    PSA Screening     Standing Status:   Future     Standing Expiration Date:   4/7/2023     Orders Placed This Encounter   Medications    brexpiprazole (REXULTI) 1 MG TABS tablet     Sig: Take 1 tablet by mouth daily     Dispense:  90 tablet     Refill:  3    ondansetron (ZOFRAN) 4 MG tablet     Sig: Take every six hours as needed     Dispense:  90 tablet     Refill:  1       Patient given educational materials - see patient instructions. Discussed use, benefit, and side effects of prescribed medications. All patient questions answered. Pt voiced understanding. Reviewed health maintenance. Instructed to continue current medications, diet andexercise. Patient agreed with treatment plan. Follow up as directed.      Electronicallysigned by Azul Oliver MD on 4/7/2022 at 11:31 AM

## 2022-04-14 RX ORDER — ONDANSETRON 4 MG/1
TABLET, FILM COATED ORAL
Qty: 90 TABLET | Refills: 1 | Status: SHIPPED | OUTPATIENT
Start: 2022-04-14

## 2022-04-14 NOTE — TELEPHONE ENCOUNTER
Insurance wouldn't cover with the directions of every 6 hours PRN but would cover 1 tablet daily with qty of 90 for 90 days.  Sent new script to tarik

## 2022-04-17 ENCOUNTER — TELEPHONE (OUTPATIENT)
Dept: PRIMARY CARE CLINIC | Age: 52
End: 2022-04-17

## 2022-04-18 DIAGNOSIS — M75.42 IMPINGEMENT SYNDROME OF LEFT SHOULDER: Primary | ICD-10-CM

## 2022-04-18 RX ORDER — OXYCODONE AND ACETAMINOPHEN 10; 325 MG/1; MG/1
1 TABLET ORAL EVERY 4 HOURS PRN
COMMUNITY
End: 2022-04-18 | Stop reason: SDUPTHER

## 2022-04-18 RX ORDER — OXYCODONE AND ACETAMINOPHEN 10; 325 MG/1; MG/1
1 TABLET ORAL EVERY 4 HOURS PRN
Qty: 180 TABLET | Refills: 0 | Status: SHIPPED | OUTPATIENT
Start: 2022-04-18 | End: 2022-05-12 | Stop reason: SDUPTHER

## 2022-04-18 NOTE — TELEPHONE ENCOUNTER
Ondansetron prior authorization denied. Insurance will not cover more that 48 tablets for 30 days. Will not pay for 90 day supply.

## 2022-05-02 DIAGNOSIS — F41.8 DEPRESSION WITH ANXIETY: ICD-10-CM

## 2022-05-02 RX ORDER — LORAZEPAM 0.5 MG/1
0.5 TABLET ORAL EVERY 8 HOURS PRN
Qty: 90 TABLET | Refills: 0 | Status: SHIPPED | OUTPATIENT
Start: 2022-05-02 | End: 2022-05-31 | Stop reason: SDUPTHER

## 2022-05-12 DIAGNOSIS — M75.42 IMPINGEMENT SYNDROME OF LEFT SHOULDER: ICD-10-CM

## 2022-05-12 RX ORDER — OXYCODONE AND ACETAMINOPHEN 10; 325 MG/1; MG/1
1 TABLET ORAL EVERY 4 HOURS PRN
Qty: 180 TABLET | Refills: 0 | Status: SHIPPED | OUTPATIENT
Start: 2022-05-12 | End: 2022-06-08 | Stop reason: SDUPTHER

## 2022-05-31 DIAGNOSIS — F41.8 DEPRESSION WITH ANXIETY: ICD-10-CM

## 2022-05-31 RX ORDER — LORAZEPAM 0.5 MG/1
0.5 TABLET ORAL EVERY 8 HOURS PRN
Qty: 90 TABLET | Refills: 0 | Status: SHIPPED | OUTPATIENT
Start: 2022-05-31 | End: 2022-07-08 | Stop reason: SDUPTHER

## 2022-05-31 RX ORDER — BUTALBITAL, ACETAMINOPHEN AND CAFFEINE 50; 325; 40 MG/1; MG/1; MG/1
1 TABLET ORAL EVERY 4 HOURS PRN
Qty: 180 TABLET | Refills: 3 | Status: SHIPPED | OUTPATIENT
Start: 2022-05-31

## 2022-06-08 ENCOUNTER — TELEPHONE (OUTPATIENT)
Dept: PRIMARY CARE CLINIC | Age: 52
End: 2022-06-08

## 2022-06-08 DIAGNOSIS — M75.42 IMPINGEMENT SYNDROME OF LEFT SHOULDER: ICD-10-CM

## 2022-06-08 RX ORDER — OXYCODONE AND ACETAMINOPHEN 10; 325 MG/1; MG/1
1 TABLET ORAL EVERY 4 HOURS PRN
Qty: 180 TABLET | Refills: 0 | Status: SHIPPED | OUTPATIENT
Start: 2022-06-08 | End: 2022-07-07 | Stop reason: SDUPTHER

## 2022-06-08 NOTE — TELEPHONE ENCOUNTER
[facility] Transitions Initial Follow Up Call    Outreach made within 2 business days of discharge: Yes    Patient: Navin Waller Patient : 1970   MRN: [id number]  Reason for Admission: There are no discharge diagnoses documented for the most recent discharge. Discharge Date: 21       Spoke with: Sophia Rausch    Discharge department/facility: [facility]     TCM Interactive Patient Contact:  Was patient able to fill all prescriptions: Yes  Was patient instructed to bring all medications to the follow-up visit: Yes  Is patient taking all medications as directed in the discharge summary? Yes  Does patient understand their discharge instructions: Yes  Does patient have questions or concerns that need addressed prior to 7-14 day follow up office visit: no    Scheduled appointment with PCP within 7-14 days    Patient stated that he has an appointment with his endocrinologist and he will follow up with us at next scheduled appointment on 22. I asked that patient call us back if he would like to get in sooner. Verbal understanding per patient.      Follow Up  Future Appointments   Date Time Provider Alfonso Marques   2022 11:00 AM [facility]

## 2022-07-07 ENCOUNTER — OFFICE VISIT (OUTPATIENT)
Dept: PRIMARY CARE CLINIC | Age: 52
End: 2022-07-07
Payer: COMMERCIAL

## 2022-07-07 VITALS
WEIGHT: 135.2 LBS | HEIGHT: 68 IN | BODY MASS INDEX: 20.49 KG/M2 | SYSTOLIC BLOOD PRESSURE: 136 MMHG | DIASTOLIC BLOOD PRESSURE: 96 MMHG | HEART RATE: 84 BPM | OXYGEN SATURATION: 96 %

## 2022-07-07 DIAGNOSIS — F41.8 DEPRESSION WITH ANXIETY: ICD-10-CM

## 2022-07-07 DIAGNOSIS — I10 ESSENTIAL HYPERTENSION: ICD-10-CM

## 2022-07-07 DIAGNOSIS — E10.65 TYPE 1 DIABETES MELLITUS WITH HYPERGLYCEMIA (HCC): Primary | ICD-10-CM

## 2022-07-07 DIAGNOSIS — M75.42 IMPINGEMENT SYNDROME OF LEFT SHOULDER: ICD-10-CM

## 2022-07-07 DIAGNOSIS — M50.30 DEGENERATIVE CERVICAL DISC: ICD-10-CM

## 2022-07-07 PROCEDURE — 3046F HEMOGLOBIN A1C LEVEL >9.0%: CPT | Performed by: FAMILY MEDICINE

## 2022-07-07 PROCEDURE — G8427 DOCREV CUR MEDS BY ELIG CLIN: HCPCS | Performed by: FAMILY MEDICINE

## 2022-07-07 PROCEDURE — G8420 CALC BMI NORM PARAMETERS: HCPCS | Performed by: FAMILY MEDICINE

## 2022-07-07 PROCEDURE — 2022F DILAT RTA XM EVC RTNOPTHY: CPT | Performed by: FAMILY MEDICINE

## 2022-07-07 PROCEDURE — 99213 OFFICE O/P EST LOW 20 MIN: CPT | Performed by: FAMILY MEDICINE

## 2022-07-07 PROCEDURE — 4004F PT TOBACCO SCREEN RCVD TLK: CPT | Performed by: FAMILY MEDICINE

## 2022-07-07 PROCEDURE — 3017F COLORECTAL CA SCREEN DOC REV: CPT | Performed by: FAMILY MEDICINE

## 2022-07-07 RX ORDER — OXYCODONE AND ACETAMINOPHEN 10; 325 MG/1; MG/1
1 TABLET ORAL EVERY 4 HOURS PRN
Qty: 180 TABLET | Refills: 0 | Status: SHIPPED | OUTPATIENT
Start: 2022-07-07 | End: 2022-08-08 | Stop reason: SDUPTHER

## 2022-07-07 ASSESSMENT — ENCOUNTER SYMPTOMS
BACK PAIN: 1
SORE THROAT: 0
SHORTNESS OF BREATH: 0
COUGH: 0
EYE REDNESS: 0
RHINORRHEA: 0
DIARRHEA: 0
WHEEZING: 0
EYE DISCHARGE: 0
VOMITING: 0
NAUSEA: 0
ABDOMINAL PAIN: 0

## 2022-07-07 NOTE — PROGRESS NOTES
[address] [place] [address] PRIMARY CARE  [address] [address]  [address] [address]  Dept: [phone]    Howie Sanches is a 46 y.o. male Established patient, who presents today for his medical conditions/complaints as noted below. Chief Complaint   Patient presents with    Pain     Pt here for med check       HPI:     HPI  Pt states a1c 9.7 last week. No change in pain medications. Mood doing ok- states leveled out. Using pain meds for pain in middle of back, right shoulder, right elbow. Seen eye doctor last week. Pt had hospitalization for dka in June. Had vomiting, nausea. Patient states mood is doing much better on the [address]. Not having any movement disorders. Patient is seen pain management in the past.  Has had no relief from his discomfort.   There is been no other recommendations from specialist on what else can be done    Reviewed prior notes Hospital record  Reviewed previous Labs, Imaging and Hospital Records    LDL Calculated (mg/dL)   Date Value   04/16/2019 97   05/21/2018 66   01/05/2018 66       (goal LDL is <100)   AST (U/L)   Date Value   08/20/2021 18     ALT (U/L)   Date Value   08/20/2021 14     BUN (mg/dL)   Date Value   08/20/2021 9     Hemoglobin A1C (%)   Date Value   10/19/2021 9.5     TSH (mIU/L)   Date Value   07/27/2018 0.43     BP Readings from Last 3 Encounters:   07/07/22 (!) 136/96   04/07/22 124/88   01/25/22 (!) 142/94          (goal 120/80)    Past Medical History:   Diagnosis Date    Anxiety     Arthritis     CAD (coronary artery disease)     Calcaneal apophysitis     Corns     Depression     Diabetes type I ([address]     Gastroparesis     GERD (gastroesophageal reflux disease)     Headache(784.0)     Hearing loss     Hyperglycemia     Hyperlipidemia     Hypertension     Intussusception ([address]     MI (myocardial infarction) ([address] jan 2014    Neuropathy     Osteoarthritis     Panic attacks     Temporomandibular joint disorder Past Surgical History:   Procedure Laterality Date    ABDOMINAL EXPLORATION SURGERY  05/28/2016    bowel resection Rt. colon &terminal ileum, intussuseption.      ABDOMINAL SURGERY      insulin pump    CARDIAC CATHETERIZATION  2014    CARPAL TUNNEL RELEASE      bilateral    COLONOSCOPY  05/23/2017    poor prep; diverticulosis    COLONOSCOPY N/A 7/10/2018    COLONOSCOPY POLYPECTOMY HOT BIOPSY performed by Moon Miller MD at [address] COLONOSCOPY N/A 8/9/2021    COLONOSCOPY DIAGNOSTIC performed by Moon Miller MD at [address]      right    HERNIA REPAIR      inguinal    IN COLON CA SCRN NOT  [address] IND N/A 5/25/2017    COLONOSCOPY performed by Sharri Mireles MD at [address] [facility] ESOPHAGOGASTRODUODENOSCOPY TRANSORAL DIAGNOSTIC N/A 5/24/2017    EGD ESOPHAGOGASTRODUODENOSCOPY performed by Sharri Mireles MD at [address] Bilateral    [place]      fracture     TONSILLECTOMY      x2    TOOTH EXTRACTION      x4    ULNAR TUNNEL RELEASE      UPPER GASTROINTESTINAL ENDOSCOPY  05/23/2017    mild gastritis    UPPER GASTROINTESTINAL ENDOSCOPY N/A 7/10/2018    EGD BIOPSY performed by Moon Miller MD at [place] [address] N/A 8/10/2021    HERNIA INCISIONAL REPAIR OPEN W/MESH performed by Moon Miller MD at [address] EXTRACTION      x4       Family History   Problem Relation Age of Onset    Diabetes Mother     High Blood Pressure Mother     Heart Disease Mother     Migraines Mother     Other Mother         diabetic neuropathy    High Blood Pressure Father        Social History     Tobacco Use    Smoking status: Current Every Day Smoker     Packs/day: 1.00     Years: 30.00     Pack years: 30.00    Smokeless tobacco: Never Used   Substance Use Topics    Alcohol use: No     Comment: previous heavy ETOH use; pt stopped around 2010      Current Outpatient Medications   Medication Sig Dispense Refill    oxyCODONE-acetaminophen (PERCOCET)  MG per tablet Take 1 tablet by mouth every 4 hours as needed for Pain for up to 30 days. 180 tablet 0    butalbital-acetaminophen-caffeine (FIORICET, ESGIC) -40 MG per tablet Take 1 tablet by mouth every 4 hours as needed for Headaches 180 tablet 3    ondansetron (ZOFRAN) 4 MG tablet 1 tablet daily 90 tablet 1    brexpiprazole (REXULTI) 1 MG TABS tablet Take 1 tablet by mouth daily 90 tablet 3    TRINTELLIX 20 MG TABS tablet TAKE 1 TABLET DAILY 90 tablet 3    HUMALOG 100 UNIT/ML injection vial INJECT UP TO 70 UNITS VIA PUMP AS DIRECTED      propranolol (INDERAL) 20 MG tablet Take 20 mg by mouth as needed Takes for migraines      b complex vitamins capsule Take 1 capsule by mouth daily      pantoprazole (PROTONIX) 40 MG tablet TAKE ONE TABLET BY MOUTH TWICE DAILY 60 tablet 3    lisinopril (PRINIVIL;ZESTRIL) 30 MG tablet Take 1 tablet by mouth daily (Patient taking differently: Take 5 mg by mouth daily ) 90 tablet 3    isosorbide mononitrate (IMDUR) 30 MG extended release tablet Take 1 tablet by mouth daily 90 tablet 3    atorvastatin (LIPITOR) 80 MG tablet Take 1 tablet by mouth nightly 90 tablet 3    clopidogrel (PLAVIX) 75 MG tablet Take 1 tablet by mouth daily 90 tablet 3    CONTOUR NEXT TEST strip use four times a day      nitroGLYCERIN (NITROSTAT) 0.4 MG SL tablet Place 1 tablet under the tongue every 5 minutes as needed for Chest pain Indications: Disease of the Arteries of the Heart Take 1 tablet every 5 minutes times three as needed for chest pain 25 tablet 2    naloxone 4 MG/0.1ML LIQD nasal spray 1 spray by Nasal route as needed for Opioid Reversal (Patient not taking: Reported on 7/7/2022) 1 each 5     No current facility-administered medications for this visit.      Facility-Administered Medications Ordered in Other Visits   Medication Dose Route Frequency Provider Last Rate Last Admin    lactated ringers infusion  75 mL/hr IntraVENous Continuous Shahab Dc MD         Allergies   Allergen Reactions    Avelox [Moxifloxacin]     Ciprofloxacin Other (See Comments)    Levofloxacin     Lyrica [Pregabalin]     Metoclopramide Other (See Comments)     Severe confusion and paranoid activity    Neurontin [Gabapentin]     Other      Can not have epidural injections, steroids, nerve blocks, or burning of nerves due to being diabetic    Tetanus Toxoids     Tramadol     Tramadol Hcl        Health Maintenance   Topic Date Due    COVID-19 Vaccine (1) Never done    HIV screen  Never done    Hepatitis C screen  Never done    Hepatitis B vaccine (1 of 3 - Risk 3-dose series) Never done    Pneumococcal 0-64 years Vaccine (2 - PCV) 01/03/2018    Diabetic retinal exam  11/28/2019    Lipids  04/16/2020    Shingles vaccine (1 of 2) Never done    Low dose CT lung screening  Never done    Diabetic microalbuminuria test  05/04/2021    A1C test (Diabetic or Prediabetic)  01/19/2022    Colorectal Cancer Screen  02/09/2022    Flu vaccine (1) 09/01/2022    Diabetic foot exam  10/19/2022    Depression Monitoring  04/07/2023    Hepatitis A vaccine  Aged Out    Hib vaccine  Aged Out    Meningococcal (ACWY) vaccine  Aged Out       Subjective:      Review of Systems   Constitutional: Negative for chills and fever. HENT: Negative for rhinorrhea and sore throat. Eyes: Negative for discharge and redness. Respiratory: Negative for cough, shortness of breath and wheezing. Cardiovascular: Negative for chest pain and palpitations. Gastrointestinal: Negative for abdominal pain, diarrhea, nausea and vomiting. Genitourinary: Negative for dysuria and frequency. Musculoskeletal: Positive for arthralgias, back pain and neck pain. Negative for myalgias. Neurological: Negative for dizziness, light-headedness and headaches. Psychiatric/Behavioral: Negative for sleep disturbance.        Objective:     BP (!) 136/96   Pulse 84   Ht 5' 8.04\" (1.728 m)   Wt 135 lb 3.2 oz (61.3 kg)   SpO2 96%   BMI 20.53 kg/m²   Physical Exam  Vitals and nursing note reviewed. Constitutional:       General: He is not in acute distress. Appearance: He is well-developed. He is not ill-appearing. HENT:      Head: Normocephalic and atraumatic. Right Ear: External ear normal.      Left Ear: External ear normal.   Eyes:      General: No scleral icterus. Right eye: No discharge. Left eye: No discharge. Conjunctiva/sclera: Conjunctivae normal.   Neck:      Thyroid: No thyromegaly. Trachea: No tracheal deviation. Cardiovascular:      Rate and Rhythm: Normal rate and regular rhythm. Heart sounds: Normal heart sounds. Pulmonary:      Effort: Pulmonary effort is normal. No respiratory distress. Breath sounds: Normal breath sounds. No wheezing. Lymphadenopathy:      Cervical: No cervical adenopathy. Skin:     General: Skin is warm. Findings: No rash. Neurological:      Mental Status: He is alert and oriented to person, place, and time. Psychiatric:         Mood and Affect: Mood normal.         Behavior: Behavior normal.         Thought Content: Thought content normal.       Controlled Substance Monitoring:    Acute and Chronic Pain Monitoring:   RX Monitoring 7/7/2022   Attestation -   Acute Pain Prescriptions -   Periodic Controlled Substance Monitoring Possible medication side effects, risk of tolerance/dependence & alternative treatments discussed. ;No signs of potential drug abuse or diversion identified. Chronic Pain > 50 MEDD Obtained or confirmed \"Consent for Opioid Use\" on file. Chronic Pain > 80 MEDD -   Chronic Pain > 120 MEDD Obtained or confirmed \"Medication Contract\" on file. Assessment:       Diagnosis Orders   1. Type 1 diabetes mellitus with hyperglycemia (HCC)     2. Impingement syndrome of left shoulder  oxyCODONE-acetaminophen (PERCOCET)  MG per tablet   3.  Essential hypertension 4. Degenerative cervical disc     5. Depression with anxiety          Plan:    Renew Percocet. Patient has seen pain management with no other recommendations and what else to do  Continue with endocrinology for diabetes mellitus. Patient's DKA has resolved  Patient's mood is been doing well. We will continue present medication as is  Patient education Shingrix    Return in about 3 months (around 10/7/2022). No orders of the defined types were placed in this encounter. Orders Placed This Encounter   Medications    oxyCODONE-acetaminophen (PERCOCET)  MG per tablet     Sig: Take 1 tablet by mouth every 4 hours as needed for Pain for up to 30 days. Dispense:  180 tablet     Refill:  0     Reduce doses taken as pain becomes manageable       Patient given educational materials - see patient instructions. Discussed use, benefit, and side effects of prescribed medications. All patient questions answered. Pt voiced understanding. Reviewed health maintenance. Instructed to continue current medications, diet andexercise. Patient agreed with treatment plan. Follow up as directed.      Electronicallysigned by Nataly Dickerson MD on 7/7/2022 at 11:12 AM

## 2022-07-08 RX ORDER — LORAZEPAM 0.5 MG/1
0.5 TABLET ORAL EVERY 8 HOURS PRN
Qty: 90 TABLET | Refills: 0 | Status: SHIPPED | OUTPATIENT
Start: 2022-07-08 | End: 2022-08-08 | Stop reason: SDUPTHER

## 2022-08-08 DIAGNOSIS — M75.42 IMPINGEMENT SYNDROME OF LEFT SHOULDER: ICD-10-CM

## 2022-08-08 DIAGNOSIS — F41.8 DEPRESSION WITH ANXIETY: ICD-10-CM

## 2022-08-08 RX ORDER — LORAZEPAM 0.5 MG/1
0.5 TABLET ORAL EVERY 8 HOURS PRN
Qty: 90 TABLET | Refills: 0 | Status: SHIPPED | OUTPATIENT
Start: 2022-08-08 | End: 2022-09-06 | Stop reason: SDUPTHER

## 2022-08-08 RX ORDER — OXYCODONE AND ACETAMINOPHEN 10; 325 MG/1; MG/1
1 TABLET ORAL EVERY 4 HOURS PRN
Qty: 180 TABLET | Refills: 0 | Status: SHIPPED | OUTPATIENT
Start: 2022-08-08 | End: 2022-08-29 | Stop reason: ALTCHOICE

## 2022-08-16 ENCOUNTER — OFFICE VISIT (OUTPATIENT)
Dept: PRIMARY CARE CLINIC | Age: 52
End: 2022-08-16
Payer: COMMERCIAL

## 2022-08-16 VITALS
DIASTOLIC BLOOD PRESSURE: 88 MMHG | HEART RATE: 101 BPM | WEIGHT: 133 LBS | BODY MASS INDEX: 20.16 KG/M2 | OXYGEN SATURATION: 98 % | SYSTOLIC BLOOD PRESSURE: 168 MMHG | HEIGHT: 68 IN

## 2022-08-16 DIAGNOSIS — K20.90 ESOPHAGITIS, ACUTE: ICD-10-CM

## 2022-08-16 DIAGNOSIS — E10.65 TYPE 1 DIABETES MELLITUS WITH HYPERGLYCEMIA (HCC): ICD-10-CM

## 2022-08-16 DIAGNOSIS — R10.10 PAIN OF UPPER ABDOMEN: Primary | ICD-10-CM

## 2022-08-16 DIAGNOSIS — M48.02 STENOSIS OF CERVICAL SPINE WITH MYELOPATHY (HCC): ICD-10-CM

## 2022-08-16 DIAGNOSIS — G99.2 STENOSIS OF CERVICAL SPINE WITH MYELOPATHY (HCC): ICD-10-CM

## 2022-08-16 DIAGNOSIS — K86.89 PANCREATIC INSUFFICIENCY: ICD-10-CM

## 2022-08-16 PROCEDURE — 4004F PT TOBACCO SCREEN RCVD TLK: CPT | Performed by: FAMILY MEDICINE

## 2022-08-16 PROCEDURE — G8427 DOCREV CUR MEDS BY ELIG CLIN: HCPCS | Performed by: FAMILY MEDICINE

## 2022-08-16 PROCEDURE — 3017F COLORECTAL CA SCREEN DOC REV: CPT | Performed by: FAMILY MEDICINE

## 2022-08-16 PROCEDURE — 99214 OFFICE O/P EST MOD 30 MIN: CPT | Performed by: FAMILY MEDICINE

## 2022-08-16 PROCEDURE — 3046F HEMOGLOBIN A1C LEVEL >9.0%: CPT | Performed by: FAMILY MEDICINE

## 2022-08-16 PROCEDURE — 2022F DILAT RTA XM EVC RTNOPTHY: CPT | Performed by: FAMILY MEDICINE

## 2022-08-16 PROCEDURE — G8420 CALC BMI NORM PARAMETERS: HCPCS | Performed by: FAMILY MEDICINE

## 2022-08-16 RX ORDER — PANTOPRAZOLE SODIUM 40 MG/1
40 TABLET, DELAYED RELEASE ORAL DAILY
Qty: 90 TABLET | Refills: 3
Start: 2022-08-16

## 2022-08-16 ASSESSMENT — ENCOUNTER SYMPTOMS
ABDOMINAL DISTENTION: 1
EYE DISCHARGE: 0
RHINORRHEA: 0
SHORTNESS OF BREATH: 0
NAUSEA: 1
COUGH: 0
SORE THROAT: 0
DIARRHEA: 0
EYE REDNESS: 0
VOMITING: 1
WHEEZING: 0
ABDOMINAL PAIN: 1

## 2022-08-16 NOTE — PROGRESS NOTES
[facility] CARE  [address] [address] [address]  Dept: [phone]    Teresa Avila is a 46 y.o. male Established patient, who presents today for his medical conditions/complaints as noted below. Chief Complaint   Patient presents with    Nausea & Vomiting     X 2 weeks off and on    Dizziness     Started on saturday    Abdominal Pain       HPI:     HPI  Pt states started about two weeks ago with vomiting. Has been having worsening pain. Has been emesis of yellow. Has a history of intussusception. Pt developed dizziness. Blood sugars been running around 200-300. Was 160 this am.  Bowels been moving normal.  Pt with pain in the upper abdomen, right and left. Patient states the vomiting will occur out of the blue. Once that happens, no longer feels the need to vomit. Diabetes is managed by endocrinology. States her sugars are still running high but improved. Patient still using the Percocet for his chronic neck pain. Patient has history of pancreatic insufficiency as well. But denies any chronic diarrhea at this time.       Reviewed prior notes None  Reviewed previous Labs and Imaging    LDL Calculated (mg/dL)   Date Value   04/16/2019 97   05/21/2018 66   01/05/2018 66       (goal LDL is <100)   AST (U/L)   Date Value   08/20/2021 18     ALT (U/L)   Date Value   08/20/2021 14     BUN (mg/dL)   Date Value   08/20/2021 9     Hemoglobin A1C (%)   Date Value   10/19/2021 9.5     TSH (mIU/L)   Date Value   07/27/2018 0.43     BP Readings from Last 3 Encounters:   08/16/22 (!) 170/94   07/07/22 (!) 136/96   04/07/22 124/88          (goal 120/80)    Past Medical History:   Diagnosis Date    Anxiety     Arthritis     CAD (coronary artery disease)     Calcaneal apophysitis     Corns     Depression     Diabetes type I (HCC)     Gastroparesis     GERD (gastroesophageal reflux disease)     Headache(784.0)     Hearing loss     Hyperglycemia Hyperlipidemia     Hypertension     Intussusception ([facility]     MI (myocardial infarction) ([facility] jan 2014    Neuropathy     Osteoarthritis     Panic attacks     Temporomandibular joint disorder       Past Surgical History:   Procedure Laterality Date    ABDOMINAL EXPLORATION SURGERY  05/28/2016    bowel resection Rt. colon &terminal ileum, intussuseption.      ABDOMINAL SURGERY      insulin pump    CARDIAC CATHETERIZATION  2014    CARPAL TUNNEL RELEASE      bilateral    COLONOSCOPY  05/23/2017    poor prep; diverticulosis    COLONOSCOPY N/A 7/10/2018    COLONOSCOPY POLYPECTOMY HOT BIOPSY performed by Prudencio Carey MD at [facility] N/A 8/9/2021    COLONOSCOPY DIAGNOSTIC performed by Prudencio Carey MD at [address]      right    HERNIA REPAIR      inguinal    [place] COLON CA SCRN NOT  [address] IND N/A 5/25/2017    COLONOSCOPY performed by Reggie Barnes MD at [address] ESOPHAGOGASTRODUODENOSCOPY TRANSORAL DIAGNOSTIC N/A 5/24/2017    EGD ESOPHAGOGASTRODUODENOSCOPY performed by Reggie Barnes MD at [address] Bilateral     SHOULDER SURGERY      fracture     TONSILLECTOMY      x2    TOOTH EXTRACTION      x4    ULNAR TUNNEL RELEASE      UPPER GASTROINTESTINAL ENDOSCOPY  05/23/2017    mild gastritis    UPPER GASTROINTESTINAL ENDOSCOPY N/A 7/10/2018    EGD BIOPSY performed by Prudencio Carey MD at [facility] N/A 8/10/2021    HERNIA INCISIONAL REPAIR OPEN W/MESH performed by Prudencio Carey MD at [facility]      x4       Family History   Problem Relation Age of Onset    Diabetes Mother     High Blood Pressure Mother     Heart Disease Mother     Migraines Mother     Other Mother         diabetic neuropathy    High Blood Pressure Father        Social History     Tobacco Use    Smoking status: Every Day     Packs/day: 1.00     Years: 30.00     Pack years: 30.00     Types: Cigarettes    Smokeless tobacco: Never   Substance Use Topics    Alcohol use: No     Comment: previous heavy ETOH use; pt stopped around 2010      Current Outpatient Medications   Medication Sig Dispense Refill    pantoprazole (PROTONIX) 40 MG tablet Take 1 tablet by mouth in the morning. TAKE ONE TABLET BY MOUTH TWICE DAILY. 90 tablet 3    oxyCODONE-acetaminophen (PERCOCET)  MG per tablet Take 1 tablet by mouth every 4 hours as needed for Pain for up to 30 days. 180 tablet 0    LORazepam (ATIVAN) 0.5 MG tablet Take 1 tablet by mouth every 8 hours as needed for Anxiety for up to 30 days.  90 tablet 0    butalbital-acetaminophen-caffeine (FIORICET, ESGIC) -40 MG per tablet Take 1 tablet by mouth every 4 hours as needed for Headaches 180 tablet 3    ondansetron (ZOFRAN) 4 MG tablet 1 tablet daily 90 tablet 1    brexpiprazole (REXULTI) 1 MG TABS tablet Take 1 tablet by mouth daily 90 tablet 3    TRINTELLIX 20 MG TABS tablet TAKE 1 TABLET DAILY 90 tablet 3    HUMALOG 100 UNIT/ML injection vial INJECT UP TO 70 UNITS VIA PUMP AS DIRECTED      naloxone 4 MG/0.1ML LIQD nasal spray 1 spray by Nasal route as needed for Opioid Reversal 1 each 5    propranolol (INDERAL) 20 MG tablet Take 20 mg by mouth as needed Takes for migraines      b complex vitamins capsule Take 1 capsule by mouth daily      lisinopril (PRINIVIL;ZESTRIL) 30 MG tablet Take 1 tablet by mouth daily (Patient taking differently: Take 5 mg by mouth in the morning.) 90 tablet 3    isosorbide mononitrate (IMDUR) 30 MG extended release tablet Take 1 tablet by mouth daily 90 tablet 3    atorvastatin (LIPITOR) 80 MG tablet Take 1 tablet by mouth nightly 90 tablet 3    clopidogrel (PLAVIX) 75 MG tablet Take 1 tablet by mouth daily 90 tablet 3    CONTOUR NEXT TEST strip use four times a day      nitroGLYCERIN (NITROSTAT) 0.4 MG SL tablet Place 1 tablet under the tongue every 5 minutes as needed for Chest pain Indications: Disease of the Arteries of the Heart Take 1 tablet every 5 minutes times three as needed for chest pain 25 tablet 2     No current facility-administered medications for this visit. Facility-Administered Medications Ordered in Other Visits   Medication Dose Route Frequency Provider Last Rate Last Admin    lactated ringers infusion  75 mL/hr IntraVENous Continuous Mikal Lowe Cea, MD         Allergies   Allergen Reactions    Avelox [Moxifloxacin]     Ciprofloxacin Other (See Comments)    Levofloxacin     Lyrica [Pregabalin]     Metoclopramide Other (See Comments)     Severe confusion and paranoid activity    Neurontin [Gabapentin]     Other      Can not have epidural injections, steroids, nerve blocks, or burning of nerves due to being diabetic    Tetanus Toxoids     Tramadol     Tramadol Hcl        Health Maintenance   Topic Date Due    COVID-19 Vaccine (1) Never done    HIV screen  Never done    Hepatitis C screen  Never done    Hepatitis B vaccine (1 of 3 - Risk 3-dose series) Never done    Pneumococcal 0-64 years Vaccine (2 - PCV) 01/03/2018    Diabetic retinal exam  11/28/2019    Lipids  04/16/2020    Shingles vaccine (1 of 2) Never done    Low dose CT lung screening  Never done    Diabetic microalbuminuria test  05/04/2021    A1C test (Diabetic or Prediabetic)  01/19/2022    Colorectal Cancer Screen  02/09/2022    Flu vaccine (1) 09/01/2022    Diabetic foot exam  10/19/2022    Depression Monitoring  04/07/2023    Hepatitis A vaccine  Aged Out    Hib vaccine  Aged Out    Meningococcal (ACWY) vaccine  Aged Out       Subjective:      Review of Systems   Constitutional:  Negative for chills and fever. HENT:  Negative for rhinorrhea and sore throat. Eyes:  Negative for discharge and redness. Respiratory:  Negative for cough, shortness of breath and wheezing. Cardiovascular:  Negative for chest pain and palpitations. Gastrointestinal:  Positive for abdominal distention, abdominal pain, nausea and vomiting. Negative for diarrhea.    Genitourinary:  Negative for dysuria and frequency. Musculoskeletal:  Negative for arthralgias and myalgias. Neurological:  Negative for dizziness, light-headedness and headaches. Psychiatric/Behavioral:  Negative for sleep disturbance. Objective:     BP (!) 170/94   Pulse (!) 101   Ht 5' 8.04\" (1.728 m)   Wt 133 lb (60.3 kg)   SpO2 98%   BMI 20.20 kg/m²   Physical Exam  Vitals and nursing note reviewed. Constitutional:       General: He is not in acute distress. Appearance: He is well-developed. He is not ill-appearing. HENT:      Head: Normocephalic and atraumatic. Right Ear: External ear normal.      Left Ear: External ear normal.   Eyes:      General: No scleral icterus. Right eye: No discharge. Left eye: No discharge. Conjunctiva/sclera: Conjunctivae normal.   Neck:      Thyroid: No thyromegaly. Trachea: No tracheal deviation. Cardiovascular:      Rate and Rhythm: Normal rate and regular rhythm. Heart sounds: Normal heart sounds. Pulmonary:      Effort: Pulmonary effort is normal. No respiratory distress. Breath sounds: Normal breath sounds. No wheezing. Abdominal:      General: There is distension. Tenderness: There is abdominal tenderness. There is no guarding or rebound. Comments: Tenderness expressed in both right upper as well as left upper quadrants. Some distention is noted. Lymphadenopathy:      Cervical: No cervical adenopathy. Skin:     General: Skin is warm. Findings: No rash. Neurological:      Mental Status: He is alert and oriented to person, place, and time. Psychiatric:         Mood and Affect: Mood normal.         Behavior: Behavior normal.         Thought Content: Thought content normal.       Assessment:       Diagnosis Orders   1.  Pain of upper abdomen  CBC with Auto Differential    Basic Metabolic Panel    Hepatic Function Panel    Lipase    XR ABDOMEN (KUB) (SINGLE AP VIEW)      2. Esophagitis, acute  pantoprazole (PROTONIX) 40 MG tablet 3. Type 1 diabetes mellitus with hyperglycemia ([facility]        4. Pancreatic insufficiency        5. Stenosis of cervical spine with myelopathy ([facility]             Plan:   Blood work ordered  KUB x-ray  Patient encouraged to restart his Carafate he has at home  Continue with his Protonix daily  Continue Zofran as needed for nausea  Continue pain medication as is  Keep follow-up appointment with endocrinology    Return in about 3 months (around 11/16/2022). Orders Placed This Encounter   Procedures    XR ABDOMEN (KUB) (SINGLE AP VIEW)     Standing Status:   Future     Standing Expiration Date:   8/16/2023     Order Specific Question:   Reason for exam:     Answer:   vomiting, history of intussiseption    CBC with Auto Differential     Standing Status:   Future     Standing Expiration Date:   8/03/2956    Basic Metabolic Panel     Standing Status:   Future     Standing Expiration Date:   8/16/2023    Hepatic Function Panel     Standing Status:   Future     Standing Expiration Date:   8/16/2023    Lipase     Standing Status:   Future     Standing Expiration Date:   8/16/2023     Orders Placed This Encounter   Medications    pantoprazole (PROTONIX) 40 MG tablet     Sig: Take 1 tablet by mouth in the morning. TAKE ONE TABLET BY MOUTH TWICE DAILY. Dispense:  90 tablet     Refill:  3     Please consider 90 day supplies to promote better adherence       Patient given educational materials - see patient instructions. Discussed use, benefit, and side effects of prescribed medications. All patient questions answered. Pt voiced understanding. Reviewed health maintenance. Instructed to continue current medications, diet andexercise. Patient agreed with treatment plan. Follow up as directed.      Electronicallysigned by Emilia Valdes MD on 8/16/2022 at 10:49 AM

## 2022-08-17 DIAGNOSIS — R10.10 PAIN OF UPPER ABDOMEN: ICD-10-CM

## 2022-08-18 DIAGNOSIS — R10.10 PAIN OF UPPER ABDOMEN: ICD-10-CM

## 2022-08-26 ENCOUNTER — TELEPHONE (OUTPATIENT)
Dept: PRIMARY CARE CLINIC | Age: 52
End: 2022-08-26

## 2022-08-26 NOTE — TELEPHONE ENCOUNTER
Pt said that he does not want to take Percocet anymore. Took his last dose yesterday. Asking if he can get Suboxone sent in to [facility]? Afraid that he is going to go through withdrawal.  Was on Suboxone before. Does not want to wait until Nori Tello gets back. Please advise.

## 2022-08-26 NOTE — TELEPHONE ENCOUNTER
He will need to stay on Percocet until he seen by Jamia Feliciano  The last time he told Dr. Ritesh Waldrop did not work for his pain and stopped it. He needs an office visit with Jamia Feliciano and he can decide whether to restart suboxone or not.

## 2022-08-29 ENCOUNTER — OFFICE VISIT (OUTPATIENT)
Dept: PRIMARY CARE CLINIC | Age: 52
End: 2022-08-29
Payer: COMMERCIAL

## 2022-08-29 VITALS
BODY MASS INDEX: 21.87 KG/M2 | HEART RATE: 122 BPM | HEIGHT: 64 IN | SYSTOLIC BLOOD PRESSURE: 156 MMHG | DIASTOLIC BLOOD PRESSURE: 98 MMHG | OXYGEN SATURATION: 98 % | WEIGHT: 128.1 LBS

## 2022-08-29 DIAGNOSIS — R11.0 NAUSEA: ICD-10-CM

## 2022-08-29 DIAGNOSIS — G99.2 STENOSIS OF CERVICAL SPINE WITH MYELOPATHY (HCC): Primary | ICD-10-CM

## 2022-08-29 DIAGNOSIS — F11.20 UNCOMPLICATED OPIOID DEPENDENCE (HCC): ICD-10-CM

## 2022-08-29 DIAGNOSIS — Z79.899 HIGH RISK MEDICATION USE: ICD-10-CM

## 2022-08-29 DIAGNOSIS — R10.13 EPIGASTRIC PAIN: ICD-10-CM

## 2022-08-29 DIAGNOSIS — Z79.899 MEDICATION MANAGEMENT: ICD-10-CM

## 2022-08-29 DIAGNOSIS — M48.02 STENOSIS OF CERVICAL SPINE WITH MYELOPATHY (HCC): Primary | ICD-10-CM

## 2022-08-29 LAB
ALCOHOL URINE: NORMAL
AMPHETAMINE SCREEN, URINE: NEGATIVE
BARBITURATE SCREEN, URINE: NEGATIVE
BENZODIAZEPINE SCREEN, URINE: POSITIVE
BUPRENORPHINE URINE: NEGATIVE
COCAINE METABOLITE SCREEN URINE: NEGATIVE
FENTANYL SCREEN, URINE: NORMAL
GABAPENTIN SCREEN, URINE: NEGATIVE
MDMA URINE: NEGATIVE
METHADONE SCREEN, URINE: NEGATIVE
METHAMPHETAMINE, URINE: NEGATIVE
OPIATE SCREEN URINE: NORMAL
OXYCODONE SCREEN URINE: POSITIVE
PHENCYCLIDINE SCREEN URINE: NEGATIVE
PROPOXYPHENE SCREEN, URINE: NEGATIVE
SYNTHETIC CANNABINOIDS(K2) SCREEN, URINE: NORMAL
THC SCREEN, URINE: POSITIVE
TRAMADOL SCREEN URINE: NORMAL
TRICYCLIC ANTIDEPRESSANTS, UR: NEGATIVE

## 2022-08-29 PROCEDURE — G8420 CALC BMI NORM PARAMETERS: HCPCS | Performed by: NURSE PRACTITIONER

## 2022-08-29 PROCEDURE — 99214 OFFICE O/P EST MOD 30 MIN: CPT | Performed by: NURSE PRACTITIONER

## 2022-08-29 PROCEDURE — G8427 DOCREV CUR MEDS BY ELIG CLIN: HCPCS | Performed by: NURSE PRACTITIONER

## 2022-08-29 PROCEDURE — 4004F PT TOBACCO SCREEN RCVD TLK: CPT | Performed by: NURSE PRACTITIONER

## 2022-08-29 PROCEDURE — 80305 DRUG TEST PRSMV DIR OPT OBS: CPT | Performed by: NURSE PRACTITIONER

## 2022-08-29 PROCEDURE — 3017F COLORECTAL CA SCREEN DOC REV: CPT | Performed by: NURSE PRACTITIONER

## 2022-08-29 RX ORDER — PROMETHAZINE HYDROCHLORIDE 25 MG/1
25 TABLET ORAL 3 TIMES DAILY PRN
Qty: 21 TABLET | Refills: 0 | Status: SHIPPED | OUTPATIENT
Start: 2022-08-29 | End: 2022-09-05

## 2022-08-29 RX ORDER — BUPRENORPHINE AND NALOXONE 8; 2 MG/1; MG/1
1 FILM, SOLUBLE BUCCAL; SUBLINGUAL 3 TIMES DAILY
Qty: 21 FILM | Refills: 0 | Status: SHIPPED | OUTPATIENT
Start: 2022-08-29 | End: 2022-09-05

## 2022-08-29 ASSESSMENT — ENCOUNTER SYMPTOMS
VOMITING: 1
NAUSEA: 1
COUGH: 0
SHORTNESS OF BREATH: 0

## 2022-08-29 NOTE — PATIENT INSTRUCTIONS
Stop percocet  Start suboxone 2-3 strips daily   Promethazine for nausea  Complete CT of abdomen and pelvis. Will take suboxone for one week to try to get through the pain and withdrawals. If suboxone works well will continue with suboxone. If suboxone does not work may have to go back to [employer].

## 2022-08-29 NOTE — PROGRESS NOTES
[id number] [address] PRIMARY CARE  [facility] [address]  [facility] [address] [place] [address]  Dept: [phone]    Howie Sanches is a 46 y.o. male Established patient, who presents today for his medical conditions/complaints as noted below. Chief Complaint   Patient presents with    Medication Problem     Pt reports vomiting - has been taking extra percocet due to vomiting it back up - pt has 2 percocet left from 08/08/2022 (rx was for 180 tablets)    Abdominal Pain     Pt requesting to have CT done - states the pain started a month ago & the xray he completed did not show anything. HPI:     HPI  He is down two pills of the percocet. He keeps throwing up the pain medication and other medications. He states he is vomiting daily. He is see endocrinology. His blood sugar is running in the 400's. He is tired and agitated and afraid of withdrawals. Blood pressure is elevated. Stress cause glucose to go higher.     Reviewed prior notes: Previous PCP   Reviewed previous:  Labs, Imaging, and Hospital Records    LDL Calculated (mg/dL)   Date Value   04/16/2019 97   05/21/2018 66   01/05/2018 66       (goal LDL is <100)   AST (U/L)   Date Value   08/20/2021 18     ALT (U/L)   Date Value   08/20/2021 14     BUN (mg/dL)   Date Value   08/20/2021 9     Hemoglobin A1C (%)   Date Value   10/19/2021 9.5     TSH (mIU/L)   Date Value   07/27/2018 0.43     BP Readings from Last 3 Encounters:   08/29/22 (!) 156/98   08/16/22 (!) 168/88   07/07/22 (!) 136/96          (goal 120/80)    Past Medical History:   Diagnosis Date    Anxiety     Arthritis     CAD (coronary artery disease)     Calcaneal apophysitis     Corns     Depression     Diabetes type I (HCC)     Gastroparesis     GERD (gastroesophageal reflux disease)     Headache(784.0)     Hearing loss     Hyperglycemia     Hyperlipidemia     Hypertension     Intussusception ([address]     MI (myocardial infarction) ([address] jan 2014    Neuropathy Osteoarthritis     Panic attacks     Temporomandibular joint disorder       Past Surgical History:   Procedure Laterality Date    ABDOMEN SURGERY      insulin pump    ABDOMINAL EXPLORATION SURGERY  05/28/2016    bowel resection Rt. colon &terminal ileum, intussuseption.      CARDIAC CATHETERIZATION  2014    CARPAL TUNNEL RELEASE      bilateral    COLONOSCOPY  05/23/2017    poor prep; diverticulosis    COLONOSCOPY N/A 7/10/2018    COLONOSCOPY POLYPECTOMY HOT BIOPSY [facility] [place] [address] N/A 8/9/2021    COLONOSCOPY DIAGNOSTIC performed by Thor Veliz MD at [address]      right    HERNIA REPAIR      inguinal    NV COLON CA SCRN NOT  [address] IND N/A 5/25/2017    COLONOSCOPY performed by Mamie Melendez MD at [facility] ESOPHAGOGASTRODUODENOSCOPY TRANSORAL DIAGNOSTIC N/A 5/24/2017    EGD ESOPHAGOGASTRODUODENOSCOPY performed by Mamie Melendez MD at [address] Bilateral     [address]      fracture     TONSILLECTOMY      x2    TOOTH EXTRACTION      x4    ULNAR TUNNEL RELEASE      UPPER GASTROINTESTINAL ENDOSCOPY  05/23/2017    mild gastritis    UPPER GASTROINTESTINAL ENDOSCOPY N/A 7/10/2018    EGD BIOPSY performed by Thor Veliz MD at [facility] N/A 8/10/2021    HERNIA INCISIONAL REPAIR OPEN W/MESH performed by Thor Veliz MD at [facility]      x4       Family History   Problem Relation Age of Onset    Diabetes Mother     High Blood Pressure Mother     Heart Disease Mother     Migraines Mother     Other Mother         diabetic neuropathy    High Blood Pressure Father        Social History     Tobacco Use    Smoking status: Every Day     Packs/day: 1.00     Years: 30.00     Pack years: 30.00     Types: Cigarettes    Smokeless tobacco: Never   Substance Use Topics    Alcohol use: No     Comment: previous heavy ETOH use; pt stopped around 2010      Current Outpatient Medications   Medication Sig Dispense Refill    buprenorphine-naloxone (SUBOXONE) 8-2 MG FILM SL film Place 1 Film under the tongue 3 times daily for 7 days. (Patient taking differently take 1 film in the morning and 1 film in the evening and  1/2 film 2x daily as needed for pain or withdrawals.) 21 Film 0    promethazine (PHENERGAN) 25 MG tablet Take 1 tablet by mouth 3 times daily as needed for Nausea 21 tablet 0    pantoprazole (PROTONIX) 40 MG tablet Take 1 tablet by mouth in the morning. TAKE ONE TABLET BY MOUTH TWICE DAILY. 90 tablet 3    LORazepam (ATIVAN) 0.5 MG tablet Take 1 tablet by mouth every 8 hours as needed for Anxiety for up to 30 days.  90 tablet 0    butalbital-acetaminophen-caffeine (FIORICET, ESGIC) -40 MG per tablet Take 1 tablet by mouth every 4 hours as needed for Headaches 180 tablet 3    ondansetron (ZOFRAN) 4 MG tablet 1 tablet daily 90 tablet 1    brexpiprazole (REXULTI) 1 MG TABS tablet Take 1 tablet by mouth daily 90 tablet 3    TRINTELLIX 20 MG TABS tablet TAKE 1 TABLET DAILY 90 tablet 3    HUMALOG 100 UNIT/ML injection vial INJECT UP TO 70 UNITS VIA PUMP AS DIRECTED      naloxone 4 MG/0.1ML LIQD nasal spray 1 spray by Nasal route as needed for Opioid Reversal 1 each 5    propranolol (INDERAL) 20 MG tablet Take 20 mg by mouth as needed Takes for migraines      b complex vitamins capsule Take 1 capsule by mouth daily      lisinopril (PRINIVIL;ZESTRIL) 30 MG tablet Take 1 tablet by mouth daily (Patient taking differently: Take 5 mg by mouth daily) 90 tablet 3    isosorbide mononitrate (IMDUR) 30 MG extended release tablet Take 1 tablet by mouth daily 90 tablet 3    atorvastatin (LIPITOR) 80 MG tablet Take 1 tablet by mouth nightly 90 tablet 3    clopidogrel (PLAVIX) 75 MG tablet Take 1 tablet by mouth daily 90 tablet 3    CONTOUR NEXT TEST strip use four times a day      nitroGLYCERIN (NITROSTAT) 0.4 MG SL tablet Place 1 tablet under the tongue every 5 minutes as needed for Chest pain Indications: Disease of the Arteries of the Heart Take 1 tablet every 5 minutes times three as needed for chest pain 25 tablet 2     No current facility-administered medications for this visit. Facility-Administered Medications Ordered in Other Visits   Medication Dose Route Frequency Provider Last Rate Last Admin    lactated ringers infusion  75 mL/hr IntraVENous Continuous Mikal Kaplan MD         Allergies   Allergen Reactions    Avelox [Moxifloxacin]     Ciprofloxacin Other (See Comments)    Levofloxacin     Lyrica [Pregabalin]     Metoclopramide Other (See Comments)     Severe confusion and paranoid activity    Neurontin [Gabapentin]     Other      Can not have epidural injections, steroids, nerve blocks, or burning of nerves due to being diabetic    Tetanus Toxoids     Tramadol     Tramadol Hcl        Health Maintenance   Topic Date Due    COVID-19 Vaccine (1) Never done    HIV screen  Never done    Hepatitis C screen  Never done    Hepatitis B vaccine (1 of 3 - Risk 3-dose series) Never done    Pneumococcal 0-64 years Vaccine (2 - PCV) 01/03/2018    Diabetic retinal exam  11/28/2019    Lipids  04/16/2020    Shingles vaccine (1 of 2) Never done    Low dose CT lung screening  Never done    Diabetic microalbuminuria test  05/04/2021    A1C test (Diabetic or Prediabetic)  01/19/2022    Colorectal Cancer Screen  02/09/2022    Flu vaccine (1) 09/01/2022    Diabetic foot exam  10/19/2022    Depression Monitoring  04/07/2023    Hepatitis A vaccine  Aged Out    Hib vaccine  Aged Out    Meningococcal (ACWY) vaccine  Aged Out       Subjective:      Review of Systems   Constitutional:  Positive for activity change and appetite change. HENT:  Negative for congestion and ear pain. Respiratory:  Negative for cough and shortness of breath. Cardiovascular:  Negative for chest pain and leg swelling. Gastrointestinal:  Positive for nausea and vomiting.    Genitourinary:  Negative for difficulty (SUBOXONE) 8-2 MG FILM SL film; Place 1 Film under the tongue 3 times daily for 7 days. (Patient taking differently take 1 film in the morning and 1 film in the evening and  1/2 film 2x daily as needed for pain or withdrawals.), Disp-21 Film, R-0Normal  4. Nausea  -     promethazine (PHENERGAN) 25 MG tablet; Take 1 tablet by mouth 3 times daily as needed for Nausea, Disp-21 tablet, R-0Normal  5. Epigastric pain  -     CT ABDOMEN PELVIS WO CONTRAST Additional Contrast? Radiologist Recommendation; Future  6. High risk medication use  -     POCT Rapid Drug Screen     Stop percocet  Start suboxone 2-3 strips daily   Promethazine for nausea  Complete CT of abdomen and pelvis. Will take suboxone for one week to try to get through the pain and withdrawals. If suboxone works well will continue with suboxone. If suboxone does not work may have to go back to [employer]. No follow-ups on file. Data Unavailable     Orders Placed This Encounter   Procedures    CT ABDOMEN PELVIS WO CONTRAST Additional Contrast? Radiologist Recommendation     Standing Status:   Future     Standing Expiration Date:   8/29/2023     Order Specific Question:   Additional Contrast?     Answer:   Radiologist Recommendation     Order Specific Question:   Reason for exam:     Answer:   pain, nausea and vomiting    POCT Rapid Drug Screen     Orders Placed This Encounter   Medications    buprenorphine-naloxone (SUBOXONE) 8-2 MG FILM SL film     Sig: Place 1 Film under the tongue 3 times daily for 7 days. (Patient taking differently take 1 film in the morning and 1 film in the evening and  1/2 film 2x daily as needed for pain or withdrawals.)     Dispense:  21 Film     Refill:  0     NADEAN:  [id number]    promethazine (PHENERGAN) 25 MG tablet     Sig: Take 1 tablet by mouth 3 times daily as needed for Nausea     Dispense:  21 tablet     Refill:  0       Patient given educational materials - see patient instructions.   Discussed use, benefit, and side effects of prescribed medications. All patient questions answered. Pt voiced understanding. Reviewed health maintenance. Instructed to continue current medications, diet and exercise. Patient agreed with treatment plan. Follow up as directed.      Electronically signed by CLYDE Samuels CNP on 8/29/2022 at 2:17 PM

## 2022-08-31 ENCOUNTER — HOSPITAL ENCOUNTER (OUTPATIENT)
Dept: CT IMAGING | Age: 52
Discharge: HOME | End: 2022-09-02
Payer: COMMERCIAL

## 2022-08-31 DIAGNOSIS — R10.13 EPIGASTRIC PAIN: ICD-10-CM

## 2022-08-31 PROCEDURE — 6360000004 HC RX CONTRAST MEDICATION: Performed by: NURSE PRACTITIONER

## 2022-08-31 PROCEDURE — 74176 CT ABD & PELVIS W/O CONTRAST: CPT

## 2022-08-31 RX ADMIN — IOHEXOL 50 ML: 240 INJECTION, SOLUTION INTRATHECAL; INTRAVASCULAR; INTRAVENOUS; ORAL at 15:11

## 2022-09-06 DIAGNOSIS — F41.8 DEPRESSION WITH ANXIETY: ICD-10-CM

## 2022-09-06 RX ORDER — LORAZEPAM 0.5 MG/1
0.5 TABLET ORAL EVERY 8 HOURS PRN
Qty: 90 TABLET | Refills: 0 | Status: SHIPPED | OUTPATIENT
Start: 2022-09-06 | End: 2022-10-03 | Stop reason: SDUPTHER

## 2022-09-07 ENCOUNTER — HOSPITAL ENCOUNTER (OUTPATIENT)
Age: 52
Setting detail: SPECIMEN
Discharge: HOME | End: 2022-09-07

## 2022-09-07 ENCOUNTER — OFFICE VISIT (OUTPATIENT)
Dept: PRIMARY CARE CLINIC | Age: 52
End: 2022-09-07
Payer: COMMERCIAL

## 2022-09-07 VITALS
DIASTOLIC BLOOD PRESSURE: 92 MMHG | OXYGEN SATURATION: 97 % | SYSTOLIC BLOOD PRESSURE: 158 MMHG | BODY MASS INDEX: 22.31 KG/M2 | WEIGHT: 130 LBS | HEART RATE: 81 BPM

## 2022-09-07 DIAGNOSIS — F11.20 UNCOMPLICATED OPIOID DEPENDENCE (HCC): ICD-10-CM

## 2022-09-07 DIAGNOSIS — R10.13 EPIGASTRIC PAIN: ICD-10-CM

## 2022-09-07 DIAGNOSIS — R35.0 URINARY FREQUENCY: ICD-10-CM

## 2022-09-07 DIAGNOSIS — Z23 NEED FOR INFLUENZA VACCINATION: ICD-10-CM

## 2022-09-07 DIAGNOSIS — M75.42 IMPINGEMENT SYNDROME OF LEFT SHOULDER: ICD-10-CM

## 2022-09-07 DIAGNOSIS — R35.0 URINARY FREQUENCY: Primary | ICD-10-CM

## 2022-09-07 PROCEDURE — 90674 CCIIV4 VAC NO PRSV 0.5 ML IM: CPT | Performed by: NURSE PRACTITIONER

## 2022-09-07 PROCEDURE — G0008 ADMIN INFLUENZA VIRUS VAC: HCPCS | Performed by: NURSE PRACTITIONER

## 2022-09-07 PROCEDURE — G8427 DOCREV CUR MEDS BY ELIG CLIN: HCPCS | Performed by: NURSE PRACTITIONER

## 2022-09-07 PROCEDURE — 4004F PT TOBACCO SCREEN RCVD TLK: CPT | Performed by: NURSE PRACTITIONER

## 2022-09-07 PROCEDURE — G8420 CALC BMI NORM PARAMETERS: HCPCS | Performed by: NURSE PRACTITIONER

## 2022-09-07 PROCEDURE — 99214 OFFICE O/P EST MOD 30 MIN: CPT | Performed by: NURSE PRACTITIONER

## 2022-09-07 PROCEDURE — 3017F COLORECTAL CA SCREEN DOC REV: CPT | Performed by: NURSE PRACTITIONER

## 2022-09-07 RX ORDER — OXYCODONE AND ACETAMINOPHEN 10; 325 MG/1; MG/1
1 TABLET ORAL EVERY 4 HOURS PRN
Qty: 180 TABLET | Refills: 0 | Status: SHIPPED | OUTPATIENT
Start: 2022-09-07 | End: 2022-10-03 | Stop reason: SDUPTHER

## 2022-09-07 NOTE — PROGRESS NOTES
[address]  PRIMARY CARE  [facility]  [facility] [place] [address]  Dept: [phone]    Jero Arriaga is a 46 y.o. male Established patient, who presents today for his medical conditions/complaints as noted below. Chief Complaint   Patient presents with    Abdominal Pain     F/u from CT to do UA. Still having abd pain Suboxone helped with the withdrawls, but pain is through the roof. HPI:     HPI  States he is not getting any pain relief from Suboxone. He has not taken Suboxone since 5 o'clock yesterday. His pain is through the roof. He has not had any withdrawal symptoms. He did CT without intervenous contrast and with oral contrast. IT showed bladder thickening and atheromatous plague. He did smoke more marijuana so he could eat with the pain. Promethazine made his head swim.     Reviewed prior notes: None   Reviewed previous:  Imaging    LDL Calculated (mg/dL)   Date Value   04/16/2019 97   05/21/2018 66   01/05/2018 66       (goal LDL is <100)   AST (U/L)   Date Value   08/20/2021 18     ALT (U/L)   Date Value   08/20/2021 14     BUN (mg/dL)   Date Value   08/20/2021 9     Hemoglobin A1C (%)   Date Value   10/19/2021 9.5     TSH (mIU/L)   Date Value   07/27/2018 0.43     BP Readings from Last 3 Encounters:   09/07/22 (!) 158/92   08/29/22 (!) 156/98   08/16/22 (!) 168/88          (goal 120/80)    Past Medical History:   Diagnosis Date    Anxiety     Arthritis     CAD (coronary artery disease)     Calcaneal apophysitis     Corns     Depression     Diabetes type I (HCC)     Gastroparesis     GERD (gastroesophageal reflux disease)     Headache(784.0)     Hearing loss     Hyperglycemia     Hyperlipidemia     Hypertension     Intussusception ([address]     MI (myocardial infarction) ([address] jan 2014    Neuropathy     Osteoarthritis     Panic attacks     Temporomandibular joint disorder       Past Surgical History:   Procedure Laterality Date    ABDOMEN SURGERY insulin pump    ABDOMINAL EXPLORATION SURGERY  05/28/2016    bowel resection Rt. colon &terminal ileum, intussuseption.      CARDIAC CATHETERIZATION  2014    CARPAL TUNNEL RELEASE      bilateral    COLONOSCOPY  05/23/2017    poor prep; diverticulosis    COLONOSCOPY N/A 7/10/2018    COLONOSCOPY POLYPECTOMY HOT BIOPSY performed by Ana Chauhan MD at [address] [facility] N/A 8/9/2021    COLONOSCOPY DIAGNOSTIC performed by Ana Chauhan MD at [address]      right    HERNIA REPAIR      inguinal    CT COLON CA SCRN NOT  [address] IND N/A 5/25/2017    COLONOSCOPY performed by Donna Aquino MD at [facility] ESOPHAGOGASTRODUODENOSCOPY TRANSORAL DIAGNOSTIC N/A 5/24/2017    EGD ESOPHAGOGASTRODUODENOSCOPY performed by Donna Aquino MD at [facility] Bilateral     [address]      fracture     TONSILLECTOMY      x2    TOOTH EXTRACTION      x4    ULNAR TUNNEL RELEASE      UPPER GASTROINTESTINAL ENDOSCOPY  05/23/2017    mild gastritis    UPPER GASTROINTESTINAL ENDOSCOPY N/A 7/10/2018    EGD BIOPSY performed by Ana Chauhan MD at [facility] N/A 8/10/2021    HERNIA INCISIONAL REPAIR OPEN W/MESH performed by Ana [facility]han MD at [facility]      x4       Family History   Problem Relation Age of Onset    Diabetes Mother     High Blood Pressure Mother     Heart Disease Mother     Migraines Mother     Other Mother         diabetic neuropathy    High Blood Pressure Father        Social History     Tobacco Use    Smoking status: Every Day     Packs/day: 1.00     Years: 30.00     Pack years: 30.00     Types: Cigarettes    Smokeless tobacco: Never   Substance Use Topics    Alcohol use: No     Comment: previous heavy ETOH use; pt stopped around 2010      Current Outpatient Medications   Medication Sig Dispense Refill    oxyCODONE-acetaminophen (PERCOCET)  MG per tablet Take 1 tablet by mouth every 4 hours as needed for Pain for up to 30 days. 180 tablet 0    LORazepam (ATIVAN) 0.5 MG tablet Take 1 tablet by mouth every 8 hours as needed for Anxiety for up to 30 days. 90 tablet 0    pantoprazole (PROTONIX) 40 MG tablet Take 1 tablet by mouth in the morning. TAKE ONE TABLET BY MOUTH TWICE DAILY. 90 tablet 3    butalbital-acetaminophen-caffeine (FIORICET, ESGIC) -40 MG per tablet Take 1 tablet by mouth every 4 hours as needed for Headaches 180 tablet 3    ondansetron (ZOFRAN) 4 MG tablet 1 tablet daily 90 tablet 1    brexpiprazole (REXULTI) 1 MG TABS tablet Take 1 tablet by mouth daily 90 tablet 3    TRINTELLIX 20 MG TABS tablet TAKE 1 TABLET DAILY 90 tablet 3    HUMALOG 100 UNIT/ML injection vial INJECT UP TO 70 UNITS VIA PUMP AS DIRECTED      naloxone 4 MG/0.1ML LIQD nasal spray 1 spray by Nasal route as needed for Opioid Reversal 1 each 5    propranolol (INDERAL) 20 MG tablet Take 20 mg by mouth as needed Takes for migraines      b complex vitamins capsule Take 1 capsule by mouth daily      lisinopril (PRINIVIL;ZESTRIL) 30 MG tablet Take 1 tablet by mouth daily (Patient taking differently: Take 5 mg by mouth daily) 90 tablet 3    isosorbide mononitrate (IMDUR) 30 MG extended release tablet Take 1 tablet by mouth daily 90 tablet 3    atorvastatin (LIPITOR) 80 MG tablet Take 1 tablet by mouth nightly 90 tablet 3    clopidogrel (PLAVIX) 75 MG tablet Take 1 tablet by mouth daily 90 tablet 3    CONTOUR NEXT TEST strip use four times a day      nitroGLYCERIN (NITROSTAT) 0.4 MG SL tablet Place 1 tablet under the tongue every 5 minutes as needed for Chest pain Indications: Disease of the Arteries of the Heart Take 1 tablet every 5 minutes times three as needed for chest pain 25 tablet 2     No current facility-administered medications for this visit.      Facility-Administered Medications Ordered in Other Visits   Medication Dose Route Frequency Provider Last Rate Last Admin    lactated ringers infusion  75 mL/hr IntraVENous Continuous Ilsa Medina MD         Allergies   Allergen Reactions    Avelox [Moxifloxacin]     Ciprofloxacin Other (See Comments)    Levofloxacin     Lyrica [Pregabalin]     Metoclopramide Other (See Comments)     Severe confusion and paranoid activity    Neurontin [Gabapentin]     Other      Can not have epidural injections, steroids, nerve blocks, or burning of nerves due to being diabetic    Tetanus Toxoids     Tramadol     Tramadol Hcl        Health Maintenance   Topic Date Due    COVID-19 Vaccine (1) Never done    HIV screen  Never done    Hepatitis C screen  Never done    Hepatitis B vaccine (1 of 3 - Risk 3-dose series) Never done    Pneumococcal 0-64 years Vaccine (2 - PCV) 01/03/2018    Diabetic retinal exam  11/28/2019    Lipids  04/16/2020    Shingles vaccine (1 of 2) Never done    Low dose CT lung screening  Never done    Diabetic microalbuminuria test  05/04/2021    A1C test (Diabetic or Prediabetic)  01/19/2022    Colorectal Cancer Screen  02/09/2022    Flu vaccine (1) 09/01/2022    Diabetic foot exam  10/19/2022    Depression Monitoring  04/07/2023    Hepatitis A vaccine  Aged Out    Hib vaccine  Aged Out    Meningococcal (ACWY) vaccine  Aged Out       Subjective:      Review of Systems   Constitutional:  Positive for activity change and appetite change. Negative for chills and fever. HENT:  Negative for rhinorrhea and sore throat. Eyes:  Negative for discharge and redness. Respiratory:  Negative for cough, shortness of breath and wheezing. Cardiovascular:  Negative for chest pain and palpitations. Gastrointestinal:  Positive for nausea and vomiting. Negative for abdominal pain and diarrhea. Genitourinary:  Negative for dysuria and frequency. Musculoskeletal:  Positive for neck pain and neck stiffness. Negative for arthralgias and myalgias. Neurological:  Negative for dizziness, light-headedness and headaches. Psychiatric/Behavioral:  Positive for sleep disturbance.  The patient is nervous/anxious. Objective:     BP (!) 158/92   Pulse 81   Wt 130 lb (59 kg)   SpO2 97%   BMI 22.31 kg/m²   Physical Exam  Vitals and nursing note reviewed. Constitutional:       General: He is not in acute distress. Appearance: He is well-developed. He is not ill-appearing. HENT:      Head: Normocephalic and atraumatic. Right Ear: External ear normal.      Left Ear: External ear normal.   Eyes:      General: No scleral icterus. Right eye: No discharge. Left eye: No discharge. Conjunctiva/sclera: Conjunctivae normal.   Neck:      Thyroid: No thyromegaly. Trachea: No tracheal deviation. Cardiovascular:      Rate and Rhythm: Normal rate and regular rhythm. Heart sounds: Normal heart sounds. Pulmonary:      Effort: Pulmonary effort is normal. No respiratory distress. Breath sounds: Normal breath sounds. No wheezing. Musculoskeletal:      Cervical back: Muscular tenderness present. Decreased range of motion. Lymphadenopathy:      Cervical: No cervical adenopathy. Skin:     General: Skin is warm. Findings: No rash. Neurological:      Mental Status: He is alert and oriented to person, place, and time. Psychiatric:         Mood and Affect: Mood normal.         Behavior: Behavior normal.         Thought Content: Thought content normal.     Controlled substances monitoring: possible medication side effects, risk of tolerance and/or dependence, and alternative treatments discussed, no signs of potential drug abuse or diversion identified, and OARRS report reviewed today- activity consistent with treatment plan. Assessment/Plan:   1. Urinary frequency  -     Culture, Urine; Future  2. Epigastric pain  3. Uncomplicated opioid dependence ([facility]  4. Impingement syndrome of left shoulder  -     oxyCODONE-acetaminophen (PERCOCET)  MG per tablet; Take 1 tablet by mouth every 4 hours as needed for Pain for up to 30 days. , Disp-180 tablet, R-0Normal  5. Need for influenza vaccination  -     Influenza, FLUCELVAX, (age 10 mo+), IM, Preservative Free, 0.5 mL     Urine set for culture  Restart Percocet and follow up with Dr. Tiki Salinas  Flu vaccine given today. Return in about 4 weeks (around 10/5/2022) for pain with Dr. Tiki Salinas. Data Unavailable     Orders Placed This Encounter   Procedures    Culture, Urine     Standing Status:   Future     Standing Expiration Date:   9/7/2023     Order Specific Question:   Specify (ex-cath, midstream, cysto, etc)? Answer:   clean catch    Influenza, FLUCELVAX, (age 10 mo+), IM, Preservative Free, 0.5 mL     Orders Placed This Encounter   Medications    oxyCODONE-acetaminophen (PERCOCET)  MG per tablet     Sig: Take 1 tablet by mouth every 4 hours as needed for Pain for up to 30 days. Dispense:  180 tablet     Refill:  0     Reduce doses taken as pain becomes manageable       Patient given educational materials - see patient instructions. Discussed use, benefit, and side effects of prescribed medications. All patient questions answered. Pt voiced understanding. Reviewed health maintenance. Instructed to continue current medications, diet and exercise. Patient agreed with treatment plan. Follow up as directed.      Electronically signed by CLYDE Mancuso CNP on 9/7/2022 at 12:17 PM

## 2022-09-08 LAB
CULTURE: NO GROWTH
SPECIMEN DESCRIPTION: NORMAL

## 2022-09-08 ASSESSMENT — ENCOUNTER SYMPTOMS
RHINORRHEA: 0
DIARRHEA: 0
EYE REDNESS: 0
COUGH: 0
WHEEZING: 0
EYE DISCHARGE: 0
SHORTNESS OF BREATH: 0
SORE THROAT: 0
ABDOMINAL PAIN: 0
VOMITING: 1
NAUSEA: 1

## 2022-09-19 ENCOUNTER — OFFICE VISIT (OUTPATIENT)
Dept: PRIMARY CARE CLINIC | Age: 52
End: 2022-09-19
Payer: COMMERCIAL

## 2022-09-19 VITALS
BODY MASS INDEX: 23.32 KG/M2 | OXYGEN SATURATION: 96 % | HEIGHT: 64 IN | DIASTOLIC BLOOD PRESSURE: 104 MMHG | WEIGHT: 136.6 LBS | HEART RATE: 96 BPM | SYSTOLIC BLOOD PRESSURE: 162 MMHG

## 2022-09-19 DIAGNOSIS — M75.42 IMPINGEMENT SYNDROME OF LEFT SHOULDER: ICD-10-CM

## 2022-09-19 DIAGNOSIS — E11.42 DIABETIC PERIPHERAL NEUROPATHY (HCC): ICD-10-CM

## 2022-09-19 DIAGNOSIS — M54.12 CERVICAL RADICULOPATHY: ICD-10-CM

## 2022-09-19 DIAGNOSIS — R93.5 ABNORMAL CT OF THE ABDOMEN: Primary | ICD-10-CM

## 2022-09-19 LAB
BILIRUBIN, POC: NORMAL
BLOOD URINE, POC: NORMAL
CLARITY, POC: CLEAR
COLOR, POC: YELLOW
GLUCOSE URINE, POC: 250
KETONES, POC: NORMAL
LEUKOCYTE EST, POC: NORMAL
NITRITE, POC: NORMAL
PH, POC: 7
PROTEIN, POC: NORMAL
SPECIFIC GRAVITY, POC: 1.01
UROBILINOGEN, POC: 0.2

## 2022-09-19 PROCEDURE — 3017F COLORECTAL CA SCREEN DOC REV: CPT | Performed by: FAMILY MEDICINE

## 2022-09-19 PROCEDURE — G8427 DOCREV CUR MEDS BY ELIG CLIN: HCPCS | Performed by: FAMILY MEDICINE

## 2022-09-19 PROCEDURE — 2022F DILAT RTA XM EVC RTNOPTHY: CPT | Performed by: FAMILY MEDICINE

## 2022-09-19 PROCEDURE — 81003 URINALYSIS AUTO W/O SCOPE: CPT | Performed by: FAMILY MEDICINE

## 2022-09-19 PROCEDURE — 4004F PT TOBACCO SCREEN RCVD TLK: CPT | Performed by: FAMILY MEDICINE

## 2022-09-19 PROCEDURE — 3046F HEMOGLOBIN A1C LEVEL >9.0%: CPT | Performed by: FAMILY MEDICINE

## 2022-09-19 PROCEDURE — G8420 CALC BMI NORM PARAMETERS: HCPCS | Performed by: FAMILY MEDICINE

## 2022-09-19 PROCEDURE — 99214 OFFICE O/P EST MOD 30 MIN: CPT | Performed by: FAMILY MEDICINE

## 2022-09-19 RX ORDER — OXYCODONE HCL 20 MG/1
20 TABLET, FILM COATED, EXTENDED RELEASE ORAL 2 TIMES DAILY
Qty: 14 TABLET | Refills: 0 | Status: SHIPPED | OUTPATIENT
Start: 2022-09-19 | End: 2022-09-22 | Stop reason: SDUPTHER

## 2022-09-19 RX ORDER — OXYCODONE HCL 20 MG/1
20 TABLET, FILM COATED, EXTENDED RELEASE ORAL 2 TIMES DAILY
Qty: 14 TABLET | Refills: 0 | Status: SHIPPED | OUTPATIENT
Start: 2022-09-19 | End: 2022-09-19

## 2022-09-19 ASSESSMENT — ENCOUNTER SYMPTOMS
COUGH: 0
EYE REDNESS: 0
VOMITING: 0
NAUSEA: 0
SORE THROAT: 0
EYE DISCHARGE: 0
RHINORRHEA: 0
ABDOMINAL PAIN: 0
DIARRHEA: 0
SHORTNESS OF BREATH: 0
WHEEZING: 0

## 2022-09-19 ASSESSMENT — PATIENT HEALTH QUESTIONNAIRE - PHQ9
5. POOR APPETITE OR OVEREATING: 0
SUM OF ALL RESPONSES TO PHQ QUESTIONS 1-9: 3
1. LITTLE INTEREST OR PLEASURE IN DOING THINGS: 1
4. FEELING TIRED OR HAVING LITTLE ENERGY: 0
7. TROUBLE CONCENTRATING ON THINGS, SUCH AS READING THE NEWSPAPER OR WATCHING TELEVISION: 0
2. FEELING DOWN, DEPRESSED OR HOPELESS: 1
6. FEELING BAD ABOUT YOURSELF - OR THAT YOU ARE A FAILURE OR HAVE LET YOURSELF OR YOUR FAMILY DOWN: 1
10. IF YOU CHECKED OFF ANY PROBLEMS, HOW DIFFICULT HAVE THESE PROBLEMS MADE IT FOR YOU TO DO YOUR WORK, TAKE CARE OF THINGS AT HOME, OR GET ALONG WITH OTHER PEOPLE: 0
SUM OF ALL RESPONSES TO PHQ QUESTIONS 1-9: 3
SUM OF ALL RESPONSES TO PHQ QUESTIONS 1-9: 3
8. MOVING OR SPEAKING SO SLOWLY THAT OTHER PEOPLE COULD HAVE NOTICED. OR THE OPPOSITE, BEING SO FIGETY OR RESTLESS THAT YOU HAVE BEEN MOVING AROUND A LOT MORE THAN USUAL: 0
SUM OF ALL RESPONSES TO PHQ QUESTIONS 1-9: 3
SUM OF ALL RESPONSES TO PHQ9 QUESTIONS 1 & 2: 2
9. THOUGHTS THAT YOU WOULD BE BETTER OFF DEAD, OR OF HURTING YOURSELF: 0
3. TROUBLE FALLING OR STAYING ASLEEP: 0

## 2022-09-19 NOTE — PROGRESS NOTES
[facility]  [address] [facility]  [address] [address]  Dept: [phone]    Enma Domingo is a 46 y.o. male Established patient, who presents today for his medical conditions/complaints as noted below. Chief Complaint   Patient presents with    Pain     Pt wants to discuss medications       HPI:     HPI  Pt states since mid July, having worsening pain in shoulders, neck. Pt states feels that he needs to go back on the oxycontin. States wakes up every hour due to pain. Pt had been taken off of oxycontin in January. Pt had seen neurosurgery dr. Luis Fernando Hylton, states nothing he would do for him. Seen Dr. Edin Abernathy, pain management, told could do no further injections do to not helping. Had surgery twice on both shoulders by Dr. Dre Smith. Told nothing else can help. Pt had colonoscopy and EGD ordered. Pt had seen endocrinology last week.   Tried suboxone and methadone in the past.     Reviewed prior notes  endocrinology  Reviewed previous Labs    LDL Calculated (mg/dL)   Date Value   04/16/2019 97   05/21/2018 66   01/05/2018 66       (goal LDL is <100)   AST (U/L)   Date Value   08/20/2021 18     ALT (U/L)   Date Value   08/20/2021 14     BUN (mg/dL)   Date Value   08/20/2021 9     Hemoglobin A1C (%)   Date Value   10/19/2021 9.5     TSH (mIU/L)   Date Value   07/27/2018 0.43     BP Readings from Last 3 Encounters:   09/19/22 (!) 162/104   09/07/22 (!) 158/92   08/29/22 (!) 156/98          (goal 120/80)    Past Medical History:   Diagnosis Date    Anxiety     Arthritis     CAD (coronary artery disease)     Calcaneal apophysitis     Corns     Depression     Diabetes type I (HCC)     Gastroparesis     GERD (gastroesophageal reflux disease)     Headache(784.0)     Hearing loss     Hyperglycemia     Hyperlipidemia     Hypertension     Intussusception ([address]     MI (myocardial infarction) ([facility] jan 2014    Neuropathy     Osteoarthritis     Panic attacks oxyCODONE (OXYCONTIN) 20 MG extended release tablet Take 1 tablet by mouth 2 times daily for 7 days. Intended supply: 30 days 14 tablet 0    oxyCODONE-acetaminophen (PERCOCET)  MG per tablet Take 1 tablet by mouth every 4 hours as needed for Pain for up to 30 days. 180 tablet 0    LORazepam (ATIVAN) 0.5 MG tablet Take 1 tablet by mouth every 8 hours as needed for Anxiety for up to 30 days. 90 tablet 0    pantoprazole (PROTONIX) 40 MG tablet Take 1 tablet by mouth in the morning. TAKE ONE TABLET BY MOUTH TWICE DAILY.  90 tablet 3    butalbital-acetaminophen-caffeine (FIORICET, ESGIC) -40 MG per tablet Take 1 tablet by mouth every 4 hours as needed for Headaches 180 tablet 3    ondansetron (ZOFRAN) 4 MG tablet 1 tablet daily 90 tablet 1    brexpiprazole (REXULTI) 1 MG TABS tablet Take 1 tablet by mouth daily 90 tablet 3    TRINTELLIX 20 MG TABS tablet TAKE 1 TABLET DAILY 90 tablet 3    HUMALOG 100 UNIT/ML injection vial INJECT UP TO 70 UNITS VIA PUMP AS DIRECTED      naloxone 4 MG/0.1ML LIQD nasal spray 1 spray by Nasal route as needed for Opioid Reversal 1 each 5    propranolol (INDERAL) 20 MG tablet Take 20 mg by mouth as needed Takes for migraines      b complex vitamins capsule Take 1 capsule by mouth daily      lisinopril (PRINIVIL;ZESTRIL) 30 MG tablet Take 1 tablet by mouth daily (Patient taking differently: Take 5 mg by mouth daily) 90 tablet 3    isosorbide mononitrate (IMDUR) 30 MG extended release tablet Take 1 tablet by mouth daily 90 tablet 3    atorvastatin (LIPITOR) 80 MG tablet Take 1 tablet by mouth nightly 90 tablet 3    clopidogrel (PLAVIX) 75 MG tablet Take 1 tablet by mouth daily 90 tablet 3    CONTOUR NEXT TEST strip use four times a day      nitroGLYCERIN (NITROSTAT) 0.4 MG SL tablet Place 1 tablet under the tongue every 5 minutes as needed for Chest pain Indications: Disease of the Arteries of the Heart Take 1 tablet every 5 minutes times three as needed for chest pain 25 tablet 2 for myalgias. Neurological:  Negative for dizziness, light-headedness and headaches. Psychiatric/Behavioral:  Negative for sleep disturbance. Objective:     BP (!) 162/104   Pulse 96   Ht 5' 4\" (1.626 m)   Wt 136 lb 9.6 oz (62 kg)   SpO2 96%   BMI 23.45 kg/m²   Physical Exam  Vitals and nursing note reviewed. Constitutional:       General: He is not in acute distress. Appearance: He is well-developed. He is not ill-appearing. HENT:      Head: Normocephalic and atraumatic. Right Ear: External ear normal.      Left Ear: External ear normal.   Eyes:      General: No scleral icterus. Right eye: No discharge. Left eye: No discharge. Conjunctiva/sclera: Conjunctivae normal.   Neck:      Thyroid: No thyromegaly. Trachea: No tracheal deviation. Comments: Limited cervical range of motion  Cardiovascular:      Rate and Rhythm: Normal rate and regular rhythm. Heart sounds: Normal heart sounds. Pulmonary:      Effort: Pulmonary effort is normal. No respiratory distress. Breath sounds: Normal breath sounds. No wheezing. Lymphadenopathy:      Cervical: No cervical adenopathy. Skin:     General: Skin is warm. Findings: No rash. Neurological:      Mental Status: He is alert and oriented to person, place, and time. Psychiatric:         Mood and Affect: Mood normal.         Behavior: Behavior normal.         Thought Content: Thought content normal.       Assessment:       Diagnosis Orders   1. Abnormal CT of the abdomen  POCT Urinalysis No Micro (Auto)    Jones Murphy MD, Urology, [facility]      2. Diabetic peripheral neuropathy (HCC)  oxyCODONE (OXYCONTIN) 20 MG extended release tablet    DISCONTINUED: oxyCODONE (OXYCONTIN) 20 MG extended release tablet      3. Cervical radiculopathy  oxyCODONE (OXYCONTIN) 20 MG extended release tablet    DISCONTINUED: oxyCODONE (OXYCONTIN) 20 MG extended release tablet      4.  Impingement syndrome of left shoulder             Plan:   Restart OxyContin 20 mg twice daily. Patient is seen pain management as well as neurosurgery with no one else offering any options. Patient states is no longer able to function at his current level. Advised patient unsure if pharmacy will fill prescription based on current laws and concerns. Urine drug screen today  Continue to follow-up with endocrinology  Believe blood pressure is elevated secondary to his pain  Staff to get office note from endocrinology. Return in about 3 months (around 12/19/2022). Orders Placed This Encounter   Procedures    [facility] - Sindhu Moe MD, Urology, [place]     Referral Priority:   Routine     Referral Type:   Eval and Treat     Referral Reason:   Specialty Services Required     Referred to Provider:   Benny Powell MD     Requested Specialty:   Urology     Number of Visits Requested:   1    POCT Urinalysis No Micro (Auto)     Orders Placed This Encounter   Medications    DISCONTD: oxyCODONE (OXYCONTIN) 20 MG extended release tablet     Sig: Take 1 tablet by mouth 2 times daily for 7 days. Intended supply: 30 days     Dispense:  14 tablet     Refill:  0     Reduce doses taken as pain becomes manageable    oxyCODONE (OXYCONTIN) 20 MG extended release tablet     Sig: Take 1 tablet by mouth 2 times daily for 7 days. Intended supply: 30 days     Dispense:  14 tablet     Refill:  0     Reduce doses taken as pain becomes manageable       Patient given educational materials - see patient instructions. Discussed use, benefit, and side effects of prescribed medications. All patient questions answered. Pt voiced understanding. Reviewed health maintenance. Instructed to continue current medications, diet andexercise. Patient agreed with treatment plan. Follow up as directed.      Electronicallysigned by Nataly Dickerson MD on 9/19/2022 at 10:21 AM

## 2022-09-22 DIAGNOSIS — M75.42 IMPINGEMENT SYNDROME OF LEFT SHOULDER: ICD-10-CM

## 2022-09-22 DIAGNOSIS — E11.42 DIABETIC PERIPHERAL NEUROPATHY (HCC): ICD-10-CM

## 2022-09-22 DIAGNOSIS — M54.12 CERVICAL RADICULOPATHY: ICD-10-CM

## 2022-09-22 RX ORDER — OXYCODONE HCL 20 MG/1
20 TABLET, FILM COATED, EXTENDED RELEASE ORAL 2 TIMES DAILY
Qty: 60 TABLET | Refills: 0 | Status: SHIPPED | OUTPATIENT
Start: 2022-09-22 | End: 2022-10-07

## 2022-10-03 DIAGNOSIS — F41.8 DEPRESSION WITH ANXIETY: ICD-10-CM

## 2022-10-03 DIAGNOSIS — M75.42 IMPINGEMENT SYNDROME OF LEFT SHOULDER: ICD-10-CM

## 2022-10-03 RX ORDER — LORAZEPAM 0.5 MG/1
0.5 TABLET ORAL EVERY 8 HOURS PRN
Qty: 90 TABLET | Refills: 0 | Status: SHIPPED | OUTPATIENT
Start: 2022-10-03 | End: 2022-11-04 | Stop reason: SDUPTHER

## 2022-10-03 RX ORDER — OXYCODONE AND ACETAMINOPHEN 10; 325 MG/1; MG/1
1 TABLET ORAL EVERY 4 HOURS PRN
Qty: 180 TABLET | Refills: 0 | Status: SHIPPED | OUTPATIENT
Start: 2022-10-03 | End: 2022-10-07

## 2022-10-07 ENCOUNTER — OFFICE VISIT (OUTPATIENT)
Dept: PRIMARY CARE CLINIC | Age: 52
End: 2022-10-07
Payer: MEDICARE

## 2022-10-07 VITALS
BODY MASS INDEX: 23.15 KG/M2 | DIASTOLIC BLOOD PRESSURE: 92 MMHG | SYSTOLIC BLOOD PRESSURE: 148 MMHG | WEIGHT: 135.6 LBS | HEART RATE: 96 BPM | OXYGEN SATURATION: 96 % | HEIGHT: 64 IN

## 2022-10-07 DIAGNOSIS — G99.2 STENOSIS OF CERVICAL SPINE WITH MYELOPATHY (HCC): Primary | ICD-10-CM

## 2022-10-07 DIAGNOSIS — M48.02 STENOSIS OF CERVICAL SPINE WITH MYELOPATHY (HCC): Primary | ICD-10-CM

## 2022-10-07 PROCEDURE — 99213 OFFICE O/P EST LOW 20 MIN: CPT | Performed by: FAMILY MEDICINE

## 2022-10-07 PROCEDURE — 4004F PT TOBACCO SCREEN RCVD TLK: CPT | Performed by: FAMILY MEDICINE

## 2022-10-07 PROCEDURE — G8420 CALC BMI NORM PARAMETERS: HCPCS | Performed by: FAMILY MEDICINE

## 2022-10-07 PROCEDURE — G8482 FLU IMMUNIZE ORDER/ADMIN: HCPCS | Performed by: FAMILY MEDICINE

## 2022-10-07 PROCEDURE — G8427 DOCREV CUR MEDS BY ELIG CLIN: HCPCS | Performed by: FAMILY MEDICINE

## 2022-10-07 PROCEDURE — 3017F COLORECTAL CA SCREEN DOC REV: CPT | Performed by: FAMILY MEDICINE

## 2022-10-07 RX ORDER — OXYCODONE HYDROCHLORIDE 20 MG/1
20 TABLET ORAL EVERY 4 HOURS PRN
Qty: 150 TABLET | Refills: 0
Start: 2022-10-07 | End: 2022-10-17 | Stop reason: SDUPTHER

## 2022-10-07 ASSESSMENT — ENCOUNTER SYMPTOMS
RHINORRHEA: 0
SHORTNESS OF BREATH: 0
EYE DISCHARGE: 0
ABDOMINAL PAIN: 0
DIARRHEA: 0
NAUSEA: 0
SORE THROAT: 0
COUGH: 0
VOMITING: 0
WHEEZING: 0
EYE REDNESS: 0

## 2022-10-07 NOTE — PROGRESS NOTES
[facility]  [address]  [address] [place] [address]  Dept: [phone]    Kim Villanueva is a 46 y.o. male Established patient, who presents today for his medical conditions/complaints as noted below. Chief Complaint   Patient presents with    Back Pain    Neck Pain       HPI:     HPI  Pt currently on 100mg oxycodone, 40mg is timed release. Pt states gets better relief from short acting. Asking if he can go to oxycodone 20mg short acting five times a day. Pt has seen pain management. Finally got injections with no relief. Pt has seen two different neurosurgeons, neither of which will touch him due to his brittle diabetes. Last mri from [facility] 2021 reviewed, showing severe spinal stenosis. Pt also with significant right shoulder pain. Can no longer put deodorant on due to pain abducting.      Reviewed prior notes None  Reviewed previous Imaging    LDL Calculated (mg/dL)   Date Value   04/16/2019 97   05/21/2018 66   01/05/2018 66       (goal LDL is <100)   AST (U/L)   Date Value   08/20/2021 18     ALT (U/L)   Date Value   08/20/2021 14     BUN (mg/dL)   Date Value   08/20/2021 9     Hemoglobin A1C (%)   Date Value   10/19/2021 9.5     TSH (mIU/L)   Date Value   07/27/2018 0.43     BP Readings from Last 3 Encounters:   10/07/22 (!) 164/92   09/19/22 (!) 162/104   09/07/22 (!) 158/92          (goal 120/80)    Past Medical History:   Diagnosis Date    Anxiety     Arthritis     CAD (coronary artery disease)     Calcaneal apophysitis     Corns     Depression     Diabetes type I (HCC)     Gastroparesis     GERD (gastroesophageal reflux disease)     Headache(784.0)     Hearing loss     Hyperglycemia     Hyperlipidemia     Hypertension     Intussusception ([address]     MI (myocardial infarction) ([address] jan 2014    Neuropathy     Osteoarthritis     Panic attacks     Temporomandibular joint disorder       Past Surgical History:   Procedure Laterality Date ABDOMEN SURGERY      insulin pump    ABDOMINAL EXPLORATION SURGERY  05/28/2016    bowel resection Rt. colon &terminal ileum, intussuseption.      CARDIAC CATHETERIZATION  2014    CARPAL TUNNEL RELEASE      bilateral    COLONOSCOPY  05/23/2017    poor prep; diverticulosis    COLONOSCOPY N/A 7/10/2018    COLONOSCOPY POLYPECTOMY HOT BIOPSY performed by Ania Erickson MD at [address] [address] N/A 8/9/2021    COLONOSCOPY DIAGNOSTIC performed by Ania Erickson MD at [address]      right    HERNIA REPAIR      inguinal    [place] COLON CA SCRN NOT  [address] IND N/A 5/25/2017    COLONOSCOPY performed by Eugenia Fernando MD at [facility] ESOPHAGOGASTRODUODENOSCOPY TRANSORAL DIAGNOSTIC N/A 5/24/2017    EGD ESOPHAGOGASTRODUODENOSCOPY performed by Eugenia Fernando MD at [address] Bilateral     [facility]      fracture     TONSILLECTOMY      x2    TOOTH EXTRACTION      x4    ULNAR TUNNEL RELEASE      UPPER GASTROINTESTINAL ENDOSCOPY  05/23/2017    mild gastritis    UPPER GASTROINTESTINAL ENDOSCOPY N/A 7/10/2018    EGD BIOPSY performed by Ania [facility]kson MD at [facility] N/A 8/10/2021    HERNIA INCISIONAL REPAIR OPEN W/MESH performed by [facility] at [facility]      x4       Family History   Problem Relation Age of Onset    Diabetes Mother     High Blood Pressure Mother     Heart Disease Mother     Migraines Mother     Other Mother         diabetic neuropathy    High Blood Pressure Father        Social History     Tobacco Use    Smoking status: Every Day     Packs/day: 1.00     Years: 30.00     Pack years: 30.00     Types: Cigarettes    Smokeless tobacco: Never   Substance Use Topics    Alcohol use: No     Comment: previous heavy ETOH use; pt stopped around 2010      Current Outpatient Medications   Medication Sig Dispense Refill    oxyCODONE (ROXICODONE) 20 MG immediate release tablet Take 1 tablet by mouth every 4 hours as needed for Pain for up to 30 days. 150 tablet 0    LORazepam (ATIVAN) 0.5 MG tablet Take 1 tablet by mouth every 8 hours as needed for Anxiety for up to 30 days. 90 tablet 0    pantoprazole (PROTONIX) 40 MG tablet Take 1 tablet by mouth in the morning. TAKE ONE TABLET BY MOUTH TWICE DAILY. 90 tablet 3    butalbital-acetaminophen-caffeine (FIORICET, ESGIC) -40 MG per tablet Take 1 tablet by mouth every 4 hours as needed for Headaches 180 tablet 3    ondansetron (ZOFRAN) 4 MG tablet 1 tablet daily 90 tablet 1    brexpiprazole (REXULTI) 1 MG TABS tablet Take 1 tablet by mouth daily 90 tablet 3    TRINTELLIX 20 MG TABS tablet TAKE 1 TABLET DAILY 90 tablet 3    HUMALOG 100 UNIT/ML injection vial INJECT UP TO 70 UNITS VIA PUMP AS DIRECTED      naloxone 4 MG/0.1ML LIQD nasal spray 1 spray by Nasal route as needed for Opioid Reversal 1 each 5    propranolol (INDERAL) 20 MG tablet Take 20 mg by mouth as needed Takes for migraines      b complex vitamins capsule Take 1 capsule by mouth daily      lisinopril (PRINIVIL;ZESTRIL) 30 MG tablet Take 1 tablet by mouth daily (Patient taking differently: Take 5 mg by mouth daily) 90 tablet 3    isosorbide mononitrate (IMDUR) 30 MG extended release tablet Take 1 tablet by mouth daily 90 tablet 3    atorvastatin (LIPITOR) 80 MG tablet Take 1 tablet by mouth nightly 90 tablet 3    clopidogrel (PLAVIX) 75 MG tablet Take 1 tablet by mouth daily 90 tablet 3    CONTOUR NEXT TEST strip use four times a day      nitroGLYCERIN (NITROSTAT) 0.4 MG SL tablet Place 1 tablet under the tongue every 5 minutes as needed for Chest pain Indications: Disease of the Arteries of the Heart Take 1 tablet every 5 minutes times three as needed for chest pain 25 tablet 2     No current facility-administered medications for this visit.      Facility-Administered Medications Ordered in Other Visits   Medication Dose Route Frequency Provider Last Rate Last Admin    lactated ringers infusion  75 mL/hr IntraVENous Continuous Mikal Roberts MD         Allergies   Allergen Reactions    Avelox [Moxifloxacin]     Ciprofloxacin Other (See Comments)    Levofloxacin     Lyrica [Pregabalin]     Metoclopramide Other (See Comments)     Severe confusion and paranoid activity    Neurontin [Gabapentin]     Other      Can not have epidural injections, steroids, nerve blocks, or burning of nerves due to being diabetic    Tetanus Toxoids     Tramadol     Tramadol Hcl        Health Maintenance   Topic Date Due    COVID-19 Vaccine (1) Never done    HIV screen  Never done    Hepatitis C screen  Never done    Diabetic retinal exam  11/28/2019    Lipids  04/16/2020    Shingles vaccine (1 of 2) Never done    Low dose CT lung screening  Never done    Diabetic microalbuminuria test  05/04/2021    A1C test (Diabetic or Prediabetic)  01/19/2022    Colorectal Cancer Screen  02/09/2022    Diabetic foot exam  10/19/2022    Depression Monitoring  09/19/2023    Flu vaccine  Completed    Pneumococcal 0-64 years Vaccine  Completed    Hepatitis A vaccine  Aged Out    Hepatitis B vaccine  Aged Out    Hib vaccine  Aged Out    Meningococcal (ACWY) vaccine  Aged Out       Subjective:      Review of Systems   Constitutional:  Negative for chills and fever. HENT:  Negative for rhinorrhea and sore throat. Eyes:  Negative for discharge and redness. Respiratory:  Negative for cough, shortness of breath and wheezing. Cardiovascular:  Negative for chest pain and palpitations. Gastrointestinal:  Negative for abdominal pain, diarrhea, nausea and vomiting. Genitourinary:  Negative for dysuria and frequency. Musculoskeletal:  Positive for arthralgias and neck pain. Negative for myalgias. Neurological:  Negative for dizziness, light-headedness and headaches. Psychiatric/Behavioral:  Negative for sleep disturbance.       Objective:     BP (!) 164/92   Pulse 96   Ht 5' 3.96\" (1.625 m)   Wt 135 lb 9.6 oz (61.5 kg)   SpO2 96%   BMI 23.30 kg/m²   Physical Exam  Vitals and nursing note reviewed. Constitutional:       General: He is not in acute distress. Appearance: He is well-developed. He is not ill-appearing. HENT:      Head: Normocephalic and atraumatic. Right Ear: External ear normal.      Left Ear: External ear normal.   Eyes:      General: No scleral icterus. Right eye: No discharge. Left eye: No discharge. Conjunctiva/sclera: Conjunctivae normal.   Neck:      Thyroid: No thyromegaly. Trachea: No tracheal deviation. Comments: Limited rotation to the right, causing pain. Normal flexion , extension  Cardiovascular:      Rate and Rhythm: Normal rate and regular rhythm. Heart sounds: Normal heart sounds. Pulmonary:      Effort: Pulmonary effort is normal. No respiratory distress. Breath sounds: Normal breath sounds. No wheezing. Abdominal:      Palpations: There is no mass. Musculoskeletal:      Comments: Right shoulder abducts to 100 degrees but with pain. Lymphadenopathy:      Cervical: No cervical adenopathy. Skin:     General: Skin is warm. Findings: No rash. Neurological:      Mental Status: He is alert and oriented to person, place, and time. Psychiatric:         Mood and Affect: Mood normal.         Behavior: Behavior normal.         Thought Content: Thought content normal.       Assessment:       Diagnosis Orders   1. Stenosis of cervical spine with myelopathy [facility])  External Referral To Neurosurgery    oxyCODONE (ROXICODONE) 20 MG immediate release tablet             Plan: Will change pain medication to oxycodone 20mg five times daily with next script  Refer to [facility] neurosurgery. Believe pt would benefit from intervention. Once neck resolved, refer back to ortho Dr. Vear Dakins for possible reverse shoulder replacement on the right. Advised pt about ongoing pressure from government regarding pain medication.      Return in about 3 months (around 1/7/2023). Orders Placed This Encounter   Procedures    External Referral To Neurosurgery     Referral Priority:   Routine     Referral Type:   Eval and Treat     Referral Reason:   Specialty Services Required     Requested Specialty:   Neurosurgery     Number of Visits Requested:   1     Orders Placed This Encounter   Medications    oxyCODONE (ROXICODONE) 20 MG immediate release tablet     Sig: Take 1 tablet by mouth every 4 hours as needed for Pain for up to 30 days. Dispense:  150 tablet     Refill:  0     No more than 5 per day       Patient given educational materials - see patient instructions. Discussed use, benefit, and side effects of prescribed medications. All patient questions answered. Pt voiced understanding. Reviewed health maintenance. Instructed to continue current medications, diet andexercise. Patient agreed with treatment plan. Follow up as directed.      Electronicallysigned by Lizz Jones MD on 10/7/2022 at 10:23 AM

## 2022-10-14 DIAGNOSIS — M48.02 STENOSIS OF CERVICAL SPINE WITH MYELOPATHY (HCC): ICD-10-CM

## 2022-10-14 DIAGNOSIS — G99.2 STENOSIS OF CERVICAL SPINE WITH MYELOPATHY (HCC): ICD-10-CM

## 2022-10-14 RX ORDER — OXYCODONE HYDROCHLORIDE 20 MG/1
20 TABLET ORAL EVERY 4 HOURS PRN
Qty: 150 TABLET | Refills: 0 | OUTPATIENT
Start: 2022-10-14 | End: 2022-11-13

## 2022-10-17 DIAGNOSIS — M48.02 STENOSIS OF CERVICAL SPINE WITH MYELOPATHY (HCC): ICD-10-CM

## 2022-10-17 DIAGNOSIS — G99.2 STENOSIS OF CERVICAL SPINE WITH MYELOPATHY (HCC): ICD-10-CM

## 2022-10-17 RX ORDER — OXYCODONE HYDROCHLORIDE 20 MG/1
20 TABLET ORAL EVERY 4 HOURS PRN
Qty: 150 TABLET | Refills: 0 | Status: SHIPPED | OUTPATIENT
Start: 2022-10-17 | End: 2022-11-16

## 2022-10-18 ENCOUNTER — TELEPHONE (OUTPATIENT)
Dept: PRIMARY CARE CLINIC | Age: 52
End: 2022-10-18

## 2022-10-18 NOTE — TELEPHONE ENCOUNTER
Pt c/o a white film on his tongue that is painful, sore throat. Asking, if something can be sent in for Beverley Pike? Uses [facility] on [address].

## 2022-10-20 ENCOUNTER — TELEPHONE (OUTPATIENT)
Dept: PRIMARY CARE CLINIC | Age: 52
End: 2022-10-20

## 2022-10-28 ENCOUNTER — TELEPHONE (OUTPATIENT)
Dept: PRIMARY CARE CLINIC | Age: 52
End: 2022-10-28

## 2022-10-28 NOTE — TELEPHONE ENCOUNTER
Patient finished the nystatin rinse. Thinks he still has the mouth infection. Asking for another script to be sent in . Edson.

## 2022-10-31 ENCOUNTER — HOSPITAL ENCOUNTER (OUTPATIENT)
Dept: PREADMISSION TESTING | Age: 52
Discharge: HOME | End: 2022-11-04

## 2022-10-31 VITALS — WEIGHT: 130 LBS | HEIGHT: 68 IN | BODY MASS INDEX: 19.7 KG/M2

## 2022-10-31 NOTE — PROGRESS NOTES

## 2022-11-04 DIAGNOSIS — F41.8 DEPRESSION WITH ANXIETY: ICD-10-CM

## 2022-11-04 RX ORDER — LORAZEPAM 0.5 MG/1
0.5 TABLET ORAL EVERY 8 HOURS PRN
Qty: 90 TABLET | Refills: 0 | Status: SHIPPED | OUTPATIENT
Start: 2022-11-04 | End: 2022-11-30 | Stop reason: SDUPTHER

## 2022-11-10 DIAGNOSIS — G99.2 STENOSIS OF CERVICAL SPINE WITH MYELOPATHY (HCC): ICD-10-CM

## 2022-11-10 DIAGNOSIS — M48.02 STENOSIS OF CERVICAL SPINE WITH MYELOPATHY (HCC): ICD-10-CM

## 2022-11-10 RX ORDER — OXYCODONE HYDROCHLORIDE 20 MG/1
20 TABLET ORAL EVERY 4 HOURS PRN
Qty: 150 TABLET | Refills: 0 | Status: SHIPPED | OUTPATIENT
Start: 2022-11-10 | End: 2022-11-30 | Stop reason: SDUPTHER

## 2022-11-10 NOTE — PRE-PROCEDURE INSTRUCTIONS
Have you received your Prep?y Any questions with prep instructions?n  Only Clear Liquid Diet day before.y  Nothing to eat after midnight day before procedure.n  Are you taking any blood thinners?y If so, you need to Stop. 10 days  Remove any jewelry and body piercings.y  Do you wear glasses? If so, please bring a case to store them in. Are you having any Covid symptoms?n  Do you have any new rashes, infections, etc. that we should be aware of?n  Do you have a ride home the day of surgery?y It cannot be a cab or medical transportation.   Verify surgery time/date and what time to arrive at hospital. 0900, 0700

## 2022-11-11 ENCOUNTER — ANESTHESIA EVENT (OUTPATIENT)
Dept: ENDOSCOPY | Age: 52
End: 2022-11-11
Payer: COMMERCIAL

## 2022-11-14 ENCOUNTER — HOSPITAL ENCOUNTER (OUTPATIENT)
Age: 52
Setting detail: OUTPATIENT SURGERY
Discharge: HOME | End: 2022-11-14
Attending: SURGERY | Admitting: SURGERY
Payer: COMMERCIAL

## 2022-11-14 ENCOUNTER — ANESTHESIA (OUTPATIENT)
Dept: ENDOSCOPY | Age: 52
End: 2022-11-14
Payer: COMMERCIAL

## 2022-11-14 VITALS
HEART RATE: 96 BPM | TEMPERATURE: 97.1 F | BODY MASS INDEX: 19.7 KG/M2 | HEIGHT: 68 IN | SYSTOLIC BLOOD PRESSURE: 139 MMHG | WEIGHT: 130 LBS | RESPIRATION RATE: 18 BRPM | DIASTOLIC BLOOD PRESSURE: 82 MMHG | OXYGEN SATURATION: 97 %

## 2022-11-14 DIAGNOSIS — R19.4 CHANGE IN BOWEL HABITS: ICD-10-CM

## 2022-11-14 DIAGNOSIS — R10.84 GENERALIZED ABDOMINAL PAIN: ICD-10-CM

## 2022-11-14 LAB
GLUCOSE BLD-MCNC: 175 MG/DL (ref 75–110)
GLUCOSE BLD-MCNC: 201 MG/DL (ref 75–110)

## 2022-11-14 PROCEDURE — 2500000003 HC RX 250 WO HCPCS: Performed by: NURSE ANESTHETIST, CERTIFIED REGISTERED

## 2022-11-14 PROCEDURE — 88305 TISSUE EXAM BY PATHOLOGIST: CPT

## 2022-11-14 PROCEDURE — 3700000001 HC ADD 15 MINUTES (ANESTHESIA): Performed by: SURGERY

## 2022-11-14 PROCEDURE — 7100000001 HC PACU RECOVERY - ADDTL 15 MIN: Performed by: SURGERY

## 2022-11-14 PROCEDURE — 7100000030 HC ASPR PHASE II RECOVERY - FIRST 15 MIN: Performed by: SURGERY

## 2022-11-14 PROCEDURE — 2580000003 HC RX 258: Performed by: ANESTHESIOLOGY

## 2022-11-14 PROCEDURE — 7100000031 HC ASPR PHASE II RECOVERY - ADDTL 15 MIN: Performed by: SURGERY

## 2022-11-14 PROCEDURE — 7100000011 HC PHASE II RECOVERY - ADDTL 15 MIN: Performed by: SURGERY

## 2022-11-14 PROCEDURE — 7100000000 HC PACU RECOVERY - FIRST 15 MIN: Performed by: SURGERY

## 2022-11-14 PROCEDURE — 3700000000 HC ANESTHESIA ATTENDED CARE: Performed by: SURGERY

## 2022-11-14 PROCEDURE — 82947 ASSAY GLUCOSE BLOOD QUANT: CPT

## 2022-11-14 PROCEDURE — 2709999900 HC NON-CHARGEABLE SUPPLY: Performed by: SURGERY

## 2022-11-14 PROCEDURE — 3609010300 HC COLONOSCOPY W/BIOPSY SINGLE/MULTIPLE: Performed by: SURGERY

## 2022-11-14 PROCEDURE — 7100000010 HC PHASE II RECOVERY - FIRST 15 MIN: Performed by: SURGERY

## 2022-11-14 PROCEDURE — 6360000002 HC RX W HCPCS: Performed by: NURSE ANESTHETIST, CERTIFIED REGISTERED

## 2022-11-14 RX ORDER — FENTANYL CITRATE 50 UG/ML
INJECTION, SOLUTION INTRAMUSCULAR; INTRAVENOUS PRN
Status: DISCONTINUED | OUTPATIENT
Start: 2022-11-14 | End: 2022-11-14 | Stop reason: SDUPTHER

## 2022-11-14 RX ORDER — SODIUM CHLORIDE 0.9 % (FLUSH) 0.9 %
5-40 SYRINGE (ML) INJECTION PRN
Status: DISCONTINUED | OUTPATIENT
Start: 2022-11-14 | End: 2022-11-14 | Stop reason: HOSPADM

## 2022-11-14 RX ORDER — MIDAZOLAM HYDROCHLORIDE 1 MG/ML
INJECTION INTRAMUSCULAR; INTRAVENOUS PRN
Status: DISCONTINUED | OUTPATIENT
Start: 2022-11-14 | End: 2022-11-14 | Stop reason: SDUPTHER

## 2022-11-14 RX ORDER — FENTANYL CITRATE 0.05 MG/ML
25 INJECTION, SOLUTION INTRAMUSCULAR; INTRAVENOUS EVERY 5 MIN PRN
Status: DISCONTINUED | OUTPATIENT
Start: 2022-11-14 | End: 2022-11-14 | Stop reason: HOSPADM

## 2022-11-14 RX ORDER — SODIUM CHLORIDE 0.9 % (FLUSH) 0.9 %
5-40 SYRINGE (ML) INJECTION EVERY 12 HOURS SCHEDULED
Status: DISCONTINUED | OUTPATIENT
Start: 2022-11-14 | End: 2022-11-14 | Stop reason: HOSPADM

## 2022-11-14 RX ORDER — ONDANSETRON 2 MG/ML
4 INJECTION INTRAMUSCULAR; INTRAVENOUS
Status: DISCONTINUED | OUTPATIENT
Start: 2022-11-14 | End: 2022-11-14 | Stop reason: HOSPADM

## 2022-11-14 RX ORDER — DEXAMETHASONE SODIUM PHOSPHATE 4 MG/ML
INJECTION, SOLUTION INTRA-ARTICULAR; INTRALESIONAL; INTRAMUSCULAR; INTRAVENOUS; SOFT TISSUE PRN
Status: DISCONTINUED | OUTPATIENT
Start: 2022-11-14 | End: 2022-11-14 | Stop reason: SDUPTHER

## 2022-11-14 RX ORDER — SODIUM CHLORIDE 9 MG/ML
INJECTION, SOLUTION INTRAVENOUS PRN
Status: DISCONTINUED | OUTPATIENT
Start: 2022-11-14 | End: 2022-11-14 | Stop reason: HOSPADM

## 2022-11-14 RX ORDER — SODIUM CHLORIDE 9 MG/ML
INJECTION, SOLUTION INTRAVENOUS CONTINUOUS
Status: DISCONTINUED | OUTPATIENT
Start: 2022-11-14 | End: 2022-11-14 | Stop reason: HOSPADM

## 2022-11-14 RX ORDER — GLYCOPYRROLATE 0.2 MG/ML
INJECTION INTRAMUSCULAR; INTRAVENOUS PRN
Status: DISCONTINUED | OUTPATIENT
Start: 2022-11-14 | End: 2022-11-14 | Stop reason: SDUPTHER

## 2022-11-14 RX ORDER — ONDANSETRON 2 MG/ML
INJECTION INTRAMUSCULAR; INTRAVENOUS PRN
Status: DISCONTINUED | OUTPATIENT
Start: 2022-11-14 | End: 2022-11-14 | Stop reason: SDUPTHER

## 2022-11-14 RX ORDER — PROPOFOL 10 MG/ML
INJECTION, EMULSION INTRAVENOUS PRN
Status: DISCONTINUED | OUTPATIENT
Start: 2022-11-14 | End: 2022-11-14 | Stop reason: SDUPTHER

## 2022-11-14 RX ORDER — LIDOCAINE HYDROCHLORIDE 10 MG/ML
INJECTION, SOLUTION EPIDURAL; INFILTRATION; INTRACAUDAL; PERINEURAL PRN
Status: DISCONTINUED | OUTPATIENT
Start: 2022-11-14 | End: 2022-11-14 | Stop reason: SDUPTHER

## 2022-11-14 RX ORDER — LIDOCAINE HYDROCHLORIDE 10 MG/ML
1 INJECTION, SOLUTION EPIDURAL; INFILTRATION; INTRACAUDAL; PERINEURAL
Status: DISCONTINUED | OUTPATIENT
Start: 2022-11-14 | End: 2022-11-14 | Stop reason: HOSPADM

## 2022-11-14 RX ORDER — DIPHENHYDRAMINE HYDROCHLORIDE 50 MG/ML
12.5 INJECTION INTRAMUSCULAR; INTRAVENOUS
Status: DISCONTINUED | OUTPATIENT
Start: 2022-11-14 | End: 2022-11-14 | Stop reason: HOSPADM

## 2022-11-14 RX ADMIN — FENTANYL CITRATE 50 MCG: 50 INJECTION, SOLUTION INTRAMUSCULAR; INTRAVENOUS at 08:36

## 2022-11-14 RX ADMIN — PROPOFOL 350 MG: 10 INJECTION, EMULSION INTRAVENOUS at 08:36

## 2022-11-14 RX ADMIN — FENTANYL CITRATE 50 MCG: 50 INJECTION, SOLUTION INTRAMUSCULAR; INTRAVENOUS at 08:50

## 2022-11-14 RX ADMIN — ONDANSETRON 4 MG: 2 INJECTION INTRAMUSCULAR; INTRAVENOUS at 09:10

## 2022-11-14 RX ADMIN — LIDOCAINE HYDROCHLORIDE 40 MG: 10 INJECTION, SOLUTION EPIDURAL; INFILTRATION; INTRACAUDAL; PERINEURAL at 08:36

## 2022-11-14 RX ADMIN — MIDAZOLAM 2 MG: 1 INJECTION INTRAMUSCULAR; INTRAVENOUS at 08:28

## 2022-11-14 RX ADMIN — SODIUM CHLORIDE: 9 INJECTION, SOLUTION INTRAVENOUS at 07:46

## 2022-11-14 RX ADMIN — DEXAMETHASONE SODIUM PHOSPHATE 4 MG: 4 INJECTION, SOLUTION INTRAMUSCULAR; INTRAVENOUS at 08:50

## 2022-11-14 RX ADMIN — GLYCOPYRROLATE 0.2 MG: 0.2 INJECTION, SOLUTION INTRAMUSCULAR; INTRAVENOUS at 08:28

## 2022-11-14 ASSESSMENT — PAIN DESCRIPTION - DESCRIPTORS: DESCRIPTORS: ACHING

## 2022-11-14 ASSESSMENT — PAIN - FUNCTIONAL ASSESSMENT: PAIN_FUNCTIONAL_ASSESSMENT: 0-10

## 2022-11-14 NOTE — ANESTHESIA PRE PROCEDURE
Department of Anesthesiology  Preprocedure Note       Name:  Geraldine Israel   Age:  46 y.o.  :  1970                                          MRN:  [id number]         Date:  2022      Surgeon: Charly Gomez):  Jose Raul Das MD    Procedure: Procedure(s):  COLONOSCOPY DIAGNOSTIC    Medications prior to admission:   Prior to Admission medications    Medication Sig Start Date End Date Taking? Authorizing Provider   oxyCODONE (ROXICODONE) 20 MG immediate release tablet Take 1 tablet by mouth every 4 hours as needed for Pain for up to 30 days. 11/10/22 12/10/22  Arlette Gonzalez MD   LORazepam (ATIVAN) 0.5 MG tablet Take 1 tablet by mouth every 8 hours as needed for Anxiety for up to 30 days. 22  Arlette Gonzalez MD   nystatin (MYCOSTATIN) 416869 UNIT/ML suspension Take 5 mLs by mouth 4 times daily 10/31/22   Arlette Gonzalez MD   pantoprazole (PROTONIX) 40 MG tablet Take 1 tablet by mouth in the morning. TAKE ONE TABLET BY MOUTH TWICE DAILY.  22   Arlette Gonzalez MD   butalbital-acetaminophen-caffeine (FIORIC, [facility]) -57 MG per tablet Take 1 tablet by mouth every 4 hours as needed for Headaches 22   Arlette Gonzalez MD   ondansetron [facility]) 4 MG tablet 1 tablet daily 22   Arlette Gonzalez MD   brexpiprazole (REXULTI) 1 MG TABS tablet Take 1 tablet by mouth daily 22   Arlette Gonzalez MD   TRINTELLIX 20 MG TABS tablet TAKE 1 TABLET DAILY 3/4/22   Arlette Gonzalez MD   HUMALOG 100 UNIT/ML injection vial INJECT UP TO 70 UNITS VIA PUMP AS DIRECTED 21   Historical Provider, MD   naloxone 4 MG/0.1ML LIQD nasal spray 1 spray by Nasal route as needed for Opioid Reversal 8/10/21   Jose Raul Das MD   propranolol (INDERAL) 20 MG tablet Take 20 mg by mouth as needed Takes for migraines    Historical Provider, MD   b complex vitamins capsule Take 1 capsule by mouth daily    Historical Provider, MD   lisinopril (PRINIVIL;ZESTRIL) 30 MG tablet Take 1 tablet by mouth daily  Patient taking differently: Take 5 mg by mouth daily 2/11/21   Adrienne Evans MD   isosorbide mononitrate (IMDUR) 30 MG extended release tablet Take 1 tablet by mouth daily 2/11/21   Adrienne Evans MD   atorvastatin (LIPITOR) 80 MG tablet Take 1 tablet by mouth nightly 2/11/21   Adrienne Evans MD   clopidogrel (PLAVIX) 75 MG tablet Take 1 tablet by mouth daily 2/11/21   Adrienne Evans MD   CONTOUR NEXT TEST strip use four times a day 1/13/21   Historical Provider, MD   nitroGLYCERIN (NITROSTAT) 0.4 MG SL tablet Place 1 tablet under the tongue every 5 minutes as needed for Chest pain Indications: Disease of the Arteries of the Heart Take 1 tablet every 5 minutes times three as needed for chest pain 2/8/16   CLYDE Tyler - CNP       Current medications:    Current Facility-Administered Medications   Medication Dose Route Frequency Provider Last Rate Last Admin    lidocaine PF 1 % injection 1 mL  1 mL IntraDERmal Once PRN Rey Hoang MD        sodium chloride flush 0.9 % injection 5-40 mL  5-40 mL IntraVENous 2 times per day Rey Hoang MD        sodium chloride flush 0.9 % injection 5-40 mL  5-40 mL IntraVENous PRN Agata Gillespie MD        0.9 % sodium chloride infusion   IntraVENous PRN Agata Gillespie MD        0.9 % sodium chloride infusion   IntraVENous Continuous Rey Hoang MD         Facility-Administered Medications Ordered in Other Encounters   Medication Dose Route Frequency Provider Last Rate Last Admin    lactated ringers infusion  75 mL/hr IntraVENous Continuous Roger Ceja MD           Allergies:     Allergies   Allergen Reactions    Avelox [Moxifloxacin]     Ciprofloxacin Other (See Comments)    Levofloxacin     Lyrica [Pregabalin]     Metoclopramide Other (See Comments)     Severe confusion and paranoid activity    Neurontin [Gabapentin]     Other      Can not have epidural injections, steroids, nerve blocks, or burning of nerves due to being diabetic    Tetanus Toxoids     Tramadol     Tramadol Hcl        Problem List:    Patient Active Problem List   Diagnosis Code    Ulnar nerve compression at multiple levels G56.20    Degenerative cervical disc M50.30    Arthropathy of cervical facet joint M47.812    Stenosis of cervical spine with myelopathy (HCC) M48.02, G99.2    Cervical spondylosis M47.812    Chronic coronary artery disease I25.10    Depression with anxiety F41.8    Diabetic peripheral neuropathy (HCC) E11.42    Essential hypertension I10    Gastroparesis K31.84    History of long-term treatment with high-risk medication Z79.899    Hyperlipidemia E78.5    Impingement syndrome of left shoulder M75.42    Injury of right ulnar nerve S54. 01XA    Migraine headache G43.909    Type 1 diabetes mellitus with hyperglycemia ([facility]) E10.65    Current every day smoker F17.200    Perforation of right tympanic membrane H72.91    Pancreatic insufficiency K86.89    Major depressive disorder, recurrent episode with anxious distress ([facility]) F33.9    Unspecified inflammatory spondylopathy, cervical region ([address] M46.92    Incisional hernia, without obstruction or gangrene K43.2       Past Medical History:        Diagnosis Date    Anxiety     Arthritis     CAD (coronary artery disease)     Calcaneal apophysitis     Corns     Depression     Diabetes type I ([address]     Gastroparesis     GERD (gastroesophageal reflux disease)     Headache(784.0)     Hearing loss     Hyperglycemia     Hyperlipidemia     Hypertension     Intussusception ([address]     MI (myocardial infarction) ([address] jan 2014    Neuropathy     Osteoarthritis     Panic attacks     Temporomandibular joint disorder        Past Surgical History:        Procedure Laterality Date    ABDOMEN SURGERY      insulin pump    ABDOMINAL EXPLORATION SURGERY  05/28/2016    bowel resection Rt. colon &terminal ileum, intussuseption.     [facility] CARDIAC CATHETERIZATION  2014    CARPAL TUNNEL RELEASE      bilateral    COLONOSCOPY  05/23/2017    poor prep; diverticulosis    COLONOSCOPY N/A 7/10/2018    COLONOSCOPY POLYPECTOMY HOT BIOPSY performed by Mamie Wiseman MD at [address] [facility] N/A 8/9/2021    COLONOSCOPY DIAGNOSTIC performed by Mamie Wiseman MD at [address]      right    HERNIA REPAIR      inguinal    [place] COLON CA SCRN NOT  [address] IND N/A 5/25/2017    COLONOSCOPY performed by Apryl Broderick MD at [facility] ESOPHAGOGASTRODUODENOSCOPY TRANSORAL DIAGNOSTIC N/A 5/24/2017    EGD ESOPHAGOGASTRODUODENOSCOPY performed by Apryl Broderick MD at [facility]. [address] Bilateral     SHOULDER SURGERY      fracture     TONSILLECTOMY      x2    TOOTH EXTRACTION      x4    ULNAR TUNNEL RELEASE      UPPER GASTROINTESTINAL ENDOSCOPY  05/23/2017    mild gastritis    UPPER GASTROINTESTINAL ENDOSCOPY N/A 7/10/2018    EGD BIOPSY performed by Mamie Wiseman MD at [place] [address] N/A 8/10/2021    HERNIA INCISIONAL REPAIR OPEN [address] performed by Mamie Wiseman MD at [address] EXTRACTION      x4       Social History:    Social History     Tobacco Use    Smoking status: Every Day     Packs/day: 1.00     Years: 30.00     Pack years: 30.00     Types: Cigarettes    Smokeless tobacco: Never   Substance Use Topics    Alcohol use: No     Comment: previous heavy ETOH use; pt stopped around 2010                                Ready to quit: Not Answered  Counseling given: Not Answered      Vital Signs (Current):   Vitals:    11/14/22 0725   BP: (!) 147/106   Pulse: (!) 102   Resp: 18   Temp: 98.9 °F (37.2 °C)   TempSrc: Infrared   SpO2: 97%   Weight: 130 lb (59 kg)   Height: 5' 8\" (1.727 m)                                              BP Readings from Last 3 Encounters:   11/14/22 (!) 147/106   10/07/22 (!) 148/92   09/19/22 (!) 162/104       NPO Status: Time of last liquid consumption: 2200                        Time of last solid consumption: 2359                        Date of last liquid consumption: 11/13/22                        Date of last solid food consumption: 11/12/22    BMI:   Wt Readings from Last 3 Encounters:   11/14/22 130 lb (59 kg)   10/31/22 130 lb (59 kg)   11/03/22 126 lb 3.2 oz (57.2 kg)     Body mass index is 19.77 kg/m². CBC:   Lab Results   Component Value Date/Time    WBC 8.0 08/20/2021 12:53 PM    RBC 4.64 08/20/2021 12:53 PM    RBC 4.16 01/21/2012 04:25 AM    HGB 15.1 08/20/2021 12:53 PM    HCT 43.9 08/20/2021 12:53 PM    MCV 94.7 08/20/2021 12:53 PM    RDW 13.6 08/20/2021 12:53 PM     08/20/2021 12:53 PM     01/21/2012 04:25 AM       CMP:   Lab Results   Component Value Date/Time     08/20/2021 12:53 PM    K 4.1 08/20/2021 12:53 PM     08/20/2021 12:53 PM    CO2 27 08/20/2021 12:53 PM    BUN 9 08/20/2021 12:53 PM    CREATININE 0.57 08/20/2021 12:53 PM    GFRAA >60 08/20/2021 12:53 PM    LABGLOM >60 08/20/2021 12:53 PM    GLUCOSE 285 08/20/2021 12:53 PM    GLUCOSE 145 01/20/2012 08:37 PM    PROT 6.4 08/20/2021 12:53 PM    CALCIUM 8.8 08/20/2021 12:53 PM    BILITOT 0.21 08/20/2021 12:53 PM    ALKPHOS 155 08/20/2021 12:53 PM    AST 18 08/20/2021 12:53 PM    ALT 14 08/20/2021 12:53 PM       POC Tests: No results for input(s): POCGLU, POCNA, POCK, POCCL, POCBUN, POCHEMO, POCHCT in the last 72 hours.     Coags: No results found for: PROTIME, INR, APTT    HCG (If Applicable): No results found for: PREGTESTUR, PREGSERUM, HCG, HCGQUANT     ABGs: No results found for: PHART, PO2ART, ZMD5PMU, KBY4GWC, BEART, I4KQYHGE     Type & Screen (If Applicable):  No results found for: LABABO, LABRH    Drug/Infectious Status (If Applicable):  No results found for: HIV, HEPCAB    COVID-19 Screening (If Applicable):   Lab Results   Component Value Date/Time    COVID19 Not Detected 08/09/2021 08:43 AM           Anesthesia Evaluation  Patient summary reviewed and Nursing notes reviewed no history of anesthetic complications:   Airway: Mallampati: II  TM distance: >3 FB   Neck ROM: full  Mouth opening: > = 3 FB   Dental:    (+) edentulous      Pulmonary: breath sounds clear to auscultation                             Cardiovascular:    (+) hypertension:, past MI:, CAD:,         Rhythm: regular  Rate: normal                    Neuro/Psych:   (+) neuromuscular disease:, headaches:, psychiatric history:            GI/Hepatic/Renal:   (+) GERD:, bowel prep,           Endo/Other:    (+) DiabetesType II DM, , : arthritis:., .                 Abdominal:             Vascular: negative vascular ROS. Other Findings:           Anesthesia Plan      general     ASA 3       Induction: intravenous. Anesthetic plan and risks discussed with patient. Plan discussed with CRNA.                     Bhargavi Wilson MD   11/14/2022

## 2022-11-14 NOTE — OP NOTE
Operative Note      Patient: Zohreh Littlejohn  YOB: 1970  MRN: [id number]    Date of Procedure: 11/14/2022    PROCEDURE NOTE    DATE OF PROCEDURE: 11/14/2022    SURGEON: Corona Juan MD    ASSISTANT: None    PREOPERATIVE DIAGNOSIS: Abdominal pain. History of right hemicolectomy. Previous poor prep August 2021. POSTOPERATIVE DIAGNOSIS: Poor prep. Patent anastomosis status post right hemicolectomy. Grade 1 internal hemorrhoid. Rectal polyp. OPERATION: Total colonoscopy to ileocolonic anastomosis with intubation of terminal ileum. Rectal polypectomy with large cold biopsy forceps    ANESTHESIA: General    ESTIMATED BLOOD LOSS: None    COMPLICATIONS: None     SPECIMENS:  Was Not Obtained    HISTORY: The patient is a 46y.o. year old male with history of above preop diagnosis. I recommended colonoscopy with possible biopsy or polypectomy and I explained the risk, benefits, expected outcome, and alternatives to the procedure. Risks included but are not limited to bleeding, infection, respiratory distress, hypotension, and perforation of the colon and possibility of missing a lesion. The patient understands and is in agreement. PROCEDURE: The patient was given IV conscious sedation. The patient's SPO2 remained above 90% throughout the procedure. Digital rectal exam was normal.  The colonoscope was inserted through the anus into the rectum and advanced under direct vision to the cecum without difficulty. Terminal ileum was examined for approximately 2 inches. The prep was poor. Findings:  Terminal ileum: normal    Cecum/Ascending colon: Status post right hemicolectomy with patent anastomosis    Transverse colon: normal    Descending/Sigmoid colon: normal    Rectum/Anus: examined in normal and retroflexed positions and was abnormal: Rectal polyp removed with large cold biopsy forceps. Grade 1 internal hemorrhoid.     Withdrawal Time was (minutes): 18      Next screening colonoscopy: 3 years. If screening is less than 10 years the recommended reason is due:poor preparation    The colon was decompressed. While withdrawing the scope the above findings were verified and the scope was removed. The patient tolerated the procedure and conscious sedation without unusual events. In the recovery room patient was examined and remains hemodynamically stable. Discharge home when criteria met. Recommendations/Plan:   F/U Biopsies  F/U In Office as instructed  Discussed with the family  High fiber diet   Precautions to avoid constipation  CT scan done 9/2/2022 reveals no acute inflammatory process. Diffuse bladder wall thickening possible cystitis. Calcified atheromatous plaque.     Electronically signed by Emeka Plaza MD  on 11/14/2022 at 8:41 AM

## 2022-11-14 NOTE — ANESTHESIA POSTPROCEDURE EVALUATION
POST- ANESTHESIA EVALUATION       Pt Name: Tosha Collier  MRN: [id number]  YOB: 1970  Date of evaluation: 11/14/2022  Time:  11:36 AM      /82   Pulse 96   Temp 97.1 °F (36.2 °C) (Infrared)   Resp 18   Ht 5' 8\" (1.727 m)   Wt 130 lb (59 kg)   SpO2 97%   BMI 19.77 kg/m²      Consciousness Level  Awake  Cardiopulmonary Status  Stable  Pain Adequately Treated YES  Nausea / Vomiting  NO  Adequate Hydration  YES  Anesthesia Related Complications NONE      Electronically signed by Aric Rosas MD on 11/14/2022 at 11:36 AM       Department of Anesthesiology  Postprocedure Note    Patient: Tosha Collier  MRN: [id number]  YOB: 1970  Date of evaluation: 11/14/2022      Procedure Summary     Date: 11/14/22 Room / [facility]    Anesthesia Start: 3751 Anesthesia Stop: 0886    Procedure: COLONOSCOPY WITH BIOPSY Diagnosis:       Generalized abdominal pain      Change in bowel habits      (GENERALIZED ABDOMEN PAIN  CHANGE IN BOWEL HABITS)    Surgeons: Maryjo Alejandro MD Responsible Provider: Aric Rosas MD    Anesthesia Type: general ASA Status: 3          Anesthesia Type: No value filed.     Demond Phase I: Demond Score: 9    Demond Phase II: Demond Score: 10      Anesthesia Post Evaluation

## 2022-11-14 NOTE — H&P
HISTORY and [facility] [id number]       NAME:  Pawan Aragon  MRN: [id number]   YOB: 1970   Date: 11/14/2022   Age: 46 y.o. Gender: male       COMPLAINT AND PRESENT HISTORY:   Pawan Aragon is 46 y.o.,   male, having a Diagnostic Colonoscopy, for hx of: GENERALIZED ABDOMEN PAIN &  CHANGE IN BOWEL HABITS    Prior Colonoscopy was done last in 2018 with polyp removed. Patient has hx of Colon Polyps. Pt also has hx of Diverticulosis. Patient is s/p Colon Surgery, bowel resection of right colon. Patient denies any  FH of Colon Cancer. Patient reports no changes in bowel habits. No difficulty in bowel movements. Pt denies any GI /Rectal bleeding, Denies any melena stools. Patient has a history of abd. pain in the generalized area, The pain is sharp across mid area with onset 7 mos. Pt states that he has some digestive problem, he states that his food randomly sits in his stomach, he reports it was told to him that he has motility issues. He states in the am, when he vomits his food up, its undigested. Pt reports that the pain is short-lived but rates at 9/10 at times. Pt also reports weight loss. Pt has hx of GERD Pt is on Protonix that is helping to control symptoms. Patient reports nausea and vomiting. No diarrhea or constipation. No abdominal pains or cramping. No heartburn or dysphagia. no changes in the color, caliber or  consistency of the stools. Any significant  medical conditions: CAD, DM,- has insulin pump,  HTN, HLD, MI ,tobacco dependent    BS this am 145    Denies current chest pain, SOB. No recent URI, fever or chills. Activity level:  Functional Capacity per patient:              1. Patient is able to walk 2 city blocks on level ground without SOB. 2. Patient is able to climb 2 flights of stairs without SOB. Any anticoagulants or blood thinners: Plavix    Patient has been NPO since midnight.  No blood thinners in the past  5-7 days. (Plavix)    Patient states has taken all bowel prep with clear outcome. Patient denies any personal or family problems with anesthesia. PAST MEDICAL HISTORY     Past Medical History:   Diagnosis Date    Anxiety     Arthritis     CAD (coronary artery disease)     Calcaneal apophysitis     Corns     Depression     Diabetes type I ([address]     Gastroparesis     GERD (gastroesophageal reflux disease)     Headache(784.0)     Hearing loss     Hyperglycemia     Hyperlipidemia     Hypertension     Intussusception ([facility]     MI (myocardial infarction) ([facility] jan 2014    Neuropathy     Osteoarthritis     Panic attacks     Temporomandibular joint disorder        SURGICAL HISTORY       Past Surgical History:   Procedure Laterality Date    ABDOMEN SURGERY      insulin pump    ABDOMINAL EXPLORATION SURGERY  05/28/2016    bowel resection Rt. colon &terminal ileum, intussuseption.      CARDIAC CATHETERIZATION  2014    CARPAL TUNNEL RELEASE      bilateral    COLONOSCOPY  05/23/2017    poor prep; diverticulosis    COLONOSCOPY N/A 7/10/2018    COLONOSCOPY POLYPECTOMY HOT BIOPSY performed by Bay Solares MD at [facility] 132 N/A 8/9/2021    COLONOSCOPY DIAGNOSTIC performed by Bay Solares MD at [address]      right    HERNIA REPAIR      inguinal    AK COLON CA SCRN NOT  [address] IND N/A 5/25/2017    COLONOSCOPY performed by Shonda Burgos MD at [facility] ESOPHAGOGASTRODUODENOSCOPY TRANSORAL DIAGNOSTIC N/A 5/24/2017    EGD ESOPHAGOGASTRODUODENOSCOPY performed by Shonda Burgos MD at [address] [place] [address] Rd Bilateral     SHOULDER SURGERY      fracture     TONSILLECTOMY      x2    TOOTH EXTRACTION      x4    ULNAR TUNNEL RELEASE      UPPER GASTROINTESTINAL ENDOSCOPY  05/23/2017    mild gastritis    UPPER GASTROINTESTINAL ENDOSCOPY N/A 7/10/2018    EGD BIOPSY performed by Bay Solares MD at [facility] 172 N/A 8/10/2021    HERNIA INCISIONAL REPAIR OPEN W/MESH performed by Lizzeth Ashraf MD at [address] EXTRACTION      x4       FAMILY HISTORY       Family History   Problem Relation Age of Onset    Diabetes Mother     High Blood Pressure Mother     Heart Disease Mother     Migraines Mother     Other Mother         diabetic neuropathy    High Blood Pressure Father        SOCIAL HISTORY       Social History     Socioeconomic History    Marital status:    Occupational History    Occupation: disability   Tobacco Use    Smoking status: Every Day     Packs/day: 1.00     Years: 30.00     Pack years: 30.00     Types: Cigarettes    Smokeless tobacco: Never   Vaping Use    Vaping Use: Never used   Substance and Sexual Activity    Alcohol use: No     Comment: previous heavy ETOH use; pt stopped around 2010    Drug use: No     Social Determinants of Health     Financial Resource Strain: Low Risk     Difficulty of Paying Living Expenses: Not hard at all   Food Insecurity: No Food Insecurity    Worried About Running Out of Food in the Last Year: Never true    Ran Out of Food in the Last Year: Never true           REVIEW OF SYSTEMS      Allergies   Allergen Reactions    Avelox [Moxifloxacin]     Ciprofloxacin Other (See Comments)    Levofloxacin     Lyrica [Pregabalin]     Metoclopramide Other (See Comments)     Severe confusion and paranoid activity    Neurontin [Gabapentin]     Other      Can not have epidural injections, steroids, nerve blocks, or burning of nerves due to being diabetic    Tetanus Toxoids     Tramadol     Tramadol Hcl        Current Facility-Administered Medications on File Prior to Encounter   Medication Dose Route Frequency Provider Last Rate Last Admin    lactated ringers infusion  75 mL/hr IntraVENous Continuous Steven Whitney MD         Current Outpatient Medications on File Prior to Encounter   Medication Sig Dispense Refill    pantoprazole (PROTONIX) 40 MG tablet Take 1 tablet by mouth in the morning.  TAKE ONE TABLET BY MOUTH TWICE DAILY. 90 tablet 3    butalbital-acetaminophen-caffeine (FIORICET, ESGIC) -40 MG per tablet Take 1 tablet by mouth every 4 hours as needed for Headaches 180 tablet 3    ondansetron (ZOFRAN) 4 MG tablet 1 tablet daily 90 tablet 1    brexpiprazole (REXULTI) 1 MG TABS tablet Take 1 tablet by mouth daily 90 tablet 3    TRINTELLIX 20 MG TABS tablet TAKE 1 TABLET DAILY 90 tablet 3    HUMALOG 100 UNIT/ML injection vial INJECT UP TO 70 UNITS VIA PUMP AS DIRECTED      naloxone 4 MG/0.1ML LIQD nasal spray 1 spray by Nasal route as needed for Opioid Reversal 1 each 5    propranolol (INDERAL) 20 MG tablet Take 20 mg by mouth as needed Takes for migraines      b complex vitamins capsule Take 1 capsule by mouth daily      lisinopril (PRINIVIL;ZESTRIL) 30 MG tablet Take 1 tablet by mouth daily (Patient taking differently: Take 5 mg by mouth daily) 90 tablet 3    isosorbide mononitrate (IMDUR) 30 MG extended release tablet Take 1 tablet by mouth daily 90 tablet 3    atorvastatin (LIPITOR) 80 MG tablet Take 1 tablet by mouth nightly 90 tablet 3    clopidogrel (PLAVIX) 75 MG tablet Take 1 tablet by mouth daily 90 tablet 3    CONTOUR NEXT TEST strip use four times a day      nitroGLYCERIN (NITROSTAT) 0.4 MG SL tablet Place 1 tablet under the tongue every 5 minutes as needed for Chest pain Indications: Disease of the Arteries of the Heart Take 1 tablet every 5 minutes times three as needed for chest pain 25 tablet 2       Negative except for what is mentioned in the HPI. GENERAL PHYSICAL EXAM     Vitals :   See vital signs in RN flow sheet. GENERAL APPEARANCE:   Reyna Stark is 46 y.o., male, not obese, nourished, conscious, alert. Does not appear to be distress or pain at this time. SKIN:  Warm, dry, no cyanosis or jaundice. HEAD:  Normocephalic, atraumatic, no swelling or tenderness. EYES:  Pupils equal, reactive to light. EARS:  No discharge, no marked hearing loss. NOSE:  No rhinorrhea, epistaxis or septal deformity. THROAT:  Not congested. No ulceration bleeding or discharge. NECK:  No stiffness, trachea central.  No palpable masses or L.N.                 CHEST:  Symmetrical and equal on expansion. HEART:  RRR . No audible murmurs or gallops. LUNGS:  Equal on expansion, normal breath sounds. No adventitious sounds. ABDOMEN:    Soft on palpation. No dysphagia, No localized tenderness. No guarding or rigidity. Insulin pump in place on right side of abdomen. LYMPHATICS:  No palpable cervical lymphadenopathy. LOCOMOTOR, BACK AND SPINE:  No tenderness or deformities. EXTREMITIES:  Symmetrical, no pretibial edema. No discoloration or ulcerations. NEUROLOGIC:  The patient is conscious, alert, oriented,Cranial nerve II-XII intact, taste and smell were not examined. No apparent focal sensory or motor deficits.              PROVISIONAL DIAGNOSES / SURGERY:        COLONOSCOPY DIAGNOSTIC    GENERALIZED ABDOMEN PAIN  CHANGE IN BOWEL HABITS      Patient Active Problem List    Diagnosis Date Noted    Incisional hernia, without obstruction or gangrene 08/10/2021    Unspecified inflammatory spondylopathy, cervical region ([address] 01/22/2021    Major depressive disorder, recurrent episode with anxious distress ([address] 08/10/2020    Pancreatic insufficiency 01/15/2019    Cervical spondylosis 10/21/2017    Perforation of right tympanic membrane 02/14/2017    Chronic coronary artery disease 11/03/2015    Depression with anxiety 11/03/2015    Diabetic peripheral neuropathy ([address] 11/03/2015    Essential hypertension 11/03/2015    Gastroparesis 11/03/2015    History of long-term treatment with high-risk medication 11/03/2015    Hyperlipidemia 11/03/2015    Impingement syndrome of left shoulder 11/03/2015    Injury of right ulnar nerve 11/03/2015    Migraine headache 11/03/2015    Type 1 diabetes mellitus with hyperglycemia ([facility] 11/03/2015    Current every day smoker 11/03/2015    Ulnar nerve compression at multiple levels 12/17/2014    Degenerative cervical disc 12/17/2014    Arthropathy of cervical facet joint 12/17/2014    Stenosis of cervical spine with myelopathy ([facility] 12/17/2014           MARTA Dupree, APRN - CNP on 11/14/2022 at 6:23 AM

## 2022-11-15 ENCOUNTER — TELEPHONE (OUTPATIENT)
Dept: PRIMARY CARE CLINIC | Age: 52
End: 2022-11-15

## 2022-11-15 LAB — SURGICAL PATHOLOGY REPORT: NORMAL

## 2022-11-15 NOTE — TELEPHONE ENCOUNTER
Patient called and stated that the nystatin is not helping his thrush. He has been using it x 1 month. He is requesting flagyl.  Please advise      Pharmacy [facility]

## 2022-11-15 NOTE — TELEPHONE ENCOUNTER
Patient was notified per Dr Anastasiia Merchant message and scheduled with Popeye Rutherford tomorrow in [place]

## 2022-11-17 RX ORDER — FLUCONAZOLE 100 MG/1
100 TABLET ORAL DAILY
Qty: 14 TABLET | Refills: 0 | Status: SHIPPED | OUTPATIENT
Start: 2022-11-17 | End: 2022-12-01

## 2022-11-17 NOTE — TELEPHONE ENCOUNTER
Patient's wife, Michelle Severino, called in and stated that he needs to be on Diflucan now. He was too sick to come in for his appointment yesterday per wife is his throwing up because of the severity of the thrush. She states the Charna Prader said the wrong medication when he called originally and he was supposed to ask for Fluconazole. Wife states Charna Prader did reschedule his appointment for 11/23 but he cannot wait this long to be on medication. [facility] in [facility].

## 2022-11-22 LAB
AVERAGE GLUCOSE: NORMAL
HBA1C MFR BLD: 9.1 %

## 2022-11-30 DIAGNOSIS — M48.02 STENOSIS OF CERVICAL SPINE WITH MYELOPATHY (HCC): ICD-10-CM

## 2022-11-30 DIAGNOSIS — F41.8 DEPRESSION WITH ANXIETY: ICD-10-CM

## 2022-11-30 DIAGNOSIS — G99.2 STENOSIS OF CERVICAL SPINE WITH MYELOPATHY (HCC): ICD-10-CM

## 2022-11-30 RX ORDER — OXYCODONE HYDROCHLORIDE 20 MG/1
20 TABLET ORAL EVERY 4 HOURS PRN
Qty: 150 TABLET | Refills: 0 | Status: SHIPPED | OUTPATIENT
Start: 2022-11-30 | End: 2022-12-30

## 2022-11-30 RX ORDER — LORAZEPAM 0.5 MG/1
0.5 TABLET ORAL EVERY 8 HOURS PRN
Qty: 90 TABLET | Refills: 0 | Status: SHIPPED | OUTPATIENT
Start: 2022-11-30 | End: 2022-12-30

## 2022-12-20 DIAGNOSIS — M48.02 STENOSIS OF CERVICAL SPINE WITH MYELOPATHY (HCC): ICD-10-CM

## 2022-12-20 DIAGNOSIS — G99.2 STENOSIS OF CERVICAL SPINE WITH MYELOPATHY (HCC): ICD-10-CM

## 2022-12-20 RX ORDER — OXYCODONE HYDROCHLORIDE 20 MG/1
20 TABLET ORAL EVERY 4 HOURS PRN
Qty: 150 TABLET | Refills: 0 | Status: SHIPPED | OUTPATIENT
Start: 2022-12-20 | End: 2023-01-19

## 2022-12-20 NOTE — TELEPHONE ENCOUNTER
LAST VISIT:   9/19/2022     Future Appointments   Date Time Provider Alfonso Shelli   1/5/2023  9:00 AM MD [facility] Rehan Berg

## 2022-12-22 ENCOUNTER — TELEPHONE (OUTPATIENT)
Dept: PRIMARY CARE CLINIC | Age: 52
End: 2022-12-22

## 2022-12-22 NOTE — TELEPHONE ENCOUNTER
Patient called office states he is needing ok from [address] [place] [address] to call pharmacy to ok to fill oxycodone early. Patient states not due to fill until yuan weiss. Patient states he is leaving to go out of town asking if  is ok with him filling early?         Please advise     Please call pharmacy if ok by PCP

## 2023-01-09 ENCOUNTER — TELEPHONE (OUTPATIENT)
Dept: PRIMARY CARE CLINIC | Age: 53
End: 2023-01-09

## 2023-01-09 DIAGNOSIS — G99.2 STENOSIS OF CERVICAL SPINE WITH MYELOPATHY (HCC): ICD-10-CM

## 2023-01-09 DIAGNOSIS — M48.02 STENOSIS OF CERVICAL SPINE WITH MYELOPATHY (HCC): ICD-10-CM

## 2023-01-09 RX ORDER — FLUCONAZOLE 100 MG/1
100 TABLET ORAL DAILY
Qty: 7 TABLET | Refills: 0 | Status: SHIPPED | OUTPATIENT
Start: 2023-01-09 | End: 2023-01-16

## 2023-01-09 RX ORDER — OXYCODONE HYDROCHLORIDE 20 MG/1
20 TABLET ORAL EVERY 4 HOURS PRN
Qty: 150 TABLET | Refills: 0 | Status: SHIPPED | OUTPATIENT
Start: 2023-01-09 | End: 2023-02-08

## 2023-01-09 NOTE — TELEPHONE ENCOUNTER
Patient has a bad case of thrush. While in the hospital was receiving diflucan and magic mouth wash. Nothing was sent in for him when he was DC'd today. Asking for scripts.     Pharmacy [facility] [place]

## 2023-01-09 NOTE — TELEPHONE ENCOUNTER
Care Transitions Initial Follow Up Call    Outreach made within 2 business days of discharge: Yes    Patient: Poli Le Patient : 1970   MRN: [id number]  Reason for Admission: There are no discharge diagnoses documented for the most recent discharge. Discharge Date: 22       Spoke with: Johanna Cristobal    Discharge department/facility: [facility] Interactive Patient Contact:  Was patient able to fill all prescriptions: Yes  Was patient instructed to bring all medications to the follow-up visit: Yes  Is patient taking all medications as directed in the discharge summary? Yes  Does patient understand their discharge instructions: Yes  Does patient have questions or concerns that need addressed prior to 7-14 day follow up office visit: no    Scheduled appointment with PCP within 7-14 days    Follow Up  No future appointments.     Ofe Walter MA

## 2023-01-11 ENCOUNTER — TELEPHONE (OUTPATIENT)
Dept: PRIMARY CARE CLINIC | Age: 53
End: 2023-01-11

## 2023-01-11 NOTE — TELEPHONE ENCOUNTER
Patient called in an cancelled 8:40 appointment at 8:42 today. Asking that you call him back to reschedule.

## 2023-01-12 DIAGNOSIS — F41.8 DEPRESSION WITH ANXIETY: ICD-10-CM

## 2023-01-13 RX ORDER — LORAZEPAM 0.5 MG/1
0.5 TABLET ORAL EVERY 8 HOURS PRN
Qty: 90 TABLET | Refills: 0 | Status: SHIPPED | OUTPATIENT
Start: 2023-01-13 | End: 2023-02-12

## 2023-01-26 ENCOUNTER — TELEPHONE (OUTPATIENT)
Dept: PRIMARY CARE CLINIC | Age: 53
End: 2023-01-26

## 2023-01-26 NOTE — TELEPHONE ENCOUNTER
Pt called asking for his last no show to be excused because he had just gotten out of ICU the night prior and did not feel well enough to come in. Per  she could excuse this one but if he no shows or late cancels (less than 24 hrs) he would be dismissed. Pt verbalized understanding.

## 2023-01-30 RX ORDER — BUTALBITAL, ACETAMINOPHEN AND CAFFEINE 50; 325; 40 MG/1; MG/1; MG/1
1 TABLET ORAL EVERY 4 HOURS PRN
Qty: 180 TABLET | Refills: 3 | Status: SHIPPED | OUTPATIENT
Start: 2023-01-30

## 2023-01-30 NOTE — TELEPHONE ENCOUNTER
LAST VISIT:   9/19/2022     Future Appointments   Date Time Provider Alfonso Marques   2/6/2023  1:20 PM MD [facility] Saige Penn

## 2023-02-01 DIAGNOSIS — M48.02 STENOSIS OF CERVICAL SPINE WITH MYELOPATHY (HCC): ICD-10-CM

## 2023-02-01 DIAGNOSIS — G99.2 STENOSIS OF CERVICAL SPINE WITH MYELOPATHY (HCC): ICD-10-CM

## 2023-02-01 RX ORDER — OXYCODONE HYDROCHLORIDE 20 MG/1
20 TABLET ORAL EVERY 4 HOURS PRN
Qty: 150 TABLET | Refills: 0 | Status: SHIPPED | OUTPATIENT
Start: 2023-02-01 | End: 2023-03-03

## 2023-02-06 ENCOUNTER — OFFICE VISIT (OUTPATIENT)
Dept: PRIMARY CARE CLINIC | Age: 53
End: 2023-02-06
Payer: COMMERCIAL

## 2023-02-06 VITALS
OXYGEN SATURATION: 98 % | WEIGHT: 135 LBS | HEIGHT: 68 IN | SYSTOLIC BLOOD PRESSURE: 140 MMHG | HEART RATE: 88 BPM | DIASTOLIC BLOOD PRESSURE: 110 MMHG | BODY MASS INDEX: 20.46 KG/M2

## 2023-02-06 DIAGNOSIS — I10 ESSENTIAL HYPERTENSION: ICD-10-CM

## 2023-02-06 DIAGNOSIS — G43.009 MIGRAINE WITHOUT AURA AND WITHOUT STATUS MIGRAINOSUS, NOT INTRACTABLE: ICD-10-CM

## 2023-02-06 DIAGNOSIS — F41.8 DEPRESSION WITH ANXIETY: ICD-10-CM

## 2023-02-06 DIAGNOSIS — Z79.899 HIGH RISK MEDICATION USE: ICD-10-CM

## 2023-02-06 DIAGNOSIS — E11.42 DIABETIC PERIPHERAL NEUROPATHY (HCC): ICD-10-CM

## 2023-02-06 DIAGNOSIS — M54.12 CERVICAL RADICULOPATHY: ICD-10-CM

## 2023-02-06 DIAGNOSIS — E10.65 TYPE 1 DIABETES MELLITUS WITH HYPERGLYCEMIA (HCC): Primary | ICD-10-CM

## 2023-02-06 PROCEDURE — 3017F COLORECTAL CA SCREEN DOC REV: CPT | Performed by: FAMILY MEDICINE

## 2023-02-06 PROCEDURE — 99214 OFFICE O/P EST MOD 30 MIN: CPT | Performed by: FAMILY MEDICINE

## 2023-02-06 PROCEDURE — G8482 FLU IMMUNIZE ORDER/ADMIN: HCPCS | Performed by: FAMILY MEDICINE

## 2023-02-06 PROCEDURE — G8427 DOCREV CUR MEDS BY ELIG CLIN: HCPCS | Performed by: FAMILY MEDICINE

## 2023-02-06 PROCEDURE — 3080F DIAST BP >= 90 MM HG: CPT | Performed by: FAMILY MEDICINE

## 2023-02-06 PROCEDURE — 4004F PT TOBACCO SCREEN RCVD TLK: CPT | Performed by: FAMILY MEDICINE

## 2023-02-06 PROCEDURE — 3077F SYST BP >= 140 MM HG: CPT | Performed by: FAMILY MEDICINE

## 2023-02-06 PROCEDURE — 2022F DILAT RTA XM EVC RTNOPTHY: CPT | Performed by: FAMILY MEDICINE

## 2023-02-06 PROCEDURE — G8420 CALC BMI NORM PARAMETERS: HCPCS | Performed by: FAMILY MEDICINE

## 2023-02-06 PROCEDURE — 3046F HEMOGLOBIN A1C LEVEL >9.0%: CPT | Performed by: FAMILY MEDICINE

## 2023-02-06 RX ORDER — LISINOPRIL 10 MG/1
30 TABLET ORAL DAILY
Qty: 30 TABLET | Refills: 5 | Status: ON HOLD
Start: 2023-02-06

## 2023-02-06 RX ORDER — ISOSORBIDE MONONITRATE 30 MG/1
30 TABLET, EXTENDED RELEASE ORAL DAILY
Qty: 90 TABLET | Refills: 3 | Status: ON HOLD | OUTPATIENT
Start: 2023-02-06

## 2023-02-06 RX ORDER — PROPRANOLOL HYDROCHLORIDE 20 MG/1
20 TABLET ORAL 2 TIMES DAILY
Qty: 180 TABLET | Refills: 5 | Status: ON HOLD | OUTPATIENT
Start: 2023-02-06

## 2023-02-06 RX ORDER — CLOPIDOGREL BISULFATE 75 MG/1
75 TABLET ORAL DAILY
Qty: 90 TABLET | Refills: 3 | Status: ON HOLD | OUTPATIENT
Start: 2023-02-06

## 2023-02-06 RX ORDER — PROCHLORPERAZINE MALEATE 10 MG
10 TABLET ORAL EVERY 6 HOURS PRN
Qty: 120 TABLET | Refills: 3 | Status: ON HOLD | OUTPATIENT
Start: 2023-02-06

## 2023-02-06 RX ORDER — LORAZEPAM 0.5 MG/1
0.5 TABLET ORAL EVERY 8 HOURS PRN
Qty: 90 TABLET | Refills: 2 | Status: ON HOLD | OUTPATIENT
Start: 2023-02-06 | End: 2023-03-08

## 2023-02-06 RX ORDER — ATORVASTATIN CALCIUM 80 MG/1
80 TABLET, FILM COATED ORAL NIGHTLY
Qty: 90 TABLET | Refills: 3 | Status: ON HOLD | OUTPATIENT
Start: 2023-02-06

## 2023-02-06 RX ORDER — TOPIRAMATE 25 MG/1
25 TABLET ORAL NIGHTLY
Qty: 180 TABLET | Refills: 3 | Status: ON HOLD | OUTPATIENT
Start: 2023-02-06

## 2023-02-06 RX ORDER — ATORVASTATIN CALCIUM 80 MG/1
80 TABLET, FILM COATED ORAL NIGHTLY
Qty: 90 TABLET | Refills: 3 | Status: SHIPPED | OUTPATIENT
Start: 2023-02-06 | End: 2023-02-06

## 2023-02-06 SDOH — ECONOMIC STABILITY: INCOME INSECURITY: HOW HARD IS IT FOR YOU TO PAY FOR THE VERY BASICS LIKE FOOD, HOUSING, MEDICAL CARE, AND HEATING?: NOT HARD AT ALL

## 2023-02-06 SDOH — ECONOMIC STABILITY: FOOD INSECURITY: WITHIN THE PAST 12 MONTHS, YOU WORRIED THAT YOUR FOOD WOULD RUN OUT BEFORE YOU GOT MONEY TO BUY MORE.: NEVER TRUE

## 2023-02-06 SDOH — ECONOMIC STABILITY: HOUSING INSECURITY
IN THE LAST 12 MONTHS, WAS THERE A TIME WHEN YOU DID NOT HAVE A STEADY PLACE TO SLEEP OR SLEPT IN A SHELTER (INCLUDING NOW)?: NO

## 2023-02-06 SDOH — ECONOMIC STABILITY: FOOD INSECURITY: WITHIN THE PAST 12 MONTHS, THE FOOD YOU BOUGHT JUST DIDN'T LAST AND YOU DIDN'T HAVE MONEY TO GET MORE.: NEVER TRUE

## 2023-02-06 ASSESSMENT — ENCOUNTER SYMPTOMS
SORE THROAT: 0
VOMITING: 0
NAUSEA: 0
RHINORRHEA: 0
DIARRHEA: 0
COUGH: 0
EYE DISCHARGE: 0
WHEEZING: 0
EYE REDNESS: 0
ABDOMINAL PAIN: 0
SHORTNESS OF BREATH: 0

## 2023-02-06 ASSESSMENT — PATIENT HEALTH QUESTIONNAIRE - PHQ9
2. FEELING DOWN, DEPRESSED OR HOPELESS: 0
5. POOR APPETITE OR OVEREATING: 0
7. TROUBLE CONCENTRATING ON THINGS, SUCH AS READING THE NEWSPAPER OR WATCHING TELEVISION: 0
6. FEELING BAD ABOUT YOURSELF - OR THAT YOU ARE A FAILURE OR HAVE LET YOURSELF OR YOUR FAMILY DOWN: 0
SUM OF ALL RESPONSES TO PHQ QUESTIONS 1-9: 0
9. THOUGHTS THAT YOU WOULD BE BETTER OFF DEAD, OR OF HURTING YOURSELF: 0
1. LITTLE INTEREST OR PLEASURE IN DOING THINGS: 0
3. TROUBLE FALLING OR STAYING ASLEEP: 0
SUM OF ALL RESPONSES TO PHQ QUESTIONS 1-9: 0
SUM OF ALL RESPONSES TO PHQ QUESTIONS 1-9: 0
8. MOVING OR SPEAKING SO SLOWLY THAT OTHER PEOPLE COULD HAVE NOTICED. OR THE OPPOSITE, BEING SO FIGETY OR RESTLESS THAT YOU HAVE BEEN MOVING AROUND A LOT MORE THAN USUAL: 0
10. IF YOU CHECKED OFF ANY PROBLEMS, HOW DIFFICULT HAVE THESE PROBLEMS MADE IT FOR YOU TO DO YOUR WORK, TAKE CARE OF THINGS AT HOME, OR GET ALONG WITH OTHER PEOPLE: 0
SUM OF ALL RESPONSES TO PHQ QUESTIONS 1-9: 0
4. FEELING TIRED OR HAVING LITTLE ENERGY: 0
SUM OF ALL RESPONSES TO PHQ9 QUESTIONS 1 & 2: 0

## 2023-02-06 NOTE — PROGRESS NOTES
[address] [place] [address] PRIMARY CARE  [address] [address]  Dept: [phone]    Gerson Toth is a 46 y.o. male Established patient, who presents today for his medical conditions/complaints as noted below. Chief Complaint   Patient presents with    Follow-up     Patient is here today for hospital follow up - hospitals        HPI:     HPI  Pt states blood pressure has been running higher lately. Gets emesis with headaches and then sugar elevates. Pt states still irritable. Neck pain is unchanged. Still not able to get injections from pain management. Pt sees endocrinology for diabetes. No note again available. Patient had hospitalization in [facility] for DKA. While in the hospital patient was noted to have a dilated common bile duct however his MRCP was normal.  Patient continues to complain of migraine headaches. States that been getting worse. Does develop photophobia and phonophobia. Patient was noted to have low potassium and magnesium while in the hospital.  Patient has been using over-the-counter potassium and magnesium.     Reviewed prior notes None  Reviewed previous Labs and Imaging    LDL Calculated (mg/dL)   Date Value   04/16/2019 97   05/21/2018 66   01/05/2018 66       (goal LDL is <100)   AST (U/L)   Date Value   08/20/2021 18     ALT (U/L)   Date Value   08/20/2021 14     BUN (mg/dL)   Date Value   08/20/2021 9     Hemoglobin A1C (%)   Date Value   11/22/2022 9.1     TSH (mIU/L)   Date Value   07/27/2018 0.43     BP Readings from Last 3 Encounters:   02/06/23 (!) 140/110   11/14/22 139/82   10/07/22 (!) 148/92          (goal 120/80)    Past Medical History:   Diagnosis Date    Anxiety     Arthritis     CAD (coronary artery disease)     Calcaneal apophysitis     Corns     Depression     Diabetes type I (HCC)     Gastroparesis     GERD (gastroesophageal reflux disease)     Headache(784.0)     Hearing loss     Hyperglycemia Hyperlipidemia     Hypertension     Intussusception ([facility] [address]     MI (myocardial infarction) ([facility] [address] jan 2014    Neuropathy     Osteoarthritis     Panic attacks     Temporomandibular joint disorder       Past Surgical History:   Procedure Laterality Date    ABDOMEN SURGERY      insulin pump    ABDOMINAL EXPLORATION SURGERY  05/28/2016    bowel resection Rt. colon &terminal ileum, intussuseption.      CARDIAC CATHETERIZATION  2014    CARPAL TUNNEL RELEASE      bilateral    COLONOSCOPY  05/23/2017    poor prep; diverticulosis    COLONOSCOPY N/A 7/10/2018    COLONOSCOPY POLYPECTOMY HOT BIOPSY performed by Raul Cheney MD at [facility] N/A 8/9/2021    COLONOSCOPY DIAGNOSTIC performed by Raul Cheney MD at [place] [address] 11/14/2022    COLONOSCOPY WITH BIOPSY performed by Raul Cheney MD at [facility]      right    HERNIA REPAIR      inguinal    UT COLON CA SCRN NOT  [address] [place] [address] IND N/A 5/25/2017    COLONOSCOPY performed by Jessi Guzman MD at [facility] ESOPHAGOGASTRODUODENOSCOPY TRANSORAL DIAGNOSTIC N/A 5/24/2017    EGD ESOPHAGOGASTRODUODENOSCOPY performed by Jessi Guzman MD at [address] Bilateral     SHOULDER SURGERY      fracture     TONSILLECTOMY      x2    TOOTH EXTRACTION      x4    ULNAR TUNNEL RELEASE      UPPER GASTROINTESTINAL ENDOSCOPY  05/23/2017    mild gastritis    UPPER GASTROINTESTINAL ENDOSCOPY N/A 7/10/2018    EGD BIOPSY performed by Raul Cheney MD at [place] [address] N/A 8/10/2021    HERNIA INCISIONAL REPAIR OPEN W/MESH performed by Raul Cheney MD at [facility]      x4       Family History   Problem Relation Age of Onset    Diabetes Mother     High Blood Pressure Mother     Heart Disease Mother     Migraines Mother     Other Mother         diabetic neuropathy    High Blood Pressure Father        Social History     Tobacco Use    Smoking status: Every Day     Packs/day: 1.00 Years: 30.00     Pack years: 30.00     Types: Cigarettes    Smokeless tobacco: Never   Substance Use Topics    Alcohol use: No     Comment: previous heavy ETOH use; pt stopped around 2010      Current Outpatient Medications   Medication Sig Dispense Refill    lisinopril (PRINIVIL;ZESTRIL) 10 MG tablet Take 3 tablets by mouth daily 30 tablet 5    atorvastatin (LIPITOR) 80 MG tablet Take 1 tablet by mouth nightly 90 tablet 3    clopidogrel (PLAVIX) 75 MG tablet Take 1 tablet by mouth daily 90 tablet 3    isosorbide mononitrate (IMDUR) 30 MG extended release tablet Take 1 tablet by mouth daily 90 tablet 3    propranolol (INDERAL) 20 MG tablet Take 1 tablet by mouth 2 times daily Takes for migraines 180 tablet 5    prochlorperazine (COMPAZINE) 10 MG tablet Take 1 tablet by mouth every 6 hours as needed (nausea) 120 tablet 3    topiramate (TOPAMAX) 25 MG tablet Take 1 tablet by mouth nightly 180 tablet 3    LORazepam (ATIVAN) 0.5 MG tablet Take 1 tablet by mouth every 8 hours as needed for Anxiety for up to 30 days. 90 tablet 2    oxyCODONE (ROXICODONE) 20 MG immediate release tablet Take 1 tablet by mouth every 4 hours as needed for Pain for up to 30 days. 150 tablet 0    butalbital-acetaminophen-caffeine (FIORICET, ESGIC) -40 MG per tablet Take 1 tablet by mouth every 4 hours as needed for Headaches 180 tablet 3    pantoprazole (PROTONIX) 40 MG tablet Take 1 tablet by mouth in the morning. TAKE ONE TABLET BY MOUTH TWICE DAILY.  90 tablet 3    brexpiprazole (REXULTI) 1 MG TABS tablet Take 1 tablet by mouth daily 90 tablet 3    TRINTELLIX 20 MG TABS tablet TAKE 1 TABLET DAILY 90 tablet 3    HUMALOG 100 UNIT/ML injection vial INJECT UP TO 70 UNITS VIA PUMP AS DIRECTED      naloxone 4 MG/0.1ML LIQD nasal spray 1 spray by Nasal route as needed for Opioid Reversal 1 each 5    b complex vitamins capsule Take 1 capsule by mouth daily      CONTOUR NEXT TEST strip use four times a day      nitroGLYCERIN (NITROSTAT) 0.4 MG SL tablet Place 1 tablet under the tongue every 5 minutes as needed for Chest pain Indications: Disease of the Arteries of the Heart Take 1 tablet every 5 minutes times three as needed for chest pain 25 tablet 2     No current facility-administered medications for this visit. Facility-Administered Medications Ordered in Other Visits   Medication Dose Route Frequency Provider Last Rate Last Admin    lactated ringers infusion  75 mL/hr IntraVENous Continuous Mikal Henderson MD         Allergies   Allergen Reactions    Avelox [Moxifloxacin]     Ciprofloxacin Other (See Comments)    Levofloxacin     Lyrica [Pregabalin]     Metoclopramide Other (See Comments)     Severe confusion and paranoid activity    Neurontin [Gabapentin]     Other      Can not have epidural injections, steroids, nerve blocks, or burning of nerves due to being diabetic    Tetanus Toxoids     Tramadol     Tramadol Hcl        Health Maintenance   Topic Date Due    COVID-19 Vaccine (1) Never done    HIV screen  Never done    Hepatitis C screen  Never done    Hepatitis B vaccine (1 of 3 - Risk 3-dose series) Never done    Diabetic retinal exam  11/28/2019    Lipids  04/16/2020    Shingles vaccine (1 of 2) Never done    Low dose CT lung screening  Never done    Diabetic Alb to Cr ratio (uACR) test  05/04/2021    GFR test (Diabetes, CKD 3-4, OR last GFR 15-59)  08/20/2022    Diabetic foot exam  10/19/2022    A1C test (Diabetic or Prediabetic)  04/07/2023    Colorectal Cancer Screen  05/14/2023    Depression Monitoring  09/19/2023    Flu vaccine  Completed    Pneumococcal 0-64 years Vaccine  Completed    Hepatitis A vaccine  Aged Out    Hib vaccine  Aged Out    Meningococcal (ACWY) vaccine  Aged Out       Subjective:      Review of Systems   Constitutional:  Negative for chills and fever. HENT:  Negative for rhinorrhea and sore throat. Eyes:  Negative for discharge and redness.    Respiratory:  Negative for cough, shortness of breath and wheezing. Cardiovascular:  Negative for chest pain and palpitations. Gastrointestinal:  Negative for abdominal pain, diarrhea, nausea and vomiting. Genitourinary:  Negative for dysuria and frequency. Musculoskeletal:  Negative for arthralgias and myalgias. Neurological:  Positive for headaches. Negative for dizziness and light-headedness. Psychiatric/Behavioral:  Negative for sleep disturbance. Objective:     BP (!) 140/110   Pulse 88   Ht 5' 8\" (1.727 m)   Wt 135 lb (61.2 kg)   SpO2 98%   BMI 20.53 kg/m²   Physical Exam  Vitals and nursing note reviewed. Constitutional:       General: He is not in acute distress. Appearance: He is well-developed. He is not ill-appearing. HENT:      Head: Normocephalic and atraumatic. Right Ear: External ear normal.      Left Ear: External ear normal.   Eyes:      General: No scleral icterus. Right eye: No discharge. Left eye: No discharge. Conjunctiva/sclera: Conjunctivae normal.   Neck:      Thyroid: No thyromegaly. Trachea: No tracheal deviation. Cardiovascular:      Rate and Rhythm: Normal rate and regular rhythm. Heart sounds: Normal heart sounds. Pulmonary:      Effort: Pulmonary effort is normal. No respiratory distress. Breath sounds: Normal breath sounds. No wheezing. Lymphadenopathy:      Cervical: No cervical adenopathy. Skin:     General: Skin is warm. Findings: No rash. Neurological:      Mental Status: He is alert and oriented to person, place, and time. Psychiatric:         Mood and Affect: Mood normal.         Behavior: Behavior normal.         Thought Content: Thought content normal.       Assessment:       Diagnosis Orders   1. Type 1 diabetes mellitus with hyperglycemia (HCC)        2. Cervical radiculopathy  Inga Alfredo MD, Pain Management, [place]      3. Diabetic peripheral neuropathy (HCC)  lisinopril (PRINIVIL;ZESTRIL) 10 MG tablet      4.  High risk medication use  clopidogrel (PLAVIX) 75 MG tablet    isosorbide mononitrate (IMDUR) 30 MG extended release tablet      5. Depression with anxiety  LORazepam (ATIVAN) 0.5 MG tablet      6. Essential hypertension  Basic Metabolic Panel      7. Migraine without aura and without status migrainosus, not intractable             Plan:   Increase Adderall to 20 mg twice daily for migraines  Start Topamax 25 mg twice daily, may need to titrate  Increase lisinopril to 10 mg daily for high blood pressure. If in 3 weeks still elevated would increase to 20 mg  Repeat BMP with patient being hypokalemic in the hospital  Renew Ativan  Referral to pain management for possible cervical procedures. Patient continues to have intractable neck pain but has been difficult to get treated secondary to his brittle diabetes. Patient currently followed by endocrinology and has an insulin pump  We will use Compazine instead of Zofran for nausea. Much more effective in the hospital    Return in about 3 months (around 5/6/2023).     Orders Placed This Encounter   Procedures    Basic Metabolic Panel     Standing Status:   Future     Standing Expiration Date:   2/6/2024    Delonte Ferro MD, Pain Management, [place]     Referral Priority:   Routine     Referral Type:   Eval and Treat     Referral Reason:   Specialty Services Required     Referred to Provider:   Liz Moreno MD     Requested Specialty:   Pain Management     Number of Visits Requested:   1     Orders Placed This Encounter   Medications    lisinopril (PRINIVIL;ZESTRIL) 10 MG tablet     Sig: Take 3 tablets by mouth daily     Dispense:  30 tablet     Refill:  5    atorvastatin (LIPITOR) 80 MG tablet     Sig: Take 1 tablet by mouth nightly     Dispense:  90 tablet     Refill:  3    DISCONTD: atorvastatin (LIPITOR) 80 MG tablet     Sig: Take 1 tablet by mouth nightly     Dispense:  90 tablet     Refill:  3    clopidogrel (PLAVIX) 75 MG tablet     Sig: Take 1 tablet by mouth daily Dispense:  90 tablet     Refill:  3    isosorbide mononitrate (IMDUR) 30 MG extended release tablet     Sig: Take 1 tablet by mouth daily     Dispense:  90 tablet     Refill:  3    propranolol (INDERAL) 20 MG tablet     Sig: Take 1 tablet by mouth 2 times daily Takes for migraines     Dispense:  180 tablet     Refill:  5    prochlorperazine (COMPAZINE) 10 MG tablet     Sig: Take 1 tablet by mouth every 6 hours as needed (nausea)     Dispense:  120 tablet     Refill:  3    topiramate (TOPAMAX) 25 MG tablet     Sig: Take 1 tablet by mouth nightly     Dispense:  180 tablet     Refill:  3    LORazepam (ATIVAN) 0.5 MG tablet     Sig: Take 1 tablet by mouth every 8 hours as needed for Anxiety for up to 30 days. Dispense:  90 tablet     Refill:  2       Patient given educational materials - see patient instructions. Discussed use, benefit, and side effects of prescribed medications. All patient questions answered. Pt voiced understanding. Reviewed health maintenance. Instructed to continue current medications, diet andexercise. Patient agreed with treatment plan. Follow up as directed.      Electronicallysigned by Olya Francis MD on 2/6/2023 at 1:36 PM

## 2023-02-12 ENCOUNTER — APPOINTMENT (OUTPATIENT)
Dept: GENERAL RADIOLOGY | Age: 53
DRG: 071 | End: 2023-02-12
Payer: COMMERCIAL

## 2023-02-12 ENCOUNTER — HOSPITAL ENCOUNTER (INPATIENT)
Age: 53
LOS: 2 days | Discharge: HOME | DRG: 071 | End: 2023-02-14
Attending: EMERGENCY MEDICINE | Admitting: INTERNAL MEDICINE
Payer: COMMERCIAL

## 2023-02-12 ENCOUNTER — APPOINTMENT (OUTPATIENT)
Dept: CT IMAGING | Age: 53
DRG: 071 | End: 2023-02-12
Payer: COMMERCIAL

## 2023-02-12 DIAGNOSIS — R41.82 ALTERED MENTAL STATUS, UNSPECIFIED ALTERED MENTAL STATUS TYPE: Primary | ICD-10-CM

## 2023-02-12 PROBLEM — G93.40 ACUTE ENCEPHALOPATHY: Status: ACTIVE | Noted: 2023-02-12

## 2023-02-12 LAB
ABSOLUTE EOS #: 0 K/UL (ref 0–0.4)
ABSOLUTE IMMATURE GRANULOCYTE: 0 K/UL (ref 0–0.3)
ABSOLUTE LYMPH #: 2.7 K/UL (ref 1–4.8)
ABSOLUTE MONO #: 0.46 K/UL (ref 0.1–0.8)
ACETAMINOPHEN LEVEL: 10 UG/ML (ref 10–30)
AMMONIA PLAS-SCNC: 50 UMOL/L (ref 16–60)
AMPHETAMINE SCREEN URINE: NEGATIVE
ANION GAP SERPL CALCULATED.3IONS-SCNC: 11 MMOL/L (ref 9–17)
ANION GAP: 13 MMOL/L (ref 7–16)
BARBITURATE SCREEN URINE: POSITIVE
BASOPHILS # BLD: 0 % (ref 0–2)
BASOPHILS ABSOLUTE: 0 K/UL (ref 0–0.2)
BENZODIAZEPINE SCREEN, URINE: POSITIVE
BILIRUBIN URINE: NEGATIVE
BNP SERPL-MCNC: <36 PG/ML
BUN SERPL-MCNC: 7 MG/DL (ref 6–20)
CALCIUM SERPL-MCNC: 9.6 MG/DL (ref 8.6–10.4)
CANNABINOID SCREEN URINE: POSITIVE
CHLORIDE SERPL-SCNC: 100 MMOL/L (ref 98–107)
CHP ED QC CHECK: YES
CK SERPL-CCNC: 61 U/L (ref 39–308)
CO2 SERPL-SCNC: 27 MMOL/L (ref 20–31)
COCAINE METABOLITE, URINE: NEGATIVE
COLOR: YELLOW
COMMENT UA: ABNORMAL
CREAT SERPL-MCNC: 0.69 MG/DL (ref 0.7–1.2)
EGFR, POC: >60 ML/MIN/1.73M2
EOSINOPHILS RELATIVE PERCENT: 0 % (ref 1–4)
ETHANOL PERCENT: <0.01 %
ETHANOL: <10 MG/DL
FENTANYL URINE: NEGATIVE
GFR SERPL CREATININE-BSD FRML MDRD: >60 ML/MIN/1.73M2
GLUCOSE BLD-MCNC: 210 MG/DL (ref 74–100)
GLUCOSE BLD-MCNC: 220 MG/DL
GLUCOSE BLD-MCNC: 220 MG/DL (ref 75–110)
GLUCOSE SERPL-MCNC: 210 MG/DL (ref 70–99)
GLUCOSE UR STRIP.AUTO-MCNC: ABNORMAL MG/DL
HCO3 VENOUS: 27.9 MMOL/L (ref 22–29)
HCT VFR BLD AUTO: 49.7 % (ref 40.7–50.3)
HGB BLD-MCNC: 17.3 G/DL (ref 13–17)
IMMATURE GRANULOCYTES: 0 %
INR PPP: 1
KETONES UR STRIP.AUTO-MCNC: NEGATIVE MG/DL
LEUKOCYTE ESTERASE UR QL STRIP.AUTO: NEGATIVE
LYMPHOCYTES # BLD: 35 % (ref 24–44)
MCH RBC QN AUTO: 30.7 PG (ref 25.2–33.5)
MCHC RBC AUTO-ENTMCNC: 34.8 G/DL (ref 28.4–34.8)
MCV RBC AUTO: 88.1 FL (ref 82.6–102.9)
METHADONE SCREEN, URINE: POSITIVE
MONOCYTES # BLD: 6 % (ref 1–7)
MORPHOLOGY: NORMAL
MYOGLOBIN SERPL-MCNC: 36 NG/ML (ref 28–72)
NITRITE UR QL STRIP.AUTO: NEGATIVE
NRBC AUTOMATED: 0 PER 100 WBC
O2 SAT, VEN: 56 % (ref 60–85)
OPIATES, URINE: NEGATIVE
OXYCODONE SCREEN URINE: POSITIVE
PARTIAL THROMBOPLASTIN TIME: 24.3 SEC (ref 20.5–30.5)
PCO2, VEN: 41 MM HG (ref 41–51)
PDW BLD-RTO: 13.5 % (ref 11.8–14.4)
PH VENOUS: 7.44 (ref 7.32–7.43)
PHENCYCLIDINE, URINE: NEGATIVE
PLATELET # BLD AUTO: 225 K/UL (ref 138–453)
PMV BLD AUTO: 10.3 FL (ref 8.1–13.5)
PO2, VEN: 28.2 MM HG (ref 30–50)
POC BUN: 6 MG/DL (ref 8–26)
POC CHLORIDE: 101 MMOL/L (ref 98–107)
POC CREATININE: 0.74 MG/DL (ref 0.51–1.19)
POC HEMATOCRIT: 52 % (ref 41–53)
POC HEMOGLOBIN: 17.6 G/DL (ref 13.5–17.5)
POC IONIZED CALCIUM: 1.19 MMOL/L (ref 1.15–1.33)
POC LACTIC ACID: 1.86 MMOL/L (ref 0.56–1.39)
POC POTASSIUM: 4.2 MMOL/L (ref 3.5–4.5)
POC SODIUM: 140 MMOL/L (ref 138–146)
POC TCO2: 27 MMOL/L (ref 22–30)
POSITIVE BASE EXCESS, VEN: 3 (ref 0–3)
POTASSIUM SERPL-SCNC: 4.3 MMOL/L (ref 3.7–5.3)
PROT UR STRIP.AUTO-MCNC: 7 MG/DL (ref 5–8)
PROT UR STRIP.AUTO-MCNC: NEGATIVE MG/DL
PROTHROMBIN TIME: 10.5 SEC (ref 9.1–12.3)
RBC # BLD: 5.64 M/UL (ref 4.21–5.77)
SALICYLATE LEVEL: <1 MG/DL (ref 3–10)
SEG NEUTROPHILS: 59 % (ref 36–66)
SEGMENTED NEUTROPHILS ABSOLUTE COUNT: 4.54 K/UL (ref 1.8–7.7)
SERUM OSMOLALITY: 294 MOSM/KG (ref 275–295)
SODIUM SERPL-SCNC: 138 MMOL/L (ref 135–144)
SPECIFIC GRAVITY UA: 1.04 (ref 1–1.03)
TEST INFORMATION: ABNORMAL
TOXIC TRICYCLIC SC,BLOOD: NEGATIVE
TROPONIN I SERPL DL<=0.01 NG/ML-MCNC: <6 NG/L (ref 0–22)
TSH SERPL-ACNC: 1.5 UIU/ML (ref 0.3–5)
TURBIDITY: CLEAR
URINE HGB: NEGATIVE
UROBILINOGEN, URINE: NORMAL
WBC # BLD AUTO: 7.7 K/UL (ref 3.5–11.3)

## 2023-02-12 PROCEDURE — 83605 ASSAY OF LACTIC ACID: CPT

## 2023-02-12 PROCEDURE — 96376 TX/PRO/DX INJ SAME DRUG ADON: CPT

## 2023-02-12 PROCEDURE — G0378 HOSPITAL OBSERVATION PER HR: HCPCS

## 2023-02-12 PROCEDURE — 71045 X-RAY EXAM CHEST 1 VIEW: CPT

## 2023-02-12 PROCEDURE — 80307 DRUG TEST PRSMV CHEM ANLYZR: CPT

## 2023-02-12 PROCEDURE — 94761 N-INVAS EAR/PLS OXIMETRY MLT: CPT

## 2023-02-12 PROCEDURE — 80143 DRUG ASSAY ACETAMINOPHEN: CPT

## 2023-02-12 PROCEDURE — 85014 HEMATOCRIT: CPT

## 2023-02-12 PROCEDURE — 82947 ASSAY GLUCOSE BLOOD QUANT: CPT

## 2023-02-12 PROCEDURE — 82330 ASSAY OF CALCIUM: CPT

## 2023-02-12 PROCEDURE — 80051 ELECTROLYTE PANEL: CPT

## 2023-02-12 PROCEDURE — 84520 ASSAY OF UREA NITROGEN: CPT

## 2023-02-12 PROCEDURE — 51701 INSERT BLADDER CATHETER: CPT

## 2023-02-12 PROCEDURE — 83930 ASSAY OF BLOOD OSMOLALITY: CPT

## 2023-02-12 PROCEDURE — 82550 ASSAY OF CK (CPK): CPT

## 2023-02-12 PROCEDURE — 6360000002 HC RX W HCPCS

## 2023-02-12 PROCEDURE — 84484 ASSAY OF TROPONIN QUANT: CPT

## 2023-02-12 PROCEDURE — 70450 CT HEAD/BRAIN W/O DYE: CPT

## 2023-02-12 PROCEDURE — 96361 HYDRATE IV INFUSION ADD-ON: CPT

## 2023-02-12 PROCEDURE — 80048 BASIC METABOLIC PNL TOTAL CA: CPT

## 2023-02-12 PROCEDURE — 2060000000 HC ICU INTERMEDIATE R&B

## 2023-02-12 PROCEDURE — 1200000000 HC SEMI PRIVATE

## 2023-02-12 PROCEDURE — 93005 ELECTROCARDIOGRAM TRACING: CPT | Performed by: STUDENT IN AN ORGANIZED HEALTH CARE EDUCATION/TRAINING PROGRAM

## 2023-02-12 PROCEDURE — 6370000000 HC RX 637 (ALT 250 FOR IP): Performed by: STUDENT IN AN ORGANIZED HEALTH CARE EDUCATION/TRAINING PROGRAM

## 2023-02-12 PROCEDURE — 82553 CREATINE MB FRACTION: CPT

## 2023-02-12 PROCEDURE — 99223 1ST HOSP IP/OBS HIGH 75: CPT | Performed by: PSYCHIATRY & NEUROLOGY

## 2023-02-12 PROCEDURE — 96375 TX/PRO/DX INJ NEW DRUG ADDON: CPT

## 2023-02-12 PROCEDURE — 80179 DRUG ASSAY SALICYLATE: CPT

## 2023-02-12 PROCEDURE — 82803 BLOOD GASES ANY COMBINATION: CPT

## 2023-02-12 PROCEDURE — 85610 PROTHROMBIN TIME: CPT

## 2023-02-12 PROCEDURE — 83880 ASSAY OF NATRIURETIC PEPTIDE: CPT

## 2023-02-12 PROCEDURE — 80076 HEPATIC FUNCTION PANEL: CPT

## 2023-02-12 PROCEDURE — 70498 CT ANGIOGRAPHY NECK: CPT

## 2023-02-12 PROCEDURE — 83874 ASSAY OF MYOGLOBIN: CPT

## 2023-02-12 PROCEDURE — 99285 EMERGENCY DEPT VISIT HI MDM: CPT

## 2023-02-12 PROCEDURE — 85025 COMPLETE CBC W/AUTO DIFF WBC: CPT

## 2023-02-12 PROCEDURE — 2580000003 HC RX 258: Performed by: STUDENT IN AN ORGANIZED HEALTH CARE EDUCATION/TRAINING PROGRAM

## 2023-02-12 PROCEDURE — 6360000002 HC RX W HCPCS: Performed by: STUDENT IN AN ORGANIZED HEALTH CARE EDUCATION/TRAINING PROGRAM

## 2023-02-12 PROCEDURE — 36415 COLL VENOUS BLD VENIPUNCTURE: CPT

## 2023-02-12 PROCEDURE — 6360000004 HC RX CONTRAST MEDICATION: Performed by: STUDENT IN AN ORGANIZED HEALTH CARE EDUCATION/TRAINING PROGRAM

## 2023-02-12 PROCEDURE — 85730 THROMBOPLASTIN TIME PARTIAL: CPT

## 2023-02-12 PROCEDURE — 82140 ASSAY OF AMMONIA: CPT

## 2023-02-12 PROCEDURE — 84443 ASSAY THYROID STIM HORMONE: CPT

## 2023-02-12 PROCEDURE — G0480 DRUG TEST DEF 1-7 CLASSES: HCPCS

## 2023-02-12 PROCEDURE — 51798 US URINE CAPACITY MEASURE: CPT

## 2023-02-12 PROCEDURE — 96374 THER/PROPH/DIAG INJ IV PUSH: CPT

## 2023-02-12 PROCEDURE — 87040 BLOOD CULTURE FOR BACTERIA: CPT

## 2023-02-12 PROCEDURE — 81003 URINALYSIS AUTO W/O SCOPE: CPT

## 2023-02-12 PROCEDURE — 96372 THER/PROPH/DIAG INJ SC/IM: CPT

## 2023-02-12 PROCEDURE — 82565 ASSAY OF CREATININE: CPT

## 2023-02-12 RX ORDER — HYDRALAZINE HYDROCHLORIDE 20 MG/ML
5 INJECTION INTRAMUSCULAR; INTRAVENOUS EVERY 6 HOURS PRN
Status: DISCONTINUED | OUTPATIENT
Start: 2023-02-12 | End: 2023-02-14 | Stop reason: HOSPADM

## 2023-02-12 RX ORDER — CLOPIDOGREL BISULFATE 75 MG/1
75 TABLET ORAL DAILY
Status: DISCONTINUED | OUTPATIENT
Start: 2023-02-13 | End: 2023-02-14 | Stop reason: HOSPADM

## 2023-02-12 RX ORDER — HALOPERIDOL 5 MG/ML
5 INJECTION INTRAMUSCULAR ONCE
Status: COMPLETED | OUTPATIENT
Start: 2023-02-12 | End: 2023-02-12

## 2023-02-12 RX ORDER — SODIUM CHLORIDE 9 MG/ML
INJECTION, SOLUTION INTRAVENOUS PRN
Status: DISCONTINUED | OUTPATIENT
Start: 2023-02-12 | End: 2023-02-14 | Stop reason: HOSPADM

## 2023-02-12 RX ORDER — INSULIN GLARGINE 100 [IU]/ML
10 INJECTION, SOLUTION SUBCUTANEOUS NIGHTLY
Status: DISCONTINUED | OUTPATIENT
Start: 2023-02-12 | End: 2023-02-12

## 2023-02-12 RX ORDER — ATORVASTATIN CALCIUM 80 MG/1
80 TABLET, FILM COATED ORAL NIGHTLY
Status: DISCONTINUED | OUTPATIENT
Start: 2023-02-12 | End: 2023-02-14 | Stop reason: HOSPADM

## 2023-02-12 RX ORDER — INSULIN GLARGINE 100 [IU]/ML
8 INJECTION, SOLUTION SUBCUTANEOUS NIGHTLY
Status: DISCONTINUED | OUTPATIENT
Start: 2023-02-12 | End: 2023-02-13

## 2023-02-12 RX ORDER — MIDAZOLAM HYDROCHLORIDE 2 MG/2ML
2 INJECTION, SOLUTION INTRAMUSCULAR; INTRAVENOUS ONCE
Status: COMPLETED | OUTPATIENT
Start: 2023-02-12 | End: 2023-02-12

## 2023-02-12 RX ORDER — MIDAZOLAM HYDROCHLORIDE 2 MG/2ML
1 INJECTION, SOLUTION INTRAMUSCULAR; INTRAVENOUS ONCE
Status: COMPLETED | OUTPATIENT
Start: 2023-02-12 | End: 2023-02-12

## 2023-02-12 RX ORDER — ONDANSETRON 2 MG/ML
4 INJECTION INTRAMUSCULAR; INTRAVENOUS EVERY 6 HOURS PRN
Status: DISCONTINUED | OUTPATIENT
Start: 2023-02-12 | End: 2023-02-14 | Stop reason: HOSPADM

## 2023-02-12 RX ORDER — LISINOPRIL 10 MG/1
10 TABLET ORAL DAILY
Status: DISCONTINUED | OUTPATIENT
Start: 2023-02-13 | End: 2023-02-13

## 2023-02-12 RX ORDER — INSULIN LISPRO 100 [IU]/ML
0-4 INJECTION, SOLUTION INTRAVENOUS; SUBCUTANEOUS EVERY 6 HOURS
Status: DISCONTINUED | OUTPATIENT
Start: 2023-02-12 | End: 2023-02-13

## 2023-02-12 RX ORDER — DEXTROSE MONOHYDRATE 100 MG/ML
INJECTION, SOLUTION INTRAVENOUS CONTINUOUS PRN
Status: DISCONTINUED | OUTPATIENT
Start: 2023-02-12 | End: 2023-02-14 | Stop reason: HOSPADM

## 2023-02-12 RX ORDER — SODIUM CHLORIDE 0.9 % (FLUSH) 0.9 %
5-40 SYRINGE (ML) INJECTION EVERY 12 HOURS SCHEDULED
Status: DISCONTINUED | OUTPATIENT
Start: 2023-02-12 | End: 2023-02-14 | Stop reason: HOSPADM

## 2023-02-12 RX ORDER — POLYETHYLENE GLYCOL 3350 17 G/17G
17 POWDER, FOR SOLUTION ORAL DAILY PRN
Status: DISCONTINUED | OUTPATIENT
Start: 2023-02-12 | End: 2023-02-14 | Stop reason: HOSPADM

## 2023-02-12 RX ORDER — SODIUM CHLORIDE, SODIUM LACTATE, POTASSIUM CHLORIDE, CALCIUM CHLORIDE 600; 310; 30; 20 MG/100ML; MG/100ML; MG/100ML; MG/100ML
INJECTION, SOLUTION INTRAVENOUS CONTINUOUS
Status: DISCONTINUED | OUTPATIENT
Start: 2023-02-12 | End: 2023-02-13

## 2023-02-12 RX ORDER — ACETAMINOPHEN 325 MG/1
650 TABLET ORAL EVERY 6 HOURS PRN
Status: DISCONTINUED | OUTPATIENT
Start: 2023-02-12 | End: 2023-02-14 | Stop reason: HOSPADM

## 2023-02-12 RX ORDER — PROPRANOLOL HYDROCHLORIDE 20 MG/1
20 TABLET ORAL 2 TIMES DAILY
Status: DISCONTINUED | OUTPATIENT
Start: 2023-02-12 | End: 2023-02-14 | Stop reason: HOSPADM

## 2023-02-12 RX ORDER — ISOSORBIDE MONONITRATE 30 MG/1
30 TABLET, EXTENDED RELEASE ORAL DAILY
Status: DISCONTINUED | OUTPATIENT
Start: 2023-02-13 | End: 2023-02-14 | Stop reason: HOSPADM

## 2023-02-12 RX ORDER — INSULIN LISPRO 100 [IU]/ML
2 INJECTION, SOLUTION INTRAVENOUS; SUBCUTANEOUS
Status: DISCONTINUED | OUTPATIENT
Start: 2023-02-13 | End: 2023-02-13

## 2023-02-12 RX ORDER — SODIUM CHLORIDE 0.9 % (FLUSH) 0.9 %
5-40 SYRINGE (ML) INJECTION PRN
Status: DISCONTINUED | OUTPATIENT
Start: 2023-02-12 | End: 2023-02-14 | Stop reason: HOSPADM

## 2023-02-12 RX ORDER — ACETAMINOPHEN 650 MG/1
650 SUPPOSITORY RECTAL EVERY 6 HOURS PRN
Status: DISCONTINUED | OUTPATIENT
Start: 2023-02-12 | End: 2023-02-14 | Stop reason: HOSPADM

## 2023-02-12 RX ORDER — ONDANSETRON 4 MG/1
4 TABLET, ORALLY DISINTEGRATING ORAL EVERY 8 HOURS PRN
Status: DISCONTINUED | OUTPATIENT
Start: 2023-02-12 | End: 2023-02-14 | Stop reason: HOSPADM

## 2023-02-12 RX ORDER — ENOXAPARIN SODIUM 100 MG/ML
40 INJECTION SUBCUTANEOUS DAILY
Status: DISCONTINUED | OUTPATIENT
Start: 2023-02-13 | End: 2023-02-14 | Stop reason: HOSPADM

## 2023-02-12 RX ORDER — LORAZEPAM 2 MG/ML
1 INJECTION INTRAMUSCULAR ONCE
Status: COMPLETED | OUTPATIENT
Start: 2023-02-12 | End: 2023-02-12

## 2023-02-12 RX ORDER — DIPHENHYDRAMINE HYDROCHLORIDE 50 MG/ML
50 INJECTION INTRAMUSCULAR; INTRAVENOUS ONCE
Status: COMPLETED | OUTPATIENT
Start: 2023-02-12 | End: 2023-02-12

## 2023-02-12 RX ORDER — 0.9 % SODIUM CHLORIDE 0.9 %
1000 INTRAVENOUS SOLUTION INTRAVENOUS ONCE
Status: COMPLETED | OUTPATIENT
Start: 2023-02-12 | End: 2023-02-12

## 2023-02-12 RX ORDER — METOPROLOL TARTRATE 5 MG/5ML
5 INJECTION INTRAVENOUS EVERY 8 HOURS PRN
Status: DISCONTINUED | OUTPATIENT
Start: 2023-02-12 | End: 2023-02-14 | Stop reason: HOSPADM

## 2023-02-12 RX ORDER — LORAZEPAM 2 MG/ML
INJECTION INTRAMUSCULAR
Status: COMPLETED
Start: 2023-02-12 | End: 2023-02-12

## 2023-02-12 RX ADMIN — PROPRANOLOL HYDROCHLORIDE 20 MG: 20 TABLET ORAL at 23:29

## 2023-02-12 RX ADMIN — MIDAZOLAM 2 MG: 1 INJECTION INTRAMUSCULAR; INTRAVENOUS at 16:53

## 2023-02-12 RX ADMIN — DIPHENHYDRAMINE HYDROCHLORIDE 50 MG: 50 INJECTION, SOLUTION INTRAMUSCULAR; INTRAVENOUS at 17:30

## 2023-02-12 RX ADMIN — HALOPERIDOL LACTATE 5 MG: 5 INJECTION, SOLUTION INTRAMUSCULAR at 17:18

## 2023-02-12 RX ADMIN — LORAZEPAM 1 MG: 2 INJECTION INTRAMUSCULAR at 18:36

## 2023-02-12 RX ADMIN — SODIUM CHLORIDE, POTASSIUM CHLORIDE, SODIUM LACTATE AND CALCIUM CHLORIDE: 600; 310; 30; 20 INJECTION, SOLUTION INTRAVENOUS at 23:16

## 2023-02-12 RX ADMIN — ATORVASTATIN CALCIUM 80 MG: 80 TABLET, FILM COATED ORAL at 23:31

## 2023-02-12 RX ADMIN — MIDAZOLAM 2 MG: 1 INJECTION INTRAMUSCULAR; INTRAVENOUS at 16:41

## 2023-02-12 RX ADMIN — SODIUM CHLORIDE 1000 ML: 9 INJECTION, SOLUTION INTRAVENOUS at 18:53

## 2023-02-12 RX ADMIN — MIDAZOLAM 1 MG: 1 INJECTION INTRAMUSCULAR; INTRAVENOUS at 18:06

## 2023-02-12 RX ADMIN — IOPAMIDOL 90 ML: 755 INJECTION, SOLUTION INTRAVENOUS at 16:58

## 2023-02-12 RX ADMIN — LORAZEPAM 1 MG: 2 INJECTION INTRAMUSCULAR; INTRAVENOUS at 18:36

## 2023-02-12 RX ADMIN — HALOPERIDOL LACTATE 5 MG: 5 INJECTION, SOLUTION INTRAMUSCULAR at 17:10

## 2023-02-12 NOTE — ED NOTES
Pt's wife at bedside, states that pt takes a large amount of pain medication as well as Lorazepam.  Wife states that pt has acted this way before, thrashing around and not able to follow commands or speak, after taking \"his whole bottle of Xanax\". Wife reports that pt was not taking Xanax anymore, states that pt started taking Topamax and \"was out of it\" so she did not allow pt to take that medication anymore.      Ba Gates RN  02/12/23 [id number]

## 2023-02-12 NOTE — ED NOTES
Pt continues to be agitated and restless after given 1 mg Versed.       Thang Harmano, RN  02/12/23 4901

## 2023-02-12 NOTE — ED PROVIDER NOTES
[facility]  ED  Emergency Department Encounter  EmergencyMedicine Resident     Pt Name:Nam Felipe  MRN: [id number]  [place] 1970  Date of evaluation: 2/12/23  PCP:  Rosetta Saunders MD    CHIEF COMPLAINT       Chief Complaint   Patient presents with    Cerebrovascular Accident       HISTORY OF PRESENT ILLNESS  (Location/Symptom, Timing/Onset, Context/Setting, Quality, Duration, Modifying Factors, Severity.)      Enma Domingo is a 46 y.o. male who presents with concern for stroke presents as a race alert score 5 with last known well at noon today. No reported falls or trauma. Patient presents to us receptive and expressive aphasia unable to provide any history. PAST MEDICAL / SURGICAL / SOCIAL / FAMILY HISTORY      has a past medical history of Anxiety, Arthritis, CAD (coronary artery disease), Calcaneal apophysitis, Corns, Depression, Diabetes type I ([address] Gastroparesis, GERD (gastroesophageal reflux disease), Headache(784.0), Hearing loss, Hyperglycemia, Hyperlipidemia, Hypertension, Intussusception ([address] MI (myocardial infarction) ([address] Neuropathy, Osteoarthritis, Panic attacks, and Temporomandibular joint disorder. has a past surgical history that includes Ulnar tunnel release; Rotator cuff repair (Bilateral); Carpal tunnel release; shoulder surgery; Elbow fracture surgery; Tonsillectomy; [place] tooth extraction; Tooth Extraction; Abdomen surgery; hernia repair; Abdominal exploration surgery (05/28/2016); Cardiac catheterization (2014); pr esophagogastroduodenoscopy transoral diagnostic (N/A, 5/24/2017); pr colon ca scrn not hi rsk ind (N/A, 5/25/2017); Colonoscopy (05/23/2017); Upper gastrointestinal endoscopy (05/23/2017); Upper gastrointestinal endoscopy (N/A, 7/10/2018); Colonoscopy (N/A, 7/10/2018); Colonoscopy (N/A, 8/9/2021); ventral hernia repair (N/A, 8/10/2021); and Colonoscopy (N/A, 11/14/2022).       Social History     Socioeconomic History    Marital status:      Spouse name: Not on file    Number of children: Not on file    Years of education: Not on file    Highest education level: Not on file   Occupational History    Occupation: disability   Tobacco Use    Smoking status: Every Day     Packs/day: 1.00     Years: 30.00     Pack years: 30.00     Types: Cigarettes    Smokeless tobacco: Never   Vaping Use    Vaping Use: Never used   Substance and Sexual Activity    Alcohol use: No     Comment: previous heavy ETOH use; pt stopped around 2010    Drug use: No    Sexual activity: Not on file   Other Topics Concern    Not on file   Social History Narrative    Not on file     Social Determinants of Health     Financial Resource Strain: Low Risk     Difficulty of Paying Living Expenses: Not hard at all   Food Insecurity: No Food Insecurity    Worried About [facility] in the Last Year: Never true    [address] in the Last Year: Never true   Transportation Needs: Unknown    Lack of Transportation (Medical): Not on file    Lack of Transportation (Non-Medical): No   Physical Activity: Not on file   Stress: Not on file   Social Connections: Not on file   Intimate Partner Violence: Not on file   Housing Stability: Unknown    Unable to Pay for Housing in the Last Year: Not on file    Number of Places Lived in the Last Year: Not on file    Unstable Housing in the Last Year: No       Family History   Problem Relation Age of Onset    Diabetes Mother     High Blood Pressure Mother     Heart Disease Mother     Migraines Mother     Other Mother         diabetic neuropathy    High Blood Pressure Father        Allergies: Avelox [moxifloxacin], Ciprofloxacin, Levofloxacin, Lyrica [pregabalin], Metoclopramide, Neurontin [gabapentin], Other, Tetanus toxoids, Tramadol, and Tramadol hcl    Home Medications:  Prior to Admission medications    Medication Sig Start Date End Date Taking?  Authorizing Provider   lisinopril (PRINIVIL;ZESTRIL) 10 MG tablet Take 3 tablets by mouth daily 2/6/23   Dallin Gonzales MD   atorvastatin (LIPITOR) 80 MG tablet Take 1 tablet by mouth nightly 2/6/23   Dallin Gonzales MD   clopidogrel (PLAVIX) 75 MG tablet Take 1 tablet by mouth daily 2/6/23   Dallin Gonzales MD   isosorbide mononitrate (IMDUR) 30 MG extended release tablet Take 1 tablet by mouth daily 2/6/23   Dallin Gonzales MD   propranolol (INDERAL) 20 MG tablet Take 1 tablet by mouth 2 times daily Takes for migraines 2/6/23   Dallin Gonzales MD   prochlorperazine (COMPAZINE) 10 MG tablet Take 1 tablet by mouth every 6 hours as needed (nausea) 2/6/23   Dallin Gonzales MD   topiramate (TOPAMAX) 25 MG tablet Take 1 tablet by mouth nightly 2/6/23   Dallin Gonzales MD   LORazepam (ATIVAN) 0.5 MG tablet Take 1 tablet by mouth every 8 hours as needed for Anxiety for up to 30 days. 2/6/23 3/8/23  Dallin Gonzales MD   oxyCODONE (ROXICODONE) 20 MG immediate release tablet Take 1 tablet by mouth every 4 hours as needed for Pain for up to 30 days. 2/1/23 3/3/23  Dallin Gonzales MD   butalbital-acetaminophen-caffeine (FIORIC, [facility]) -29 MG per tablet Take 1 tablet by mouth every 4 hours as needed for Headaches 1/30/23   Dallin Gonzales MD   pantoprazole (PROTONIX) 40 MG tablet Take 1 tablet by mouth in the morning. TAKE ONE TABLET BY MOUTH TWICE DAILY.  8/16/22   Dallin Gonzales MD   brexpiprazole (REXULTI) 1 MG TABS tablet Take 1 tablet by mouth daily 4/7/22   Dallin Gonzales MD   TRINTELLIX 20 MG TABS tablet TAKE 1 TABLET DAILY 3/4/22   Dallin Gonzales MD   HUMALOG 100 UNIT/ML injection vial INJECT UP TO 70 UNITS VIA PUMP AS DIRECTED 11/24/21   Historical Provider, MD   naloxone 4 MG/0.1ML LIQD nasal spray 1 spray by Nasal route as needed for Opioid Reversal 8/10/21   Mervat Saldivar MD   b complex vitamins capsule Take 1 capsule by mouth daily    Historical Provider, MD   CONTOUR NEXT TEST strip use four times a day 1/13/21   Historical Provider, MD   nitroGLYCERIN (NITROSTAT) 0.4 MG SL tablet Place 1 tablet under the tongue every 5 minutes as needed for Chest pain Indications: Disease of the Arteries of the Heart Take 1 tablet every 5 minutes times three as needed for chest pain 2/8/16   CLYDE Corbin - CNP       REVIEW OF SYSTEMS    (2-9 systems for level 4, 10 or more for level 5)      Review of Systems   Unable to perform ROS: Mental status change     PHYSICAL EXAM   (up to 7 for level 4, 8 or more for level 5)      INITIAL VITALS:   BP (!) 150/100   Pulse (!) 104   Temp 98.8 °F (37.1 °C) (Oral)   Resp 19   SpO2 92%     Physical Exam  Constitutional:       General: He is not in acute distress.     Appearance: He is not ill-appearing.   Cardiovascular:      Rate and Rhythm: Normal rate.      Pulses: Normal pulses.      Heart sounds: Normal heart sounds.   Pulmonary:      Effort: Pulmonary effort is normal.      Breath sounds: Normal breath sounds.   Abdominal:      Palpations: Abdomen is soft.      Tenderness: There is no abdominal tenderness.   Musculoskeletal:      Right lower leg: No edema.      Left lower leg: No edema.       INITIAL NIH STROKE SCALE    Time Performed:  1640 PM    Administer stroke scale items in the order listed. Record performance in each category after each subscale exam. Do not go back and change scores. Follow directions provided for each exam technique. Scores should reflect what the patient does, not what the clinician thinks the patient can do. The clinician should record answers while administering the exam and work quickly. Except where indicated, the patient should not be coached (i.e., repeated requests to patient to make a special effort).     1a.  Level of consciousness:  0 - alert; keenly responsive  1b.  Level of consciousness questions:  0 - answers both questions correctly  1c.  Level of consciousness questions:  0 - performs both tasks correctly  2.    Best Gaze:  0 - normal  3.    Visual:  0 -  no visual loss  4. Facial Palsy:  0 - normal symmetric movement  5a. Motor left arm:  0 - no drift, limb holds 90 (or 45) degrees for full 10 seconds  5b. Motor right arm:  0 - no drift, limb holds 90 (or 45) degrees for full 10 seconds  6a. Motor left le - no drift; leg holds 30 degree position for full 5 seconds  6b. Motor right le - no drift; leg holds 30 degree position for full 5 seconds  7. Limb Ataxia:  0 - absent  8. Sensory:  0 - normal; no sensory loss  9. Best Language:  2 - severe aphasia; all communication is through fragmentary expression; great need for inference, questioning, and guessing by the listener. Range of information that can be exchanged is limited; listener carries burden of communication. Examiner cannot identify materials provided from patient response. 10.  Dysarthria:  2 - severe; patient speech is so slurred as to be unintelligible in the absence of or our of proportion to any dysphagia, or is mute/anarthric   11. Extinction and Inattention:  0 - no abnormality    TOTAL:  4      DIFFERENTIAL  DIAGNOSIS     PLAN (LABS / IMAGING / EKG):  Orders Placed This Encounter   Procedures    XR CHEST PORTABLE    CT HEAD WO CONTRAST    CTA HEAD NECK W CONTRAST    STROKE PANEL    ELECTROLYTES PLUS    Hemoglobin and hematocrit, blood    CALCIUM, IONIC (POC)    Urine Drug Screen    Ammonia    Urinalysis with Reflex to Culture    Acetaminophen Level    Ethanol    Brain Natriuretic Peptide    Salicylate    TOXIC TRICYCLIC SC,B    TSH with Reflex    OSMOLALITY    Diet NPO    Swallow screen by nursing before diet and oral medications started    NIH Stroke Scale (NIHSS)    Pharmacy to Dose: Other - See Comments: The pharmacist will review this patient's medication profile to evaluate IV medications and change all base solutions to 0.9% sodium chloride if possible based on compatibility and product availability. This. .. Pharmacy to change base solutions.     Inpatient consult to Internal Medicine    Initiate Oxygen Therapy Protocol    POCT Glucose    Venous Blood Gas, POC    Creatinine W/GFR Point of Care    POCT urea (BUN)    Lactic Acid, POC    POCT Glucose    EKG 12 Lead    Restraints non-violent or non-self-destructive       MEDICATIONS ORDERED:  Orders Placed This Encounter   Medications    iopamidol (ISOVUE-370) 76 % injection 90 mL    midazolam PF (VERSED) injection 2 mg    midazolam PF (VERSED) injection 2 mg    haloperidol lactate (HALDOL) injection 5 mg    haloperidol lactate (HALDOL) injection 5 mg    diphenhydrAMINE (BENADRYL) injection 50 mg    midazolam PF (VERSED) injection 1 mg    LORazepam (ATIVAN) injection 1 mg    LORazepam (ATIVAN) 2 MG/ML injection     Mayur Royalty: cabinet override    0.9 % sodium chloride bolus       DIAGNOSTIC RESULTS / EMERGENCY DEPARTMENT COURSE / MDM   LAB RESULTS:  Results for orders placed or performed during the hospital encounter of 02/12/23   STROKE PANEL   Result Value Ref Range    Glucose 210 (H) 70 - 99 mg/dL    BUN 7 6 - 20 mg/dL    Creatinine 0.69 (L) 0.70 - 1.20 mg/dL    Est, Glom Filt Rate >60 >60 mL/min/1.73m2    Calcium 9.6 8.6 - 10.4 mg/dL    Sodium 138 135 - 144 mmol/L    Potassium 4.3 3.7 - 5.3 mmol/L    Chloride 100 98 - 107 mmol/L    CO2 27 20 - 31 mmol/L    Anion Gap 11 9 - 17 mmol/L    WBC 7.7 3.5 - 11.3 k/uL    RBC 5.64 4.21 - 5.77 m/uL    Hemoglobin 17.3 (H) 13.0 - 17.0 g/dL    Hematocrit 49.7 40.7 - 50.3 %    MCV 88.1 82.6 - 102.9 fL    MCH 30.7 25.2 - 33.5 pg    MCHC 34.8 28.4 - 34.8 g/dL    RDW 13.5 11.8 - 14.4 %    Platelets 313 967 - 382 k/uL    MPV 10.3 8.1 - 13.5 fL    NRBC Automated 0.0 0.0 per 100 WBC    Total CK 61 39 - 308 U/L    Immature Granulocytes 0 0 %    Seg Neutrophils 59 36 - 66 %    Lymphocytes 35 24 - 44 %    Monocytes 6 1 - 7 %    Eosinophils % 0 (L) 1 - 4 %    Basophils 0 0 - 2 %    Absolute Immature Granulocyte 0.00 0.00 - 0.30 k/uL    Segs Absolute 4.54 1.8 - 7.7 k/uL    Absolute Lymph # 2. 70 1.0 - 4.8 k/uL    Absolute Mono # 0.46 0.1 - 0.8 k/uL    Absolute Eos # 0.00 0.0 - 0.4 k/uL    Basophils Absolute 0.00 0.0 - 0.2 k/uL    Morphology Normal     Myoglobin 36 28 - 72 ng/mL    Protime 10.5 9.1 - 12.3 sec    INR 1.0     PTT 24.3 20.5 - 30.5 sec    Troponin, High Sensitivity <6 0 - 22 ng/L   ELECTROLYTES PLUS   Result Value Ref Range    POC Sodium 140 138 - 146 mmol/L    POC Potassium 4.2 3.5 - 4.5 mmol/L    POC Chloride 101 98 - 107 mmol/L    POC TCO2 27 22 - 30 mmol/L    Anion Gap 13 7 - 16 mmol/L   Hemoglobin and hematocrit, blood   Result Value Ref Range    POC Hemoglobin 17.6 (H) 13.5 - 17.5 g/dL    POC Hematocrit 52 41 - 53 %   CALCIUM, IONIC (POC)   Result Value Ref Range    POC Ionized Calcium 1.19 1.15 - 1.33 mmol/L   Ammonia   Result Value Ref Range    Ammonia 50 16 - 60 umol/L   Acetaminophen Level   Result Value Ref Range    Acetaminophen Level 10 10 - 30 ug/mL   Ethanol   Result Value Ref Range    Ethanol <10 <10 mg/dL    Ethanol percent <0.010 <0.010 %   Brain Natriuretic Peptide   Result Value Ref Range    Pro-BNP <56 <597 pg/mL   Salicylate   Result Value Ref Range    Salicylate Lvl <1 (L) 3 - 10 mg/dL   TOXIC TRICYCLIC SC,B   Result Value Ref Range    Toxic Tricyclic Sc,Blood NEGATIVE NEGATIVE   TSH with Reflex   Result Value Ref Range    TSH 1.50 0.30 - 5.00 uIU/mL   Venous Blood Gas, POC   Result Value Ref Range    pH, José 7.441 (H) 7.320 - 7.430    pCO2, José 41.0 41.0 - 51.0 mm Hg    pO2, José 28.2 (L) 30.0 - 50.0 mm Hg    HCO3, Venous 27.9 22.0 - 29.0 mmol/L    Positive Base Excess, José 3 0.0 - 3.0    O2 Sat, José 56 (L) 60.0 - 85.0 %   Creatinine W/GFR Point of Care   Result Value Ref Range    POC Creatinine 0.74 0.51 - 1.19 mg/dL    eGFR, POC >60 mL/min/1.73m2   POCT urea (BUN)   Result Value Ref Range    POC BUN 6 (L) 8 - 26 mg/dL   Lactic Acid, POC   Result Value Ref Range    POC Lactic Acid 1.86 (H) 0.56 - 1.39 mmol/L   POCT Glucose   Result Value Ref Range    POC Glucose 210 (H) 74 - 100 mg/dL       RADIOLOGY:  CT HEAD WO CONTRAST    Result Date: 2/12/2023  1. No acute intracranial hemorrhage. No loss of gray-white differentiation to suggest acute infarction. 2. Hypoattenuation in the right periventricular white matter is more conspicuous than on prior study, likely microangiopathic change. The findings were sent to the Radiology Results [address] at 5:10 pm on 2/12/2023 to be communicated to a licensed caregiver. CTA HEAD NECK W CONTRAST    Result Date: 2/12/2023  Limited exam due to motion artifact. No flow-limiting arterial stenosis in the head and neck. EKG Interpretation  Interpreted by myself    Rhythm: normal sinus   Rate: normal  Axis: normal  Ectopy: none  Conduction: normal  ST Segments: normal  T Waves: normal  Q Waves: none    Clinical Impression: no acute changes    All EKG's are interpreted by the Emergency Department Physician who either signs or Co-signs this chart in the absence of a cardiologist.      MDM  Medical Decision Making  Patient here for encephalopathy of unknown etiology. No intracranial hemorrhage, intracranial mass, large vessel occlusion on CT imaging. Patient will need MRI to evaluate for stroke. No concern for Warnicke's or Korsakoff's given no alcohol abuse. No electrolyte abnormalities. No hyperammonemia or thyrotoxicosis or myxedema coma. Altered mental status is very undifferentiated at this point. Patient would likely benefit from LTM EEG for evaluation of possible prolonged postictal state however there is no reported seizure activity. Amount and/or Complexity of Data Reviewed  Independent Historian: spouse and EMS  Labs: ordered. Decision-making details documented in ED Course. Radiology: ordered. Decision-making details documented in ED Course. ECG/medicine tests: ordered. Decision-making details documented in ED Course.   Discussion of management or test interpretation with external provider(s): Spoke with stroke team regarding management of symptoms, spoke with internal medicine regarding admission    Risk  Prescription drug management. Decision regarding hospitalization. Critical Care  Total time providing critical care: 30-74 minutes      EMERGENCY DEPARTMENT COURSE:  ED Course as of 02/12/23 1853   Sun Feb 12, 2023   1649 Patient value bedside. RACE score 5. Last known well noon today. Patient without any known falls or trauma. [ZE]   1700 Patient reevaluated bedside. Not answering questions appropriately participate in exam.  Awaiting CT head CTA head and neck results. [ZE]   1319 CT head and neck negative CT head without any acute process. Concern for possible metabolic cause of patient's current mental status. [ZE]   1308 Labs thus far grossly unremarkable. Unclear etiology for patient's altered mental status. Will admit to medicine service [ZE]   6706 Poke with medicine service patient will be admitted to their service [ZE]      ED Course User Index  [ZE] Huan Blake DO       PROCEDURES:  Procedures     CONSULTS:  Amadou Messina 1874 TO INTERNAL MEDICINE    CRITICAL CARE:  See MDM    FINAL IMPRESSION      1. Altered mental status, unspecified altered mental status type          DISPOSITION / PLAN     DISPOSITION Decision To Admit 02/12/2023 06:23:00 PM      PATIENT REFERRED TO:  No follow-up provider specified.     DISCHARGE MEDICATIONS:  New Prescriptions    No medications on file       Slava Smith DO  Emergency Medicine Resident    (Please note that portions of thisnote were completed with a voice recognition program.  Efforts were made to edit the dictations but occasionally words are mis-transcribed.)        Huan Blake DO  Resident  02/12/23 [id number]

## 2023-02-12 NOTE — ED NOTES
Labeled blood specimens sent to lab via tube system.     [x] Lavender   [] on ice   [x] Blue   [x] Green/yellow  [x] Green/black [] on ice  [] Pink  [] Red  [x] Yellow  [] on ice  [] Blood Cultures      Ej Erickson RN  02/12/23 3558

## 2023-02-12 NOTE — PROGRESS NOTES
[facility] [address]  PROGRESS NOTE    Shift date: 02/11/2023  Shift day: Sunday   Shift # 2    Room # 12/12   Name: Nagi Ching                Orthodox: Amish     Referral:  Multi-disciplinary team    Admit Date & Time: 2/12/2023  4:32 PM    Assessment:  Nagi Ching is a 46 y.o. male in the hospital. Upon entering the room writer observes the patient lying in bed surrounded by family, spouse Liudmila Macdonald and a clinical support are bedside. The family appeared calm and coping as the patient's spouse explained to the other family members the current status of the patient. The patient's family requested prayer and expressed no further support is needed at this time. Intervention:  Writer introduced self and title as  Writer offered space for the patient's family  to express feelings, needs, and concerns and provided a ministry presence. The writer prayed with patient and family. Outcome: The patient's family expressed gratitude. Plan:  Chaplains will remain available to offer spiritual and emotional support as needed. 02/12/23 1845   Encounter Summary   Service Provided For: Patient and family together   Referral/Consult From: Multi-disciplinary team   Support System Spouse   Last Encounter  02/12/23   Complexity of Encounter Low   Begin Time 1805   End Time  1815   Total Time Calculated 10 min   Assessment/Intervention/Outcome   Assessment Calm;Coping; Shock   Intervention Active listening;Sustaining Presence/Ministry of presence;Prayer (assurance of)/[place]   Outcome Expressed Gratitude       Electronically signed by [place] Staff Resident, on 2/12/2023 at 6:48 PM.  Temo White  [phone]

## 2023-02-12 NOTE — CONSULTS
Department of Endovascular Neurosurgery  Resident Consult Note  Stroke Alert paged @ 4:21PM (ETA 5 min)  ER Room # 12  Arrival to patient bedside @ 427PM        Reason for Consult:  stroke alert  Requesting Physician:  ED  Endovascular Neurosurgeon:   [x]Dr. Yanet Philip  []Dr. Zahra Carey  []Dr. Lizette Carvajal   []    History Obtained From:  patient, electronic medical record, EMS, ED staff    CHIEF COMPLAINT:       Generalized weakness    HISTORY OF PRESENT ILLNESS:       The patient is a 46 y.o. male with Hx of type 1 DM, HTN, HLP, smoker, who presents via EMS with complaint of frequent falls and slurred speech. Patient was brought in from home. Has significant hx of chronic neck pain and on chronic opioids. Denies any recent alcohol or drug use. Last know well: 2/12/2023 @ 1200PM(noon)      On presentation:  BP: 168/110  BSL: 201    Prior to arrival patient was on  Antiplatelets/anticoagulants: Plavix  Statins: Atorvastatin     PAST MEDICAL HISTORY :       Past Medical History:        Diagnosis Date    Anxiety     Arthritis     CAD (coronary artery disease)     Calcaneal apophysitis     Corns     Depression     Diabetes type I ([address]     Gastroparesis     GERD (gastroesophageal reflux disease)     Headache(784.0)     Hearing loss     Hyperglycemia     Hyperlipidemia     Hypertension     Intussusception ([address]     MI (myocardial infarction) ([address] jan 2014    Neuropathy     Osteoarthritis     Panic attacks     Temporomandibular joint disorder        Past Surgical History:        Procedure Laterality Date    ABDOMEN SURGERY      insulin pump    ABDOMINAL EXPLORATION SURGERY  05/28/2016    bowel resection Rt. colon &terminal ileum, intussuseption.      CARDIAC CATHETERIZATION  2014    CARPAL TUNNEL RELEASE      bilateral    COLONOSCOPY  05/23/2017    poor prep; diverticulosis    COLONOSCOPY N/A 7/10/2018    COLONOSCOPY POLYPECTOMY HOT BIOPSY performed by Sravani Laguerre MD at [address] N/A 8/9/2021    COLONOSCOPY DIAGNOSTIC performed by Romeo Velazquez MD at [facility] ENDO    COLONOSCOPY N/A 11/14/2022    COLONOSCOPY WITH BIOPSY performed by Romeo Velazquez MD at [address]      right    HERNIA REPAIR      inguinal    [place] COLON CA SCRN NOT  [address] IND N/A 5/25/2017    COLONOSCOPY performed by Elsie Snellen, MD at [facility] ESOPHAGOGASTRODUODENOSCOPY TRANSORAL DIAGNOSTIC N/A 5/24/2017    EGD ESOPHAGOGASTRODUODENOSCOPY performed by Elsie Snellen, MD at [address] Bilateral     [facility]      fracture     TONSILLECTOMY      x2    TOOTH EXTRACTION      x4    ULNAR TUNNEL RELEASE      UPPER GASTROINTESTINAL ENDOSCOPY  05/23/2017    mild gastritis    UPPER GASTROINTESTINAL ENDOSCOPY N/A 7/10/2018    EGD BIOPSY performed by Romeo Velazquez MD at [facility] N/A 8/10/2021    HERNIA INCISIONAL REPAIR OPEN W/MESH performed by Romeo Velazquez MD at [address] EXTRACTION      x4       Social History:   Social History     Socioeconomic History    Marital status:      Spouse name: Not on file    Number of children: Not on file    Years of education: Not on file    Highest education level: Not on file   Occupational History    Occupation: disability   Tobacco Use    Smoking status: Every Day     Packs/day: 1.00     Years: 30.00     Pack years: 30.00     Types: Cigarettes    Smokeless tobacco: Never   Vaping Use    Vaping Use: Never used   Substance and Sexual Activity    Alcohol use: No     Comment: previous heavy ETOH use; pt stopped around 2010    Drug use: No    Sexual activity: Not on file   Other Topics Concern    Not on file   Social History Narrative    Not on file     Social Determinants of Health     Financial Resource Strain: Low Risk     Difficulty of Paying Living Expenses: Not hard at all   Food Insecurity: No Food Insecurity    Worried About Running Out of Food in the Last Year: Never true    [address] in the Last Year: Never true Transportation Needs: Unknown    Lack of Transportation (Medical): Not on file    Lack of Transportation (Non-Medical): No   Physical Activity: Not on file   Stress: Not on file   Social Connections: Not on file   Intimate Partner Violence: Not on file   Housing Stability: Unknown    Unable to Pay for Housing in the Last Year: Not on file    Number of Places Lived in the Last Year: Not on file    Unstable Housing in the Last Year: No       Family History:       Problem Relation Age of Onset    Diabetes Mother     High Blood Pressure Mother     Heart Disease Mother     Migraines Mother     Other Mother         diabetic neuropathy    High Blood Pressure Father        Allergies: Avelox [moxifloxacin], Ciprofloxacin, Levofloxacin, Lyrica [pregabalin], Metoclopramide, Neurontin [gabapentin], Other, Tetanus toxoids, Tramadol, and Tramadol hcl    Home Medications:  Prior to Admission medications    Medication Sig Start Date End Date Taking? Authorizing Provider   lisinopril (PRINIVIL;ZESTRIL) 10 MG tablet Take 3 tablets by mouth daily 2/6/23   Steve Darling MD   atorvastatin (LIPITOR) 80 MG tablet Take 1 tablet by mouth nightly 2/6/23   Steve Darling MD   clopidogrel (PLAVIX) 75 MG tablet Take 1 tablet by mouth daily 2/6/23   Steve Darling MD   isosorbide mononitrate (IMDUR) 30 MG extended release tablet Take 1 tablet by mouth daily 2/6/23   Steve Darling MD   propranolol (INDERAL) 20 MG tablet Take 1 tablet by mouth 2 times daily Takes for migraines 2/6/23   Steve Darling MD   prochlorperazine (COMPAZINE) 10 MG tablet Take 1 tablet by mouth every 6 hours as needed (nausea) 2/6/23   Steve Darling MD   topiramate (TOPAMAX) 25 MG tablet Take 1 tablet by mouth nightly 2/6/23   Steve Darling MD   LORazepam (ATIVAN) 0.5 MG tablet Take 1 tablet by mouth every 8 hours as needed for Anxiety for up to 30 days.  2/6/23 3/8/23  Steve Darling MD   oxyCODONE (ROXICODONE) 20 MG immediate release tablet Take 1 tablet by mouth every 4 hours as needed for Pain for up to 30 days. 2/1/23 3/3/23  Azul Clark MD   butalbital-acetaminophen-caffeine (FIORICET, [facility]) -39 MG per tablet Take 1 tablet by mouth every 4 hours as needed for Headaches 1/30/23   Azul Clark MD   pantoprazole (PROTONIX) 40 MG tablet Take 1 tablet by mouth in the morning. TAKE ONE TABLET BY MOUTH TWICE DAILY.  8/16/22   Azul Clark MD   brexpiprazole (REXULTI) 1 MG TABS tablet Take 1 tablet by mouth daily 4/7/22   Azul Clark MD   TRINTELLIX 20 MG TABS tablet TAKE 1 TABLET DAILY 3/4/22   Azul Clark MD   HUMALOG 100 UNIT/ML injection vial INJECT UP TO 70 UNITS VIA PUMP AS DIRECTED 11/24/21   Historical Provider, MD   naloxone 4 MG/0.1ML LIQD nasal spray 1 spray by Nasal route as needed for Opioid Reversal 8/10/21   Tamar Alford MD   b complex vitamins capsule Take 1 capsule by mouth daily    Historical Provider, MD   CONTOUR NEXT TEST strip use four times a day 1/13/21   Historical Provider, MD   nitroGLYCERIN (NITROSTAT) 0.4 MG SL tablet Place 1 tablet under the tongue every 5 minutes as needed for Chest pain Indications: Disease of the Arteries of the Heart Take 1 tablet every 5 minutes times three as needed for chest pain 2/8/16   Jularina Bound, APRN - CNP       Current Medications:   Current Facility-Administered Medications: iopamidol (ISOVUE-370) 76 % injection 90 mL, 90 mL, IntraVENous, ONCE PRN  Facility-Administered Medications Ordered in Other Encounters: lactated ringers infusion, 75 mL/hr, IntraVENous, Continuous    REVIEW OF SYSTEMS:       CONSTITUTIONAL: negative for fatigue and malaise   EYES: negative for double vision and photophobia    HEENT: negative for tinnitus and sore throat   RESPIRATORY: negative for cough, shortness of breath   CARDIOVASCULAR: negative for chest pain, palpitations   GASTROINTESTINAL: negative for nausea, vomiting   GENITOURINARY: negative for incontinence   MUSCULOSKELETAL: negative for neck or back pain   NEUROLOGICAL: negative for seizures   PSYCHIATRIC: negative for fatigue     Review of systems otherwise negative. PHYSICAL EXAM:       BP (!) 165/110   Pulse 88   Temp 98.2 °F (36.8 °C) (Oral)   Resp 25   SpO2 92%     CONSTITUTIONAL:  Well developed, well nourished, alert and oriented x 3, in no acute distress. GCS14, nontoxic. mild dysarthria, no aphasia. HEAD:  normocephalic, atraumatic    EYES:  PERRLA, EOMI.   ENT:  moist mucous membranes   NECK:  supple, symmetric, no midline tenderness to palpation    BACK:  without midline tenderness, step-offs or deformities    LUNGS:  Equal air entry bilaterally   CARDIOVASCULAR:  normal s1 / s2   ABDOMEN:  Soft, no rigidity   NEUROLOGIC:  Mental Status:  A & O x2,agitated             Cranial Nerves:    cranial nerves II-XII are grossly intact    Motor Exam:    Drift:  absent  Tone:  normal    Able to move antigravity in all 4 extremities    Sensory:    Touch:    Right Upper Extremity:  normal  Left Upper Extremity:  normal  Right Lower Extremity:  normal  Left Lower Extremity:  normal    Deep Tendon Reflexes:    Right Bicep:  2+  Left Bicep:  2+  Right Knee:  3+  Left Knee:  3+    Plantar Response:  Right:  downgoing  Left:  downgoing    Clonus:  N/A  De Luna's:  N/A    Coordination/Dysmetria:  Finger to Nose:   Right:  normal  Left:  normal   Dysdiadochokinesia:  N/A    Gait:  not tested due to condition    INITIAL NIH STROKE SCALE:    Time Performed:  4:30 PM     1a. Level of consciousness:  0 - alert; keenly responsive  1b. Level of consciousness questions:  0 - answers both questions correctly  1c. Level of consciousness questions:  0 - performs both tasks correctly  2. Best Gaze:  0 - normal  3. Visual:  0 - no visual loss  4. Facial Palsy:  0 - normal symmetric movement  5a. Motor left arm:  0 - no drift, limb holds 90 (or 45) degrees for full 10 seconds  5b.   Motor right arm:  0 - no drift, limb holds 90 (or 45) degrees for full 10 seconds  6a. Motor left le - no drift; leg holds 30 degree position for full 5 seconds  6b. Motor right le - no drift; leg holds 30 degree position for full 5 seconds  7. Limb Ataxia:  0 - absent  8. Sensory:  0 - normal; no sensory loss  9. Best Language:  0 - no aphasia, normal  10. Dysarthria:  1 - mild to moderate, patient slurs at least some words and at worst, can be understood with some difficulty  11.   Extinction and Inattention:  0 - no abnormality    TOTAL:  1     SKIN:  no rash      Modified Dalzell Score Scale:     [] Zero: No symptoms at all   [x] 1: No significant disability despite symptoms; able to carry out all usual duties and activities   [] 2: Slight disability; unable to carry out all previous activities, but able to look after own affairs without assistance   [] 3:Moderate disability; requiring some help, but able to walk without assistance   [] 4: Moderately severe disability; unable to walk and attend to bodily needs without assistance   [] 5:Severe disability; bedridden, incontinent and requiring constant nursing care and attention    LABS AND IMAGING:     CBC with Differential:    Lab Results   Component Value Date/Time    WBC 8.0 2021 12:53 PM    RBC 4.64 2021 12:53 PM    RBC 4.16 2012 04:25 AM    HGB 15.1 2021 12:53 PM    HCT 43.9 2021 12:53 PM     2021 12:53 PM     2012 04:25 AM    MCV 94.7 2021 12:53 PM    MCH 32.5 2021 12:53 PM    MCHC 34.3 2021 12:53 PM    RDW 13.6 2021 12:53 PM    LYMPHOPCT 18 2021 12:53 PM    LYMPHOPCT 22.5 2017 12:00 AM    MONOPCT 7 2021 12:53 PM    MONOPCT 9.8 2017 12:00 AM    EOSPCT 0.2 2017 12:00 AM    BASOPCT 1 2021 12:53 PM    BASOPCT 0.8 2017 12:00 AM    MONOSABS 0.50 2021 12:53 PM    MONOSABS 0.7 2017 12:00 AM    LYMPHSABS 1.50 2021 12:53 PM LYMPHSABS 1.7 11/14/2017 12:00 AM    EOSABS 0.10 08/20/2021 12:53 PM    EOSABS 0.0 11/14/2017 12:00 AM    BASOSABS 0.10 08/20/2021 12:53 PM    DIFFTYPE NOT REPORTED 08/20/2021 12:53 PM         BMP:    Lab Results   Component Value Date/Time     08/20/2021 12:53 PM    K 4.1 08/20/2021 12:53 PM     08/20/2021 12:53 PM    CO2 27 08/20/2021 12:53 PM    BUN 9 08/20/2021 12:53 PM    LABALBU 3.8 08/20/2021 12:53 PM    LABALBU 4.7 01/19/2012 10:40 PM    CREATININE 0.74 02/12/2023 04:33 PM    CREATININE 0.57 08/20/2021 12:53 PM    CALCIUM 8.8 08/20/2021 12:53 PM    GFRAA >60 08/20/2021 12:53 PM    LABGLOM >60 08/20/2021 12:53 PM    GLUCOSE 285 08/20/2021 12:53 PM    GLUCOSE 145 01/20/2012 08:37 PM       Radiology Review:    1.) CT brain without contrast:  Impression   1. No acute intracranial hemorrhage. No loss of gray-white differentiation   to suggest acute infarction. 2. Hypoattenuation in the right periventricular white matter is more   conspicuous than on prior study, likely microangiopathic change. The findings were sent to the Radiology Results [address] at 5:10   pm on 2/12/2023 to be communicated to a licensed caregiver.     2.) CTA Head/Neck:   Impression   Limited exam due to motion artifact. No flow-limiting arterial stenosis in   the head and neck.      3.) Brain MRI W/O:       ASSESSMENT AND PLAN:       Patient Active Problem List   Diagnosis    Ulnar nerve compression at multiple levels    Degenerative cervical disc    Arthropathy of cervical facet joint    Stenosis of cervical spine with myelopathy (HCC)    Cervical spondylosis    Chronic coronary artery disease    Depression with anxiety    Diabetic peripheral neuropathy (HCC)    Essential hypertension    Gastroparesis    History of long-term treatment with high-risk medication    Hyperlipidemia    Impingement syndrome of left shoulder    Injury of right ulnar nerve    Migraine headache    Type 1 diabetes mellitus with hyperglycemia ([address]    Current every day smoker    Perforation of right tympanic membrane    Pancreatic insufficiency    Major depressive disorder, recurrent episode with anxious distress ([address]    Unspecified inflammatory spondylopathy, cervical region ([address]    Incisional hernia, without obstruction or gangrene       Assessment                 46 y.o. male with Hx of type 1 DM, HTN, HLP, smoker, who presents via EMS with complaint of frequent falls and slurred speech. Chronic neck pain on chronic opioids    Last Known Well (date and time): 2/12/2023 @ 1200PM (noon)    2. Candidate for IV Tenecteplase therapy     Yes []     No  [x] due to the following exclusion criteria: out of window    3. Candidate for Thrombectomy    Yes []      No [x] due to the following exclusion criteria: no LVO    - Discussed with Dr. Ronan Velarde     Recommendations:    [] General Neurology Care Status - prefer 5th floor (5A/5C)   [x] Internal Medicine General Care Status   [] NICU Status - (5B)     [x] MICU Status   [] Observation Status    Stroke lower on the differential as patient is moving all 4 extremities spontaneously and no focality on exam.  Consider obtaining UA and UDS  Likely a metabolic etiology    Imaging   - CT Head  WO : done   - CTA Head and Neck : done   - MRI Brain WO :    Medications   - Clopidogrel 75 mg daily   - Atorvastatin 80 mg nightly     Labs  - Fasting Lipid panel  - HgbA1c lab      - PT, OT, Speech eval   - Hydrate with IVF  - Telemetry   - Neuro checks per protocol  - We recommend SBP <180  - Blood glucose goal less than 180  - Please avoid dextrose containing solutions        Additional recommendations may follow    Please contact [facility] with any changes in patients neurologic status. Thank you for your consult.        Keke St MD   PGY 4 Neurology Resident  2/12/2023 at 4:43 PM

## 2023-02-12 NOTE — ED NOTES
Pt continues to thrash on the bed. Sitter and wife and sister at bedside.       Bessie Mendez RN  02/12/23 [id number]

## 2023-02-12 NOTE — ED PROVIDER NOTES
[facility]  Emergency Department  Faculty Attestation     I performed a history and physical examination of the patient and discussed management with the resident. I reviewed the residents note and agree with the documented findings and plan of care. Any areas of disagreement are noted on the chart. I was personally present for the key portions of any procedures. I have documented in the chart those procedures where I was not present during the key portions. I have reviewed the emergency nurses triage note. I agree with the chief complaint, past medical history, past surgical history, allergies, medications, social and family history as documented unless otherwise noted below. For Physician Assistant/ Nurse Practitioner cases/documentation I have personally evaluated this patient and have completed at least one if not all key elements of the E/M (history, physical exam, and MDM). Additional findings are as noted.       Primary Care Physician:  Morgan Ferguson MD    Screenings:  [unfilled]    CHIEF COMPLAINT       Chief Complaint   Patient presents with    Cerebrovascular Accident       RECENT VITALS:   Temp: 98.2 °F (36.8 °C),  Heart Rate: 88, Resp: 25, BP: (!) 165/110    LABS:  Labs Reviewed   HGB/HCT - Abnormal; Notable for the following components:       Result Value    POC Hemoglobin 17.6 (*)     All other components within normal limits   VENOUS BLOOD GAS, POINT OF CARE - Abnormal; Notable for the following components:    pH, José 7.441 (*)     pO2, José 28.2 (*)     O2 Sat, José 56 (*)     All other components within normal limits   UREA N (POC) - Abnormal; Notable for the following components:    POC BUN 6 (*)     All other components within normal limits   LACTIC ACID,POINT OF CARE - Abnormal; Notable for the following components:    POC Lactic Acid 1.86 (*)     All other components within normal limits   POCT GLUCOSE - Abnormal; Notable for the following components:    POC Glucose 210 (*)     All other components within normal limits   ELECTROLYTES PLUS   CALCIUM, IONIC (POC)   STROKE PANEL   CREATININE W/GFR POINT OF CARE   POCT GLUCOSE       Radiology  XR CHEST PORTABLE    (Results Pending)   CT HEAD WO CONTRAST    (Results Pending)   CTA HEAD NECK W CONTRAST    (Results Pending)       CRITICAL CARE: There was a high probability of clinically significant/life threatening deterioration in this patient's condition which required my urgent intervention. Total critical care time was 10 minutes. This excludes any time for separately reportable procedures. EKG:      Attending Physician Additional  Notes    Patient is a type I diabetic who was patient is brought by EMS for stroke alert/3 score of 5, last known well at noon. He was noted to fall and had difficulty standing again. They noted left-sided weakness but appears to have bilateral arm and leg weakness. He has no speech abnormality. Blood sugar was normal.  History of type 1 diabetes on insulin pump, pancreatitis, cervical spine disease pancreatic insufficiency, migraine headaches, anxiety, coronary disease. No known history of atrial fibrillation. Not on anticoagulation except for Plavix. On exam he is hypertensive, afebrile, vital signs are normal.  Alert and oriented x3, GCS is 14. Speech is abnormal with gargling dysarthric speech. Mouth is dry. Tongue is midline. He has bilateral ptosis. Normal pupils. He has difficulty following commands and appears to have drift of arms and legs however is later able to lift up both arms symmetrically. He is only able to lift up either right or left leg for 2 seconds at a time. Toes appear to be upgoing. He is occasionally having ankle extension almost like posturing. Lungs are clear. Skin is warm and dry. Abdomen is soft and nontender.   Impression is possible stroke versus other metabolic disturbance, consider intracranial bleed, consider spine issue, rule out trauma. Patient is not a tPA candidate. Plan is CT brain, CT angiogram head/neck, will evaluate the cervical spine on the CTA neck, chest x-ray, laboratory studies, repeat blood sugar, point-of-care chemistries, IV fluids, airway protection, reassess, admission to ICU. Cristal Dowell.  Anila Unger MD, [facility]  Attending Emergency  Physician                Joel Vazquez MD  02/12/23 [id number]

## 2023-02-12 NOTE — ED NOTES
Writer informed Dr. Frederick Hernandez that pt is still thrashing around in the bed and is extremely agitated.       Yair Carreno RN  02/12/23 [id number]

## 2023-02-12 NOTE — ED PROVIDER NOTES
FACULTY SIGN-OUT  ADDENDUM       Patient: Kurt Romero   MRN: [id number]  PCP:  Reyna Cline MD  Attestation  I was available and discussed any additional care issues that arose and coordinated the management plans with the resident(s) caring for the patient during my duty period. Any areas of disagreement with resident's documentation of care or procedures are noted on the chart. I was personally present for the key portions of any/all procedures during my duty period. I have documented in the chart those procedures where I was not present during the key portions. The patient's initial evaluation and plan have been discussed with the prior provider who initially evaluated the patient. Pertinent Comments: The patient is a 46 y.o. male taken in signout with possible stroke and stroke alert was called with negative CT but awaiting CTA of the head and neck. Potential deficits or speech but also some bilateral lower extremity weakness and not unilateral.   Positive history of cervical stenosis as well.    Patient is borderline agitated as well  We are awaiting further work-up symptomatic control with likely MRIs    ED COURSE      The patient was given the following medications:  Orders Placed This Encounter   Medications    iopamidol (ISOVUE-370) 76 % injection 90 mL    midazolam PF (VERSED) injection 2 mg    midazolam PF (VERSED) injection 2 mg    haloperidol lactate (HALDOL) injection 5 mg    haloperidol lactate (HALDOL) injection 5 mg    diphenhydrAMINE (BENADRYL) injection 50 mg       RECENT VITALS:   BP: (!) 145/95  Heart Rate: 94  Resp: 27  Temp: 98.2 °F (36.8 °C) SpO2: 90 %    (Please note that portions of this note were completed with a voice recognition program.  Efforts were made to edit the dictations but occasionally words are mis-transcribed.)    Yvon Avila MD [facility]  Attending Emergency Medicine Physician       Yvon Avila MD  02/12/23 0541

## 2023-02-12 NOTE — ED NOTES
Per Dr. Jeevan Maxwell, pt is no longer considered a stroke. Stroke documentation ending at this time.      Delphine Girard RN  02/12/23 [id number]

## 2023-02-12 NOTE — ED NOTES
Pt arrives to ED 12 via EMS. EMS states pt was reported to have a lot of falls the past couple days. Per EMS, pt has a L sided facial droop and L sided weakness and slurred speech. Last known well 12 pm.   Pt arrives and is agitated and combative. Pt respirations are even and unlabored, slurred speech, bed is in the lowest position, call light is within reach, NAD noted. Will continue to follow plan of care.         Coby Wren RN  02/12/23 [id number]

## 2023-02-13 ENCOUNTER — APPOINTMENT (OUTPATIENT)
Dept: GENERAL RADIOLOGY | Age: 53
DRG: 071 | End: 2023-02-13
Payer: COMMERCIAL

## 2023-02-13 LAB
ABSOLUTE EOS #: <0.03 K/UL (ref 0–0.44)
ABSOLUTE IMMATURE GRANULOCYTE: 0.08 K/UL (ref 0–0.3)
ABSOLUTE LYMPH #: 1.46 K/UL (ref 1.1–3.7)
ABSOLUTE MONO #: 0.97 K/UL (ref 0.1–1.2)
ALBUMIN SERPL-MCNC: 3.6 G/DL (ref 3.5–5.2)
ALBUMIN/GLOBULIN RATIO: 1.6 (ref 1–2.5)
ALP SERPL-CCNC: 216 U/L (ref 40–129)
ALT SERPL-CCNC: 10 U/L (ref 5–41)
ANION GAP SERPL CALCULATED.3IONS-SCNC: 12 MMOL/L (ref 9–17)
AST SERPL-CCNC: 18 U/L
BASOPHILS # BLD: 0 % (ref 0–2)
BASOPHILS ABSOLUTE: 0.06 K/UL (ref 0–0.2)
BILIRUB DIRECT SERPL-MCNC: 0.1 MG/DL
BILIRUB INDIRECT SERPL-MCNC: 0.2 MG/DL (ref 0–1)
BILIRUB SERPL-MCNC: 0.3 MG/DL (ref 0.3–1.2)
BUN SERPL-MCNC: 10 MG/DL (ref 6–20)
CALCIUM SERPL-MCNC: 9.1 MG/DL (ref 8.6–10.4)
CHLORIDE SERPL-SCNC: 103 MMOL/L (ref 98–107)
CO2 SERPL-SCNC: 21 MMOL/L (ref 20–31)
CREAT SERPL-MCNC: 0.57 MG/DL (ref 0.7–1.2)
EOSINOPHILS RELATIVE PERCENT: 0 % (ref 1–4)
GFR SERPL CREATININE-BSD FRML MDRD: >60 ML/MIN/1.73M2
GLUCOSE BLD-MCNC: 172 MG/DL (ref 75–110)
GLUCOSE BLD-MCNC: 189 MG/DL (ref 75–110)
GLUCOSE BLD-MCNC: 213 MG/DL (ref 75–110)
GLUCOSE BLD-MCNC: 240 MG/DL (ref 75–110)
GLUCOSE BLD-MCNC: 354 MG/DL (ref 75–110)
GLUCOSE SERPL-MCNC: 170 MG/DL (ref 70–99)
HCT VFR BLD AUTO: 47.9 % (ref 40.7–50.3)
HGB BLD-MCNC: 16.1 G/DL (ref 13–17)
IMMATURE GRANULOCYTES: 1 %
LYMPHOCYTES # BLD: 9 % (ref 24–43)
MCH RBC QN AUTO: 31.1 PG (ref 25.2–33.5)
MCHC RBC AUTO-ENTMCNC: 33.6 G/DL (ref 28.4–34.8)
MCV RBC AUTO: 92.5 FL (ref 82.6–102.9)
MONOCYTES # BLD: 6 % (ref 3–12)
NRBC AUTOMATED: 0 PER 100 WBC
PDW BLD-RTO: 13.7 % (ref 11.8–14.4)
PLATELET # BLD AUTO: 211 K/UL (ref 138–453)
PMV BLD AUTO: 9.2 FL (ref 8.1–13.5)
POTASSIUM SERPL-SCNC: 4.2 MMOL/L (ref 3.7–5.3)
PROT SERPL-MCNC: 5.8 G/DL (ref 6.4–8.3)
RBC # BLD: 5.18 M/UL (ref 4.21–5.77)
SEG NEUTROPHILS: 84 % (ref 36–65)
SEGMENTED NEUTROPHILS ABSOLUTE COUNT: 13.61 K/UL (ref 1.5–8.1)
SODIUM SERPL-SCNC: 136 MMOL/L (ref 135–144)
WBC # BLD AUTO: 16.2 K/UL (ref 3.5–11.3)

## 2023-02-13 PROCEDURE — 6370000000 HC RX 637 (ALT 250 FOR IP)

## 2023-02-13 PROCEDURE — 96375 TX/PRO/DX INJ NEW DRUG ADDON: CPT

## 2023-02-13 PROCEDURE — 99221 1ST HOSP IP/OBS SF/LOW 40: CPT | Performed by: PSYCHIATRY & NEUROLOGY

## 2023-02-13 PROCEDURE — 6370000000 HC RX 637 (ALT 250 FOR IP): Performed by: STUDENT IN AN ORGANIZED HEALTH CARE EDUCATION/TRAINING PROGRAM

## 2023-02-13 PROCEDURE — 85025 COMPLETE CBC W/AUTO DIFF WBC: CPT

## 2023-02-13 PROCEDURE — 51798 US URINE CAPACITY MEASURE: CPT

## 2023-02-13 PROCEDURE — 2500000003 HC RX 250 WO HCPCS

## 2023-02-13 PROCEDURE — 2580000003 HC RX 258: Performed by: STUDENT IN AN ORGANIZED HEALTH CARE EDUCATION/TRAINING PROGRAM

## 2023-02-13 PROCEDURE — 82947 ASSAY GLUCOSE BLOOD QUANT: CPT

## 2023-02-13 PROCEDURE — 97162 PT EVAL MOD COMPLEX 30 MIN: CPT

## 2023-02-13 PROCEDURE — 94761 N-INVAS EAR/PLS OXIMETRY MLT: CPT

## 2023-02-13 PROCEDURE — G0378 HOSPITAL OBSERVATION PER HR: HCPCS

## 2023-02-13 PROCEDURE — 6370000000 HC RX 637 (ALT 250 FOR IP): Performed by: INTERNAL MEDICINE

## 2023-02-13 PROCEDURE — 2060000000 HC ICU INTERMEDIATE R&B

## 2023-02-13 PROCEDURE — 80048 BASIC METABOLIC PNL TOTAL CA: CPT

## 2023-02-13 PROCEDURE — 2700000000 HC OXYGEN THERAPY PER DAY

## 2023-02-13 PROCEDURE — 6360000002 HC RX W HCPCS: Performed by: STUDENT IN AN ORGANIZED HEALTH CARE EDUCATION/TRAINING PROGRAM

## 2023-02-13 PROCEDURE — 71045 X-RAY EXAM CHEST 1 VIEW: CPT

## 2023-02-13 PROCEDURE — 97167 OT EVAL HIGH COMPLEX 60 MIN: CPT

## 2023-02-13 PROCEDURE — 97535 SELF CARE MNGMENT TRAINING: CPT

## 2023-02-13 PROCEDURE — 99222 1ST HOSP IP/OBS MODERATE 55: CPT | Performed by: INTERNAL MEDICINE

## 2023-02-13 PROCEDURE — 97530 THERAPEUTIC ACTIVITIES: CPT

## 2023-02-13 PROCEDURE — 51701 INSERT BLADDER CATHETER: CPT

## 2023-02-13 PROCEDURE — 96372 THER/PROPH/DIAG INJ SC/IM: CPT

## 2023-02-13 PROCEDURE — 36415 COLL VENOUS BLD VENIPUNCTURE: CPT

## 2023-02-13 RX ORDER — LISINOPRIL 20 MG/1
20 TABLET ORAL DAILY
Status: DISCONTINUED | OUTPATIENT
Start: 2023-02-14 | End: 2023-02-14 | Stop reason: HOSPADM

## 2023-02-13 RX ORDER — INSULIN LISPRO 100 [IU]/ML
0-4 INJECTION, SOLUTION INTRAVENOUS; SUBCUTANEOUS NIGHTLY
Status: DISCONTINUED | OUTPATIENT
Start: 2023-02-13 | End: 2023-02-14

## 2023-02-13 RX ORDER — INSULIN LISPRO 100 [IU]/ML
0-4 INJECTION, SOLUTION INTRAVENOUS; SUBCUTANEOUS
Status: DISCONTINUED | OUTPATIENT
Start: 2023-02-13 | End: 2023-02-14

## 2023-02-13 RX ORDER — INSULIN LISPRO 100 [IU]/ML
0-16 INJECTION, SOLUTION INTRAVENOUS; SUBCUTANEOUS
Status: DISCONTINUED | OUTPATIENT
Start: 2023-02-13 | End: 2023-02-14

## 2023-02-13 RX ORDER — LORAZEPAM 0.5 MG/1
0.5 TABLET ORAL 3 TIMES DAILY
Status: DISCONTINUED | OUTPATIENT
Start: 2023-02-13 | End: 2023-02-14 | Stop reason: HOSPADM

## 2023-02-13 RX ORDER — OXYCODONE HYDROCHLORIDE 5 MG/1
10 TABLET ORAL EVERY 4 HOURS PRN
Status: DISCONTINUED | OUTPATIENT
Start: 2023-02-13 | End: 2023-02-14 | Stop reason: HOSPADM

## 2023-02-13 RX ORDER — INSULIN LISPRO 100 [IU]/ML
0-16 INJECTION, SOLUTION INTRAVENOUS; SUBCUTANEOUS
Status: DISCONTINUED | OUTPATIENT
Start: 2023-02-14 | End: 2023-02-13

## 2023-02-13 RX ADMIN — OXYCODONE 10 MG: 5 TABLET ORAL at 13:18

## 2023-02-13 RX ADMIN — OXYCODONE 10 MG: 5 TABLET ORAL at 20:24

## 2023-02-13 RX ADMIN — ISOSORBIDE MONONITRATE 30 MG: 30 TABLET, EXTENDED RELEASE ORAL at 12:40

## 2023-02-13 RX ADMIN — INSULIN LISPRO 2 UNITS: 100 INJECTION, SOLUTION INTRAVENOUS; SUBCUTANEOUS at 13:18

## 2023-02-13 RX ADMIN — INSULIN LISPRO 1 UNITS: 100 INJECTION, SOLUTION INTRAVENOUS; SUBCUTANEOUS at 05:40

## 2023-02-13 RX ADMIN — PROPRANOLOL HYDROCHLORIDE 20 MG: 20 TABLET ORAL at 12:39

## 2023-02-13 RX ADMIN — HYDRALAZINE HYDROCHLORIDE 5 MG: 20 INJECTION INTRAMUSCULAR; INTRAVENOUS at 01:03

## 2023-02-13 RX ADMIN — SODIUM CHLORIDE, PRESERVATIVE FREE 10 ML: 5 INJECTION INTRAVENOUS at 09:50

## 2023-02-13 RX ADMIN — ATORVASTATIN CALCIUM 80 MG: 80 TABLET, FILM COATED ORAL at 20:24

## 2023-02-13 RX ADMIN — INSULIN LISPRO 1 UNITS: 100 INJECTION, SOLUTION INTRAVENOUS; SUBCUTANEOUS at 00:21

## 2023-02-13 RX ADMIN — ENOXAPARIN SODIUM 40 MG: 100 INJECTION SUBCUTANEOUS at 09:50

## 2023-02-13 RX ADMIN — PROPRANOLOL HYDROCHLORIDE 20 MG: 20 TABLET ORAL at 20:26

## 2023-02-13 RX ADMIN — INSULIN GLARGINE 8 UNITS: 100 INJECTION, SOLUTION SUBCUTANEOUS at 00:22

## 2023-02-13 RX ADMIN — INSULIN LISPRO 16 UNITS: 100 INJECTION, SOLUTION INTRAVENOUS; SUBCUTANEOUS at 22:02

## 2023-02-13 RX ADMIN — SODIUM CHLORIDE, PRESERVATIVE FREE 10 ML: 5 INJECTION INTRAVENOUS at 00:25

## 2023-02-13 RX ADMIN — LORAZEPAM 0.5 MG: 0.5 TABLET ORAL at 13:18

## 2023-02-13 RX ADMIN — CLOPIDOGREL 75 MG: 75 TABLET, FILM COATED ORAL at 12:40

## 2023-02-13 RX ADMIN — METOPROLOL TARTRATE 5 MG: 5 INJECTION, SOLUTION INTRAVENOUS at 09:47

## 2023-02-13 RX ADMIN — SODIUM CHLORIDE, PRESERVATIVE FREE 10 ML: 5 INJECTION INTRAVENOUS at 21:39

## 2023-02-13 RX ADMIN — INSULIN LISPRO 4 UNITS: 100 INJECTION, SOLUTION INTRAVENOUS; SUBCUTANEOUS at 17:09

## 2023-02-13 RX ADMIN — LORAZEPAM 0.5 MG: 0.5 TABLET ORAL at 20:26

## 2023-02-13 ASSESSMENT — PAIN DESCRIPTION - DESCRIPTORS: DESCRIPTORS: STABBING

## 2023-02-13 ASSESSMENT — PAIN SCALES - GENERAL
PAINLEVEL_OUTOF10: 8
PAINLEVEL_OUTOF10: 7
PAINLEVEL_OUTOF10: 8

## 2023-02-13 ASSESSMENT — PAIN DESCRIPTION - LOCATION
LOCATION: NECK;SHOULDER;ELBOW
LOCATION: GENERALIZED
LOCATION: NECK;SHOULDER

## 2023-02-13 NOTE — PROGRESS NOTES
Physical Therapy  Facility/Department: [facility]  Physical Therapy Initial Assessment    Name: Tania Perez  : 1970  MRN: [id number]  Date of Service: 2023  Copied from chart: \"The patient is a pleasant 46 y.o. male with past medical history of diabetes-on insulin pump, hypertension, hyperlipidemia, intractable migraine, bipolar, anxiety was brought in by wife for the initial concern of stroke as patient had a slurred speech and was noted to have fall along with difficulty standing again. patient was noted to have bilateral arm and leg weakness and a stroke alert was called but CT head and CT angio were negative. Patient was found to be confused by wife-patient was found to put lighter in his mouth and trying to lit the lighter with pen, trying to bring lighter near the mouth without any cigarettes, walking through the door as if there is no door, going to the restroom 3 times and sitting on toilet seat but not having bowel or passing urine. As per wife patient had a similar episode yesterday which self resolved and patient was doing completely fine this morning but later in the afternoon he was found to be confused and brought into the hospital by wife. Patient takes multiple medications for bipolar disorder, intractable migraine. Wife states recently around a week ago topiramate was added to the regimen by his PCP for migraine control. Patient has a history of heavy alcohol drinking around 12 years back but has not been drinking at all since then. Wife denies any concern of drug abuse. \"  Discharge Recommendations: Further therapy recommended at discharge. PT Equipment Recommendations  Equipment Needed: No (currently unsafe to ambulate without assistance)      Patient Diagnosis(es): The encounter diagnosis was Altered mental status, unspecified altered mental status type.   Past Medical History:  has a past medical history of Anxiety, Arthritis, CAD (coronary artery disease), Calcaneal apophysitis, Corns, Depression, Diabetes type I ([address] Gastroparesis, GERD (gastroesophageal reflux disease), Headache(784.0), Hearing loss, Hyperglycemia, Hyperlipidemia, Hypertension, Intussusception ([address] MI (myocardial infarction) ([address] Neuropathy, Osteoarthritis, Panic attacks, and Temporomandibular joint disorder. Past Surgical History:  has a past surgical history that includes Ulnar tunnel release; Rotator cuff repair (Bilateral); Carpal tunnel release; shoulder surgery; Elbow fracture surgery; Tonsillectomy; Equality tooth extraction; Tooth Extraction; Abdomen surgery; hernia repair; Abdominal exploration surgery (05/28/2016); Cardiac catheterization (2014); pr esophagogastroduodenoscopy transoral diagnostic (N/A, 5/24/2017); pr colon ca scrn not hi rsk ind (N/A, 5/25/2017); Colonoscopy (05/23/2017); Upper gastrointestinal endoscopy (05/23/2017); Upper gastrointestinal endoscopy (N/A, 7/10/2018); Colonoscopy (N/A, 7/10/2018); Colonoscopy (N/A, 8/9/2021); ventral hernia repair (N/A, 8/10/2021); and Colonoscopy (N/A, 11/14/2022). Assessment   Body Structures, Functions, Activity Limitations Requiring Skilled Therapeutic Intervention: Decreased functional mobility ; Decreased endurance;Decreased cognition;Decreased balance;Decreased strength;Decreased safe awareness  Assessment: The pt required CGA for transfers and Min A to ambulate 5ft with RW, limited by endurance and cognition this date. Recommend continued PT to progress toward prior level of independence. The pt currently requires 24hr assistance for mobility.   Therapy Prognosis: Good  Decision Making: Medium Complexity  Requires PT Follow-Up: Yes  Activity Tolerance  Activity Tolerance: Patient limited by endurance;Treatment limited secondary to decreased cognition     Plan   Physcial Therapy Plan  General Plan:  (5-6x/wk)  Current Treatment Recommendations: Strengthening, ROM, Balance training, Functional mobility training, Transfer training, Endurance training, Gait training, Stair training, Safety education & training, Therapeutic activities, Patient/Caregiver education & training, Equipment evaluation, education, & procurement  Safety Devices  Type of Devices: Call light within reach, Left in bed, Gait belt, Nurse notified, Sitter present, Bed alarm in place  Restraints  Restraints Initially in Place: No     Restrictions  Restrictions/Precautions  Restrictions/Precautions: Fall Risk  Required Braces or Orthoses?: No  Position Activity Restriction  Other position/activity restrictions: up with assist     Subjective   General  Patient assessed for rehabilitation services?: Yes  Response To Previous Treatment: Not applicable  Family / Caregiver Present: Yes (pt's wife)  General Comment  Comments: 3L O2 via NC throughout  Subjective  Subjective: RN and pt agreeable to PT. Pt supine in bed upon arrival,lethargic and cooperative throughout. Pt denies pain, repeatedly states \"I don't feel good. My whole body doesn't feel good. \"         Social/Functional History  Social/Functional History  Lives With: Spouse (3dogs)  Type of Home: House  Home Layout: One level  Home Access: Stairs to enter with rails  Entrance Stairs - Number of Steps: 2  Entrance Stairs - Rails: Right  Bathroom Shower/Tub: Tub/Shower unit, Walk-in shower  Bathroom Toilet: Standard  Bathroom Equipment: Shower chair (no use of shower chair at baseline)  Home Equipment:  (no use of DME AT baseline)  ADL Assistance: [address]: Independent  Homemaking Responsibilities: Yes  Meal Prep Responsibility: Primary  Laundry Responsibility: Primary  Cleaning Responsibility: Primary  Ambulation Assistance: Independent  Transfer Assistance: Independent  Active : Yes  Mode of Transportation: Car  Occupation: On [address]: playing poker on phone  Additional Comments: pt wife works 2 jobs outside the home, reports being within 5 miles during work hours. Information obtained from pt's wife at bedside due to pt's cognition  Vision/Hearing  Vision  Vision: Impaired (contacts)  Hearing  Hearing: Within functional limits    Cognition   Orientation  Overall Orientation Status: Impaired  Orientation Level: Oriented to time;Oriented to person;Disoriented to situation;Disoriented to place (pt states \"bar\" for location and corrects with cues to \"the place with doctors\")  Cognition  Overall Cognitive Status: Exceptions  Arousal/Alertness: Delayed responses to stimuli  Following Commands: Follows multistep commands with repitition; Follows multistep commands with increased time  Attention Span: Attends with cues to redirect  Safety Judgement: Decreased awareness of need for assistance;Decreased awareness of need for safety  Insights: Decreased awareness of deficits  Initiation: Requires cues for all  Sequencing: Requires cues for all  Cognition Comment: pt required increased time to process all commands and questions. pt lethgargic throughout session.      Objective     Gross Assessment  Tone: Normal  Sensation: Intact (pt denies numbness and tingling)     Joint Mobility  Spine: WFL  ROM RLE: WFL  ROM LLE: WFL  ROM RUE: WFL  ROM LUE: WFL  Strength RLE  Comment: grossly 3+/5 at hip and knee, 4/5 ankle  Strength LLE  Comment: grossly 3+/5 at hip and knee, 4/5 ankle  Strength RUE  Comment: formally assessed by OT  Strength LUE  Comment: formally assessed by OT      Bed mobility  Supine to Sit: Stand by assistance  Sit to Supine: Stand by assistance  Scooting: Contact guard assistance  Bed Mobility Comments: HOB flat, no use of bed rail; verbal cues for safety to await line management and therapist instruction prior to mobilizing  Transfers  Sit to Stand: Contact guard assistance  Stand to Sit: Contact guard assistance  Comment: transfers performed 2x, cues for safety and encouragement to participate  Ambulation  Surface: Level tile  Device: Rolling Walker  Other Apparatus: O2 (3L)  Assistance: Minimal assistance (to progress RW)  Quality of Gait: frequent verbal and tactile cues to continue task  Gait Deviations: Slow Annette;Decreased step length;Decreased step height  Distance: 5ft  Comments: distance limited by pt repeatedly requesting to return to bed  Stairs/Curb  Stairs?: No     Balance  Posture: Fair  Sitting - Static: Good;-  Sitting - Dynamic: Fair;+  Standing - Static: Fair  Standing - Dynamic: Fair  Comments: standing balance assessed with RW       AM-PAC Score  AM-PAC Inpatient Mobility Raw Score : 18 (02/13/23 1436)  AM-PAC Inpatient T-Scale Score : 43.63 (02/13/23 1436)  Mobility Inpatient CMS 0-100% Score: 46.58 (02/13/23 1436)  Mobility Inpatient CMS G-Code Modifier : CK (02/13/23 1436)     Goals  Short Term Goals  Time Frame for Short Term Goals: 14 visits  Short Term Goal 1: Perform bed mobility and functional transfers independently  Short Term Goal 2: Ambulate 300ft independently  Short Term Goal 3: Ascend/descend 2 steps with R HR and supervision  Short Term Goal 4: Demo Good dynamic standing balance to decrease risk of falls       Education  Patient Education  Education Given To: Patient; Family  Education Provided: Role of Therapy;Plan of Care;Transfer Training  Education Method: Verbal;Demonstration  Barriers to Learning: Cognition  Education Outcome: Verbalized understanding;Continued education needed      Therapy Time   Individual Concurrent Group Co-treatment   Time In 0907         Time Out 0927         Minutes 20         Timed Code Treatment Minutes: 8 Minutes; co-eval with OT       Osman Nelson, PT

## 2023-02-13 NOTE — H&P
[address]     Department of Internal Medicine - Staff Internal Medicine Teaching Service          ADMISSION NOTE/HISTORY AND PHYSICAL EXAMINATION   Date: 2/12/2023  Patient Name: Darell Montalvo  Date of admission: 2/12/2023  4:32 PM  YOB: 1970  PCP: Mary Alaniz MD  History Obtained From:  patient, family member -wife, electronic medical record    CHIEF COMPLAINT     Chief complaint: Altered mental status    HISTORY OF PRESENTING ILLNESS     The patient is a pleasant 46 y.o. male with past medical history of diabetes-on insulin pump, hypertension, hyperlipidemia, intractable migraine, bipolar, anxiety was brought in by wife for the initial concern of stroke as patient had a slurred speech and was noted to have fall along with difficulty standing again. patient was noted to have bilateral arm and leg weakness and a stroke alert was called but CT head and CT angio were negative. Patient was found to be confused by wife-patient was found to put lighter in his mouth and trying to lit the lighter with pen, trying to bring lighter near the mouth without any cigarettes, walking through the door as if there is no door, going to the restroom 3 times and sitting on toilet seat but not having bowel or passing urine. As per wife patient had a similar episode yesterday which self resolved and patient was doing completely fine this morning but later in the afternoon he was found to be confused and brought into the hospital by wife. Patient takes multiple medications for bipolar disorder, intractable migraine. Wife states recently around a week ago topiramate was added to the regimen by his PCP for migraine control. Patient has a history of heavy alcohol drinking around 12 years back but has not been drinking at all since then. Wife denies any concern of drug abuse.   In the ER patient was hemodynamically stable, BMP unremarkable, ammonia 50, VBG unremarkable, toxic blood x-ray negative, TSH normal-1.5. Negative chest x-ray. In the ER patient was agitated and combative and was given multiple doses of multiple doses of Haldol, Ativan and Versed. On my encounter patient was very sleepy after multiple sedating medications but stable vitally and with nonlabored breathing. Review of Systems:  Could not be completed due to altered mental status  PAST MEDICAL HISTORY     Past Medical History:   Diagnosis Date    Anxiety     Arthritis     CAD (coronary artery disease)     Calcaneal apophysitis     Corns     Depression     Diabetes type I ([address]     Gastroparesis     GERD (gastroesophageal reflux disease)     Headache(784.0)     Hearing loss     Hyperglycemia     Hyperlipidemia     Hypertension     Intussusception ([address]     MI (myocardial infarction) ([address] jan 2014    Neuropathy     Osteoarthritis     Panic attacks     Temporomandibular joint disorder        PAST SURGICAL HISTORY     Past Surgical History:   Procedure Laterality Date    ABDOMEN SURGERY      insulin pump    ABDOMINAL EXPLORATION SURGERY  05/28/2016    bowel resection Rt. colon &terminal ileum, intussuseption.      CARDIAC CATHETERIZATION  2014    CARPAL TUNNEL RELEASE      bilateral    COLONOSCOPY  05/23/2017    poor prep; diverticulosis    COLONOSCOPY N/A 7/10/2018    COLONOSCOPY POLYPECTOMY HOT BIOPSY performed by Wilhemina Olszewski, MD at [address] [place] [address] N/A 8/9/2021    COLONOSCOPY DIAGNOSTIC performed by Wilhemina Olszewski, MD at [place] [address] 11/14/2022    COLONOSCOPY WITH BIOPSY performed by Wilhemina Olszewski, MD at [address] [facility]      right    HERNIA REPAIR      inguinal    MA COLON CA SCRN NOT  [address] [place] [address] IND N/A 5/25/2017    COLONOSCOPY performed by Richardson Retana MD at [facility] ESOPHAGOGASTRODUODENOSCOPY TRANSORAL DIAGNOSTIC N/A 5/24/2017    EGD ESOPHAGOGASTRODUODENOSCOPY performed by Richardson Retana MD at [address] Bilateral     [address] [place] [address] fracture     TONSILLECTOMY      x2    TOOTH EXTRACTION      x4    ULNAR TUNNEL RELEASE      UPPER GASTROINTESTINAL ENDOSCOPY  05/23/2017    mild gastritis    UPPER GASTROINTESTINAL ENDOSCOPY N/A 7/10/2018    EGD BIOPSY performed by Eliecer Peraza MD at [facility] N/A 8/10/2021    HERNIA INCISIONAL REPAIR OPEN [address] performed by Eliecer Peraza MD at [facility]      x4       ALLERGIES     Avelox [moxifloxacin], Ciprofloxacin, Levofloxacin, Lyrica [pregabalin], Metoclopramide, Neurontin [gabapentin], Other, Tetanus toxoids, Tramadol, and Tramadol hcl    MEDICATIONS PRIOR TO ADMISSION     Prior to Admission medications    Medication Sig Start Date End Date Taking? Authorizing Provider   lisinopril (PRINIVIL;ZESTRIL) 10 MG tablet Take 3 tablets by mouth daily 2/6/23   Андрей Ashley MD   atorvastatin (LIPITOR) 80 MG tablet Take 1 tablet by mouth nightly 2/6/23   Андрей Ashley MD   clopidogrel (PLAVIX) 75 MG tablet Take 1 tablet by mouth daily 2/6/23   Андрей Ashley MD   isosorbide mononitrate (IMDUR) 30 MG extended release tablet Take 1 tablet by mouth daily 2/6/23   Андрей Ashley MD   propranolol (INDERAL) 20 MG tablet Take 1 tablet by mouth 2 times daily Takes for migraines 2/6/23   Андрей Ashley MD   prochlorperazine (COMPAZINE) 10 MG tablet Take 1 tablet by mouth every 6 hours as needed (nausea) 2/6/23   Андрей Ashley MD   topiramate (TOPAMAX) 25 MG tablet Take 1 tablet by mouth nightly 2/6/23   Андрей Ashley MD   LORazepam (ATIVAN) 0.5 MG tablet Take 1 tablet by mouth every 8 hours as needed for Anxiety for up to 30 days. 2/6/23 3/8/23  Андрей Ashley MD   oxyCODONE (ROXICODONE) 20 MG immediate release tablet Take 1 tablet by mouth every 4 hours as needed for Pain for up to 30 days.  2/1/23 3/3/23  Андрей Ashley MD   butalbital-acetaminophen-caffeine (FIORICET, ESGIC) -65 MG per tablet Take 1 tablet by mouth every 4 hours as needed for Headaches 23   Mary Alaniz, MD   pantoprazole (PROTONIX) 40 MG tablet Take 1 tablet by mouth in the morning. TAKE ONE TABLET BY MOUTH TWICE DAILY. 22   Mary Alaniz MD   brexpiprazole (REXULTI) 1 MG TABS tablet Take 1 tablet by mouth daily 22   Mary Alaniz MD   TRINTELLIX 20 MG TABS tablet TAKE 1 TABLET DAILY 3/4/22   Mary Alaniz MD   HUMALOG 100 UNIT/ML injection vial INJECT UP TO 70 UNITS VIA PUMP AS DIRECTED 21   Historical Provider, MD   naloxone 4 MG/0.1ML LIQD nasal spray 1 spray by Nasal route as needed for Opioid Reversal 8/10/21   Sravani Laguerre MD   b complex vitamins capsule Take 1 capsule by mouth daily    Historical Provider, MD   CONTOUR NEXT TEST strip use four times a day 21   Historical Provider, MD   nitroGLYCERIN (NITROSTAT) 0.4 MG SL tablet Place 1 tablet under the tongue every 5 minutes as needed for Chest pain Indications: Disease of the Arteries of the Heart Take 1 tablet every 5 minutes times three as needed for chest pain 16   [facility], APRN - CNP       SOCIAL HISTORY     Tobacco: Current everyday smoker-1 pack/day for 30 years  Alcohol: Stopped drinking around 8682  Illicits: No    FAMILY HISTORY     Family History   Problem Relation Age of Onset    Diabetes Mother     High Blood Pressure Mother     Heart Disease Mother     Migraines Mother     Other Mother         diabetic neuropathy    High Blood Pressure Father        PHYSICAL EXAM     Vitals: BP (!) 148/104   Pulse 93   Temp 98.6 °F (37 °C) (Oral)   Resp 18   SpO2 95%   Tmax: Temp (24hrs), Av.8 °F (37.1 °C), Min:98.2 °F (36.8 °C), Max:99.6 °F (37.6 °C)    Last Body weight:   Wt Readings from Last 3 Encounters:   23 135 lb (61.2 kg)   22 130 lb (59 kg)   10/31/22 130 lb (59 kg)     Body Mass Index : There is no height or weight on file to calculate BMI.       PHYSICAL EXAMINATION:  Constitutional: This is a well developed, well nourished, 18.5-24.9 - Normal 46y.o. year old male who is very lethargic/sleepy and in no apparent distress. Head:normocephalic and atraumatic. EENT: Pupils sluggishly reactive, no conjunctival injections. Septum was midline, mucosa was without erythema, exudates or cobblestoning. No thrush was noted. Neck: Supple without thyromegaly. No elevated JVP. Trachea was midline. Respiratory: Chest was symmetrical without dullness to percussion. Breath sounds bilaterally were clear to auscultation. There were no wheezes, rhonchi or rales. There is no intercostal retraction or use of accessory muscles. No egophony noted. Cardiovascular: Regular without murmur, clicks, gallops or rubs. Abdomen: Slightly rounded and soft without organomegaly. No rebound, rigidity or guarding was appreciated. Lymphatic: No lymphadenopathy. Musculoskeletal: Normal curvature of the spine. Extremities:  No lower extremity edema, ulcerations, tenderness, varicosities or erythema. Muscle size, tone are normal.  No involuntary movements are noted. Skin:  Warm and dry. Good color, turgor and pigmentation. No lesions or scars. No cyanosis or clubbing  Neurological/Psychiatric: Patient was very lethargic/sleepy.           INVESTIGATIONS     Laboratory Testing:     Recent Results (from the past 24 hour(s))   Venous Blood Gas, POC    Collection Time: 02/12/23  4:33 PM   Result Value Ref Range    pH, José 7.441 (H) 7.320 - 7.430    pCO2, José 41.0 41.0 - 51.0 mm Hg    pO2, José 28.2 (L) 30.0 - 50.0 mm Hg    HCO3, Venous 27.9 22.0 - 29.0 mmol/L    Positive Base Excess, José 3 0.0 - 3.0    O2 Sat, José 56 (L) 60.0 - 85.0 %   ELECTROLYTES PLUS    Collection Time: 02/12/23  4:33 PM   Result Value Ref Range    POC Sodium 140 138 - 146 mmol/L    POC Potassium 4.2 3.5 - 4.5 mmol/L    POC Chloride 101 98 - 107 mmol/L    POC TCO2 27 22 - 30 mmol/L    Anion Gap 13 7 - 16 mmol/L   Hemoglobin and hematocrit, blood    Collection Time: 02/12/23  4:33 PM   Result Value Ref Range    POC Hemoglobin 17.6 (H) 13.5 - 17.5 g/dL    POC Hematocrit 52 41 - 53 %   Creatinine W/GFR Point of Care    Collection Time: 02/12/23  4:33 PM   Result Value Ref Range    POC Creatinine 0.74 0.51 - 1.19 mg/dL    eGFR, POC >60 mL/min/1.73m2   CALCIUM, IONIC (POC)    Collection Time: 02/12/23  4:33 PM   Result Value Ref Range    POC Ionized Calcium 1.19 1.15 - 1.33 mmol/L   POCT urea (BUN)    Collection Time: 02/12/23  4:33 PM   Result Value Ref Range    POC BUN 6 (L) 8 - 26 mg/dL   Lactic Acid, POC    Collection Time: 02/12/23  4:33 PM   Result Value Ref Range    POC Lactic Acid 1.86 (H) 0.56 - 1.39 mmol/L   POCT Glucose    Collection Time: 02/12/23  4:33 PM   Result Value Ref Range    POC Glucose 210 (H) 74 - 100 mg/dL   STROKE PANEL    Collection Time: 02/12/23  5:11 PM   Result Value Ref Range    Glucose 210 (H) 70 - 99 mg/dL    BUN 7 6 - 20 mg/dL    Creatinine 0.69 (L) 0.70 - 1.20 mg/dL    Est, Glom Filt Rate >60 >60 mL/min/1.73m2    Calcium 9.6 8.6 - 10.4 mg/dL    Sodium 138 135 - 144 mmol/L    Potassium 4.3 3.7 - 5.3 mmol/L    Chloride 100 98 - 107 mmol/L    CO2 27 20 - 31 mmol/L    Anion Gap 11 9 - 17 mmol/L    WBC 7.7 3.5 - 11.3 k/uL    RBC 5.64 4.21 - 5.77 m/uL    Hemoglobin 17.3 (H) 13.0 - 17.0 g/dL    Hematocrit 49.7 40.7 - 50.3 %    MCV 88.1 82.6 - 102.9 fL    MCH 30.7 25.2 - 33.5 pg    MCHC 34.8 28.4 - 34.8 g/dL    RDW 13.5 11.8 - 14.4 %    Platelets 309 946 - 562 k/uL    MPV 10.3 8.1 - 13.5 fL    NRBC Automated 0.0 0.0 per 100 WBC    Total CK 61 39 - 308 U/L    Immature Granulocytes 0 0 %    Seg Neutrophils 59 36 - 66 %    Lymphocytes 35 24 - 44 %    Monocytes 6 1 - 7 %    Eosinophils % 0 (L) 1 - 4 %    Basophils 0 0 - 2 %    Absolute Immature Granulocyte 0.00 0.00 - 0.30 k/uL    Segs Absolute 4.54 1.8 - 7.7 k/uL    Absolute Lymph # 2.70 1.0 - 4.8 k/uL    Absolute Mono # 0.46 0.1 - 0.8 k/uL    Absolute Eos # 0.00 0.0 - 0.4 k/uL    Basophils Absolute 0.00 0.0 - 0.2 k/uL    Morphology Normal     Myoglobin 36 28 - 72 ng/mL    Protime 10.5 9.1 - 12.3 sec    INR 1.0     PTT 24.3 20.5 - 30.5 sec    Troponin, High Sensitivity <6 0 - 22 ng/L   Acetaminophen Level    Collection Time: 02/12/23  5:11 PM   Result Value Ref Range    Acetaminophen Level 10 10 - 30 ug/mL   Ethanol    Collection Time: 02/12/23  5:11 PM   Result Value Ref Range    Ethanol <10 <10 mg/dL    Ethanol percent <0.010 <0.010 %   Brain Natriuretic Peptide    Collection Time: 02/12/23  5:11 PM   Result Value Ref Range    Pro-BNP <91 <969 pg/mL   Salicylate    Collection Time: 02/12/23  5:11 PM   Result Value Ref Range    Salicylate Lvl <1 (L) 3 - 10 mg/dL   TOXIC TRICYCLIC SC,B    Collection Time: 02/12/23  5:11 PM   Result Value Ref Range    Toxic Tricyclic Sc,Blood NEGATIVE NEGATIVE   TSH with Reflex    Collection Time: 02/12/23  5:11 PM   Result Value Ref Range    TSH 1.50 0.30 - 5.00 uIU/mL   Osmolality    Collection Time: 02/12/23  5:11 PM   Result Value Ref Range    Serum Osmolality 294 275 - 295 mOsm/kg   Ammonia    Collection Time: 02/12/23  5:14 PM   Result Value Ref Range    Ammonia 50 16 - 60 umol/L   POC Glucose Fingerstick    Collection Time: 02/12/23  8:04 PM   Result Value Ref Range    POC Glucose 220 (H) 75 - 110 mg/dL   POCT Glucose    Collection Time: 02/12/23  8:18 PM   Result Value Ref Range    Glucose 220 mg/dL    QC OK? yes        Imaging:   CT HEAD WO CONTRAST    Result Date: 2/12/2023  1. No acute intracranial hemorrhage. No loss of gray-white differentiation to suggest acute infarction. 2. Hypoattenuation in the right periventricular white matter is more conspicuous than on prior study, likely microangiopathic change. The findings were sent to the Radiology Results [address] at 5:10 pm on 2/12/2023 to be communicated to a licensed caregiver. XR CHEST PORTABLE    Result Date: 2/12/2023  No acute abnormality.      CTA HEAD NECK W CONTRAST    Result Date: 2/12/2023  Limited exam due to motion artifact. No flow-limiting arterial stenosis in the head and neck. ASSESSMENT & PLAN     Acute metabolic encephalopathy-GCS 10/11  -Currently patient has likely been sleepy after multiple sedating medication-vitally stable with unlabored breathing-not in apparent distress. -CT head, VBG, ammonia, BMP, blood tox, TSH were unremarkable.  -Head of bed elevation with aspiration precautions.  -N.p.o. till mental status improves. -Neurology has been consulted-recommendations to follow  -Avoid benzodiazepines for agitation. Seroquel or Haldol can be given. Mild lactic acidosis  -Likely secondary to dehydration-patient has received 1 L fluid bolus in the ER. Will monitor. Diabetes  -Currently patient has insulin pump running on, we will try to switch it to low-dose sliding scale with insulin Lantus while in the hospital.  [id number] in November 2022-wife states patient has been on insulin pump for 8 years. Hypertension  -Lisinopril 10 mg, Imdur 30 mg  -Lopressor 5 mg and hydralazine 5 mg as needed. History of bipolar disorder  -We will resume brexpiprazole 1 mg OD and vortioxetine 20 mg OD once mental status improves    History of migraine  We will resume home meds as needed-propranolol 20 mg BD, topiramate 25 mg OD, Fioricet    Unknown history of CAD  -We will resume Plavix, Imdur and Lipitor  -Echo in 2016-LVEF 55 to 60%, otherwise unremarkable    DVT ppx: Lovenox  GI ppx: Not indicated now    PT/OT/SW-consulted  Discharge Planning:  consulted-we will follow-up    Kelly Lopez MD  Internal Medicine Resident, PGY-1  [facility];  [place], [place]  2/12/2023, 9:16 PM

## 2023-02-13 NOTE — PROGRESS NOTES
Internal medicine senior note    54-year-old male with past medical history type 1 diabetes on insulin pump, cervical radiculopathy, peripheral neuropathy, depression and anxiety, hypertension, migraines presents for altered mental status. Patient's wife provides history as patient has been sedated with Ativan and Versed and Haldol in the ED. Patient apparently started acting funny yesterday evening by being confused and stumbling. He did however appear to be back in normal condition this morning when he woke up per wife but progressed back to a confusion and uncoordinated state throughout the day. She denies he used any drugs or did anything different than normal.  She was not aware of any symptoms that he was complaining of like fever, she states he did not lose control of his bowel or bladder however was having issues going to the bathroom possibly having difficulty with urination. Around 130 today he developed some slurred speech and she decided to take him to the emergency room. She described as acting \"drunk\". On arrival to the ED patient was hemodynamically stable, afebrile. Lab work-up showed unremarkable BMP, glucose 210 and patient on insulin pump, ammonia within normal limits, troponin negative, TSH normal, ethanol undetectable, CBC showing slight polycythemia, chest x-ray showed no acute abnormality. Stroke alert was initially called for slurred speech and CT head without contrast negative for acute intracranial hemorrhage and did show hypoattenuation in the right periventricular white matter likely due to microangiopathic change. CTA head and neck did not show flow-limiting arterial stenosis. On exam patient is arousable to sternal rub, is moving all extremities, pupils are sluggish however both are responsive to light, patient is not following commands at this time    #Encephalopathy  -Metabolic work-up essentially negative.  -Osmolar gap is 4  -No obvious signs of infection.   Patient has chronic neck pain from cervical radiculopathy and has been seen by pain management for this issue and received injections without resolve. He has been seen by 2 different neurosurgeons who will not operate due to his brittle diabetes. He had an MRI in 2021 showing severe spinal stenosis. -CT head and CTA head and neck negative  -Consult neurology. Appreciate recommendations  -Difficult to assess as patient has been heavily sedated due to agitation in the ED. UDS was drawn after patient received multiple benzos  -Remain n.p.o. until mentation improves  -LR 75 mL/h  -Aspiration precautions      #Type I diabetic on insulin pump  -Wife is not sure how to operate pump and states patient uses pump on his own and controls it with his phone  -We have removed his insulin pump and will start him on weight-based insulin basal and prandial with sliding scale. POCT every 6  -Patient will remain n.p.o. due to mental status    #Hypertension  #Hyperlipidemia  #Migraines  -Continue home medications. Will restart what patient was on at last outpatient visit.   Takes propranolol for migraine prevention    DVT prophylaxis Lovenox  Consults: Neurology  IV fluid: LR 75

## 2023-02-13 NOTE — PROGRESS NOTES
[facility]  Internal Medicine Teaching Residency Program  Inpatient Daily Progress Note  ______________________________________________________________________________    Patient: Aidan Pelletier  YOB: 1970   UJK:[id number]    Acct: [de-identified]     Room: 12/0512-01  Admit date: 2/12/2023  Today's date: 02/13/23  Number of days in the hospital: 1    SUBJECTIVE   Admitting Diagnosis: Altered mental status  CC: Altered mental status  Pt examined at bedside. Chart & results reviewed. No acute event overnight. Remained afebrile and slightly hypertensive overnight. Saturating 92% on 3 L of oxygen. Patient's mentation has improved-sleepy but oriented x3, able to follow commands. Able to move all extremities against gravity. Patient is having headache. Denies any other complaints. ROS:  Constitutional:  negative for chills, fevers, sweats  Respiratory:  negative for cough, dyspnea on exertion, hemoptysis, shortness of breath, wheezing  Cardiovascular:  negative for chest pain, chest pressure/discomfort, lower extremity edema, palpitations  Gastrointestinal:  negative for abdominal pain, constipation, diarrhea, nausea, vomiting  Neurological:  negative for dizziness, headache  BRIEF HISTORY     The patient is a pleasant 46 y.o. male with past medical history of diabetes-on insulin pump, hypertension, hyperlipidemia, intractable migraine, bipolar, anxiety was brought in by wife for the initial concern of stroke as patient had a slurred speech and was noted to have fall along with difficulty standing again. patient was noted to have bilateral arm and leg weakness and a stroke alert was called but CT head and CT angio were negative.   Patient was found to be confused by wife-patient was found to put lighter in his mouth and trying to lit the lighter with pen, trying to bring lighter near the mouth without any cigarettes, walking through the door as if there is no door, going to the restroom 3 times and sitting on toilet seat but not having bowel or passing urine. As per wife patient had a similar episode yesterday which self resolved and patient was doing completely fine this morning but later in the afternoon he was found to be confused and brought into the hospital by wife. Patient takes multiple medications for bipolar disorder, intractable migraine. Wife states recently around a week ago topiramate was added to the regimen by his PCP for migraine control. Patient has a history of heavy alcohol drinking around 12 years back but has not been drinking at all since then. Wife denies any concern of drug abuse. In the ER patient was hemodynamically stable, BMP unremarkable, ammonia 50, VBG unremarkable, toxic blood x-ray negative, TSH normal-1.5. Negative chest x-ray. In the ER patient was agitated and combative and was given multiple doses of multiple doses of Haldol, Ativan and Versed. On my encounter patient was very sleepy after multiple sedating medications but stable vitally and with nonlabored breathing. OBJECTIVE     Vital Signs:  BP (!) 158/94   Pulse 89   Temp 99.5 °F (37.5 °C) (Oral)   Resp 26   Ht 5' 8\" (1.727 m)   Wt 135 lb 5.8 oz (61.4 kg)   SpO2 91%   BMI 20.58 kg/m²     Temp (24hrs), Av °F (37.2 °C), Min:98.2 °F (36.8 °C), Max:99.6 °F (37.6 °C)    In: 10   Out: 550 [Urine:550]    Physical Exam:  Constitutional: This is a well developed, well nourished, 18.5-24.9 - Normal 46y.o. year old male who is sleepy, oriented, cooperative and in no apparent distress. Head:normocephalic and atraumatic. EENT:  PERRLA. No conjunctival injections. Septum was midline, mucosa was without erythema, exudates or cobblestoning. No thrush was noted. Neck: Supple without thyromegaly. No elevated JVP. Trachea was midline. Respiratory: Chest was symmetrical without dullness to percussion. Breath sounds bilaterally were clear to auscultation. There were no wheezes, rhonchi or rales. There is no intercostal retraction or use of accessory muscles. No egophony noted. Cardiovascular: Regular without murmur, clicks, gallops or rubs. Abdomen: Slightly rounded and soft without organomegaly. No rebound, rigidity or guarding was appreciated. Lymphatic: No lymphadenopathy. Musculoskeletal: Normal curvature of the spine. No gross muscle weakness. Extremities:  No lower extremity edema, ulcerations, tenderness, varicosities or erythema. Muscle size, tone and strength are normal.  No involuntary movements are noted. Skin:  Warm and dry. Good color, turgor and pigmentation. No lesions or scars.   No cyanosis or clubbing  Neurological/Psychiatric: Patient is sleepy but oriented x3, able to follow all commands, moving all extremities on command    Medications:  Scheduled Medications:    sodium chloride flush  5-40 mL IntraVENous 2 times per day    enoxaparin  40 mg SubCUTAneous Daily    insulin lispro  0-4 Units SubCUTAneous Q6H    insulin lispro  2 Units SubCUTAneous TID WC    insulin glargine  8 Units SubCUTAneous Nightly    lisinopril  10 mg Oral Daily    atorvastatin  80 mg Oral Nightly    clopidogrel  75 mg Oral Daily    isosorbide mononitrate  30 mg Oral Daily    propranolol  20 mg Oral BID     Continuous Infusions:    sodium chloride      dextrose      lactated ringers IV soln 75 mL/hr at 02/12/23 2316     PRN Medicationssodium chloride flush, 5-40 mL, PRN  sodium chloride, , PRN  ondansetron, 4 mg, Q8H PRN   Or  ondansetron, 4 mg, Q6H PRN  polyethylene glycol, 17 g, Daily PRN  acetaminophen, 650 mg, Q6H PRN   Or  acetaminophen, 650 mg, Q6H PRN  glucose, 4 tablet, PRN  dextrose bolus, 125 mL, PRN   Or  dextrose bolus, 250 mL, PRN  glucagon (rDNA), 1 mg, PRN  dextrose, , Continuous PRN  metoprolol, 5 mg, Q8H PRN  hydrALAZINE, 5 mg, Q6H PRN        Diagnostic Labs:  CBC:   Recent Labs     02/12/23  1711   WBC 7.7   RBC 5.64   HGB 17.3*   HCT 49.7 MCV 88.1   RDW 13.5        BMP:   Recent Labs     02/12/23  1633 02/12/23  1711   NA  --  138   K  --  4.3   CL  --  100   CO2  --  27   BUN  --  7   CREATININE 0.74 0.69*     BNP: No results for input(s): BNP in the last 72 hours. PT/INR:   Recent Labs     02/12/23  1711   PROTIME 10.5   INR 1.0     APTT:   Recent Labs     02/12/23  1711   APTT 24.3     CARDIAC ENZYMES: No results for input(s): CKMB, CKMBINDEX, TROPONINI in the last 72 hours. Invalid input(s): CKTOTAL;3  FASTING LIPID PANEL:  Lab Results   Component Value Date    CHOL 152 05/21/2018    HDL 52 04/16/2019    TRIG 47 05/21/2018     LIVER PROFILE:   Recent Labs     02/12/23 2252   AST 18   ALT 10   BILIDIR 0.1   BILITOT 0.3   ALKPHOS 216*      MICROBIOLOGY:   Lab Results   Component Value Date/Time    CULTURE NO GROWTH <24 HRS 02/12/2023 10:52 PM       Imaging:    CT HEAD WO CONTRAST    Result Date: 2/12/2023  1. No acute intracranial hemorrhage. No loss of gray-white differentiation to suggest acute infarction. 2. Hypoattenuation in the right periventricular white matter is more conspicuous than on prior study, likely microangiopathic change. The findings were sent to the Radiology Results [address] at 5:10 pm on 2/12/2023 to be communicated to a licensed caregiver. XR CHEST PORTABLE    Result Date: 2/12/2023  No acute abnormality. CTA HEAD NECK W CONTRAST    Result Date: 2/12/2023  Limited exam due to motion artifact. No flow-limiting arterial stenosis in the head and neck. ASSESSMENT & PLAN     Acute metabolic encephalopathy-GCS 10/11  - Mentation appears to be getting better. Previous confusion Can be secondary to recent topiramate intake. -CT head, VBG, ammonia, BMP, blood tox, TSH were unremarkable. -Urine tox screen showing multiple substance-likely patient was taking regularly at home.   Methadone possibly false positive with Compazine, barbiturates is positive as patient is on Fioricet at home.  -Head of bed elevation with aspiration precautions.  -N.p.o. till mental status improves completely.  -Avoid benzodiazepines for agitation. Seroquel or Haldol can be given. -We will reevaluate once mental status improves completely. Diabetes  -low-dose sliding scale with insulin Lantus while in the hospital.  OpC4y-1 in November 2022-wife states patient has been on insulin pump for 8 years. Leukocytosis-we will monitor off antibiotics. Patient may have aspirated-chest x-ray currently showing no opacities. Hypertension  -Lisinopril 10 mg, Imdur 30 mg  -Lopressor 5 mg and hydralazine 5 mg as needed. History of bipolar disorder  -We will resume brexpiprazole 1 mg OD and vortioxetine 20 mg OD once mental status improves     History of migraine  We will resume home meds as needed-propranolol 20 mg BD, topiramate 25 mg OD, Fioricet     Unknown history of CAD  -We will resume Plavix, Imdur and Lipitor  -Echo in 2016-LVEF 55 to 60%, otherwise unremarkable     DVT ppx: Lovenox  GI ppx: Not indicated now     PT/OT/SW-consulted  Discharge Planning:  consulted-we will follow-up    Fabby Galvan MD  Internal Medicine Resident, PGY-1  [facility];  [place], [place]  2/13/2023, 6:20 AM

## 2023-02-13 NOTE — ED NOTES
Pt rr even and nonlabored, nad. Pt laying on cot with full cardiac monitor on. Pt on 2L NC. Pt on 1:1 observation. Will continue to monitor.      Ansley Horvath RN  02/12/23 1925

## 2023-02-13 NOTE — PLAN OF CARE
Problem: Safety - Medical Restraint  Goal: Remains free of injury from restraints (Restraint for Interference with Medical Device)  Description: INTERVENTIONS:  1. Determine that other, less restrictive measures have been tried or would not be effective before applying the restraint  2. Evaluate the patient's condition at the time of restraint application  3. Inform patient/family regarding the reason for restraint  4. Q2H: Monitor safety, psychosocial status, comfort, nutrition and hydration  Outcome: Progressing     Problem: Discharge Planning  Goal: Discharge to home or other facility with appropriate resources  Outcome: Progressing     Problem: Confusion  Goal: Confusion, delirium, dementia, or psychosis is improved or at baseline  Description: INTERVENTIONS:  1. Assess for possible contributors to thought disturbance, including medications, impaired vision or hearing, underlying metabolic abnormalities, dehydration, psychiatric diagnoses, and notify attending LIP  2. [place] high risk fall precautions, as indicated  3. Provide frequent short contacts to provide reality reorientation, refocusing and direction  4. Decrease environmental stimuli, including noise as appropriate  5. Monitor and intervene to maintain adequate nutrition, hydration, elimination, sleep and activity  6. If unable to ensure safety without constant attention obtain sitter and review sitter guidelines with assigned personnel  7.  Initiate Psychosocial CNS and Spiritual Care consult, as indicated  Outcome: Progressing     Problem: Safety - Adult  Goal: Free from fall injury  Outcome: Progressing     Problem: Chronic Conditions and Co-morbidities  Goal: Patient's chronic conditions and co-morbidity symptoms are monitored and maintained or improved  Outcome: Progressing     Problem: Respiratory - Adult  Goal: Achieves optimal ventilation and oxygenation  Outcome: Progressing     Problem: Genitourinary - Adult  Goal: Absence of urinary retention  Outcome: Progressing  Goal: Urinary catheter remains patent  Outcome: Progressing     Problem: Pain  Goal: Verbalizes/displays adequate comfort level or baseline comfort level  Outcome: Progressing

## 2023-02-13 NOTE — CONSULTS
[facility] Neurology   IN-PATIENT SERVICE      NEUROLOGY CONSULT  NOTE            Date:   2/13/2023  Patient name:  Carolyn Arora  Date of admission:  2/12/2023  YOB: 1970      Chief Complaint:     Chief Complaint   Patient presents with    Cerebrovascular Accident       Reason for Consult:      AMS, sluggish pupils on examination    History of Present Illness: This patient is a 49-year-old male with past medical history of type I DM, hypertension, hyperlipidemia, chronic smoker, cervical stenosis, migraines on propranolol for migraine prevention who presented to the ED initially with frequent falls and slurred speech, last known well was at 12 PM on 2 to/12/2023, stroke alert was called. CT head and CTA was unremarkable, so was recommended to consider metabolic etiology for the patient's symptoms and admit under internal medicine. Patient was admitted under internal medicine for work-up of acute metabolic encephalopathy. On further chart reviewing, patient acted funny yesterday evening, mildly confused and was stumbling but was back to normal condition in the morning when his wife woke him up but progressed back to confusion and agitation and having slurred speech and ending up in the ED. On arrival to the ED patient's blood pressure was on the higher side otherwise other vital signs were stable. Pertinent labs done in the ED: Glucose levels around 200s, ammonia WNL, ethanol WNL. UDS positive for barbiturates, benzodiazepine, cannabinoids, methadone, oxycodone. Of note patient did receive 5 mg of Versed, 1 mg of Ativan and 10 mg of Haldol in the ED for agitation. Chest x-ray-unremarkable   neurology is consulted for altered mental status and sluggish pupils on examination.     Past Medical History:     Past Medical History:   Diagnosis Date    Anxiety     Arthritis     CAD (coronary artery disease)     Calcaneal apophysitis     Corns     Depression     Diabetes type I ([facility] [address] Gastroparesis     GERD (gastroesophageal reflux disease)     Headache(784.0)     Hearing loss     Hyperglycemia     Hyperlipidemia     Hypertension     Intussusception (HCC)     MI (myocardial infarction) ([address] jan 2014    Neuropathy     Osteoarthritis     Panic attacks     Temporomandibular joint disorder         Past Surgical History:     Past Surgical History:   Procedure Laterality Date    ABDOMEN SURGERY      insulin pump    ABDOMINAL EXPLORATION SURGERY  05/28/2016    bowel resection Rt. colon &terminal ileum, intussuseption. CARDIAC CATHETERIZATION  2014    CARPAL TUNNEL RELEASE      bilateral    COLONOSCOPY  05/23/2017    poor prep; diverticulosis    COLONOSCOPY N/A 7/10/2018    COLONOSCOPY POLYPECTOMY HOT BIOPSY performed by Urmila Steele MD at [address] [facility] [address] N/A 8/9/2021    COLONOSCOPY DIAGNOSTIC performed by Urmila Steele MD at [address] 11/14/2022    COLONOSCOPY WITH BIOPSY performed by Urmila Steele MD at [address]      right    HERNIA REPAIR      inguinal    MI COLON CA SCRN NOT  [address] IND N/A 5/25/2017    COLONOSCOPY performed by Reta Levin MD at [facility] ESOPHAGOGASTRODUODENOSCOPY TRANSORAL DIAGNOSTIC N/A 5/24/2017    EGD ESOPHAGOGASTRODUODENOSCOPY performed by Reta Levin MD at [address] [place] [address],[address] Bilateral     SHOULDER SURGERY      fracture     TONSILLECTOMY      x2    TOOTH EXTRACTION      x4    ULNAR TUNNEL RELEASE      UPPER GASTROINTESTINAL ENDOSCOPY  05/23/2017    mild gastritis    UPPER GASTROINTESTINAL ENDOSCOPY N/A 7/10/2018    EGD BIOPSY performed by Urmila Steele MD at [facility] N/A 8/10/2021    HERNIA INCISIONAL REPAIR OPEN [address] performed by Urmila Steele MD at [address] EXTRACTION      x4        Medications Prior to Admission:     Prior to Admission medications    Medication Sig Start Date End Date Taking?  Authorizing Provider   lisinopril (PRINIVIL;ZESTRIL) 10 MG tablet Take 3 tablets by mouth daily 2/6/23   Tiffanie East MD   atorvastatin (LIPITOR) 80 MG tablet Take 1 tablet by mouth nightly 2/6/23   Tiffanie East MD   clopidogrel (PLAVIX) 75 MG tablet Take 1 tablet by mouth daily 2/6/23   Tiffanie East MD   isosorbide mononitrate (IMDUR) 30 MG extended release tablet Take 1 tablet by mouth daily 2/6/23   Tiffanie East MD   propranolol (INDERAL) 20 MG tablet Take 1 tablet by mouth 2 times daily Takes for migraines 2/6/23   Tiffanie East MD   prochlorperazine (COMPAZINE) 10 MG tablet Take 1 tablet by mouth every 6 hours as needed (nausea) 2/6/23   Tiffanie East MD   topiramate (TOPAMAX) 25 MG tablet Take 1 tablet by mouth nightly 2/6/23   Tiffanie East MD   LORazepam (ATIVAN) 0.5 MG tablet Take 1 tablet by mouth every 8 hours as needed for Anxiety for up to 30 days. 2/6/23 3/8/23  Tiffanie East MD   oxyCODONE (ROXICODONE) 20 MG immediate release tablet Take 1 tablet by mouth every 4 hours as needed for Pain for up to 30 days. 2/1/23 3/3/23  Tiffanie East MD   butalbital-acetaminophen-caffeine ([facility]) -48 MG per tablet Take 1 tablet by mouth every 4 hours as needed for Headaches 1/30/23   Tiffanie East MD   pantoprazole (PROTONIX) 40 MG tablet Take 1 tablet by mouth in the morning. TAKE ONE TABLET BY MOUTH TWICE DAILY.  8/16/22   Tiffanie East MD   brexpiprazole (REXULTI) 1 MG TABS tablet Take 1 tablet by mouth daily 4/7/22   Tiffanie East MD   TRINTELLIX 20 MG TABS tablet TAKE 1 TABLET DAILY 3/4/22   Tiffanie East MD   HUMALOG 100 UNIT/ML injection vial INJECT UP TO 70 UNITS VIA PUMP AS DIRECTED 11/24/21   Historical Provider, MD   naloxone 4 MG/0.1ML LIQD nasal spray 1 spray by Nasal route as needed for Opioid Reversal 8/10/21   Christopher Silva MD   b complex vitamins capsule Take 1 capsule by mouth daily    Historical Provider, MD   CONTOUR NEXT TEST strip use four times a day 21   Historical Provider, MD   nitroGLYCERIN (NITROSTAT) 0.4 MG SL tablet Place 1 tablet under the tongue every 5 minutes as needed for Chest pain Indications: Disease of the Arteries of the Heart Take 1 tablet every 5 minutes times three as needed for chest pain 16   [place]ills, APRN - CNP        Allergies: Avelox [moxifloxacin], Ciprofloxacin, Levofloxacin, Lyrica [pregabalin], Metoclopramide, Neurontin [gabapentin], Other, Tetanus toxoids, Tramadol, and Tramadol hcl    Social History:     Tobacco:    reports that he has been smoking cigarettes. He has a 30.00 pack-year smoking history. He has never used smokeless tobacco.  Alcohol:      reports no history of alcohol use. Drug Use:  reports no history of drug use.     Family History:     Family History   Problem Relation Age of Onset    Diabetes Mother     High Blood Pressure Mother     Heart Disease Mother     Migraines Mother     Other Mother         diabetic neuropathy    High Blood Pressure Father        Review of Systems:       Constitutional Negative for fever and chills   HEENT Negative for ear discharge, ear pain, nosebleed   Eyes Negative for photophobia, pain and discharge   Respiratory Negative for hemoptysis and sputum   Cardiovascular Negative for orthopnea, claudication and PND   Gastrointestinal Negative for abdominal pain, diarrhea, blood in stool   Musculoskeletal Negative for joint pain, negative for myalgia   Skin Negative for rash or itching   hematology Negative for ecchymosis, anemia   Psychiatric Negative for suicidal ideation, anxiety, depression, hallucinations       Physical Exam:   BP (!) 158/94   Pulse 89   Temp 99.5 °F (37.5 °C) (Oral)   Resp 26   Ht 5' 8\" (1.727 m)   Wt 135 lb 5.8 oz (61.4 kg)   SpO2 91%   BMI 20.58 kg/m²   Temp (24hrs), Av °F (37.2 °C), Min:98.2 °F (36.8 °C), Max:99.6 °F (37.6 °C)        General examination:      General Appearance:  alert, well appearing, and in no acute distress  HEENT: Normocephalic, atraumatic, moist mucus membranes  Neck: supple, no carotid bruits, (-) nuchal rigidity  Lungs:  Respirations unlabored, chest wall no deformity, BS normal  Cardiovascular: normal rate, regular rhythm  Abdomen: Soft, nontender, nondistended, normal bowel sounds  Skin: No gross lesions, rashes, bruising or bleeding on exposed skin area  Extremities:  peripheral pulses palpable, clubbing or edema  Psych: normal affect    NEUROLOGIC EXAMINATION    Mental status   Alert and oriented x 2; following all commands;   speech is fluent, no dysarthria, aphasia. Cranial nerves   II - visual fields intact to confrontation; pupils reactive  III, IV, VI - extraocular muscles intact; no MAIRA; no nystagmus; no ptosis   V - normal facial sensation                                                               VII - normal facial symmetry                                                             VIII - intact hearing                                                                             IX, X - symmetrical palate elevation                                               XI - symmetrical shoulder shrug                                                       XII - midline tongue without atrophy or fasciculation     Motor function  Strength:   5/5 RUE, 5/5 RLE  5/5 LUE, 5/5  LLE  Normal bulk and tone. Sensory function Intact to touch     Cerebellar Intact finger-nose-finger testing. Reflex function 2/4 symmetric throughout .       Gait                  Deferred             Diagnostics:      Laboratory Testing:  CBC:   Recent Labs     02/12/23  1711   WBC 7.7   HGB 17.3*        BMP:    Recent Labs     02/12/23  1633 02/12/23  1711 02/12/23 2018   NA  --  138  --    K  --  4.3  --    CL  --  100  --    CO2  --  27  --    BUN  --  7  --    CREATININE 0.74 0.69*  --    GLUCOSE  --  210* 220         Lab Results   Component Value Date    CHOL 152 05/21/2018    HDL 52 04/16/2019    TRIG 47 05/21/2018    ALT 10 02/12/2023    AST 18 02/12/2023    TSH 1.50 02/12/2023    INR 1.0 02/12/2023    GLUF 204 04/16/2019    LABA1C 9.1 11/22/2022    LABMICR CANNOT BE CALCULATED 04/10/2019       No results found for: PHENYTOIN, PHENYTOIN, VALPROATE, CBMZ      Assessment and plan:        Acute metabolic encephalopathy, unknown cause, Medication induced. Patient was recently started on Topamax on Tuesday which was eventually stopped as he was feeling weird  Acute urinary retention  DM1 on insulin pump  Hypertension   Hyperlipidemia  H/o migraine on prophylactic propranolol    - Stroke was ruled out yesterday. - Mentation better than yesterday as per wife. - Patient was following commands  - Continue to monitor for mentation for now  - Will continue to follow. - Rest of the management as per the primary team.   - Will discuss with attending for final recommendations.       Electronically signed by Mason Wong MD on 2/13/2023 at 7:35 AM

## 2023-02-13 NOTE — PROGRESS NOTES
Occupational Therapy  Facility/Department: [facility]  Occupational Therapy Initial Assessment    Name: Virginie Ching  : 1970  MRN: [id number]  Date of Service: 2023    Discharge Recommendations:   Further therapy recommended at discharge. Patient Diagnosis(es): The encounter diagnosis was Altered mental status, unspecified altered mental status type. Past Medical History:  has a past medical history of Anxiety, Arthritis, CAD (coronary artery disease), Calcaneal apophysitis, Corns, Depression, Diabetes type I ([address] Gastroparesis, GERD (gastroesophageal reflux disease), Headache(784.0), Hearing loss, Hyperglycemia, Hyperlipidemia, Hypertension, Intussusception ([address] MI (myocardial infarction) ([address] Neuropathy, Osteoarthritis, Panic attacks, and Temporomandibular joint disorder. Past Surgical History:  has a past surgical history that includes Ulnar tunnel release; Rotator cuff repair (Bilateral); Carpal tunnel release; shoulder surgery; Elbow fracture surgery; Tonsillectomy; Fleming tooth extraction; Tooth Extraction; Abdomen surgery; hernia repair; Abdominal exploration surgery (2016); Cardiac catheterization (); pr esophagogastroduodenoscopy transoral diagnostic (N/A, 2017); pr colon ca scrn not hi rsk ind (N/A, 2017); Colonoscopy (2017); Upper gastrointestinal endoscopy (2017); Upper gastrointestinal endoscopy (N/A, 7/10/2018); Colonoscopy (N/A, 7/10/2018); Colonoscopy (N/A, 2021); ventral hernia repair (N/A, 8/10/2021); and Colonoscopy (N/A, 2022). Assessment   Performance deficits / Impairments: Decreased functional mobility ; Decreased ADL status; Decreased endurance;Decreased high-level IADLs;Decreased strength;Decreased balance;Decreased safe awareness;Decreased cognition  Assessment: pt exhibited above deficits impacting occupational performance.  pt would benefit from further acute OT, as well as at discharge in orderto increase safety and independence with ADLS adn functional transfers/functional mobility. Prognosis: Good  Decision Making: High Complexity  REQUIRES OT FOLLOW-UP: Yes  Activity Tolerance  Activity Tolerance: Patient Tolerated treatment well;Treatment limited secondary to decreased cognition        Plan   Occupational Therapy Plan  Times Per Week: 3x/wk     Restrictions  Restrictions/Precautions  Restrictions/Precautions: Fall Risk  Required Braces or Orthoses?: No  Position Activity Restriction  Other position/activity restrictions: up with assist    Subjective   General  Patient assessed for rehabilitation services?: Yes  Family / Caregiver Present: Yes (pt wife present for duration of session)  Diagnosis: AMS  General Comment  Comments: RN ok'd for therapy this morning. pt agreeable to participate in session following minimal encouragement. pt reported generalized pain, frequently stating \"I don't feel good\"     Social/Functional History  Social/Functional History  Lives With: Spouse (3dogs)  Type of Home: House  Home Layout: One level  Home Access: Stairs to enter with rails  Entrance Stairs - Number of Steps: 2  Entrance Stairs - Rails: Right  Bathroom Shower/Tub: Tub/Shower unit, Walk-in shower  Bathroom Toilet: Standard  Bathroom Equipment: Shower chair (no use of shower chair at baseline)  Home Equipment:  (no use of DME AT baseline)  ADL Assistance: [address]: Independent  Homemaking Responsibilities: Yes  Meal Prep Responsibility: Primary  Laundry Responsibility: Primary  Cleaning Responsibility: Primary  Ambulation Assistance: Independent  Transfer Assistance: Independent  Active : Yes  Mode of Transportation: Car  Occupation: On disability  Leisure & Hobbies: playing poker on phone  Additional Comments: pt wife works 2 jobs outside home, but works close to home       Objective                Safety Devices  Type of Devices: Call light within reach; Left in bed;Gait belt;Nurse notified; Patient at risk for falls;Sitter present  Restraints  Restraints Initially in Place: No    Bed Mobility Training  Bed Mobility Training: Yes  Overall Level of Assistance: Stand-by assistance  Supine to Sit: Stand-by assistance  Sit to Supine: Stand-by assistance  Scooting: Stand-by assistance    Transfer Training  Transfer Training: Yes  Overall Level of Assistance: Contact-guard assistance (with and without RW)  Sit to Stand: Contact-guard assistance  Stand to Sit: Contact-guard assistance    Balance  Sitting: Intact (~10 minutes on eOB with SBA)  Standing: With support (~2 minutes total. pt completed sit>stand transfer 2x andtook side steps toward HOB. Pt required assistance for walker progression and mod verbal cues for initiation and continuation, otherwise pt CGA with RW)      Gait  Overall Level of Assistance: Contact-guard assistance (with RW)       AROM: Grossly decreased, non-functional  Strength: Generally decreased, functional (B shoulders 3-/5 grossly, able to maintain against gravity. B  4-/5)  Coordination: Generally decreased, functional (pt exhibited decreased speed and acccuracy impacting fine motor coordination to B UE)  Tone: Normal  Sensation: Intact      ADL  Feeding: Modified independent   Grooming: Modified independent ;Verbal cueing  UE Bathing: Stand by assistance;Verbal cueing  LE Bathing: Minimal assistance;Verbal cueing  UE Dressing: Stand by assistance;Verbal cueing  LE Dressing: Minimal assistance;Verbal cueing  Toileting: Contact guard assistance; Increased time to complete  Additional Comments: OT facilitated pt in donning B socks, pt able to complete with increased time and mod verbal cues for initiation. Vision  Vision: Impaired (contacts)  Hearing  Hearing: Within functional limits      Cognition  Overall Cognitive Status: Exceptions  Arousal/Alertness: Delayed responses to stimuli  Following Commands: Follows multistep commands with repitition; Follows multistep commands with increased time  Attention Span: Attends with cues to redirect  Safety Judgement: Decreased awareness of need for assistance;Decreased awareness of need for safety  Insights: Decreased awareness of deficits  Initiation: Requires cues for all  Sequencing: Requires cues for all  Cognition Comment: pt required increased time  to process all commands and questions. pt lethgargic throughout session. pt also exhibited impulsivity by standing before therapist was ready. Orientation  Overall Orientation Status: Impaired  Orientation Level: Oriented to time;Oriented to person;Disoriented to situation;Disoriented to place                    Education Given To: Patient  Education Provided: Role of Therapy;Plan of Care;Transfer Training; Fall Prevention Strategies  Education Method: Verbal  Barriers to Learning: None  Education Outcome: Verbalized understanding    LUE AROM (degrees)  LUE AROM : Exceptions  L Shoulder Flexion 0-180: 0-40, AAROM WFL  L Elbow Flexion 0-145: WFL  Left Hand AROM (degrees)  Left Hand AROM: WFL  RUE AROM (degrees)  RUE AROM : Exceptions  R Shoulder Flexion 0-180: 0-40, AAROM WFL  R Elbow Flexion 0-145: WFL  Right Hand AROM (degrees)  Right Hand AROM: WFL        Hand Dominance  Hand Dominance: Left                                                          AM-PAC Score        AM-[facility] Inpatient Daily Activity Raw Score: 20 (02/13/23 1208)  AM-PAC Inpatient ADL T-Scale Score : 42.03 (02/13/23 1208)  ADL Inpatient CMS 0-100% Score: 38.32 (02/13/23 1208)  ADL Inpatient CMS G-Code Modifier : CJ (02/13/23 1208)           Goals  Short Term Goals  Time Frame for Short Term Goals: pt will, by discharge  Short Term Goal 1: complete LB ADLs and toileting tasks with SBA and set up  Short Term Goal 2: complete UB ADLS with mod I  Short Term Goal 3: follow simple 2 steps commands with 1-2 verbal cues for initiation ,sequencing and continuation  Short Term Goal 4: maintain attention to task for 6+ minutes with 1 verbal cues for redirection  Short Term Goal 5: dem SBA During functional transfers/functional mobility with LRD ,as needed       Therapy Time   Individual Concurrent Group Co-treatment   Time In 0906         Time Out 0926         Minutes 20      Co-eval with PT    Timed Code Treatment Minutes: Fely 8745, OTR/L

## 2023-02-14 VITALS
HEIGHT: 68 IN | TEMPERATURE: 98 F | OXYGEN SATURATION: 97 % | SYSTOLIC BLOOD PRESSURE: 134 MMHG | BODY MASS INDEX: 20.52 KG/M2 | RESPIRATION RATE: 17 BRPM | DIASTOLIC BLOOD PRESSURE: 85 MMHG | HEART RATE: 77 BPM | WEIGHT: 135.36 LBS

## 2023-02-14 PROBLEM — G93.40 ACUTE ENCEPHALOPATHY: Status: RESOLVED | Noted: 2023-02-12 | Resolved: 2023-02-14

## 2023-02-14 PROBLEM — E10.9 TYPE 1 DIABETES MELLITUS WITHOUT COMPLICATION (HCC): Status: ACTIVE | Noted: 2023-02-14

## 2023-02-14 PROBLEM — R41.82 ALTERED MENTAL STATUS: Status: RESOLVED | Noted: 2023-02-12 | Resolved: 2023-02-14

## 2023-02-14 LAB
ABSOLUTE EOS #: <0.03 K/UL (ref 0–0.44)
ABSOLUTE IMMATURE GRANULOCYTE: 0.05 K/UL (ref 0–0.3)
ABSOLUTE LYMPH #: 2.52 K/UL (ref 1.1–3.7)
ABSOLUTE MONO #: 1.04 K/UL (ref 0.1–1.2)
ANION GAP SERPL CALCULATED.3IONS-SCNC: 13 MMOL/L (ref 9–17)
BASOPHILS # BLD: 0 % (ref 0–2)
BASOPHILS ABSOLUTE: 0.04 K/UL (ref 0–0.2)
BUN SERPL-MCNC: 10 MG/DL (ref 6–20)
CALCIUM SERPL-MCNC: 9.1 MG/DL (ref 8.6–10.4)
CHLORIDE SERPL-SCNC: 104 MMOL/L (ref 98–107)
CO2 SERPL-SCNC: 23 MMOL/L (ref 20–31)
CREAT SERPL-MCNC: 0.5 MG/DL (ref 0.7–1.2)
EKG ATRIAL RATE: 74 BPM
EKG ATRIAL RATE: 89 BPM
EKG P AXIS: 67 DEGREES
EKG P AXIS: 72 DEGREES
EKG P-R INTERVAL: 146 MS
EKG P-R INTERVAL: 154 MS
EKG Q-T INTERVAL: 364 MS
EKG Q-T INTERVAL: 388 MS
EKG QRS DURATION: 80 MS
EKG QRS DURATION: 82 MS
EKG QTC CALCULATION (BAZETT): 430 MS
EKG QTC CALCULATION (BAZETT): 442 MS
EKG R AXIS: 52 DEGREES
EKG R AXIS: 66 DEGREES
EKG T AXIS: 74 DEGREES
EKG T AXIS: 77 DEGREES
EKG VENTRICULAR RATE: 74 BPM
EKG VENTRICULAR RATE: 89 BPM
EOSINOPHILS RELATIVE PERCENT: 0 % (ref 1–4)
GFR SERPL CREATININE-BSD FRML MDRD: >60 ML/MIN/1.73M2
GLUCOSE BLD-MCNC: 312 MG/DL (ref 75–110)
GLUCOSE BLD-MCNC: 454 MG/DL (ref 75–110)
GLUCOSE BLD-MCNC: 457 MG/DL (ref 75–110)
GLUCOSE SERPL-MCNC: 140 MG/DL (ref 70–99)
HCT VFR BLD AUTO: 41.2 % (ref 40.7–50.3)
HGB BLD-MCNC: 14.6 G/DL (ref 13–17)
IMMATURE GRANULOCYTES: 0 %
LYMPHOCYTES # BLD: 21 % (ref 24–43)
MAGNESIUM SERPL-MCNC: 1.8 MG/DL (ref 1.6–2.6)
MCH RBC QN AUTO: 30.6 PG (ref 25.2–33.5)
MCHC RBC AUTO-ENTMCNC: 35.4 G/DL (ref 28.4–34.8)
MCV RBC AUTO: 86.4 FL (ref 82.6–102.9)
MONOCYTES # BLD: 9 % (ref 3–12)
NRBC AUTOMATED: 0 PER 100 WBC
PDW BLD-RTO: 13.4 % (ref 11.8–14.4)
PLATELET # BLD AUTO: 186 K/UL (ref 138–453)
PMV BLD AUTO: 9.5 FL (ref 8.1–13.5)
POTASSIUM SERPL-SCNC: 3.7 MMOL/L (ref 3.7–5.3)
RBC # BLD: 4.77 M/UL (ref 4.21–5.77)
SEG NEUTROPHILS: 70 % (ref 36–65)
SEGMENTED NEUTROPHILS ABSOLUTE COUNT: 8.56 K/UL (ref 1.5–8.1)
SODIUM SERPL-SCNC: 140 MMOL/L (ref 135–144)
TROPONIN I SERPL DL<=0.01 NG/ML-MCNC: 8 NG/L (ref 0–22)
WBC # BLD AUTO: 12.2 K/UL (ref 3.5–11.3)

## 2023-02-14 PROCEDURE — 6370000000 HC RX 637 (ALT 250 FOR IP): Performed by: STUDENT IN AN ORGANIZED HEALTH CARE EDUCATION/TRAINING PROGRAM

## 2023-02-14 PROCEDURE — 93010 ELECTROCARDIOGRAM REPORT: CPT | Performed by: INTERNAL MEDICINE

## 2023-02-14 PROCEDURE — 96372 THER/PROPH/DIAG INJ SC/IM: CPT

## 2023-02-14 PROCEDURE — 36415 COLL VENOUS BLD VENIPUNCTURE: CPT

## 2023-02-14 PROCEDURE — 83735 ASSAY OF MAGNESIUM: CPT

## 2023-02-14 PROCEDURE — 2580000003 HC RX 258: Performed by: STUDENT IN AN ORGANIZED HEALTH CARE EDUCATION/TRAINING PROGRAM

## 2023-02-14 PROCEDURE — 96376 TX/PRO/DX INJ SAME DRUG ADON: CPT

## 2023-02-14 PROCEDURE — 93005 ELECTROCARDIOGRAM TRACING: CPT

## 2023-02-14 PROCEDURE — 6360000002 HC RX W HCPCS: Performed by: STUDENT IN AN ORGANIZED HEALTH CARE EDUCATION/TRAINING PROGRAM

## 2023-02-14 PROCEDURE — 6370000000 HC RX 637 (ALT 250 FOR IP)

## 2023-02-14 PROCEDURE — 96366 THER/PROPH/DIAG IV INF ADDON: CPT

## 2023-02-14 PROCEDURE — 96365 THER/PROPH/DIAG IV INF INIT: CPT

## 2023-02-14 PROCEDURE — 6370000000 HC RX 637 (ALT 250 FOR IP): Performed by: INTERNAL MEDICINE

## 2023-02-14 PROCEDURE — G0378 HOSPITAL OBSERVATION PER HR: HCPCS

## 2023-02-14 PROCEDURE — 99232 SBSQ HOSP IP/OBS MODERATE 35: CPT | Performed by: INTERNAL MEDICINE

## 2023-02-14 PROCEDURE — 6360000002 HC RX W HCPCS

## 2023-02-14 PROCEDURE — 85025 COMPLETE CBC W/AUTO DIFF WBC: CPT

## 2023-02-14 PROCEDURE — 84484 ASSAY OF TROPONIN QUANT: CPT

## 2023-02-14 PROCEDURE — 80048 BASIC METABOLIC PNL TOTAL CA: CPT

## 2023-02-14 RX ORDER — MAGNESIUM SULFATE IN WATER 40 MG/ML
2000 INJECTION, SOLUTION INTRAVENOUS ONCE
Status: COMPLETED | OUTPATIENT
Start: 2023-02-14 | End: 2023-02-14

## 2023-02-14 RX ORDER — INSULIN LISPRO 100 [IU]/ML
0-8 INJECTION, SOLUTION INTRAVENOUS; SUBCUTANEOUS
Status: DISCONTINUED | OUTPATIENT
Start: 2023-02-14 | End: 2023-02-14 | Stop reason: HOSPADM

## 2023-02-14 RX ORDER — INSULIN GLARGINE 100 [IU]/ML
10 INJECTION, SOLUTION SUBCUTANEOUS NIGHTLY
Status: DISCONTINUED | OUTPATIENT
Start: 2023-02-14 | End: 2023-02-14

## 2023-02-14 RX ORDER — MAGNESIUM SULFATE 1 G/100ML
1000 INJECTION INTRAVENOUS ONCE
Status: DISCONTINUED | OUTPATIENT
Start: 2023-02-14 | End: 2023-02-14

## 2023-02-14 RX ORDER — INSULIN LISPRO 100 [IU]/ML
0-4 INJECTION, SOLUTION INTRAVENOUS; SUBCUTANEOUS NIGHTLY
Status: DISCONTINUED | OUTPATIENT
Start: 2023-02-14 | End: 2023-02-14

## 2023-02-14 RX ORDER — POTASSIUM CHLORIDE 20 MEQ/1
40 TABLET, EXTENDED RELEASE ORAL ONCE
Status: COMPLETED | OUTPATIENT
Start: 2023-02-14 | End: 2023-02-14

## 2023-02-14 RX ORDER — INSULIN LISPRO 100 [IU]/ML
0-8 INJECTION, SOLUTION INTRAVENOUS; SUBCUTANEOUS
Status: DISCONTINUED | OUTPATIENT
Start: 2023-02-14 | End: 2023-02-14

## 2023-02-14 RX ORDER — INSULIN GLARGINE 100 [IU]/ML
8 INJECTION, SOLUTION SUBCUTANEOUS NIGHTLY
Status: DISCONTINUED | OUTPATIENT
Start: 2023-02-14 | End: 2023-02-14 | Stop reason: HOSPADM

## 2023-02-14 RX ORDER — INSULIN GLARGINE 100 [IU]/ML
8 INJECTION, SOLUTION SUBCUTANEOUS
Status: COMPLETED | OUTPATIENT
Start: 2023-02-14 | End: 2023-02-14

## 2023-02-14 RX ORDER — INSULIN LISPRO 100 [IU]/ML
0-4 INJECTION, SOLUTION INTRAVENOUS; SUBCUTANEOUS NIGHTLY
Status: DISCONTINUED | OUTPATIENT
Start: 2023-02-14 | End: 2023-02-14 | Stop reason: HOSPADM

## 2023-02-14 RX ADMIN — LORAZEPAM 0.5 MG: 0.5 TABLET ORAL at 13:08

## 2023-02-14 RX ADMIN — LISINOPRIL 20 MG: 20 TABLET ORAL at 08:55

## 2023-02-14 RX ADMIN — SODIUM CHLORIDE, PRESERVATIVE FREE 10 ML: 5 INJECTION INTRAVENOUS at 04:56

## 2023-02-14 RX ADMIN — OXYCODONE 10 MG: 5 TABLET ORAL at 08:55

## 2023-02-14 RX ADMIN — PROPRANOLOL HYDROCHLORIDE 20 MG: 20 TABLET ORAL at 08:55

## 2023-02-14 RX ADMIN — ISOSORBIDE MONONITRATE 30 MG: 30 TABLET, EXTENDED RELEASE ORAL at 08:55

## 2023-02-14 RX ADMIN — SODIUM CHLORIDE, PRESERVATIVE FREE 10 ML: 5 INJECTION INTRAVENOUS at 08:56

## 2023-02-14 RX ADMIN — ACETAMINOPHEN 650 MG: 325 TABLET ORAL at 02:40

## 2023-02-14 RX ADMIN — OXYCODONE 10 MG: 5 TABLET ORAL at 02:40

## 2023-02-14 RX ADMIN — MAGNESIUM SULFATE HEPTAHYDRATE 2000 MG: 40 INJECTION, SOLUTION INTRAVENOUS at 11:05

## 2023-02-14 RX ADMIN — INSULIN GLARGINE 8 UNITS: 100 INJECTION, SOLUTION SUBCUTANEOUS at 08:56

## 2023-02-14 RX ADMIN — SODIUM CHLORIDE: 9 INJECTION, SOLUTION INTRAVENOUS at 11:02

## 2023-02-14 RX ADMIN — POTASSIUM CHLORIDE 40 MEQ: 1500 TABLET, EXTENDED RELEASE ORAL at 08:55

## 2023-02-14 RX ADMIN — INSULIN LISPRO 8 UNITS: 100 INJECTION, SOLUTION INTRAVENOUS; SUBCUTANEOUS at 12:27

## 2023-02-14 RX ADMIN — HYDRALAZINE HYDROCHLORIDE 5 MG: 20 INJECTION INTRAMUSCULAR; INTRAVENOUS at 04:56

## 2023-02-14 RX ADMIN — ENOXAPARIN SODIUM 40 MG: 100 INJECTION SUBCUTANEOUS at 08:56

## 2023-02-14 RX ADMIN — CLOPIDOGREL 75 MG: 75 TABLET, FILM COATED ORAL at 08:55

## 2023-02-14 RX ADMIN — LORAZEPAM 0.5 MG: 0.5 TABLET ORAL at 08:56

## 2023-02-14 RX ADMIN — OXYCODONE 10 MG: 5 TABLET ORAL at 13:08

## 2023-02-14 ASSESSMENT — PAIN DESCRIPTION - LOCATION
LOCATION: ELBOW;SHOULDER;BACK
LOCATION: SHOULDER

## 2023-02-14 ASSESSMENT — PAIN DESCRIPTION - FREQUENCY: FREQUENCY: INTERMITTENT

## 2023-02-14 ASSESSMENT — PAIN DESCRIPTION - PAIN TYPE: TYPE: CHRONIC PAIN

## 2023-02-14 ASSESSMENT — PAIN SCALES - GENERAL
PAINLEVEL_OUTOF10: 7
PAINLEVEL_OUTOF10: 7
PAINLEVEL_OUTOF10: 4
PAINLEVEL_OUTOF10: 7
PAINLEVEL_OUTOF10: 7

## 2023-02-14 ASSESSMENT — PAIN DESCRIPTION - DESCRIPTORS
DESCRIPTORS: SHARP
DESCRIPTORS: STABBING

## 2023-02-14 NOTE — PLAN OF CARE
Problem: Safety - Medical Restraint  Goal: Remains free of injury from restraints (Restraint for Interference with Medical Device)  Description: INTERVENTIONS:  1. Determine that other, less restrictive measures have been tried or would not be effective before applying the restraint  2. Evaluate the patient's condition at the time of restraint application  3. Inform patient/family regarding the reason for restraint  4. Q2H: Monitor safety, psychosocial status, comfort, nutrition and hydration  2/13/2023 1911 by Khanh Seay RN  Outcome: Progressing  2/13/2023 0513 by Dee Porter RN  Outcome: Progressing  Flowsheets (Taken 2/12/2023 1830 by Airam Renteria RN)  Remains free of injury from restraints (restraint for interference with medical device): Every 2 hours: Monitor safety, psychosocial status, comfort, nutrition and hydration     Problem: Discharge Planning  Goal: Discharge to home or other facility with appropriate resources  2/13/2023 1911 by Khanh Seay RN  Outcome: Progressing  2/13/2023 0513 by Dee Porter RN  Outcome: Progressing     Problem: Confusion  Goal: Confusion, delirium, dementia, or psychosis is improved or at baseline  Description: INTERVENTIONS:  1. Assess for possible contributors to thought disturbance, including medications, impaired vision or hearing, underlying metabolic abnormalities, dehydration, psychiatric diagnoses, and notify attending LIP  2. [place] high risk fall precautions, as indicated  3. Provide frequent short contacts to provide reality reorientation, refocusing and direction  4. Decrease environmental stimuli, including noise as appropriate  5. Monitor and intervene to maintain adequate nutrition, hydration, elimination, sleep and activity  6. If unable to ensure safety without constant attention obtain sitter and review sitter guidelines with assigned personnel  7.  Initiate Psychosocial CNS and Spiritual Care consult, as indicated  2/13/2023 1911 by Lorrie Rawls RN  Outcome: Progressing  2/13/2023 0513 by Timbo Anna RN  Outcome: Progressing     Problem: Safety - Adult  Goal: Free from fall injury  2/13/2023 1911 by Lorrie Rawls RN  Outcome: Progressing  2/13/2023 0513 by Timbo Anna RN  Outcome: Progressing     Problem: Chronic Conditions and Co-morbidities  Goal: Patient's chronic conditions and co-morbidity symptoms are monitored and maintained or improved  2/13/2023 1911 by Lorrie Rawls RN  Outcome: Progressing  2/13/2023 0513 by Timbo Anna RN  Outcome: Progressing     Problem: Respiratory - Adult  Goal: Achieves optimal ventilation and oxygenation  2/13/2023 1911 by Lorrie Rawls RN  Outcome: Progressing  2/13/2023 0513 by Timbo Anna RN  Outcome: Progressing     Problem: Genitourinary - Adult  Goal: Absence of urinary retention  2/13/2023 1911 by Lorrie Rawls RN  Outcome: Progressing  2/13/2023 0513 by Timbo Anna RN  Outcome: Progressing  Goal: Urinary catheter remains patent  2/13/2023 1911 by Lorrie Rawls RN  Outcome: Progressing  2/13/2023 0513 by Timbo Anna RN  Outcome: Progressing     Problem: Pain  Goal: Verbalizes/displays adequate comfort level or baseline comfort level  2/13/2023 1911 by Lorrie Rawls RN  Outcome: Progressing  2/13/2023 0513 by Timbo Anna RN  Outcome: Progressing

## 2023-02-14 NOTE — PROGRESS NOTES
[facility]  Internal Medicine Teaching Residency Program  Inpatient Daily Progress Note  ______________________________________________________________________________    Patient: Bard León  YOB: 1970   LXQ:[id number]    Acct: [de-identified]     Room: 12/0512-01  Admit date: 2/12/2023  Today's date: 02/14/23  Number of days in the hospital: 2    SUBJECTIVE   Admitting Diagnosis: Altered mental status  CC: Altered mental status  Pt examined at bedside. Chart & results reviewed. Patient noted to have PVCs with trigeminy and nonspecific bundle branch block on the monitor-patient remained asymptomatic-EKG unremarkable, magnesium 1.8, troponin 8. Otherwise remained afebrile and hemodynamically stable. Saturating 92% on room air. Patient's mentation has improved to baseline. able to move all extremities against gravity. Denies any concerns or complaints. Feeling well overall. ROS:  Constitutional:  negative for chills, fevers, sweats  Respiratory:  negative for cough, dyspnea on exertion, hemoptysis, shortness of breath, wheezing  Cardiovascular:  negative for chest pain, chest pressure/discomfort, lower extremity edema, palpitations  Gastrointestinal:  negative for abdominal pain, constipation, diarrhea, nausea, vomiting  Neurological:  negative for dizziness, headache  BRIEF HISTORY     The patient is a pleasant 46 y.o. male with past medical history of diabetes-on insulin pump, hypertension, hyperlipidemia, intractable migraine, bipolar, anxiety was brought in by wife for the initial concern of stroke as patient had a slurred speech and was noted to have fall along with difficulty standing again. patient was noted to have bilateral arm and leg weakness and a stroke alert was called but CT head and CT angio were negative.   Patient was found to be confused by wife-patient was found to put lighter in his mouth and trying to lit the lighter with pen, trying to bring lighter near the mouth without any cigarettes, walking through the door as if there is no door, going to the restroom 3 times and sitting on toilet seat but not having bowel or passing urine. As per wife patient had a similar episode yesterday which self resolved and patient was doing completely fine this morning but later in the afternoon he was found to be confused and brought into the hospital by wife. Patient takes multiple medications for bipolar disorder, intractable migraine. Wife states recently around a week ago topiramate was added to the regimen by his PCP for migraine control. Patient has a history of heavy alcohol drinking around 12 years back but has not been drinking at all since then. Wife denies any concern of drug abuse. In the ER patient was hemodynamically stable, BMP unremarkable, ammonia 50, VBG unremarkable, toxic blood x-ray negative, TSH normal-1.5. Negative chest x-ray. In the ER patient was agitated and combative and was given multiple doses of multiple doses of Haldol, Ativan and Versed. On my encounter patient was very sleepy after multiple sedating medications but stable vitally and with nonlabored breathing. OBJECTIVE     Vital Signs:  BP (!) 140/82   Pulse 80   Temp 98.4 °F (36.9 °C)   Resp 22   Ht 5' 8\" (1.727 m)   Wt 135 lb 5.8 oz (61.4 kg)   SpO2 90%   BMI 20.58 kg/m²     Temp (24hrs), Av.8 °F (37.1 °C), Min:98.4 °F (36.9 °C), Max:99.9 °F (37.7 °C)    In: 717.5   Out: 650 [Urine:650]    Physical Exam:  Constitutional: This is a well developed, well nourished, 18.5-24.9 - Normal 46y.o. year old male who is alert, oriented, cooperative and in no apparent distress. Head:normocephalic and atraumatic. EENT:  PERRLA. No conjunctival injections. Septum was midline, mucosa was without erythema, exudates or cobblestoning. No thrush was noted. Neck: Supple without thyromegaly. No elevated JVP. Trachea was midline.   Respiratory: Chest was symmetrical without dullness to percussion. Breath sounds bilaterally were clear to auscultation. There were no wheezes, rhonchi or rales. There is no intercostal retraction or use of accessory muscles. No egophony noted. Cardiovascular: Regular without murmur, clicks, gallops or rubs. Abdomen: Slightly rounded and soft without organomegaly. No rebound, rigidity or guarding was appreciated. Lymphatic: No lymphadenopathy. Musculoskeletal: Normal curvature of the spine. No gross muscle weakness. Extremities:  No lower extremity edema, ulcerations, tenderness, varicosities or erythema. Muscle size, tone and strength are normal.  No involuntary movements are noted. Skin:  Warm and dry. Good color, turgor and pigmentation. No lesions or scars.   No cyanosis or clubbing  Neurological/Psychiatric: Patient is alert and oriented x3, able to follow all commands, moving all extremities on command    Medications:  Scheduled Medications:    insulin lispro  0-8 Units SubCUTAneous TID WC    insulin lispro  0-4 Units SubCUTAneous Nightly    insulin glargine  10 Units SubCUTAneous Nightly    LORazepam  0.5 mg Oral TID    lisinopril  20 mg Oral Daily    sodium chloride flush  5-40 mL IntraVENous 2 times per day    enoxaparin  40 mg SubCUTAneous Daily    atorvastatin  80 mg Oral Nightly    clopidogrel  75 mg Oral Daily    isosorbide mononitrate  30 mg Oral Daily    propranolol  20 mg Oral BID     Continuous Infusions:    sodium chloride      dextrose       PRN MedicationsoxyCODONE, 10 mg, Q4H PRN  sodium chloride flush, 5-40 mL, PRN  sodium chloride, , PRN  ondansetron, 4 mg, Q8H PRN   Or  ondansetron, 4 mg, Q6H PRN  polyethylene glycol, 17 g, Daily PRN  acetaminophen, 650 mg, Q6H PRN   Or  acetaminophen, 650 mg, Q6H PRN  glucose, 4 tablet, PRN  dextrose bolus, 125 mL, PRN   Or  dextrose bolus, 250 mL, PRN  glucagon (rDNA), 1 mg, PRN  dextrose, , Continuous PRN  metoprolol, 5 mg, Q8H PRN  hydrALAZINE, 5 mg, Q6H PRN      Diagnostic Labs:  CBC:   Recent Labs     02/12/23 1711 02/13/23 0757 02/14/23  0037   WBC 7.7 16.2* 12.2*   RBC 5.64 5.18 4.77   HGB 17.3* 16.1 14.6   HCT 49.7 47.9 41.2   MCV 88.1 92.5 86.4   RDW 13.5 13.7 13.4    211 186       BMP:   Recent Labs     02/12/23 1711 02/13/23 0757 02/14/23 0037    136 140   K 4.3 4.2 3.7    103 104   CO2 27 21 23   BUN 7 10 10   CREATININE 0.69* 0.57* 0.50*       BNP: No results for input(s): BNP in the last 72 hours. PT/INR:   Recent Labs     02/12/23 1711   PROTIME 10.5   INR 1.0       APTT:   Recent Labs     02/12/23 1711   APTT 24.3       CARDIAC ENZYMES: No results for input(s): CKMB, CKMBINDEX, TROPONINI in the last 72 hours. Invalid input(s): CKTOTAL;3  FASTING LIPID PANEL:  Lab Results   Component Value Date    CHOL 152 05/21/2018    HDL 52 04/16/2019    TRIG 47 05/21/2018     LIVER PROFILE:   Recent Labs     02/12/23 2252   AST 18   ALT 10   BILIDIR 0.1   BILITOT 0.3   ALKPHOS 216*        MICROBIOLOGY:   Lab Results   Component Value Date/Time    CULTURE NO GROWTH 1 DAY 02/12/2023 10:52 PM       Imaging:    CT HEAD WO CONTRAST    Result Date: 2/12/2023  1. No acute intracranial hemorrhage. No loss of gray-white differentiation to suggest acute infarction. 2. Hypoattenuation in the right periventricular white matter is more conspicuous than on prior study, likely microangiopathic change. The findings were sent to the Radiology Results [address] at 5:10 pm on 2/12/2023 to be communicated to a licensed caregiver. XR CHEST PORTABLE    Result Date: 2/12/2023  No acute abnormality. CTA HEAD NECK W CONTRAST    Result Date: 2/12/2023  Limited exam due to motion artifact. No flow-limiting arterial stenosis in the head and neck. ASSESSMENT & PLAN     Acute metabolic encephalopathy-GCS 10/11  - Mentation appears to be at baseline. Previous confusion Can be secondary to recent topiramate intake.   -CT head, VBG, ammonia, BMP, blood tox, TSH were unremarkable. -Urine tox screen showing multiple substance-likely patient was taking regularly at home. Methadone possibly false positive with Compazine, barbiturates is positive as patient is on Fioricet at home. - We will discharge the patient this afternoon with advise to avoid taking topiramate till he sees his primary care physician. Diabetes  -low-dose sliding scale with insulin Lantus while in the hospital.  [id number] in November 2022-wife states patient has been on insulin pump for 8 years.  -We will discharge the patient on his regular insulin pump. [id number] will discharge the patient off antibiotics. Hypertension-well-controlled  - Continue lisinopril 20 mg, Imdur 30 mg     History of bipolar disorder  -We will resume brexpiprazole 1 mg OD and vortioxetine 20 mg OD on discharge     History of migraine  We will resume home meds -propranolol 20 mg BD, topiramate 25 mg OD, Fioricet on discharge     Unknown history of CAD  - Continue Plavix, Imdur and Lipitor  -Echo in 2016-LVEF 55 to 60%, otherwise unremarkable     DVT ppx: Lovenox  GI ppx: Not indicated now     PT/OT/SW-consulted  Discharge Planning:  consulted-we will follow-up    Jovanna Wells MD  Internal Medicine Resident, PGY-1  [facility];  [place], [place]  2/14/2023, 7:15 AM

## 2023-02-14 NOTE — DISCHARGE INSTRUCTIONS
Follow up with PCP, patient is on a lot of medication including psych medication, pain medication and was recently started on Topamax for migraine. Concern for polypharmacy. Topamax was stopped on discharge.

## 2023-02-14 NOTE — PLAN OF CARE
Problem: Safety - Medical Restraint  Goal: Remains free of injury from restraints (Restraint for Interference with Medical Device)  Description: INTERVENTIONS:  1. Determine that other, less restrictive measures have been tried or would not be effective before applying the restraint  2. Evaluate the patient's condition at the time of restraint application  3. Inform patient/family regarding the reason for restraint  4. Q2H: Monitor safety, psychosocial status, comfort, nutrition and hydration  2/14/2023 1529 by Ivan Vicente RN  Outcome: Completed  2/14/2023 0501 by Leif Foy RN  Outcome: Progressing     Problem: Discharge Planning  Goal: Discharge to home or other facility with appropriate resources  2/14/2023 1529 by Ivan Vicente RN  Outcome: Completed  2/14/2023 0501 by Leif Foy RN  Outcome: Progressing     Problem: Confusion  Goal: Confusion, delirium, dementia, or psychosis is improved or at baseline  Description: INTERVENTIONS:  1. Assess for possible contributors to thought disturbance, including medications, impaired vision or hearing, underlying metabolic abnormalities, dehydration, psychiatric diagnoses, and notify attending LIP  2. [place] high risk fall precautions, as indicated  3. Provide frequent short contacts to provide reality reorientation, refocusing and direction  4. Decrease environmental stimuli, including noise as appropriate  5. Monitor and intervene to maintain adequate nutrition, hydration, elimination, sleep and activity  6. If unable to ensure safety without constant attention obtain sitter and review sitter guidelines with assigned personnel  7.  Initiate Psychosocial CNS and Spiritual Care consult, as indicated  2/14/2023 1529 by Ivan Vicente RN  Outcome: Completed  2/14/2023 0501 by Leif Foy RN  Outcome: Progressing     Problem: Safety - Adult  Goal: Free from fall injury  2/14/2023 1529 by Ivan Vicente RN  Outcome: Completed  2/14/2023 0501 by Fercho Copeland RN  Outcome: Progressing     Problem: Chronic Conditions and Co-morbidities  Goal: Patient's chronic conditions and co-morbidity symptoms are monitored and maintained or improved  2/14/2023 1529 by Thang Crisostomo RN  Outcome: Completed  2/14/2023 0501 by Fercho Copeland RN  Outcome: Progressing     Problem: Respiratory - Adult  Goal: Achieves optimal ventilation and oxygenation  2/14/2023 1529 by Thang Crisostomo RN  Outcome: Completed  2/14/2023 0501 by Fercho Copeland RN  Outcome: Progressing     Problem: Genitourinary - Adult  Goal: Absence of urinary retention  2/14/2023 1529 by Thang Crisostomo RN  Outcome: Completed  2/14/2023 0501 by Fercho Copeland RN  Outcome: Progressing  Goal: Urinary catheter remains patent  2/14/2023 1529 by Thang Crisostomo RN  Outcome: Completed  2/14/2023 0501 by Fercho Copeland RN  Outcome: Progressing     Problem: Pain  Goal: Verbalizes/displays adequate comfort level or baseline comfort level  2/14/2023 1529 by Thang Crisostomo RN  Outcome: Completed  2/14/2023 0501 by Fercho Copeland RN  Outcome: Progressing

## 2023-02-14 NOTE — PLAN OF CARE
Problem: Safety - Medical Restraint  Goal: Remains free of injury from restraints (Restraint for Interference with Medical Device)  Description: INTERVENTIONS:  1. Determine that other, less restrictive measures have been tried or would not be effective before applying the restraint  2. Evaluate the patient's condition at the time of restraint application  3. Inform patient/family regarding the reason for restraint  4. Q2H: Monitor safety, psychosocial status, comfort, nutrition and hydration  2/14/2023 0501 by Angel Luis Perez RN  Outcome: Progressing  2/13/2023 1911 by Lupis Cruz RN  Outcome: Progressing     Problem: Discharge Planning  Goal: Discharge to home or other facility with appropriate resources  2/14/2023 0501 by Angel Luis Perez RN  Outcome: Progressing  2/13/2023 1911 by Lupis Cruz RN  Outcome: Progressing     Problem: Confusion  Goal: Confusion, delirium, dementia, or psychosis is improved or at baseline  Description: INTERVENTIONS:  1. Assess for possible contributors to thought disturbance, including medications, impaired vision or hearing, underlying metabolic abnormalities, dehydration, psychiatric diagnoses, and notify attending LIP  2. [place] high risk fall precautions, as indicated  3. Provide frequent short contacts to provide reality reorientation, refocusing and direction  4. Decrease environmental stimuli, including noise as appropriate  5. Monitor and intervene to maintain adequate nutrition, hydration, elimination, sleep and activity  6. If unable to ensure safety without constant attention obtain sitter and review sitter guidelines with assigned personnel  7.  Initiate Psychosocial CNS and Spiritual Care consult, as indicated  2/14/2023 0501 by Angel Luis Perez RN  Outcome: Progressing  2/13/2023 1911 by Lupis Cruz RN  Outcome: Progressing     Problem: Safety - Adult  Goal: Free from fall injury  2/14/2023 0501 by Angel Luis Perez RN  Outcome: Progressing  2/13/2023 1911 by Camacho Alejandro RN  Outcome: Progressing     Problem: Chronic Conditions and Co-morbidities  Goal: Patient's chronic conditions and co-morbidity symptoms are monitored and maintained or improved  2/14/2023 0501 by Abbie Rodney RN  Outcome: Progressing  2/13/2023 1911 by Camacho Alejandro RN  Outcome: Progressing     Problem: Genitourinary - Adult  Goal: Absence of urinary retention  2/14/2023 0501 by Abbie Rodney RN  Outcome: Progressing  2/13/2023 1911 by Camacho Alejandro RN  Outcome: Progressing  Goal: Urinary catheter remains patent  2/14/2023 0501 by Abbie Rodney RN  Outcome: Progressing  2/13/2023 1911 by Camacho Alejandro RN  Outcome: Progressing     Problem: Pain  Goal: Verbalizes/displays adequate comfort level or baseline comfort level  2/14/2023 0501 by Abbie Rodney RN  Outcome: Progressing  2/13/2023 1911 by Camacho Alejandro RN  Outcome: Progressing

## 2023-02-14 NOTE — PLAN OF CARE
Overnight paged by the RN. Notified that in the monitor patient is throwing PVCs, trigeminy, nonspecific bundle branch block. Immediately went to the bedside and evaluated the patient. Ordered EKG stat, troponin and mag stat. Reviewed the EKG at bedside. EKG was grossly normal without any evidence of above-mentioned changes.     Deyanira Whatley MD  Internal Medicine Resident,PGY [address]

## 2023-02-14 NOTE — PROGRESS NOTES
Pt had a change in his telemetry but denied any symptoms. Notified physician and new orders received. EKG completed and lab work drawn.

## 2023-02-14 NOTE — PROGRESS NOTES
Reviewed discharge paperwork with pt and spouse. Removed IV. Pt taken down via wheelchair by student.

## 2023-02-15 ENCOUNTER — CARE COORDINATION (OUTPATIENT)
Dept: CASE MANAGEMENT | Age: 53
End: 2023-02-15

## 2023-02-15 DIAGNOSIS — R41.82 ALTERED MENTAL STATUS, UNSPECIFIED ALTERED MENTAL STATUS TYPE: Primary | ICD-10-CM

## 2023-02-15 PROCEDURE — 1111F DSCHRG MED/CURRENT MED MERGE: CPT | Performed by: FAMILY MEDICINE

## 2023-02-15 NOTE — DISCHARGE SUMMARY
[address]     Department of Internal Medicine - Staff Internal Medicine Teaching Service    INPATIENT DISCHARGE SUMMARY      Patient Identification:  Greyson Irvin is a 46 y.o. male. :  1970  MRN: [id number]     Acct: [de-identified]   PCP: Xuan Alcantar MD  Admit Date:  2023  Discharge date and time: 2023  3:40 PM   Attending Provider: Dr. Alisa Morales MD                                    [address] Problem Lists:  Principal Problem (Resolved): Altered mental status  Active Problems:    Type 1 diabetes mellitus without complication ([facility] [address]  Resolved Problems:    Acute encephalopathy      HOSPITAL STAY     Brief Inpatient course: The patient is a pleasant 46 y.o. male with past medical history of diabetes-on insulin pump, hypertension, hyperlipidemia, intractable migraine, bipolar, anxiety was brought in by wife for the initial concern of stroke as patient had a slurred speech and was noted to have fall along with difficulty standing again. patient was noted to have bilateral arm and leg weakness and a stroke alert was called but CT head and CT angio were negative. Patient was found to be confused by wife-patient was found to put lighter in his mouth and trying to lit the lighter with pen, trying to bring lighter near the mouth without any cigarettes, walking through the door as if there is no door, going to the restroom 3 times and sitting on toilet seat but not having bowel or passing urine. As per wife patient had a similar episode yesterday which self resolved and patient was doing completely fine this morning but later in the afternoon he was found to be confused and brought into the hospital by wife. Patient takes multiple medications for bipolar disorder, intractable migraine. Wife states recently around a week ago topiramate was added to the regimen by his PCP for migraine control.   Patient has a history of heavy alcohol drinking around 12 years back but has not been drinking at all since then. Wife denies any concern of drug abuse. In the ER patient was hemodynamically stable, BMP unremarkable, ammonia 50, VBG unremarkable, toxic blood x-ray negative, TSH normal-1.5. Negative chest x-ray. In the ER patient was agitated and combative and was given multiple doses of multiple doses of Haldol, Ativan and Versed. On my encounter patient was very sleepy after multiple sedating medications but stable vitally and with nonlabored breathing. Patient admitted to the floor for further management. Patient's mentation improved over the course of hospital and patient was completely at baseline of his mentation. Topiramate was thought to be because of the confusion and patient was asked to stop taking topiramate until he sees his PCP. Blood sugars were elevated in the hospital which were treated with insulin Lantus along with sliding scale and patient was discharged on his regular insulin pump.       Procedures/ Significant Interventions:    None    Consults:     Consults:     Final Specialist Recommendations/Findings:   IP CONSULT TO PHARMACY  PHARMACY TO CHANGE BASE FLUIDS  IP CONSULT TO INTERNAL MEDICINE  IP CONSULT TO NEUROLOGY  IP CONSULT TO CASE MANAGEMENT      Any Hospital Acquired Infections: none    Discharge Functional Status:  stable    DISCHARGE PLAN     Disposition: home    Patient Instructions:   Discharge Medication List as of 2/14/2023  1:21 PM        CONTINUE these medications which have NOT CHANGED    Details   lisinopril (PRINIVIL;ZESTRIL) 10 MG tablet Take 3 tablets by mouth daily, Disp-30 tablet, R-5NO PRINT      atorvastatin (LIPITOR) 80 MG tablet Take 1 tablet by mouth nightly, Disp-90 tablet, R-3Normal      clopidogrel (PLAVIX) 75 MG tablet Take 1 tablet by mouth daily, Disp-90 tablet, R-3Normal      isosorbide mononitrate (IMDUR) 30 MG extended release tablet Take 1 tablet by mouth daily, Disp-90 tablet, R-3Normal      propranolol (INDERAL) 20 MG tablet Take 1 tablet by mouth 2 times daily Takes for migraines, Disp-180 tablet, R-5Normal      prochlorperazine (COMPAZINE) 10 MG tablet Take 1 tablet by mouth every 6 hours as needed (nausea), Disp-120 tablet, R-3Normal      LORazepam (ATIVAN) 0.5 MG tablet Take 1 tablet by mouth every 8 hours as needed for Anxiety for up to 30 days. , Disp-90 tablet, R-2Normal      oxyCODONE (ROXICODONE) 20 MG immediate release tablet Take 1 tablet by mouth every 4 hours as needed for Pain for up to 30 days. , Disp-150 tablet, R-0Normal      butalbital-acetaminophen-caffeine (FIORICET, ESGIC) -40 MG per tablet Take 1 tablet by mouth every 4 hours as needed for Headaches, Disp-180 tablet, R-3Normal      pantoprazole (PROTONIX) 40 MG tablet Take 1 tablet by mouth in the morning. TAKE ONE TABLET BY MOUTH TWICE DAILY. , Disp-90 tablet, R-3Please consider 90 day supplies to promote better adherenceNO PRINT      brexpiprazole (REXULTI) 1 MG TABS tablet Take 1 tablet by mouth daily, Disp-90 tablet, R-3Normal      TRINTELLIX 20 MG TABS tablet TAKE 1 TABLET DAILY, Disp-90 tablet, R-3Normal      HUMALOG 100 UNIT/ML injection vial INJECT UP TO 70 UNITS VIA PUMP AS DIRECTED, DAWHistorical Med      naloxone 4 MG/0.1ML LIQD nasal spray 1 spray by Nasal route as needed for Opioid Reversal, Disp-1 each, R-5Normal      b complex vitamins capsule Take 1 capsule by mouth dailyHistorical Med      CONTOUR NEXT TEST strip use four times a day, DAWHistorical Med      nitroGLYCERIN (NITROSTAT) 0.4 MG SL tablet Place 1 tablet under the tongue every 5 minutes as needed for Chest pain Indications: Disease of the Arteries of the Heart Take 1 tablet every 5 minutes times three as needed for chest pain, Disp-25 tablet, R-2           STOP taking these medications       topiramate (TOPAMAX) 25 MG tablet Comments:   Reason for Stopping:               Activity: activity as tolerated    Diet: diabetic diet    Follow-up:    Steve Darling MD  [address]  [place] [address] [phone]            Patient Instructions: Follow up with PCP, patient is on a lot of medication including psych medication, pain medication and was recently started on Topamax for migraine. Concern for polypharmacy. Topamax was stopped on discharge. Follow up labs: None  Follow up imaging: None    Note that over 30 minutes was spent in preparing discharge papers, discussing discharge with patient, medication review, etc.      Jean Claude Garg MD  Internal Medicine Resident, PGY-1  [address] [place], [place]  2/15/2023, 11:39 AM

## 2023-02-17 LAB
MICROORGANISM SPEC CULT: NORMAL
MICROORGANISM SPEC CULT: NORMAL
SERVICE CMNT-IMP: NORMAL
SERVICE CMNT-IMP: NORMAL
SPECIMEN DESCRIPTION: NORMAL
SPECIMEN DESCRIPTION: NORMAL

## 2023-02-21 ENCOUNTER — CARE COORDINATION (OUTPATIENT)
Dept: CASE MANAGEMENT | Age: 53
End: 2023-02-21

## 2023-02-21 NOTE — CARE COORDINATION
[facility] Care Transitions Follow Up Call    Patient Current Location:  Home: [address] [address]  [address] [facility] [place]    Care Transition Nurse contacted the patient by telephone to follow up after admission on . Verified name and  with patient as identifiers. Patient: John Braga  Patient : 1970   MRN: [id number]  Reason for Admission: AMS  Discharge Date: 23 RARS: Readmission Risk Score: 11.1      Needs to be reviewed by the provider   Additional needs identified to be addressed with provider: No  none             Method of communication with provider: none. Spoke with patient today. He said he is doing ok today, is alert and oriented. I explained my role to him and purpose of call. He states he has been doing ok and has not had any new issues or confusion since being home. He c/o feeling achy, states has history of multiple surgeries in the past and gets to feeling this way when weather is changing. He states he has been eating and drinking ok. Patient's altered mental status on admission was thought to be medication related, specifically from the Topamax that they started him on for Migraine headaches. He is no longer taking that medication and says he has not had any migraines since coming home. I inquired about his blood sugars today, he states they have been \"all over\" but did not give any specific readings to me today. He said he has been working with PCP to get these under control. He did say he has not had any low readings (wife reported he has dropped as low as 39 in the past). Patient had called PCP office he states to get appointment, has been waiting to hear back from office. He has no upcoming appointments. He declined scheduling assistance, states he is going to call them tomorrow and see about appointment. I expressed if he has any issues with getting an appointment to reach out to me. He denies any current needs or concerns at this time.      Addressed changes since last contact:  none  Discussed follow-up appointments. If no appointment was previously scheduled, appointment scheduling offered: Yes. Is follow up appointment scheduled within 7 days of discharge? No.    Follow Up  No future appointments. Non-[facility] follow up appointment(s): n/a    Care Transition Nurse reviewed medical action plan and red flags with patient and discussed any barriers to care and/or understanding of plan of care after discharge. Discussed appropriate site of care based on symptoms and resources available to patient including: PCP. The patient agrees to contact the PCP office for questions related to their healthcare. Advance Care Planning:   not on file; education provided. Patients top risk factors for readmission: medical condition-AMS  Interventions to address risk factors: Obtained and reviewed discharge summary and/or continuity of care documents and Education of patient/family/caregiver/guardian to support self-management-discussed polypharmacy, confusion from medication interactions. S/S to report to PCP, blood sugar management. Offered patient enrollment in the Remote Patient Monitoring (RPM) program for in-home monitoring: Patient is not eligible for RPM program.     Care Transitions Subsequent and Final Call    Schedule Follow Up Appointment with PCP: Declined  Subsequent and Final Calls  Do you have any ongoing symptoms?: Yes  Onset of Patient-reported symptoms: In the past 7 days  Patient-reported symptoms: Pain, Fatigue  Have your medications changed?: No  Do you have any questions related to your medications?: No  Do you currently have any active services?: No  Do you have any needs or concerns that I can assist you with?: No  Identified Barriers: Lack of Education  Care Transitions Interventions   Home Care Waiver: Declined       Specialty Service Referral: Declined    Other Interventions:             Care Transition Nurse provided contact information for future needs. Plan for follow-up call in 5-7 days based on severity of symptoms and risk factors. Plan for next call:  Check on mental status, see if he got appointment with PCP. See if he has had any new migraines. Check blood sugars again.      Сергей Carroll RN

## 2023-02-22 DIAGNOSIS — G99.2 STENOSIS OF CERVICAL SPINE WITH MYELOPATHY (HCC): ICD-10-CM

## 2023-02-22 DIAGNOSIS — M48.02 STENOSIS OF CERVICAL SPINE WITH MYELOPATHY (HCC): ICD-10-CM

## 2023-02-22 RX ORDER — OXYCODONE HYDROCHLORIDE 20 MG/1
20 TABLET ORAL EVERY 4 HOURS PRN
Qty: 150 TABLET | Refills: 0 | Status: SHIPPED | OUTPATIENT
Start: 2023-02-22 | End: 2023-03-24

## 2023-02-23 RX ORDER — VORTIOXETINE 20 MG/1
TABLET, FILM COATED ORAL
Qty: 90 TABLET | Refills: 3 | Status: SHIPPED | OUTPATIENT
Start: 2023-02-23

## 2023-02-28 ENCOUNTER — CARE COORDINATION (OUTPATIENT)
Dept: CASE MANAGEMENT | Age: 53
End: 2023-02-28

## 2023-02-28 NOTE — CARE COORDINATION
[facility] Care Transitions Follow Up Call    Patient Current Location:  Home: [address] [place]    Care Transition Nurse contacted the patient by telephone to follow up after admission on . Verified name and  with patient as identifiers. Patient: Cathy Boo  Patient : 1970   MRN: [id number]  Reason for Admission: AMS  Discharge Date: 23 RARS: Readmission Risk Score: 11.1      Needs to be reviewed by the provider   Additional needs identified to be addressed with provider: No  none             Method of communication with provider: none. Spoke with patient today. He states he has been feeling pretty well this week, has had no further episodes of confusion, foggy headedness or other issues. Patient originally came to the hospital with AMS, thought to be related to Topamax that he was using for migraines. He has not taken any since and has had no further confusion. Patient reports that he feels \"off\" on other migraine medications too, states he gets slurred speech with Esgic/Fioricet as well. He has not had any migraines since returning home. He states his biggest issue now is getting his blood sugars under control. He started the Dexcom 6 about 2 weeks ago he said. Blood sugars have been as low as the 50's and as high as mid 400 range. He follows with NP at Dr. Veena Kate office for this and will return to their office on 3/7. He denies any current needs or concerns. Expressed to patient in light of the uncontrolled DM, if he would like to work with ACM for his chronic issues with his DM. Patient is familiar with ACM at office and would like to work with her again. Addressed changes since last contact:  none  Discussed follow-up appointments. If no appointment was previously scheduled, appointment scheduling offered: No.   Is follow up appointment scheduled within 7 days of discharge? No.    Follow Up  No future appointments.   Non-[facility] follow up appointment(s): 3/7 with endocrinology    Care Transition Nurse reviewed medical action plan and red flags with patient and discussed any barriers to care and/or understanding of plan of care after discharge. Discussed appropriate site of care based on symptoms and resources available to patient including: PCP  Specialist. The patient agrees to contact the PCP office for questions related to their healthcare. Patients top risk factors for readmission: medical condition-DM  Interventions to address risk factors: Education of patient/family/caregiver/guardian to support self-management-Discussed working with ACM for better blood sugar control    Offered patient enrollment in the Remote Patient Monitoring (RPM) program for in-home monitoring: Patient declined. Care Transitions Subsequent and Final Call    Schedule Follow Up Appointment with PCP: Completed  Subsequent and Final Calls  Do you have any ongoing symptoms?: Yes  Onset of Patient-reported symptoms: In the past 7 days  Patient-reported symptoms: Other  Have your medications changed?: No  Do you have any questions related to your medications?: No  Do you currently have any active services?: No  Do you have any needs or concerns that I can assist you with?: No  Identified Barriers: Lack of Education  Care Transitions Interventions   Home Care Waiver: Declined       Specialty Service Referral: Declined    Other Interventions:             Care Transition Nurse provided contact information for future needs. No further follow-up call indicated based on severity of symptoms and risk factors. Plan for next call: referral to ambulatory care manager-for continued CC and to work on getting blood sugars under better control. Calista Navarro RN     Care Transition Summary: Patient was admitted to the hospital with AMS on 2/12. He was taking Topamax for his migraines and his AMS was thought to be medication related.   Topamax was stopped and he has had no new confusion since stopping the medication. He said he has tried different medications in the past, does not tolerate Fioricet and is reluctant to try other medications d/t the side effects he gets. He has not had any migraines since discharge. He did say he has been battling his blood sugars. He sees Dr. Kelly Dao for this and has upcoming appointment with them on 3/7. He has the Dexcom and his blood sugars have ranged anywhere from 50's to mid 400 range. Last A1C I found was in November which was 9.1. Patient: [facility] Patient : 1970        Is patient active with support services? no  Home Self-managing equipment:   Continued Care Coordination Recommended:  yes - Patient has DM uncontrolled      Problem List:   Patient Active Problem List   Diagnosis    Ulnar nerve compression at multiple levels    Degenerative cervical disc    Arthropathy of cervical facet joint    Stenosis of cervical spine with myelopathy (HCC)    Cervical spondylosis    Chronic coronary artery disease    Depression with anxiety    Diabetic peripheral neuropathy ([address]    Essential hypertension    Gastroparesis    History of long-term treatment with high-risk medication    Hyperlipidemia    Impingement syndrome of left shoulder    Injury of right ulnar nerve    Migraine headache    Type 1 diabetes mellitus with hyperglycemia (HCC)    Current every day smoker    Perforation of right tympanic membrane    Pancreatic insufficiency    Major depressive disorder, recurrent episode with anxious distress ([address]    Unspecified inflammatory spondylopathy, cervical region ([address]    Incisional hernia, without obstruction or gangrene    Type 1 diabetes mellitus without complication ([address]         Follow up appointments:    No future appointments.

## 2023-03-03 ENCOUNTER — CARE COORDINATION (OUTPATIENT)
Dept: CARE COORDINATION | Age: 53
End: 2023-03-03

## 2023-03-03 SDOH — ECONOMIC STABILITY: INCOME INSECURITY: IN THE LAST 12 MONTHS, WAS THERE A TIME WHEN YOU WERE NOT ABLE TO PAY THE MORTGAGE OR RENT ON TIME?: NO

## 2023-03-03 SDOH — ECONOMIC STABILITY: TRANSPORTATION INSECURITY
IN THE PAST 12 MONTHS, HAS THE LACK OF TRANSPORTATION KEPT YOU FROM MEDICAL APPOINTMENTS OR FROM GETTING MEDICATIONS?: NO

## 2023-03-03 SDOH — ECONOMIC STABILITY: HOUSING INSECURITY: IN THE LAST 12 MONTHS, HOW MANY PLACES HAVE YOU LIVED?: 1

## 2023-03-03 SDOH — HEALTH STABILITY: PHYSICAL HEALTH: ON AVERAGE, HOW MANY DAYS PER WEEK DO YOU ENGAGE IN MODERATE TO STRENUOUS EXERCISE (LIKE A BRISK WALK)?: 5 DAYS

## 2023-03-03 SDOH — HEALTH STABILITY: PHYSICAL HEALTH: ON AVERAGE, HOW MANY MINUTES DO YOU ENGAGE IN EXERCISE AT THIS LEVEL?: 30 MIN

## 2023-03-03 ASSESSMENT — SOCIAL DETERMINANTS OF HEALTH (SDOH)
HOW OFTEN DO YOU GET TOGETHER WITH FRIENDS OR RELATIVES?: TWICE A WEEK
HOW OFTEN DO YOU ATTENT MEETINGS OF THE CLUB OR ORGANIZATION YOU BELONG TO?: NEVER
HOW OFTEN DO YOU ATTEND CHURCH OR RELIGIOUS SERVICES?: NEVER
DO YOU BELONG TO ANY CLUBS OR ORGANIZATIONS SUCH AS CHURCH GROUPS UNIONS, FRATERNAL OR ATHLETIC GROUPS, OR SCHOOL GROUPS?: NO
IN A TYPICAL WEEK, HOW MANY TIMES DO YOU TALK ON THE PHONE WITH FAMILY, FRIENDS, OR NEIGHBORS?: THREE TIMES A WEEK

## 2023-03-03 ASSESSMENT — LIFESTYLE VARIABLES
HOW OFTEN DO YOU HAVE A DRINK CONTAINING ALCOHOL: NEVER
HOW MANY STANDARD DRINKS CONTAINING ALCOHOL DO YOU HAVE ON A TYPICAL DAY: PATIENT DOES NOT DRINK

## 2023-03-03 NOTE — CARE COORDINATION
Ambulatory Care Coordination Note  3/3/2023    Patient Current Location:  Home: [facility] [address] [place] [id number]    ACM contacted the patient by telephone. Verified name and  with patient as identifiers. Provided introduction to self, and explanation of the ACM role. ACM: Kvng Sauceda RN    Challenges to be reviewed by the provider   Additional needs identified to be addressed with provider: No  none               Method of communication with provider: none. Summary: Referral by care transition nurse for care management. Admitted for AMS- thought related to adverse effects of Topamax. No further symptoms. Encouraged he schedule a PCP appt, he stated will do in a few weeks, priority is endocrinology right now. DM- last A1c 10.1 23 when at [facility] for DKA. Follows with NP at Dr. Matt Myrick office. Has an insulin pump- he gives himself boluses and coverage according to carb count in meals. Just got a Dex Com, sees endo next week for complete set up of it and also set up of a new insulin pump. Blood sugars low 200s lately but was up to 257 earlier, took coverage then droped to 61 and still feels tired from hypoglycemia. Rarely gets that. Smoking- he has tried to cut down but not seriously tried to quit. Declined smoking cessation referral. He voiced knowing he's probably damaging his body by continuing to smoke. Chronic pain- no longer goes to pain mgmt, used to be on suboxone. Now on oxycodone 20 mg and has been stable. Also has migraines and takes Fioricet PRN. Depression,anxiety, panic attacks, bipolar- does not follow with behavioral health. Stated he has felt good. Lives with wife, has good support. Walks a mile every day with his dogs which also helps mood. Has had a cough for 3 days, wife also has one. No other symptoms. Discussed drinking fluids, OTC medications to treat, call office if worse. SDOH- he drives, denied any financial concerns. On disability.  Reviewed all his medications, no problems affording them. CC Plan:   Follow in a week to review symptoms, appts. Offered patient enrollment in the Remote Patient Monitoring (RPM) program for in-home monitoring: Patient declined. Not interested, doesn't need, doesn't want to pay copay. Diabetes Assessment    Medic Alert ID: No  Meal Planning: Avoidance of concentrated sweets, Carb counting   How often do you test your blood sugar?: Meals, Bedtime (Comment:  )   Do you have barriers with adherence to non-pharmacologic self-management interventions? (Nutrition/Exercise/Self-Monitoring): Yes   Have you ever had to go to the ED for symptoms of low blood sugar?: No       Do you have hyperglycemia symptoms?: No   Do you have hypoglycemia symptoms?: No   Blood Sugar Monitoring Regimen: Before Meals, 2 Hours Post Meal, At Bedtime   Blood Sugar Trends: Fluctuating         and   General Assessment    Do you have any symptoms that are causing concern?: Yes  Progression since Onset: Unchanged  Reported Symptoms: Pain (Comment: shoulder pain)           Ambulatory Care Coordination Assessment    Care Coordination Protocol  Referral from Primary Care Provider: No  Week 1 - Initial Assessment     Do you have all of your prescriptions and are they filled?: Yes  Barriers to medication adherence: None  Are you able to afford your medications?: Yes  How often do you have trouble taking your medications the way you have been told to take them?: I always take them as prescribed. Do you have Home O2 Therapy?: No      Ability to seek help/take action for Emergent Urgent situations i.e. fire, crime, inclement weather or health crisis. : Independent  Ability to ambulate to restroom: Independent  Ability handle personal hygeine needs (bathing/dressing/grooming): Independent  Ability to manage Medications: Independent  Ability to prepare Food Preparation: Independent  Ability to maintain home (clean home, laundry):  Independent  Ability to drive and/or has transportation: Independent  Ability to do shopping: Independent  Ability to manage finances: Independent  Is patient able to live independently?: Yes     Current Housing: Private Residence              Are you experiencing loss of meaning?: No  Are you experiencing loss of hope and peace?: No     Suggested Interventions and Community Resources   Pharmacist: Declined   Smoking Cessation: Declined                  No future appointments.

## 2023-03-07 LAB
AVERAGE GLUCOSE: NORMAL
HBA1C MFR BLD: 9.9 %

## 2023-03-10 ENCOUNTER — CARE COORDINATION (OUTPATIENT)
Dept: CARE COORDINATION | Age: 53
End: 2023-03-10

## 2023-03-10 NOTE — ACP (ADVANCE CARE PLANNING)
Advance Care Planning   Healthcare Decision Maker:    Primary Decision Maker: Dior boards - [phone]    Click here to complete Healthcare Decision Makers including selection of the Healthcare Decision Maker Relationship (ie \"Primary\"). Today we documented Decision Maker(s) consistent with Legal Next of Kin hierarchy. He is interested in information about ACP documents, information packet will be mailed to him.

## 2023-03-10 NOTE — CARE COORDINATION
Ambulatory Care Coordination Note  3/10/2023    Patient Current Location:  Home: Via [employer] [address] [place] [address]     ACM contacted the patient by telephone. Verified name and  with patient as identifiers. Summary: He saw endo last week. Has new insulin pump and wearing Dex Com 6. Dex Com alerts when at 90 or above 250. Blood sugars slowly improving and he likes the new system. Has another endo appt in a month. They used to always be in 300s, now are 200s or less but no hypoglycemia. Has had the cough about a week, has some chest congestion. Doesn't feel really bad, no fever, eating ok, able to do regular things. Taking Nyquil at HS so can sleep. Discussed oral fluids, taking OTC meds for symptoms, notifying PCP in a week if not improved or symptoms worsen. ACP addressed. He will be sent an ACP information packet to review. CC Plan:   -Follow up in 2 weeks to review blood sugars, symptoms, appts. Diabetes Assessment    Medic Alert ID: No  Meal Planning: Avoidance of concentrated sweets, Carb counting   How often do you test your blood sugar?: Meals, Bedtime (Comment:  )   Do you have barriers with adherence to non-pharmacologic self-management interventions?  (Nutrition/Exercise/Self-Monitoring): Yes   Have you ever had to go to the ED for symptoms of low blood sugar?: No       Do you have hyperglycemia symptoms?: No   Do you have hypoglycemia symptoms?: No   Blood Sugar Monitoring Regimen: Before Meals, At Bedtime   Blood Sugar Trends: Steady Decrease               Offered patient enrollment in the Remote Patient Monitoring (RPM) program for in-home monitoring: Patient is not eligible for RPM program.    Lab Results       None            Care Coordination Interventions    Referral from Primary Care Provider: No  Suggested Interventions and Community Resources  Pharmacist: Declined  Smoking Cessation: Declined          Goals Addressed                   This Visit's Progress     Conditions and Symptoms On track     I will schedule office visits, as directed by my provider. I will keep my appointment or reschedule if I have to cancel. I will notify my provider of any barriers to my plan of care. I will follow my Zone Management tool to seek urgent or emergent care. I will notify my provider of any symptoms that indicate a worsening of my condition. DM    Barriers:  overwhelmed with appts, lack of education  Plan for overcoming my barriers: ACM calls, education  Confidence: 9/10  Anticipated Goal Completion Date: 3/30/19  Re enrolled 3/3/23, new goal date 6/1/23                  No future appointments.

## 2023-03-13 DIAGNOSIS — F41.8 DEPRESSION WITH ANXIETY: ICD-10-CM

## 2023-03-13 RX ORDER — LORAZEPAM 0.5 MG/1
0.5 TABLET ORAL EVERY 8 HOURS PRN
Qty: 90 TABLET | Refills: 2 | Status: SHIPPED | OUTPATIENT
Start: 2023-03-13 | End: 2023-04-12

## 2023-03-20 DIAGNOSIS — M48.02 STENOSIS OF CERVICAL SPINE WITH MYELOPATHY (HCC): ICD-10-CM

## 2023-03-20 DIAGNOSIS — G99.2 STENOSIS OF CERVICAL SPINE WITH MYELOPATHY (HCC): ICD-10-CM

## 2023-03-20 RX ORDER — OXYCODONE HYDROCHLORIDE 20 MG/1
20 TABLET ORAL EVERY 4 HOURS PRN
Qty: 150 TABLET | Refills: 0 | Status: SHIPPED | OUTPATIENT
Start: 2023-03-20 | End: 2023-04-19

## 2023-03-23 ENCOUNTER — CARE COORDINATION (OUTPATIENT)
Dept: CARE COORDINATION | Age: 53
End: 2023-03-23

## 2023-03-23 NOTE — CARE COORDINATION
Ambulatory Care Coordination Note  3/23/2023    Patient Current Location:  Home: [facility] [employer] [address] [place] [id number]     ACM contacted the patient by telephone. Verified name and  with patient as identifiers. SUMMARY: He is feeling good. Had cough and chest congestion a few weeks ago which has resolved. DM- blood sugars slowly improving, they are more steady, not having ups and downs as much. All less than 300, they were steady in 160s earlier today, no hypoglycemia. No other concerns. No periods of altered mental status. Taking his medications regularly. Diabetes Assessment    Medic Alert ID: No  Meal Planning: Avoidance of concentrated sweets, Carb counting   How often do you test your blood sugar?: Meals, Bedtime (Comment:  )   Do you have barriers with adherence to non-pharmacologic self-management interventions? (Nutrition/Exercise/Self-Monitoring): Yes   Have you ever had to go to the ED for symptoms of low blood sugar?: No       Do you have hyperglycemia symptoms?: No   Do you have hypoglycemia symptoms?: No   Blood Sugar Monitoring Regimen: Before Meals, At Bedtime   Blood Sugar Trends: Other (Comment: stabilizing, not fluctuating as much)           CC Plan:   -Follow up in a few weeks to discuss appts, blood sugars, symptoms. Offered patient enrollment in the Remote Patient Monitoring (RPM) program for in-home monitoring: Patient is not eligible for RPM program.    Lab Results       None            Care Coordination Interventions    Referral from Primary Care Provider: No  Suggested Interventions and Community Resources  Pharmacist: Declined  Smoking Cessation: Declined          Goals Addressed                   This Visit's Progress     Conditions and Symptoms   On track     I will schedule office visits, as directed by my provider. I will keep my appointment or reschedule if I have to cancel. I will notify my provider of any barriers to my plan of care.   I will follow my Zone Management
Normal vision: sees adequately in most situations; can see medication labels, newsprint

## 2023-03-31 ENCOUNTER — TELEPHONE (OUTPATIENT)
Dept: PRIMARY CARE CLINIC | Age: 53
End: 2023-03-31

## 2023-03-31 NOTE — TELEPHONE ENCOUNTER
Patient called office requesting sooner appt with Dr. Nanette Swann. Patient is scheduled 5/8/23 for med check. Patient is wanting sooner appt for med check. Patient states he has something important he need to discuss with  regarding medications.      No sooner appts available - please review     Pt aware Yadira Plascencia will call back

## 2023-04-04 ENCOUNTER — OFFICE VISIT (OUTPATIENT)
Dept: PRIMARY CARE CLINIC | Age: 53
End: 2023-04-04
Payer: COMMERCIAL

## 2023-04-04 VITALS
HEART RATE: 59 BPM | DIASTOLIC BLOOD PRESSURE: 108 MMHG | WEIGHT: 138.8 LBS | SYSTOLIC BLOOD PRESSURE: 168 MMHG | OXYGEN SATURATION: 96 % | HEIGHT: 68 IN | BODY MASS INDEX: 21.04 KG/M2

## 2023-04-04 DIAGNOSIS — Z13.6 ENCOUNTER FOR LIPID SCREENING FOR CARDIOVASCULAR DISEASE: ICD-10-CM

## 2023-04-04 DIAGNOSIS — I25.119 ATHEROSCLEROSIS OF NATIVE CORONARY ARTERY OF NATIVE HEART WITH ANGINA PECTORIS (HCC): ICD-10-CM

## 2023-04-04 DIAGNOSIS — F33.9 MAJOR DEPRESSIVE DISORDER, RECURRENT EPISODE WITH ANXIOUS DISTRESS (HCC): ICD-10-CM

## 2023-04-04 DIAGNOSIS — M48.02 STENOSIS OF CERVICAL SPINE WITH MYELOPATHY (HCC): Primary | ICD-10-CM

## 2023-04-04 DIAGNOSIS — M46.92 UNSPECIFIED INFLAMMATORY SPONDYLOPATHY, CERVICAL REGION (HCC): ICD-10-CM

## 2023-04-04 DIAGNOSIS — Z00.00 ANNUAL PHYSICAL EXAM: ICD-10-CM

## 2023-04-04 DIAGNOSIS — M47.22 OSTEOARTHRITIS OF SPINE WITH RADICULOPATHY, CERVICAL REGION: ICD-10-CM

## 2023-04-04 DIAGNOSIS — G99.2 STENOSIS OF CERVICAL SPINE WITH MYELOPATHY (HCC): Primary | ICD-10-CM

## 2023-04-04 DIAGNOSIS — Z13.220 ENCOUNTER FOR LIPID SCREENING FOR CARDIOVASCULAR DISEASE: ICD-10-CM

## 2023-04-04 DIAGNOSIS — Z12.5 SCREENING PSA (PROSTATE SPECIFIC ANTIGEN): ICD-10-CM

## 2023-04-04 DIAGNOSIS — F11.20 UNCOMPLICATED OPIOID DEPENDENCE (HCC): ICD-10-CM

## 2023-04-04 PROCEDURE — 99214 OFFICE O/P EST MOD 30 MIN: CPT | Performed by: FAMILY MEDICINE

## 2023-04-04 PROCEDURE — 3017F COLORECTAL CA SCREEN DOC REV: CPT | Performed by: FAMILY MEDICINE

## 2023-04-04 PROCEDURE — 4004F PT TOBACCO SCREEN RCVD TLK: CPT | Performed by: FAMILY MEDICINE

## 2023-04-04 PROCEDURE — 3080F DIAST BP >= 90 MM HG: CPT | Performed by: FAMILY MEDICINE

## 2023-04-04 PROCEDURE — G8420 CALC BMI NORM PARAMETERS: HCPCS | Performed by: FAMILY MEDICINE

## 2023-04-04 PROCEDURE — G8427 DOCREV CUR MEDS BY ELIG CLIN: HCPCS | Performed by: FAMILY MEDICINE

## 2023-04-04 PROCEDURE — 3077F SYST BP >= 140 MM HG: CPT | Performed by: FAMILY MEDICINE

## 2023-04-04 RX ORDER — METHADONE HYDROCHLORIDE 10 MG/1
20 TABLET ORAL EVERY 4 HOURS
Qty: 84 TABLET | Refills: 0
Start: 2023-04-04 | End: 2023-04-07 | Stop reason: SDUPTHER

## 2023-04-04 ASSESSMENT — ENCOUNTER SYMPTOMS
COUGH: 0
NAUSEA: 0
SHORTNESS OF BREATH: 0
EYE REDNESS: 0
SORE THROAT: 0
DIARRHEA: 0
RHINORRHEA: 0
VOMITING: 0
EYE DISCHARGE: 0
WHEEZING: 0
ABDOMINAL PAIN: 0

## 2023-04-04 NOTE — PROGRESS NOTES
MI (myocardial infarction) [facility]) jan 2014    Neuropathy     Osteoarthritis     Panic attacks     Temporomandibular joint disorder       Past Surgical History:   Procedure Laterality Date    ABDOMEN SURGERY      insulin pump    ABDOMINAL EXPLORATION SURGERY  05/28/2016    bowel resection Rt. colon &terminal ileum, intussuseption.      CARDIAC CATHETERIZATION  2014    CARPAL TUNNEL RELEASE      bilateral    COLONOSCOPY  05/23/2017    poor prep; diverticulosis    COLONOSCOPY N/A 7/10/2018    COLONOSCOPY POLYPECTOMY HOT [facility]SY [facility] [address] N/A 8/9/2021    COLONOSCOPY DIAGNOSTIC performed by Mitchell Harrell MD at  47[address]7 11/14/2022    COLONOSCOPY WITH BIOPSY performed by Mitchell Harrell MD at [address]      right    HERNIA REPAIR      inguinal    CT COLON CA SCRN NOT  [address] IND N/A 5/25/2017    COLONOSCOPY performed by Aramis Ch MD at [facility] ESOPHAGOGASTRODUODENOSCOPY TRANSORAL DIAGNOSTIC N/A 5/24/2017    EGD ESOPHAGOGASTRODUODENOSCOPY performed by Aramis Ch MD at [address] Bilateral     SHOULDER SURGERY      fracture     TONSILLECTOMY      x2    TOOTH EXTRACTION      x4    ULNAR TUNNEL RELEASE      UPPER GASTROINTESTINAL ENDOSCOPY  05/23/2017    mild gastritis    UPPER GASTROINTESTINAL ENDOSCOPY N/A 7/10/2018    EGD BIOPSY performed by Mitchell Harrell MD at [facility] N/A 8/10/2021    HERNIA INCISIONAL REPAIR OPEN W/MESH performed by Mitchell Harrell MD at [facility] [address]      x4       Family History   Problem Relation Age of Onset    Diabetes Mother     High Blood Pressure Mother     Heart Disease Mother     Migraines Mother     Other Mother         diabetic neuropathy    High Blood Pressure Father        Social History     Tobacco Use    Smoking status: Every Day     Packs/day: 1.00     Years: 30.00     Pack years: 30.00     Types: Cigarettes

## 2023-04-05 LAB
A/G RATIO: NORMAL
ALBUMIN SERPL-MCNC: NORMAL G/DL
ALP BLD-CCNC: NORMAL U/L
ALT SERPL-CCNC: NORMAL U/L
ANION GAP SERPL CALCULATED.3IONS-SCNC: NORMAL MMOL/L
AST SERPL-CCNC: NORMAL U/L
BILIRUB SERPL-MCNC: NORMAL MG/DL
BILIRUBIN DIRECT: NORMAL
BILIRUBIN, INDIRECT: NORMAL
BUN BLDV-MCNC: NORMAL MG/DL
BUN/CREAT BLD: NORMAL
CALCIUM SERPL-MCNC: NORMAL MG/DL
CHLORIDE BLD-SCNC: NORMAL MMOL/L
CHOLESTEROL, FASTING: 146
CO2: NORMAL
CREAT SERPL-MCNC: NORMAL MG/DL
GFR AFRICAN AMERICAN: NORMAL
GFR NON-AFRICAN AMERICAN: NORMAL
GLOBULIN: NORMAL
GLUCOSE FASTING: 186 MG/DL
HDLC SERPL-MCNC: 70 MG/DL (ref 35–70)
LDL CHOLESTEROL CALCULATED: 59 MG/DL (ref 0–160)
POTASSIUM SERPL-SCNC: NORMAL MMOL/L
PROSTATE SPECIFIC ANTIGEN: NORMAL
PROTEIN TOTAL: NORMAL
SODIUM BLD-SCNC: NORMAL MMOL/L
TRIGLYCERIDE, FASTING: 83

## 2023-04-06 ENCOUNTER — TELEPHONE (OUTPATIENT)
Dept: PRIMARY CARE CLINIC | Age: 53
End: 2023-04-06

## 2023-04-06 DIAGNOSIS — Z00.00 ANNUAL PHYSICAL EXAM: ICD-10-CM

## 2023-04-06 DIAGNOSIS — Z13.220 ENCOUNTER FOR LIPID SCREENING FOR CARDIOVASCULAR DISEASE: ICD-10-CM

## 2023-04-06 DIAGNOSIS — Z12.5 SCREENING PSA (PROSTATE SPECIFIC ANTIGEN): ICD-10-CM

## 2023-04-06 DIAGNOSIS — Z13.6 ENCOUNTER FOR LIPID SCREENING FOR CARDIOVASCULAR DISEASE: ICD-10-CM

## 2023-04-06 DIAGNOSIS — M47.22 OSTEOARTHRITIS OF SPINE WITH RADICULOPATHY, CERVICAL REGION: ICD-10-CM

## 2023-04-06 NOTE — TELEPHONE ENCOUNTER
Pt called stating he would like to stop the percocet. He states he was told to call when he's almost out. Pt states meds are making him sick and he wants off of it and doesn't want to finish taking it. Pt states he's not taking it all and is starting to feel withdrawals. Pt would like to know if he can start methadone. Pt states he still has over 40 pills left. He states he is willing to bring them in. [facility],     Please advise.

## 2023-04-07 RX ORDER — METHADONE HYDROCHLORIDE 10 MG/1
20 TABLET ORAL EVERY 4 HOURS
Qty: 84 TABLET | Refills: 0 | Status: SHIPPED | OUTPATIENT
Start: 2023-04-07 | End: 2023-04-14

## 2023-04-07 NOTE — TELEPHONE ENCOUNTER
Prescription for methadone for 1 week sent to pharmacy. Advised patient to take his leftover Percocet pills to the pharmacy to see what they recommend he do with them.

## 2023-04-20 DIAGNOSIS — M47.22 OSTEOARTHRITIS OF SPINE WITH RADICULOPATHY, CERVICAL REGION: ICD-10-CM

## 2023-04-20 RX ORDER — METHADONE HYDROCHLORIDE 10 MG/1
20 TABLET ORAL EVERY 4 HOURS
Qty: 84 TABLET | Refills: 0 | Status: SHIPPED | OUTPATIENT
Start: 2023-04-20 | End: 2023-04-27

## 2023-04-20 NOTE — TELEPHONE ENCOUNTER
LAST VISIT:   4/4/2023     Future Appointments   Date Time Provider Alfonso Marques   5/8/2023  8:20 AM MD KASSIDY Mays

## 2023-04-24 DIAGNOSIS — F41.8 DEPRESSION WITH ANXIETY: ICD-10-CM

## 2023-04-24 RX ORDER — LORAZEPAM 0.5 MG/1
0.5 TABLET ORAL EVERY 8 HOURS PRN
Qty: 90 TABLET | Refills: 2 | Status: SHIPPED | OUTPATIENT
Start: 2023-04-24 | End: 2023-05-24

## 2023-04-26 DIAGNOSIS — M47.22 OSTEOARTHRITIS OF SPINE WITH RADICULOPATHY, CERVICAL REGION: ICD-10-CM

## 2023-04-26 RX ORDER — METHADONE HYDROCHLORIDE 10 MG/1
20 TABLET ORAL EVERY 4 HOURS
Qty: 84 TABLET | Refills: 0 | Status: SHIPPED | OUTPATIENT
Start: 2023-04-26 | End: 2023-05-03

## 2023-05-01 DIAGNOSIS — M47.22 OSTEOARTHRITIS OF SPINE WITH RADICULOPATHY, CERVICAL REGION: ICD-10-CM

## 2023-05-01 RX ORDER — METHADONE HYDROCHLORIDE 10 MG/1
20 TABLET ORAL EVERY 4 HOURS
Qty: 84 TABLET | Refills: 0 | Status: SHIPPED | OUTPATIENT
Start: 2023-05-01 | End: 2023-05-05 | Stop reason: SDUPTHER

## 2023-05-05 DIAGNOSIS — M47.22 OSTEOARTHRITIS OF SPINE WITH RADICULOPATHY, CERVICAL REGION: ICD-10-CM

## 2023-05-05 RX ORDER — METHADONE HYDROCHLORIDE 10 MG/1
20 TABLET ORAL EVERY 4 HOURS
Qty: 84 TABLET | Refills: 0 | Status: SHIPPED | OUTPATIENT
Start: 2023-05-05 | End: 2023-05-12

## 2023-05-08 ENCOUNTER — OFFICE VISIT (OUTPATIENT)
Dept: PRIMARY CARE CLINIC | Age: 53
End: 2023-05-08
Payer: COMMERCIAL

## 2023-05-08 VITALS
HEART RATE: 90 BPM | WEIGHT: 127 LBS | SYSTOLIC BLOOD PRESSURE: 138 MMHG | DIASTOLIC BLOOD PRESSURE: 82 MMHG | HEIGHT: 68 IN | OXYGEN SATURATION: 90 % | BODY MASS INDEX: 19.25 KG/M2

## 2023-05-08 DIAGNOSIS — M47.22 OSTEOARTHRITIS OF SPINE WITH RADICULOPATHY, CERVICAL REGION: ICD-10-CM

## 2023-05-08 DIAGNOSIS — E11.42 DIABETIC PERIPHERAL NEUROPATHY (HCC): ICD-10-CM

## 2023-05-08 DIAGNOSIS — Z87.891 PERSONAL HISTORY OF TOBACCO USE: ICD-10-CM

## 2023-05-08 DIAGNOSIS — F41.8 DEPRESSION WITH ANXIETY: ICD-10-CM

## 2023-05-08 DIAGNOSIS — M47.812 CERVICAL SPONDYLOSIS: Primary | ICD-10-CM

## 2023-05-08 DIAGNOSIS — I10 ESSENTIAL HYPERTENSION: ICD-10-CM

## 2023-05-08 PROCEDURE — G8427 DOCREV CUR MEDS BY ELIG CLIN: HCPCS | Performed by: FAMILY MEDICINE

## 2023-05-08 PROCEDURE — 3046F HEMOGLOBIN A1C LEVEL >9.0%: CPT | Performed by: FAMILY MEDICINE

## 2023-05-08 PROCEDURE — 99213 OFFICE O/P EST LOW 20 MIN: CPT | Performed by: FAMILY MEDICINE

## 2023-05-08 PROCEDURE — G0296 VISIT TO DETERM LDCT ELIG: HCPCS | Performed by: FAMILY MEDICINE

## 2023-05-08 PROCEDURE — 3079F DIAST BP 80-89 MM HG: CPT | Performed by: FAMILY MEDICINE

## 2023-05-08 PROCEDURE — 4004F PT TOBACCO SCREEN RCVD TLK: CPT | Performed by: FAMILY MEDICINE

## 2023-05-08 PROCEDURE — 2022F DILAT RTA XM EVC RTNOPTHY: CPT | Performed by: FAMILY MEDICINE

## 2023-05-08 PROCEDURE — 3017F COLORECTAL CA SCREEN DOC REV: CPT | Performed by: FAMILY MEDICINE

## 2023-05-08 PROCEDURE — 3075F SYST BP GE 130 - 139MM HG: CPT | Performed by: FAMILY MEDICINE

## 2023-05-08 PROCEDURE — G8420 CALC BMI NORM PARAMETERS: HCPCS | Performed by: FAMILY MEDICINE

## 2023-05-08 RX ORDER — METHADONE HYDROCHLORIDE 10 MG/1
20 TABLET ORAL EVERY 4 HOURS
Qty: 80 TABLET | Refills: 0
Start: 2023-05-08 | End: 2023-05-11 | Stop reason: SDUPTHER

## 2023-05-08 RX ORDER — LISINOPRIL 20 MG/1
20 TABLET ORAL DAILY
Qty: 90 TABLET | Refills: 3 | Status: SHIPPED | OUTPATIENT
Start: 2023-05-08

## 2023-05-08 ASSESSMENT — ENCOUNTER SYMPTOMS
COUGH: 0
ABDOMINAL PAIN: 0
RHINORRHEA: 0
SORE THROAT: 0
EYE REDNESS: 0
NAUSEA: 0
DIARRHEA: 0
WHEEZING: 0
SHORTNESS OF BREATH: 0
VOMITING: 0
EYE DISCHARGE: 0

## 2023-05-08 NOTE — PROGRESS NOTES
screening in patients aged 51-72 with at least a 20 pack-year smoking history who currently smoke or have quit in the last 15 years

## 2023-05-10 ENCOUNTER — CARE COORDINATION (OUTPATIENT)
Dept: CARE COORDINATION | Age: 53
End: 2023-05-10

## 2023-05-10 NOTE — CARE COORDINATION
None            Care Coordination Interventions    Referral from Primary Care Provider: No  Suggested Interventions and Community Resources  Pharmacist: Declined  Smoking Cessation: Declined          Goals Addressed                   This Visit's Progress     Conditions and Symptoms   On track     I will schedule office visits, as directed by my provider. I will keep my appointment or reschedule if I have to cancel. I will notify my provider of any barriers to my plan of care. I will follow my Zone Management tool to seek urgent or emergent care. I will notify my provider of any symptoms that indicate a worsening of my condition. DM    Barriers:  overwhelmed with appts, lack of education  Plan for overcoming my barriers: ACM calls, education  Confidence: 9/10  Anticipated Goal Completion Date: 3/30/19  Re enrolled 3/3/23, new goal date 6/1/23                  No future appointments.

## 2023-05-11 DIAGNOSIS — M47.22 OSTEOARTHRITIS OF SPINE WITH RADICULOPATHY, CERVICAL REGION: ICD-10-CM

## 2023-05-11 RX ORDER — METHADONE HYDROCHLORIDE 10 MG/1
20 TABLET ORAL EVERY 4 HOURS
Qty: 80 TABLET | Refills: 0 | Status: SHIPPED | OUTPATIENT
Start: 2023-05-11 | End: 2023-05-18

## 2023-05-17 DIAGNOSIS — M47.22 OSTEOARTHRITIS OF SPINE WITH RADICULOPATHY, CERVICAL REGION: ICD-10-CM

## 2023-05-17 RX ORDER — METHADONE HYDROCHLORIDE 10 MG/1
20 TABLET ORAL EVERY 4 HOURS
Qty: 80 TABLET | Refills: 0 | Status: SHIPPED | OUTPATIENT
Start: 2023-05-17 | End: 2023-05-24

## 2023-05-18 DIAGNOSIS — Z87.891 PERSONAL HISTORY OF TOBACCO USE: ICD-10-CM

## 2023-05-18 DIAGNOSIS — R91.8 ABNORMAL CT LUNG SCREENING: Primary | ICD-10-CM

## 2023-05-22 DIAGNOSIS — M47.22 OSTEOARTHRITIS OF SPINE WITH RADICULOPATHY, CERVICAL REGION: ICD-10-CM

## 2023-05-22 RX ORDER — METHADONE HYDROCHLORIDE 10 MG/1
20 TABLET ORAL EVERY 4 HOURS
Qty: 80 TABLET | Refills: 0 | Status: SHIPPED | OUTPATIENT
Start: 2023-05-22 | End: 2023-05-26 | Stop reason: SDUPTHER

## 2023-05-26 DIAGNOSIS — M47.22 OSTEOARTHRITIS OF SPINE WITH RADICULOPATHY, CERVICAL REGION: ICD-10-CM

## 2023-05-26 RX ORDER — METHADONE HYDROCHLORIDE 10 MG/1
20 TABLET ORAL EVERY 4 HOURS
Qty: 80 TABLET | Refills: 0 | Status: SHIPPED | OUTPATIENT
Start: 2023-05-26 | End: 2023-06-02

## 2023-06-01 DIAGNOSIS — M47.22 OSTEOARTHRITIS OF SPINE WITH RADICULOPATHY, CERVICAL REGION: ICD-10-CM

## 2023-06-01 RX ORDER — METHADONE HYDROCHLORIDE 10 MG/1
20 TABLET ORAL EVERY 4 HOURS
Qty: 80 TABLET | Refills: 0 | Status: SHIPPED | OUTPATIENT
Start: 2023-06-01 | End: 2023-06-08

## 2023-06-07 DIAGNOSIS — M47.22 OSTEOARTHRITIS OF SPINE WITH RADICULOPATHY, CERVICAL REGION: ICD-10-CM

## 2023-06-07 RX ORDER — METHADONE HYDROCHLORIDE 10 MG/1
20 TABLET ORAL EVERY 4 HOURS
Qty: 80 TABLET | Refills: 0 | Status: SHIPPED | OUTPATIENT
Start: 2023-06-07 | End: 2023-06-14

## 2023-06-09 DIAGNOSIS — F41.8 DEPRESSION WITH ANXIETY: ICD-10-CM

## 2023-06-09 RX ORDER — LORAZEPAM 0.5 MG/1
0.5 TABLET ORAL EVERY 8 HOURS PRN
Qty: 90 TABLET | Refills: 0 | Status: SHIPPED | OUTPATIENT
Start: 2023-06-09 | End: 2023-09-07

## 2023-06-21 DIAGNOSIS — M47.22 OSTEOARTHRITIS OF SPINE WITH RADICULOPATHY, CERVICAL REGION: ICD-10-CM

## 2023-06-21 RX ORDER — METHADONE HYDROCHLORIDE 10 MG/1
20 TABLET ORAL EVERY 4 HOURS
Qty: 80 TABLET | Refills: 0 | Status: SHIPPED | OUTPATIENT
Start: 2023-06-21 | End: 2023-06-28

## 2023-06-27 DIAGNOSIS — M47.22 OSTEOARTHRITIS OF SPINE WITH RADICULOPATHY, CERVICAL REGION: ICD-10-CM

## 2023-06-27 RX ORDER — METHADONE HYDROCHLORIDE 10 MG/1
20 TABLET ORAL EVERY 4 HOURS
Qty: 80 TABLET | Refills: 0 | Status: SHIPPED | OUTPATIENT
Start: 2023-06-27 | End: 2023-07-04

## 2023-07-03 DIAGNOSIS — M47.22 OSTEOARTHRITIS OF SPINE WITH RADICULOPATHY, CERVICAL REGION: ICD-10-CM

## 2023-07-03 RX ORDER — METHADONE HYDROCHLORIDE 10 MG/1
20 TABLET ORAL EVERY 4 HOURS
Qty: 24 TABLET | Refills: 0 | Status: SHIPPED | OUTPATIENT
Start: 2023-07-03 | End: 2023-07-05 | Stop reason: SDUPTHER

## 2023-07-03 NOTE — TELEPHONE ENCOUNTER
Pt asking if you would send in the 56 tabs that he did not get? Dr. Praneeth Ovalles only sent in 24 for pt. Pt usually gets 80.     Methadone

## 2023-07-05 DIAGNOSIS — M47.22 OSTEOARTHRITIS OF SPINE WITH RADICULOPATHY, CERVICAL REGION: ICD-10-CM

## 2023-07-05 DIAGNOSIS — F41.8 DEPRESSION WITH ANXIETY: ICD-10-CM

## 2023-07-05 RX ORDER — METHADONE HYDROCHLORIDE 10 MG/1
TABLET ORAL
Qty: 80 TABLET | OUTPATIENT
Start: 2023-07-05

## 2023-07-05 RX ORDER — LORAZEPAM 0.5 MG/1
0.5 TABLET ORAL EVERY 8 HOURS PRN
Qty: 90 TABLET | Refills: 0 | Status: SHIPPED | OUTPATIENT
Start: 2023-07-05 | End: 2023-10-03

## 2023-07-05 RX ORDER — METHADONE HYDROCHLORIDE 10 MG/1
20 TABLET ORAL EVERY 4 HOURS
Qty: 24 TABLET | Refills: 0 | Status: SHIPPED | OUTPATIENT
Start: 2023-07-05 | End: 2023-07-06 | Stop reason: SDUPTHER

## 2023-07-06 DIAGNOSIS — M47.22 OSTEOARTHRITIS OF SPINE WITH RADICULOPATHY, CERVICAL REGION: ICD-10-CM

## 2023-07-06 RX ORDER — METHADONE HYDROCHLORIDE 10 MG/1
20 TABLET ORAL EVERY 4 HOURS
Qty: 80 TABLET | Refills: 0 | Status: SHIPPED | OUTPATIENT
Start: 2023-07-06 | End: 2023-07-13

## 2023-07-11 DIAGNOSIS — M47.22 OSTEOARTHRITIS OF SPINE WITH RADICULOPATHY, CERVICAL REGION: ICD-10-CM

## 2023-07-11 RX ORDER — METHADONE HYDROCHLORIDE 10 MG/1
20 TABLET ORAL EVERY 4 HOURS
Qty: 80 TABLET | Refills: 0 | Status: SHIPPED | OUTPATIENT
Start: 2023-07-11 | End: 2023-07-18

## 2023-07-17 DIAGNOSIS — M47.22 OSTEOARTHRITIS OF SPINE WITH RADICULOPATHY, CERVICAL REGION: ICD-10-CM

## 2023-07-17 RX ORDER — METHADONE HYDROCHLORIDE 10 MG/1
20 TABLET ORAL EVERY 4 HOURS
Qty: 80 TABLET | Refills: 0 | Status: SHIPPED | OUTPATIENT
Start: 2023-07-17 | End: 2023-07-21 | Stop reason: SDUPTHER

## 2023-07-20 DIAGNOSIS — M47.22 OSTEOARTHRITIS OF SPINE WITH RADICULOPATHY, CERVICAL REGION: ICD-10-CM

## 2023-07-21 RX ORDER — METHADONE HYDROCHLORIDE 10 MG/1
20 TABLET ORAL EVERY 4 HOURS
Qty: 80 TABLET | Refills: 0 | Status: SHIPPED | OUTPATIENT
Start: 2023-07-21 | End: 2023-07-28

## 2023-07-26 DIAGNOSIS — M47.22 OSTEOARTHRITIS OF SPINE WITH RADICULOPATHY, CERVICAL REGION: ICD-10-CM

## 2023-07-27 RX ORDER — METHADONE HYDROCHLORIDE 10 MG/1
20 TABLET ORAL EVERY 4 HOURS
Qty: 80 TABLET | Refills: 0 | Status: SHIPPED | OUTPATIENT
Start: 2023-07-27 | End: 2023-08-03

## 2023-08-01 DIAGNOSIS — M47.22 OSTEOARTHRITIS OF SPINE WITH RADICULOPATHY, CERVICAL REGION: ICD-10-CM

## 2023-08-01 RX ORDER — METHADONE HYDROCHLORIDE 10 MG/1
20 TABLET ORAL EVERY 4 HOURS
Qty: 80 TABLET | Refills: 0 | Status: SHIPPED | OUTPATIENT
Start: 2023-08-01 | End: 2023-08-08

## 2023-08-01 NOTE — TELEPHONE ENCOUNTER
Michelle Ghotra is calling to request a refill on the following medication(s):    Medication Request:  Requested Prescriptions     Pending Prescriptions Disp Refills    methadone (DOLOPHINE) 10 MG tablet 80 tablet 0     Sig: Take 2 tablets by mouth every 4 hours for 7 days.  Max Daily Amount: 120 mg       Last Visit Date (If Applicable):  4/9/3245    Next Visit Date:    Visit date not found

## 2023-08-07 DIAGNOSIS — M47.22 OSTEOARTHRITIS OF SPINE WITH RADICULOPATHY, CERVICAL REGION: ICD-10-CM

## 2023-08-07 RX ORDER — METHADONE HYDROCHLORIDE 10 MG/1
20 TABLET ORAL EVERY 4 HOURS
Qty: 80 TABLET | Refills: 0 | Status: SHIPPED | OUTPATIENT
Start: 2023-08-07 | End: 2023-08-11 | Stop reason: SDUPTHER

## 2023-08-09 DIAGNOSIS — F41.8 DEPRESSION WITH ANXIETY: ICD-10-CM

## 2023-08-09 RX ORDER — LORAZEPAM 0.5 MG/1
0.5 TABLET ORAL EVERY 8 HOURS PRN
Qty: 90 TABLET | Refills: 0 | Status: SHIPPED | OUTPATIENT
Start: 2023-08-09 | End: 2023-11-07

## 2023-08-11 DIAGNOSIS — M47.22 OSTEOARTHRITIS OF SPINE WITH RADICULOPATHY, CERVICAL REGION: ICD-10-CM

## 2023-08-11 RX ORDER — METHADONE HYDROCHLORIDE 10 MG/1
20 TABLET ORAL EVERY 4 HOURS
Qty: 80 TABLET | Refills: 0 | Status: SHIPPED | OUTPATIENT
Start: 2023-08-11 | End: 2023-08-18

## 2023-08-17 DIAGNOSIS — M47.22 OSTEOARTHRITIS OF SPINE WITH RADICULOPATHY, CERVICAL REGION: ICD-10-CM

## 2023-08-18 RX ORDER — METHADONE HYDROCHLORIDE 10 MG/1
20 TABLET ORAL EVERY 4 HOURS
Qty: 80 TABLET | Refills: 0 | Status: SHIPPED | OUTPATIENT
Start: 2023-08-18 | End: 2023-08-25

## 2023-08-23 DIAGNOSIS — M47.22 OSTEOARTHRITIS OF SPINE WITH RADICULOPATHY, CERVICAL REGION: ICD-10-CM

## 2023-08-23 RX ORDER — METHADONE HYDROCHLORIDE 10 MG/1
20 TABLET ORAL EVERY 4 HOURS
Qty: 80 TABLET | Refills: 0 | Status: SHIPPED | OUTPATIENT
Start: 2023-08-23 | End: 2023-08-30

## 2023-08-28 DIAGNOSIS — M47.22 OSTEOARTHRITIS OF SPINE WITH RADICULOPATHY, CERVICAL REGION: ICD-10-CM

## 2023-08-28 RX ORDER — METHADONE HYDROCHLORIDE 10 MG/1
20 TABLET ORAL EVERY 4 HOURS
Qty: 80 TABLET | Refills: 0 | Status: SHIPPED | OUTPATIENT
Start: 2023-08-28 | End: 2023-09-04

## 2023-09-01 DIAGNOSIS — R91.8 ABNORMAL CT LUNG SCREENING: ICD-10-CM

## 2023-09-01 DIAGNOSIS — M47.22 OSTEOARTHRITIS OF SPINE WITH RADICULOPATHY, CERVICAL REGION: ICD-10-CM

## 2023-09-01 RX ORDER — METHADONE HYDROCHLORIDE 10 MG/1
20 TABLET ORAL EVERY 4 HOURS
Qty: 80 TABLET | Refills: 0 | OUTPATIENT
Start: 2023-09-01 | End: 2023-09-08

## 2023-09-02 DIAGNOSIS — M47.22 OSTEOARTHRITIS OF SPINE WITH RADICULOPATHY, CERVICAL REGION: ICD-10-CM

## 2023-09-05 RX ORDER — METHADONE HYDROCHLORIDE 10 MG/1
TABLET ORAL
Qty: 80 TABLET | Refills: 0 | Status: SHIPPED | OUTPATIENT
Start: 2023-09-05 | End: 2023-09-07 | Stop reason: SDUPTHER

## 2023-09-07 ENCOUNTER — OFFICE VISIT (OUTPATIENT)
Dept: PRIMARY CARE CLINIC | Age: 53
End: 2023-09-07
Payer: COMMERCIAL

## 2023-09-07 VITALS
DIASTOLIC BLOOD PRESSURE: 80 MMHG | SYSTOLIC BLOOD PRESSURE: 138 MMHG | HEIGHT: 68 IN | OXYGEN SATURATION: 96 % | HEART RATE: 73 BPM | WEIGHT: 133.8 LBS | BODY MASS INDEX: 20.28 KG/M2

## 2023-09-07 DIAGNOSIS — Z23 NEED FOR VACCINATION: ICD-10-CM

## 2023-09-07 DIAGNOSIS — K20.90 ESOPHAGITIS, ACUTE: ICD-10-CM

## 2023-09-07 DIAGNOSIS — Z79.899 HIGH RISK MEDICATION USE: ICD-10-CM

## 2023-09-07 DIAGNOSIS — F41.8 DEPRESSION WITH ANXIETY: ICD-10-CM

## 2023-09-07 DIAGNOSIS — M47.22 OSTEOARTHRITIS OF SPINE WITH RADICULOPATHY, CERVICAL REGION: ICD-10-CM

## 2023-09-07 DIAGNOSIS — M47.812 CERVICAL SPONDYLOSIS: Primary | ICD-10-CM

## 2023-09-07 LAB
ALCOHOL URINE: NORMAL
AMPHETAMINE SCREEN, URINE: NEGATIVE
BARBITURATE SCREEN, URINE: NEGATIVE
BENZODIAZEPINE SCREEN, URINE: NEGATIVE
BUPRENORPHINE URINE: NEGATIVE
COCAINE METABOLITE SCREEN URINE: NEGATIVE
FENTANYL SCREEN, URINE: NORMAL
GABAPENTIN SCREEN, URINE: NORMAL
MDMA URINE: NEGATIVE
METHADONE SCREEN, URINE: POSITIVE
METHAMPHETAMINE, URINE: NEGATIVE
OPIATE SCREEN URINE: NEGATIVE
OXYCODONE SCREEN URINE: NEGATIVE
PHENCYCLIDINE SCREEN URINE: NORMAL
PROPOXYPHENE SCREEN, URINE: NORMAL
SYNTHETIC CANNABINOIDS(K2) SCREEN, URINE: NORMAL
THC SCREEN, URINE: POSITIVE
TRAMADOL SCREEN URINE: NORMAL
TRICYCLIC ANTIDEPRESSANTS, UR: NEGATIVE

## 2023-09-07 PROCEDURE — 90471 IMMUNIZATION ADMIN: CPT | Performed by: FAMILY MEDICINE

## 2023-09-07 PROCEDURE — 3075F SYST BP GE 130 - 139MM HG: CPT | Performed by: FAMILY MEDICINE

## 2023-09-07 PROCEDURE — 90472 IMMUNIZATION ADMIN EACH ADD: CPT | Performed by: FAMILY MEDICINE

## 2023-09-07 PROCEDURE — G8427 DOCREV CUR MEDS BY ELIG CLIN: HCPCS | Performed by: FAMILY MEDICINE

## 2023-09-07 PROCEDURE — 90674 CCIIV4 VAC NO PRSV 0.5 ML IM: CPT | Performed by: FAMILY MEDICINE

## 2023-09-07 PROCEDURE — G8420 CALC BMI NORM PARAMETERS: HCPCS | Performed by: FAMILY MEDICINE

## 2023-09-07 PROCEDURE — 99214 OFFICE O/P EST MOD 30 MIN: CPT | Performed by: FAMILY MEDICINE

## 2023-09-07 PROCEDURE — 4004F PT TOBACCO SCREEN RCVD TLK: CPT | Performed by: FAMILY MEDICINE

## 2023-09-07 PROCEDURE — 80305 DRUG TEST PRSMV DIR OPT OBS: CPT | Performed by: FAMILY MEDICINE

## 2023-09-07 PROCEDURE — 90750 HZV VACC RECOMBINANT IM: CPT | Performed by: FAMILY MEDICINE

## 2023-09-07 PROCEDURE — 3017F COLORECTAL CA SCREEN DOC REV: CPT | Performed by: FAMILY MEDICINE

## 2023-09-07 PROCEDURE — 3079F DIAST BP 80-89 MM HG: CPT | Performed by: FAMILY MEDICINE

## 2023-09-07 RX ORDER — LORAZEPAM 0.5 MG/1
0.5 TABLET ORAL EVERY 8 HOURS PRN
Qty: 90 TABLET | Refills: 2 | Status: SHIPPED | OUTPATIENT
Start: 2023-09-07 | End: 2023-12-06

## 2023-09-07 RX ORDER — PANTOPRAZOLE SODIUM 40 MG/1
40 TABLET, DELAYED RELEASE ORAL DAILY
Qty: 90 TABLET | Refills: 3 | Status: SHIPPED | OUTPATIENT
Start: 2023-09-07

## 2023-09-07 RX ORDER — PROPRANOLOL HYDROCHLORIDE 20 MG/1
20 TABLET ORAL 2 TIMES DAILY
Qty: 180 TABLET | Refills: 3 | Status: SHIPPED | OUTPATIENT
Start: 2023-09-07

## 2023-09-07 RX ORDER — METHADONE HYDROCHLORIDE 10 MG/1
20 TABLET ORAL EVERY 4 HOURS PRN
Qty: 70 TABLET | Refills: 0 | Status: SHIPPED | OUTPATIENT
Start: 2023-09-07 | End: 2023-10-07

## 2023-09-07 ASSESSMENT — ENCOUNTER SYMPTOMS
VOMITING: 0
COUGH: 0
SORE THROAT: 0
DIARRHEA: 0
SHORTNESS OF BREATH: 0
EYE DISCHARGE: 0
RHINORRHEA: 0
EYE REDNESS: 0
ABDOMINAL PAIN: 0
NAUSEA: 0
WHEEZING: 0

## 2023-09-07 NOTE — PROGRESS NOTES
Instructed to continue current medications, diet andexercise. Patient agreed with treatment plan. Follow up as directed.      Electronicallysigned by Georgina Marie MD on 9/7/2023 at 8:56 AM

## 2023-09-18 DIAGNOSIS — M47.22 OSTEOARTHRITIS OF SPINE WITH RADICULOPATHY, CERVICAL REGION: ICD-10-CM

## 2023-09-18 RX ORDER — METHADONE HYDROCHLORIDE 10 MG/1
20 TABLET ORAL EVERY 4 HOURS PRN
Qty: 70 TABLET | Refills: 0 | Status: CANCELLED | OUTPATIENT
Start: 2023-09-18 | End: 2023-10-18

## 2023-09-18 RX ORDER — METHADONE HYDROCHLORIDE 10 MG/1
20 TABLET ORAL EVERY 4 HOURS PRN
Qty: 60 TABLET | Refills: 0 | Status: SHIPPED | OUTPATIENT
Start: 2023-09-18 | End: 2023-09-22 | Stop reason: SDUPTHER

## 2023-09-22 DIAGNOSIS — M47.22 OSTEOARTHRITIS OF SPINE WITH RADICULOPATHY, CERVICAL REGION: ICD-10-CM

## 2023-09-22 RX ORDER — METHADONE HYDROCHLORIDE 10 MG/1
20 TABLET ORAL EVERY 4 HOURS PRN
Qty: 60 TABLET | Refills: 0 | Status: SHIPPED | OUTPATIENT
Start: 2023-09-22 | End: 2023-09-29

## 2023-09-27 DIAGNOSIS — M47.22 OSTEOARTHRITIS OF SPINE WITH RADICULOPATHY, CERVICAL REGION: ICD-10-CM

## 2023-09-27 RX ORDER — METHADONE HYDROCHLORIDE 10 MG/1
20 TABLET ORAL EVERY 6 HOURS PRN
Qty: 56 TABLET | Refills: 0 | Status: SHIPPED | OUTPATIENT
Start: 2023-09-27 | End: 2023-10-04

## 2023-09-27 NOTE — TELEPHONE ENCOUNTER
Please Approve or Refuse.   Send to Pharmacy per Pt's Request:      Next Visit Date:  Visit date not found   Last Visit Date: 9/7/2023    Hemoglobin A1C (%)   Date Value   03/07/2023 9.9   11/22/2022 9.1   10/19/2021 9.5             ( goal A1C is < 7)   BP Readings from Last 3 Encounters:   09/07/23 138/80   05/08/23 138/82   04/04/23 (!) 168/108          (goal 120/80)  BUN   Date Value Ref Range Status   02/14/2023 10 6 - 20 mg/dL Final     Creatinine   Date Value Ref Range Status   02/14/2023 0.50 (L) 0.70 - 1.20 mg/dL Final     Potassium   Date Value Ref Range Status   02/14/2023 3.7 3.7 - 5.3 mmol/L Final         '

## 2023-10-02 DIAGNOSIS — M47.22 OSTEOARTHRITIS OF SPINE WITH RADICULOPATHY, CERVICAL REGION: ICD-10-CM

## 2023-10-02 RX ORDER — METHADONE HYDROCHLORIDE 10 MG/1
20 TABLET ORAL EVERY 6 HOURS PRN
Qty: 56 TABLET | Refills: 0 | Status: SHIPPED | OUTPATIENT
Start: 2023-10-02 | End: 2023-10-09

## 2023-10-05 DIAGNOSIS — F41.8 DEPRESSION WITH ANXIETY: ICD-10-CM

## 2023-10-05 RX ORDER — LORAZEPAM 0.5 MG/1
0.5 TABLET ORAL EVERY 8 HOURS PRN
Qty: 90 TABLET | Refills: 0 | Status: SHIPPED | OUTPATIENT
Start: 2023-10-05 | End: 2023-10-26

## 2023-10-10 DIAGNOSIS — M47.22 OSTEOARTHRITIS OF SPINE WITH RADICULOPATHY, CERVICAL REGION: ICD-10-CM

## 2023-10-10 RX ORDER — METHADONE HYDROCHLORIDE 10 MG/1
20 TABLET ORAL EVERY 6 HOURS PRN
Qty: 52 TABLET | Refills: 0 | Status: SHIPPED | OUTPATIENT
Start: 2023-10-10 | End: 2023-10-17

## 2023-10-16 DIAGNOSIS — M47.22 OSTEOARTHRITIS OF SPINE WITH RADICULOPATHY, CERVICAL REGION: ICD-10-CM

## 2023-10-16 RX ORDER — METHADONE HYDROCHLORIDE 10 MG/1
20 TABLET ORAL EVERY 6 HOURS PRN
Qty: 48 TABLET | Refills: 0 | Status: SHIPPED | OUTPATIENT
Start: 2023-10-16 | End: 2023-10-20 | Stop reason: SDUPTHER

## 2023-10-20 DIAGNOSIS — M47.22 OSTEOARTHRITIS OF SPINE WITH RADICULOPATHY, CERVICAL REGION: ICD-10-CM

## 2023-10-20 RX ORDER — METHADONE HYDROCHLORIDE 10 MG/1
20 TABLET ORAL EVERY 6 HOURS PRN
Qty: 42 TABLET | Refills: 0 | Status: SHIPPED | OUTPATIENT
Start: 2023-10-20 | End: 2023-10-27

## 2023-10-20 NOTE — TELEPHONE ENCOUNTER
LAST VISIT:   9/7/2023     Future Appointments   Date Time Provider [address]   10/26/2023  3:00 PM Mika Eldridge MD [facility]

## 2023-10-26 ENCOUNTER — OFFICE VISIT (OUTPATIENT)
Dept: PRIMARY CARE CLINIC | Age: 53
End: 2023-10-26
Payer: COMMERCIAL

## 2023-10-26 VITALS
HEART RATE: 80 BPM | DIASTOLIC BLOOD PRESSURE: 94 MMHG | WEIGHT: 137.9 LBS | BODY MASS INDEX: 20.94 KG/M2 | OXYGEN SATURATION: 98 % | SYSTOLIC BLOOD PRESSURE: 146 MMHG

## 2023-10-26 DIAGNOSIS — E10.65 TYPE 1 DIABETES MELLITUS WITH HYPERGLYCEMIA (HCC): ICD-10-CM

## 2023-10-26 DIAGNOSIS — F41.8 DEPRESSION WITH ANXIETY: ICD-10-CM

## 2023-10-26 DIAGNOSIS — M47.22 OSTEOARTHRITIS OF SPINE WITH RADICULOPATHY, CERVICAL REGION: Primary | ICD-10-CM

## 2023-10-26 PROCEDURE — 3077F SYST BP >= 140 MM HG: CPT | Performed by: FAMILY MEDICINE

## 2023-10-26 PROCEDURE — G8427 DOCREV CUR MEDS BY ELIG CLIN: HCPCS | Performed by: FAMILY MEDICINE

## 2023-10-26 PROCEDURE — 99213 OFFICE O/P EST LOW 20 MIN: CPT | Performed by: FAMILY MEDICINE

## 2023-10-26 PROCEDURE — G8482 FLU IMMUNIZE ORDER/ADMIN: HCPCS | Performed by: FAMILY MEDICINE

## 2023-10-26 PROCEDURE — G8420 CALC BMI NORM PARAMETERS: HCPCS | Performed by: FAMILY MEDICINE

## 2023-10-26 PROCEDURE — 3017F COLORECTAL CA SCREEN DOC REV: CPT | Performed by: FAMILY MEDICINE

## 2023-10-26 PROCEDURE — 3046F HEMOGLOBIN A1C LEVEL >9.0%: CPT | Performed by: FAMILY MEDICINE

## 2023-10-26 PROCEDURE — 3080F DIAST BP >= 90 MM HG: CPT | Performed by: FAMILY MEDICINE

## 2023-10-26 PROCEDURE — 2022F DILAT RTA XM EVC RTNOPTHY: CPT | Performed by: FAMILY MEDICINE

## 2023-10-26 PROCEDURE — 4004F PT TOBACCO SCREEN RCVD TLK: CPT | Performed by: FAMILY MEDICINE

## 2023-10-26 RX ORDER — LORAZEPAM 0.5 MG/1
0.5 TABLET ORAL EVERY 8 HOURS PRN
Qty: 90 TABLET | Refills: 2 | Status: SHIPPED | OUTPATIENT
Start: 2023-10-26 | End: 2024-01-24

## 2023-10-26 ASSESSMENT — ENCOUNTER SYMPTOMS
EYE REDNESS: 0
COUGH: 0
SORE THROAT: 0
VOMITING: 0
ABDOMINAL PAIN: 0
DIARRHEA: 0
WHEEZING: 0
NAUSEA: 0
EYE DISCHARGE: 0
SHORTNESS OF BREATH: 0
RHINORRHEA: 0

## 2023-10-26 NOTE — PROGRESS NOTES
[address] PRIMARY CARE  [address] [address] [place] [address]  Dept: [phone]    Karishma Massey is a 46 y.o. male Established patient, who presents today for his medical conditions/complaints as noted below. Chief Complaint   Patient presents with    Medication Check       HPI:     HPI  Here for medication check. Patient states he is decreased his methadone to only 2/day. States has not noticed any withdrawal symptoms. Patient feels his methadone is not as helpful as it once was. Patient states has basically weaned himself off to methadone after hearing issues with [facility] medical board. Patient states still using marijuana. Denies any fevers or chills. Continues with pain between the neck. States his moods been doing well. No thoughts of hurting self or others. Most of the pain that he has is in his neck. Continues to follow-up with endocrinology for his diabetes. Patient's presentation today was quite confusing. Prior patient states his pain was severe and intractable. States the only thing that helped was the methadone when taking on a regular basis. However the patient has been himself down and almost off of the methadone on his own.     Reviewed prior notes None  Reviewed previous Labs    LDL Calculated (mg/dL)   Date Value   04/05/2023 59   04/16/2019 97   05/21/2018 66       (goal LDL is <100)   AST (U/L)   Date Value   02/12/2023 18     ALT (U/L)   Date Value   02/12/2023 10     BUN (mg/dL)   Date Value   02/14/2023 10     Hemoglobin A1C (%)   Date Value   03/07/2023 9.9     TSH (uIU/mL)   Date Value   02/12/2023 1.50     BP Readings from Last 3 Encounters:   10/26/23 (!) 146/94   09/07/23 138/80   05/08/23 138/82          (goal 120/80)    Past Medical History:   Diagnosis Date    Anxiety     Arthritis     CAD (coronary artery disease)     Calcaneal apophysitis     Corns     Depression     Diabetes type I (HCC)     Gastroparesis     GERD

## 2023-10-30 ENCOUNTER — TELEPHONE (OUTPATIENT)
Dept: PRIMARY CARE CLINIC | Age: 53
End: 2023-10-30

## 2023-10-30 NOTE — TELEPHONE ENCOUNTER
Advise pt cannot prescribe methadone at 6 per day.   Would either need to see a methadone clinic, or would strongly encourage him to start suboxone with Josefa Shell for withdrawal.

## 2023-10-30 NOTE — TELEPHONE ENCOUNTER
Patient was seen on Thursday 10/26/2023. I that time he discussed getting off methadone and stopping it all together. However, he has been up since Saturday morning. He is tired but can not sleep. He feels tight and then loose and then back and forth. He does not feel right. He feels \"Jiggy\" all over. He can not get comfortable. No diarrhea or nausea. He states he has cut down from 180 per week and he is down to 42 per week. He is requesting to continue the methadone at current dose at this time. He request a call back.

## 2023-10-30 NOTE — TELEPHONE ENCOUNTER
Patient states he has tried suboxone with Jackelyn Garzon and this did not help. He is not interested in going to a Methadone clinic. He understands Dr. Poli Thompson cannot prescribe Methadone at 6 per day. Advised to call the office if anything else needed. Voices understanding.

## 2023-11-01 ENCOUNTER — TELEPHONE (OUTPATIENT)
Dept: PRIMARY CARE CLINIC | Age: 53
End: 2023-11-01

## 2023-11-01 DIAGNOSIS — F11.23 OPIOID DEPENDENCE WITH WITHDRAWAL (HCC): Primary | ICD-10-CM

## 2023-11-01 RX ORDER — BUPRENORPHINE AND NALOXONE 8; 2 MG/1; MG/1
1 FILM, SOLUBLE BUCCAL; SUBLINGUAL 2 TIMES DAILY
Qty: 2 FILM | Refills: 0 | Status: SHIPPED | OUTPATIENT
Start: 2023-11-01 | End: 2023-11-03 | Stop reason: SDUPTHER

## 2023-11-01 NOTE — TELEPHONE ENCOUNTER
Pt was seen at [facility] last night and was given suboxone, pt would like to know if he can get one or two doses of suboxone to get him through until his appt tomorrow.

## 2023-11-02 ENCOUNTER — OFFICE VISIT (OUTPATIENT)
Dept: PRIMARY CARE CLINIC | Age: 53
End: 2023-11-02
Payer: COMMERCIAL

## 2023-11-02 VITALS
WEIGHT: 127.2 LBS | SYSTOLIC BLOOD PRESSURE: 138 MMHG | BODY MASS INDEX: 19.28 KG/M2 | OXYGEN SATURATION: 99 % | DIASTOLIC BLOOD PRESSURE: 80 MMHG | HEIGHT: 68 IN | HEART RATE: 136 BPM

## 2023-11-02 DIAGNOSIS — Z79.899 HIGH RISK MEDICATION USE: ICD-10-CM

## 2023-11-02 DIAGNOSIS — F11.23 OPIOID DEPENDENCE WITH WITHDRAWAL (HCC): Primary | ICD-10-CM

## 2023-11-02 DIAGNOSIS — R68.83 CHILLS: ICD-10-CM

## 2023-11-02 DIAGNOSIS — E10.65 TYPE 1 DIABETES MELLITUS WITH HYPERGLYCEMIA (HCC): ICD-10-CM

## 2023-11-02 LAB
ALCOHOL URINE: ABNORMAL
AMPHETAMINE SCREEN, URINE: NEGATIVE
BARBITURATE SCREEN, URINE: POSITIVE
BENZODIAZEPINE SCREEN, URINE: NEGATIVE
BILIRUBIN, POC: NEGATIVE
BLOOD URINE, POC: ABNORMAL
BUPRENORPHINE URINE: POSITIVE
CHP ED QC CHECK: ABNORMAL
CLARITY, POC: ABNORMAL
COCAINE METABOLITE SCREEN URINE: NEGATIVE
COLOR, POC: YELLOW
FENTANYL SCREEN, URINE: ABNORMAL
GABAPENTIN SCREEN, URINE: ABNORMAL
GLUCOSE BLD-MCNC: 291 MG/DL
GLUCOSE URINE, POC: ABNORMAL
KETONES, POC: ABNORMAL
LEUKOCYTE EST, POC: NEGATIVE
MDMA URINE: NEGATIVE
METHADONE SCREEN, URINE: NEGATIVE
METHAMPHETAMINE, URINE: NEGATIVE
NITRITE, POC: NEGATIVE
OPIATE SCREEN URINE: NEGATIVE
OXYCODONE SCREEN URINE: NEGATIVE
PH, POC: 6
PHENCYCLIDINE SCREEN URINE: ABNORMAL
PROPOXYPHENE SCREEN, URINE: ABNORMAL
PROTEIN, POC: ABNORMAL
SPECIFIC GRAVITY, POC: 1.01
SYNTHETIC CANNABINOIDS(K2) SCREEN, URINE: ABNORMAL
THC SCREEN, URINE: POSITIVE
TRAMADOL SCREEN URINE: ABNORMAL
TRICYCLIC ANTIDEPRESSANTS, UR: NEGATIVE
UROBILINOGEN, POC: ABNORMAL

## 2023-11-02 PROCEDURE — G8427 DOCREV CUR MEDS BY ELIG CLIN: HCPCS | Performed by: NURSE PRACTITIONER

## 2023-11-02 PROCEDURE — 99214 OFFICE O/P EST MOD 30 MIN: CPT | Performed by: NURSE PRACTITIONER

## 2023-11-02 PROCEDURE — 3017F COLORECTAL CA SCREEN DOC REV: CPT | Performed by: NURSE PRACTITIONER

## 2023-11-02 PROCEDURE — 3046F HEMOGLOBIN A1C LEVEL >9.0%: CPT | Performed by: NURSE PRACTITIONER

## 2023-11-02 PROCEDURE — G8420 CALC BMI NORM PARAMETERS: HCPCS | Performed by: NURSE PRACTITIONER

## 2023-11-02 PROCEDURE — 81003 URINALYSIS AUTO W/O SCOPE: CPT | Performed by: NURSE PRACTITIONER

## 2023-11-02 PROCEDURE — 2022F DILAT RTA XM EVC RTNOPTHY: CPT | Performed by: NURSE PRACTITIONER

## 2023-11-02 PROCEDURE — 3075F SYST BP GE 130 - 139MM HG: CPT | Performed by: NURSE PRACTITIONER

## 2023-11-02 PROCEDURE — 80305 DRUG TEST PRSMV DIR OPT OBS: CPT | Performed by: NURSE PRACTITIONER

## 2023-11-02 PROCEDURE — G8482 FLU IMMUNIZE ORDER/ADMIN: HCPCS | Performed by: NURSE PRACTITIONER

## 2023-11-02 PROCEDURE — 82962 GLUCOSE BLOOD TEST: CPT | Performed by: NURSE PRACTITIONER

## 2023-11-02 PROCEDURE — 3079F DIAST BP 80-89 MM HG: CPT | Performed by: NURSE PRACTITIONER

## 2023-11-02 PROCEDURE — 4004F PT TOBACCO SCREEN RCVD TLK: CPT | Performed by: NURSE PRACTITIONER

## 2023-11-02 ASSESSMENT — ENCOUNTER SYMPTOMS
SHORTNESS OF BREATH: 1
DIARRHEA: 1
NAUSEA: 1
COUGH: 1
RHINORRHEA: 1
VOMITING: 1

## 2023-11-02 NOTE — PROGRESS NOTES
[address] PRIMARY CARE  [address] [address] [place] [address]  Dept: [phone]    Fredy Moses is a 46 y.o. male Established patient, who presents today for his medical conditions/complaints as noted below. Chief Complaint   Patient presents with    Nausea & Vomiting     With blood x 2 days       HPI:     HPI   Patient saw Dr. Maite Cortez last Thursday and at that time they decided he would stop methadone. Over the weekend he had withdrawals and did not tolerate it well. He went to [facility] on 10/31 and received suboxone  for withdrawals. I sent in two strips to get him through yesterday. He is having nausea and vomiting, cold feeling, runny nose, \"Jiggy legs\", fidgety , can not get comfortable. He can not sleep. Unable to stand or sit still while at appointment. Dr. Maite Cortez has been weaning him off and  was down to 6 methadone  daily.     Reviewed prior notes: Previous PCP   Reviewed previous:  Labs and Hospital Records    LDL Calculated (mg/dL)   Date Value   04/05/2023 59   04/16/2019 97   05/21/2018 66       (goal LDL is <100)   AST (U/L)   Date Value   02/12/2023 18     ALT (U/L)   Date Value   02/12/2023 10     BUN (mg/dL)   Date Value   02/14/2023 10     Hemoglobin A1C (%)   Date Value   03/07/2023 9.9     TSH (uIU/mL)   Date Value   02/12/2023 1.50     BP Readings from Last 3 Encounters:   11/02/23 138/80   10/26/23 (!) 146/94   09/07/23 138/80          (goal 120/80)    Past Medical History:   Diagnosis Date    Anxiety     Arthritis     CAD (coronary artery disease)     Calcaneal apophysitis     Corns     Depression     Diabetes type I (HCC)     Gastroparesis     GERD (gastroesophageal reflux disease)     Headache(784.0)     Hearing loss     Hyperglycemia     Hyperlipidemia     Hypertension     Intussusception ([address])     MI (myocardial infarction) ([address]) jan 2014    Neuropathy     Osteoarthritis     Panic attacks     Temporomandibular joint

## 2023-11-03 ENCOUNTER — TELEPHONE (OUTPATIENT)
Dept: PRIMARY CARE CLINIC | Age: 53
End: 2023-11-03

## 2023-11-03 DIAGNOSIS — F41.8 DEPRESSION WITH ANXIETY: ICD-10-CM

## 2023-11-03 DIAGNOSIS — F11.23 OPIOID DEPENDENCE WITH WITHDRAWAL (HCC): ICD-10-CM

## 2023-11-03 RX ORDER — BUPRENORPHINE AND NALOXONE 8; 2 MG/1; MG/1
1 FILM, SOLUBLE BUCCAL; SUBLINGUAL 2 TIMES DAILY
Qty: 20 FILM | Refills: 0 | Status: SHIPPED | OUTPATIENT
Start: 2023-11-03 | End: 2023-11-08 | Stop reason: SDUPTHER

## 2023-11-03 RX ORDER — LORAZEPAM 0.5 MG/1
0.5 TABLET ORAL EVERY 8 HOURS PRN
Qty: 90 TABLET | Refills: 2 | OUTPATIENT
Start: 2023-11-03 | End: 2024-02-01

## 2023-11-03 RX ORDER — PROCHLORPERAZINE MALEATE 5 MG/1
5 TABLET ORAL EVERY 6 HOURS PRN
Qty: 30 TABLET | Refills: 0 | Status: SHIPPED | OUTPATIENT
Start: 2023-11-03

## 2023-11-03 NOTE — TELEPHONE ENCOUNTER
Pt notified that Dr. Samuel Celestin sent in [address] [place] [address] & [facility] but is stated he was seen in office yesterday and was told suboxone would be sent to the pharmacy today, he states he can not go all weekend with out it.      Please advise

## 2023-11-03 NOTE — TELEPHONE ENCOUNTER
Care Transitions Initial Follow Up Call    Outreach made within 2 business days of discharge: Yes    Patient: Sergey Younger Patient : 1970   MRN: [id number]  Reason for Admission: There are no discharge diagnoses documented for the most recent discharge. Discharge Date: 23       Spoke with: patient    Discharge department/facility: [facility] Interactive Patient Contact:  Was patient able to fill all prescriptions: Yes  Was patient instructed to bring all medications to the follow-up visit: Yes  Is patient taking all medications as directed in the discharge summary? Yes  Does patient understand their discharge instructions: Yes, left on his own.   Does patient have questions or concerns that need addressed prior to 7-14 day follow up office visit: no    Scheduled appointment with PCP within 7-14 days    Follow Up  Future Appointments   Date Time Provider [address] [place] [address]   2023  9:30 AM CLYDE Langley - CNP [facility] [address], [place]

## 2023-11-03 NOTE — TELEPHONE ENCOUNTER
Pt went to er ,per [employer]. Given Suboxone, but was not given any for the weekend and he can't go without it. [facility]. listed.

## 2023-11-03 NOTE — TELEPHONE ENCOUNTER
Pt was given Compazine in the hosp and it really helped with his stomach. Asking if you would send in a rx for him? Uses [facility] [address] listed.

## 2023-11-03 NOTE — TELEPHONE ENCOUNTER
Since Gurpreet and Dr. Ernesto Duong are out of the office this afternoon, I sent in a few for him but needs to discuss at his next appt- looks like he needs to schedule one

## 2023-11-06 NOTE — TELEPHONE ENCOUNTER
Patient states he picked up prescription. States he is feeling better. Will keep appt with Josefa Corrigan on 11/13/23.

## 2023-11-08 ENCOUNTER — TELEPHONE (OUTPATIENT)
Dept: PRIMARY CARE CLINIC | Age: 53
End: 2023-11-08

## 2023-11-08 DIAGNOSIS — F11.23 OPIOID DEPENDENCE WITH WITHDRAWAL (HCC): ICD-10-CM

## 2023-11-08 RX ORDER — BUPRENORPHINE AND NALOXONE 8; 2 MG/1; MG/1
1 FILM, SOLUBLE BUCCAL; SUBLINGUAL 3 TIMES DAILY
Qty: 15 FILM | Refills: 0 | Status: SHIPPED | OUTPATIENT
Start: 2023-11-08 | End: 2023-11-13

## 2023-11-08 NOTE — TELEPHONE ENCOUNTER
Pt states suboxone at 2 times daily is not helping. States he is ok in the morning until around 11 and then he is having withdrawal sx. Pt asking if it can be increased or what you recommend?

## 2023-11-13 ENCOUNTER — OFFICE VISIT (OUTPATIENT)
Dept: PRIMARY CARE CLINIC | Age: 53
End: 2023-11-13
Payer: COMMERCIAL

## 2023-11-13 VITALS
HEIGHT: 68 IN | SYSTOLIC BLOOD PRESSURE: 136 MMHG | WEIGHT: 139.8 LBS | BODY MASS INDEX: 21.19 KG/M2 | DIASTOLIC BLOOD PRESSURE: 72 MMHG | HEART RATE: 90 BPM

## 2023-11-13 DIAGNOSIS — Z79.899 HIGH RISK MEDICATION USE: ICD-10-CM

## 2023-11-13 DIAGNOSIS — F11.23 OPIOID DEPENDENCE WITH WITHDRAWAL (HCC): Primary | ICD-10-CM

## 2023-11-13 DIAGNOSIS — M48.02 STENOSIS OF CERVICAL SPINE WITH MYELOPATHY (HCC): ICD-10-CM

## 2023-11-13 DIAGNOSIS — G99.2 STENOSIS OF CERVICAL SPINE WITH MYELOPATHY (HCC): ICD-10-CM

## 2023-11-13 LAB
ALCOHOL URINE: NORMAL
AMPHETAMINE SCREEN, URINE: NEGATIVE
BARBITURATE SCREEN, URINE: NEGATIVE
BENZODIAZEPINE SCREEN, URINE: NEGATIVE
BUPRENORPHINE URINE: POSITIVE
COCAINE METABOLITE SCREEN URINE: NORMAL
FENTANYL SCREEN, URINE: NEGATIVE
GABAPENTIN SCREEN, URINE: NORMAL
MDMA URINE: NORMAL
METHADONE SCREEN, URINE: NEGATIVE
METHAMPHETAMINE, URINE: NEGATIVE
OPIATE SCREEN URINE: NEGATIVE
OXYCODONE SCREEN URINE: NEGATIVE
PHENCYCLIDINE SCREEN URINE: NEGATIVE
PROPOXYPHENE SCREEN, URINE: NEGATIVE
SYNTHETIC CANNABINOIDS(K2) SCREEN, URINE: NORMAL
THC SCREEN, URINE: POSITIVE
TRAMADOL SCREEN URINE: NORMAL
TRICYCLIC ANTIDEPRESSANTS, UR: NEGATIVE

## 2023-11-13 PROCEDURE — G8420 CALC BMI NORM PARAMETERS: HCPCS | Performed by: NURSE PRACTITIONER

## 2023-11-13 PROCEDURE — 3017F COLORECTAL CA SCREEN DOC REV: CPT | Performed by: NURSE PRACTITIONER

## 2023-11-13 PROCEDURE — 3078F DIAST BP <80 MM HG: CPT | Performed by: NURSE PRACTITIONER

## 2023-11-13 PROCEDURE — G8482 FLU IMMUNIZE ORDER/ADMIN: HCPCS | Performed by: NURSE PRACTITIONER

## 2023-11-13 PROCEDURE — 4004F PT TOBACCO SCREEN RCVD TLK: CPT | Performed by: NURSE PRACTITIONER

## 2023-11-13 PROCEDURE — 99213 OFFICE O/P EST LOW 20 MIN: CPT | Performed by: NURSE PRACTITIONER

## 2023-11-13 PROCEDURE — 3075F SYST BP GE 130 - 139MM HG: CPT | Performed by: NURSE PRACTITIONER

## 2023-11-13 PROCEDURE — 80305 DRUG TEST PRSMV DIR OPT OBS: CPT | Performed by: NURSE PRACTITIONER

## 2023-11-13 PROCEDURE — G8427 DOCREV CUR MEDS BY ELIG CLIN: HCPCS | Performed by: NURSE PRACTITIONER

## 2023-11-13 RX ORDER — BUPRENORPHINE AND NALOXONE 8; 2 MG/1; MG/1
1 FILM, SOLUBLE BUCCAL; SUBLINGUAL 3 TIMES DAILY
Qty: 42 FILM | Refills: 0 | Status: SHIPPED | OUTPATIENT
Start: 2023-11-15 | End: 2023-11-29

## 2023-11-13 ASSESSMENT — ENCOUNTER SYMPTOMS
CONSTIPATION: 0
COUGH: 0
NAUSEA: 0
SHORTNESS OF BREATH: 0
DIARRHEA: 0

## 2023-11-13 NOTE — PROGRESS NOTES
suboxone 2-3x/per day  He decline a counselor - states right now he is  in a good place. Anxiety, pain and withdrawals controlled. Return in about 2 weeks (around 11/27/2023) for opioid dependence. Data Unavailable     Orders Placed This Encounter   Procedures    POCT Rapid Drug Screen     Orders Placed This Encounter   Medications    buprenorphine-naloxone (SUBOXONE) 8-2 MG FILM SL film     Sig: Place 1 Film under the tongue 3 times daily for 14 days. Max Daily Amount: 3 Film     Dispense:  42 Film     Refill:  0       Patient given educational materials - see patient instructions. Discussed use, benefit, and side effects of prescribed medications. All patient questions answered. Pt voiced understanding. Reviewed health maintenance. Instructed to continue current medications, diet and exercise. Patient agreed with treatment plan. Follow up as directed.      Electronically signed by CLYDE Hinton CNP on 11/13/2023 at 9:56 AM

## 2023-11-27 ENCOUNTER — OFFICE VISIT (OUTPATIENT)
Dept: PRIMARY CARE CLINIC | Age: 53
End: 2023-11-27
Payer: COMMERCIAL

## 2023-11-27 VITALS
SYSTOLIC BLOOD PRESSURE: 150 MMHG | WEIGHT: 145.9 LBS | BODY MASS INDEX: 22.11 KG/M2 | HEART RATE: 111 BPM | OXYGEN SATURATION: 97 % | HEIGHT: 68 IN | DIASTOLIC BLOOD PRESSURE: 104 MMHG

## 2023-11-27 DIAGNOSIS — F11.23 OPIOID DEPENDENCE WITH WITHDRAWAL (HCC): Primary | ICD-10-CM

## 2023-11-27 DIAGNOSIS — I10 ESSENTIAL HYPERTENSION: ICD-10-CM

## 2023-11-27 DIAGNOSIS — Z79.899 HIGH RISK MEDICATION USE: ICD-10-CM

## 2023-11-27 LAB
ALCOHOL URINE: NORMAL
AMPHETAMINE SCREEN, URINE: NEGATIVE
BARBITURATE SCREEN, URINE: NEGATIVE
BENZODIAZEPINE SCREEN, URINE: NEGATIVE
BUPRENORPHINE URINE: POSITIVE
COCAINE METABOLITE SCREEN URINE: NEGATIVE
FENTANYL SCREEN, URINE: NORMAL
GABAPENTIN SCREEN, URINE: NORMAL
MDMA URINE: NEGATIVE
METHADONE SCREEN, URINE: NEGATIVE
METHAMPHETAMINE, URINE: NEGATIVE
OPIATE SCREEN URINE: NEGATIVE
OXYCODONE SCREEN URINE: NEGATIVE
PHENCYCLIDINE SCREEN URINE: NORMAL
PROPOXYPHENE SCREEN, URINE: NORMAL
SYNTHETIC CANNABINOIDS(K2) SCREEN, URINE: NORMAL
THC SCREEN, URINE: POSITIVE
TRAMADOL SCREEN URINE: NORMAL
TRICYCLIC ANTIDEPRESSANTS, UR: NEGATIVE

## 2023-11-27 PROCEDURE — 3080F DIAST BP >= 90 MM HG: CPT | Performed by: NURSE PRACTITIONER

## 2023-11-27 PROCEDURE — 80305 DRUG TEST PRSMV DIR OPT OBS: CPT | Performed by: NURSE PRACTITIONER

## 2023-11-27 PROCEDURE — 3017F COLORECTAL CA SCREEN DOC REV: CPT | Performed by: NURSE PRACTITIONER

## 2023-11-27 PROCEDURE — 4004F PT TOBACCO SCREEN RCVD TLK: CPT | Performed by: NURSE PRACTITIONER

## 2023-11-27 PROCEDURE — 99214 OFFICE O/P EST MOD 30 MIN: CPT | Performed by: NURSE PRACTITIONER

## 2023-11-27 PROCEDURE — 3077F SYST BP >= 140 MM HG: CPT | Performed by: NURSE PRACTITIONER

## 2023-11-27 PROCEDURE — G8482 FLU IMMUNIZE ORDER/ADMIN: HCPCS | Performed by: NURSE PRACTITIONER

## 2023-11-27 PROCEDURE — G8420 CALC BMI NORM PARAMETERS: HCPCS | Performed by: NURSE PRACTITIONER

## 2023-11-27 PROCEDURE — G8427 DOCREV CUR MEDS BY ELIG CLIN: HCPCS | Performed by: NURSE PRACTITIONER

## 2023-11-27 RX ORDER — BUPRENORPHINE AND NALOXONE 8; 2 MG/1; MG/1
1 FILM, SOLUBLE BUCCAL; SUBLINGUAL 3 TIMES DAILY
Qty: 42 FILM | Refills: 0 | Status: SHIPPED | OUTPATIENT
Start: 2023-11-27 | End: 2023-12-11

## 2023-11-27 RX ORDER — PROPRANOLOL HYDROCHLORIDE 40 MG/1
40 TABLET ORAL 2 TIMES DAILY
Qty: 60 TABLET | Refills: 1 | Status: SHIPPED | OUTPATIENT
Start: 2023-11-27

## 2023-11-27 ASSESSMENT — ENCOUNTER SYMPTOMS
BACK PAIN: 1
CONSTIPATION: 1
SHORTNESS OF BREATH: 0
NAUSEA: 0
DIARRHEA: 0

## 2023-11-27 NOTE — PATIENT INSTRUCTIONS
Continue suboxone 2-3 strips per day  Increase propranolol to 40 mg 2x/day  Decrease marijuana use  Encouraged to get smoking

## 2023-12-11 ENCOUNTER — OFFICE VISIT (OUTPATIENT)
Dept: PRIMARY CARE CLINIC | Age: 53
End: 2023-12-11
Payer: COMMERCIAL

## 2023-12-11 VITALS
DIASTOLIC BLOOD PRESSURE: 90 MMHG | HEART RATE: 97 BPM | WEIGHT: 146.2 LBS | BODY MASS INDEX: 22.16 KG/M2 | OXYGEN SATURATION: 96 % | SYSTOLIC BLOOD PRESSURE: 152 MMHG | HEIGHT: 68 IN

## 2023-12-11 DIAGNOSIS — I10 ESSENTIAL HYPERTENSION: ICD-10-CM

## 2023-12-11 DIAGNOSIS — Z79.899 HIGH RISK MEDICATION USE: Primary | ICD-10-CM

## 2023-12-11 DIAGNOSIS — F11.23 OPIOID DEPENDENCE WITH WITHDRAWAL (HCC): ICD-10-CM

## 2023-12-11 PROCEDURE — 3017F COLORECTAL CA SCREEN DOC REV: CPT | Performed by: NURSE PRACTITIONER

## 2023-12-11 PROCEDURE — G8482 FLU IMMUNIZE ORDER/ADMIN: HCPCS | Performed by: NURSE PRACTITIONER

## 2023-12-11 PROCEDURE — G8427 DOCREV CUR MEDS BY ELIG CLIN: HCPCS | Performed by: NURSE PRACTITIONER

## 2023-12-11 PROCEDURE — 4004F PT TOBACCO SCREEN RCVD TLK: CPT | Performed by: NURSE PRACTITIONER

## 2023-12-11 PROCEDURE — 3077F SYST BP >= 140 MM HG: CPT | Performed by: NURSE PRACTITIONER

## 2023-12-11 PROCEDURE — 99213 OFFICE O/P EST LOW 20 MIN: CPT | Performed by: NURSE PRACTITIONER

## 2023-12-11 PROCEDURE — 3079F DIAST BP 80-89 MM HG: CPT | Performed by: NURSE PRACTITIONER

## 2023-12-11 PROCEDURE — 80305 DRUG TEST PRSMV DIR OPT OBS: CPT | Performed by: NURSE PRACTITIONER

## 2023-12-11 PROCEDURE — G8420 CALC BMI NORM PARAMETERS: HCPCS | Performed by: NURSE PRACTITIONER

## 2023-12-11 RX ORDER — BUPRENORPHINE AND NALOXONE 8; 2 MG/1; MG/1
1 FILM, SOLUBLE BUCCAL; SUBLINGUAL 3 TIMES DAILY
Qty: 63 FILM | Refills: 0 | Status: SHIPPED | OUTPATIENT
Start: 2023-12-11 | End: 2024-01-01

## 2023-12-11 RX ORDER — PROPRANOLOL HYDROCHLORIDE 60 MG/1
60 TABLET ORAL 2 TIMES DAILY
Qty: 60 TABLET | Refills: 1 | Status: SHIPPED | OUTPATIENT
Start: 2023-12-11

## 2023-12-11 ASSESSMENT — ENCOUNTER SYMPTOMS
DIARRHEA: 0
CONSTIPATION: 0
NAUSEA: 0
SHORTNESS OF BREATH: 0
COUGH: 0
BACK PAIN: 1

## 2023-12-11 NOTE — PROGRESS NOTES
[address]  PRIMARY CARE  [facility]  [address] [facility] [address]  Dept: [phone]    Reema Thomas is a 48 y.o. male Established patient, who presents today for his medical conditions/complaints as noted below. Chief Complaint   Patient presents with    Hypertension     2 week f/u    Medication Management     Pt here today for 2 week f/u for suboxone       HPI:     HPI   He smokes marijuana to help with appetite  He is taking suboxone 3x/day. He want to only take 2x/day but in the middle of the day he is getting anxious and twitchy. He states he does help with the pain. It does not help as much as some other stuff but it does help some. States his muscles are messed up today. He spent the weekend at [facility] and did a lot of hiking. Bowels are moving OK    Blood sugars has been running about 300 in the morning. When he takes medication it tends to come during the day. 72 has been his lowest the last couple weeks.     Reviewed prior notes: None   Reviewed previous:        LDL Calculated (mg/dL)   Date Value   04/05/2023 59   04/16/2019 97   05/21/2018 66       (goal LDL is <100)   AST (U/L)   Date Value   02/12/2023 18     ALT (U/L)   Date Value   02/12/2023 10     BUN (mg/dL)   Date Value   02/14/2023 10     Hemoglobin A1C (%)   Date Value   03/07/2023 9.9     TSH (uIU/mL)   Date Value   02/12/2023 1.50     BP Readings from Last 3 Encounters:   12/11/23 (!) 152/90   11/27/23 (!) 150/104   11/13/23 136/72          (goal 120/80)    Past Medical History:   Diagnosis Date    Anxiety     Arthritis     CAD (coronary artery disease)     Calcaneal apophysitis     Corns     Depression     Diabetes type I (HCC)     Gastroparesis     GERD (gastroesophageal reflux disease)     Headache(784.0)     Hearing loss     Hyperglycemia     Hyperlipidemia     Hypertension     Intussusception ([address])     MI (myocardial infarction) ([address]) jan 2014    Neuropathy     Osteoarthritis

## 2023-12-28 ENCOUNTER — TELEPHONE (OUTPATIENT)
Dept: PRIMARY CARE CLINIC | Age: 53
End: 2023-12-28

## 2023-12-28 RX ORDER — AZITHROMYCIN 250 MG/1
250 TABLET, FILM COATED ORAL SEE ADMIN INSTRUCTIONS
Qty: 6 TABLET | Refills: 0 | Status: SHIPPED | OUTPATIENT
Start: 2023-12-28 | End: 2024-01-02

## 2023-12-28 NOTE — TELEPHONE ENCOUNTER
Patient is requesting an antibiotic for his SOB and cough. Patient states his illness started about 8 days ago. Patient had an appointment today for cough, SOB and a burning sensation in his chest, but he canceled due to him not being able to walk to his car without getting out of breath. I advised patient if he is having troubles breathing to that extent he should call 911. Patient declined saying he just wants an antibiotic. Patient states he is able to get medication from the pharmacy. Pharmacy confirmed. Please advise.

## 2024-01-02 ENCOUNTER — OFFICE VISIT (OUTPATIENT)
Dept: PRIMARY CARE CLINIC | Age: 54
End: 2024-01-02
Payer: COMMERCIAL

## 2024-01-02 ENCOUNTER — HOSPITAL ENCOUNTER (OUTPATIENT)
Age: 54
Setting detail: SPECIMEN
Discharge: HOME | End: 2024-01-02

## 2024-01-02 VITALS
DIASTOLIC BLOOD PRESSURE: 92 MMHG | SYSTOLIC BLOOD PRESSURE: 148 MMHG | WEIGHT: 140.5 LBS | BODY MASS INDEX: 21.29 KG/M2 | HEART RATE: 108 BPM | OXYGEN SATURATION: 83 % | HEIGHT: 68 IN

## 2024-01-02 DIAGNOSIS — R31.9 HEMATURIA, UNSPECIFIED TYPE: ICD-10-CM

## 2024-01-02 DIAGNOSIS — J40 BRONCHITIS: ICD-10-CM

## 2024-01-02 DIAGNOSIS — K13.70 ORAL LESION: Primary | ICD-10-CM

## 2024-01-02 DIAGNOSIS — F11.23 OPIOID DEPENDENCE WITH WITHDRAWAL (HCC): ICD-10-CM

## 2024-01-02 LAB
ALCOHOL URINE: NORMAL
AMPHETAMINE SCREEN, URINE: NEGATIVE
BARBITURATE SCREEN, URINE: NEGATIVE
BENZODIAZEPINE SCREEN, URINE: NEGATIVE
BILIRUBIN, POC: NEGATIVE
BLOOD URINE, POC: NORMAL
BUPRENORPHINE URINE: POSITIVE
CLARITY, POC: NORMAL
COCAINE METABOLITE SCREEN URINE: NEGATIVE
COLOR, POC: YELLOW
FENTANYL SCREEN, URINE: NORMAL
GABAPENTIN SCREEN, URINE: NORMAL
GLUCOSE URINE, POC: NORMAL
KETONES, POC: NEGATIVE
LEUKOCYTE EST, POC: NEGATIVE
Lab: NORMAL
MDMA URINE: NEGATIVE
METHADONE SCREEN, URINE: NEGATIVE
METHAMPHETAMINE, URINE: NEGATIVE
NITRITE, POC: NEGATIVE
OPIATE SCREEN URINE: NEGATIVE
OXYCODONE SCREEN URINE: NEGATIVE
PH, POC: 5.5
PHENCYCLIDINE SCREEN URINE: NORMAL
PROPOXYPHENE SCREEN, URINE: NORMAL
PROTEIN, POC: NEGATIVE
QC PASS/FAIL: NORMAL
SARS-COV-2 RDRP RESP QL NAA+PROBE: NEGATIVE
SPECIFIC GRAVITY, POC: <=1.005
SYNTHETIC CANNABINOIDS(K2) SCREEN, URINE: NORMAL
THC SCREEN, URINE: NEGATIVE
TRAMADOL SCREEN URINE: NORMAL
TRICYCLIC ANTIDEPRESSANTS, UR: NEGATIVE
UROBILINOGEN, POC: NORMAL

## 2024-01-02 PROCEDURE — 3077F SYST BP >= 140 MM HG: CPT | Performed by: NURSE PRACTITIONER

## 2024-01-02 PROCEDURE — 87635 SARS-COV-2 COVID-19 AMP PRB: CPT | Performed by: NURSE PRACTITIONER

## 2024-01-02 PROCEDURE — G8420 CALC BMI NORM PARAMETERS: HCPCS | Performed by: NURSE PRACTITIONER

## 2024-01-02 PROCEDURE — 80305 DRUG TEST PRSMV DIR OPT OBS: CPT | Performed by: NURSE PRACTITIONER

## 2024-01-02 PROCEDURE — G8482 FLU IMMUNIZE ORDER/ADMIN: HCPCS | Performed by: NURSE PRACTITIONER

## 2024-01-02 PROCEDURE — G8427 DOCREV CUR MEDS BY ELIG CLIN: HCPCS | Performed by: NURSE PRACTITIONER

## 2024-01-02 PROCEDURE — 3080F DIAST BP >= 90 MM HG: CPT | Performed by: NURSE PRACTITIONER

## 2024-01-02 PROCEDURE — 81003 URINALYSIS AUTO W/O SCOPE: CPT | Performed by: NURSE PRACTITIONER

## 2024-01-02 PROCEDURE — 99214 OFFICE O/P EST MOD 30 MIN: CPT | Performed by: NURSE PRACTITIONER

## 2024-01-02 PROCEDURE — 3017F COLORECTAL CA SCREEN DOC REV: CPT | Performed by: NURSE PRACTITIONER

## 2024-01-02 PROCEDURE — 4004F PT TOBACCO SCREEN RCVD TLK: CPT | Performed by: NURSE PRACTITIONER

## 2024-01-02 RX ORDER — AMOXICILLIN AND CLAVULANATE POTASSIUM 875; 125 MG/1; MG/1
1 TABLET, FILM COATED ORAL 2 TIMES DAILY
Qty: 20 TABLET | Refills: 0 | Status: SHIPPED | OUTPATIENT
Start: 2024-01-02 | End: 2024-01-12

## 2024-01-02 RX ORDER — METHYLPREDNISOLONE 4 MG/1
TABLET ORAL
Qty: 1 KIT | Refills: 0 | Status: SHIPPED | OUTPATIENT
Start: 2024-01-02 | End: 2024-01-08

## 2024-01-02 RX ORDER — BUPRENORPHINE AND NALOXONE 8; 2 MG/1; MG/1
1 FILM, SOLUBLE BUCCAL; SUBLINGUAL 3 TIMES DAILY
Qty: 63 FILM | Refills: 0 | Status: SHIPPED | OUTPATIENT
Start: 2024-01-02 | End: 2024-01-23

## 2024-01-02 ASSESSMENT — PATIENT HEALTH QUESTIONNAIRE - PHQ9
7. TROUBLE CONCENTRATING ON THINGS, SUCH AS READING THE NEWSPAPER OR WATCHING TELEVISION: 3
SUM OF ALL RESPONSES TO PHQ QUESTIONS 1-9: 24
5. POOR APPETITE OR OVEREATING: 3
8. MOVING OR SPEAKING SO SLOWLY THAT OTHER PEOPLE COULD HAVE NOTICED. OR THE OPPOSITE, BEING SO FIGETY OR RESTLESS THAT YOU HAVE BEEN MOVING AROUND A LOT MORE THAN USUAL: 3
SUM OF ALL RESPONSES TO PHQ QUESTIONS 1-9: 24
SUM OF ALL RESPONSES TO PHQ9 QUESTIONS 1 & 2: 6
SUM OF ALL RESPONSES TO PHQ QUESTIONS 1-9: 24
4. FEELING TIRED OR HAVING LITTLE ENERGY: 3
1. LITTLE INTEREST OR PLEASURE IN DOING THINGS: 3
6. FEELING BAD ABOUT YOURSELF - OR THAT YOU ARE A FAILURE OR HAVE LET YOURSELF OR YOUR FAMILY DOWN: 3
10. IF YOU CHECKED OFF ANY PROBLEMS, HOW DIFFICULT HAVE THESE PROBLEMS MADE IT FOR YOU TO DO YOUR WORK, TAKE CARE OF THINGS AT HOME, OR GET ALONG WITH OTHER PEOPLE: 1
2. FEELING DOWN, DEPRESSED OR HOPELESS: 3
9. THOUGHTS THAT YOU WOULD BE BETTER OFF DEAD, OR OF HURTING YOURSELF: 0
SUM OF ALL RESPONSES TO PHQ QUESTIONS 1-9: 24
3. TROUBLE FALLING OR STAYING ASLEEP: 3

## 2024-01-02 ASSESSMENT — COLUMBIA-SUICIDE SEVERITY RATING SCALE - C-SSRS
6. HAVE YOU EVER DONE ANYTHING, STARTED TO DO ANYTHING, OR PREPARED TO DO ANYTHING TO END YOUR LIFE?: NO
1. WITHIN THE PAST MONTH, HAVE YOU WISHED YOU WERE DEAD OR WISHED YOU COULD GO TO SLEEP AND NOT WAKE UP?: NO
2. HAVE YOU ACTUALLY HAD ANY THOUGHTS OF KILLING YOURSELF?: NO

## 2024-01-02 ASSESSMENT — ENCOUNTER SYMPTOMS
SHORTNESS OF BREATH: 1
WHEEZING: 1
SORE THROAT: 0
COUGH: 1
SINUS PRESSURE: 0
NAUSEA: 1
BACK PAIN: 1
SINUS PAIN: 0

## 2024-01-02 NOTE — PROGRESS NOTES
Tenderness: There is no abdominal tenderness.   Musculoskeletal:      Cervical back: Muscular tenderness present. Decreased range of motion.      Right lower leg: No edema.      Left lower leg: No edema.   Lymphadenopathy:      Head:      Right side of head: No submental, submandibular or tonsillar adenopathy.      Left side of head: No submental, submandibular or tonsillar adenopathy.      Cervical: No cervical adenopathy.   Skin:     Findings: No rash.   Neurological:      Mental Status: He is alert.     Controlled substances monitoring: possible medication side effects, risk of tolerance and/or dependence, and alternative treatments discussed, no signs of potential drug abuse or diversion identified, and OARRS report reviewed today- activity consistent with treatment plan.   Assessment/Plan:   1. Oral lesion  -     Magic Mouthwash (MIRACLE MOUTHWASH); Swish and spit 5 mLs 4 times daily as needed for Irritation, Disp-280 mL, R-0Normal  2. Bronchitis  -     amoxicillin-clavulanate (AUGMENTIN) 875-125 MG per tablet; Take 1 tablet by mouth 2 times daily for 10 days, Disp-20 tablet, R-0Normal  -     methylPREDNISolone (MEDROL, BRITT,) 4 MG tablet; Take as directed, Disp-1 kit, R-0Normal  3. Opioid dependence with withdrawal (HCC)  -     buprenorphine-naloxone (SUBOXONE) 8-2 MG FILM SL film; Place 1 Film under the tongue 3 times daily for 21 days. Max Daily Amount: 3 Film, Disp-63 Film, R-0Normal     Augmentin 2x/day x 10 days  Medrol dose britt  Continue suboxone  Magic Mouthwash as needed    Return in about 4 weeks (around 1/30/2024).  Data Unavailable     No orders of the defined types were placed in this encounter.    Orders Placed This Encounter   Medications   • Magic Mouthwash (MIRACLE MOUTHWASH)     Sig: Swish and spit 5 mLs 4 times daily as needed for Irritation     Dispense:  280 mL     Refill:  0   • amoxicillin-clavulanate (AUGMENTIN) 875-125 MG per tablet     Sig: Take 1 tablet by mouth 2 times daily for 10

## 2024-01-03 LAB
MICROORGANISM SPEC CULT: NO GROWTH
SPECIMEN DESCRIPTION: NORMAL

## 2024-01-04 DIAGNOSIS — K13.70 ORAL LESION: ICD-10-CM

## 2024-01-04 NOTE — TELEPHONE ENCOUNTER
[facility] does not have magic mouthwash per patient. I asked him what pharmacy he would like it sent to and he said he doesn't know he is not the doctor. I let him know we would need to know where he would like it sent he said to try every pharmacy in [place] he is sick and the patient and he doesn't know this stuff and he should just be his own doctor at this point. I said okay I will let Gurpreet know he then said let me talk to Beverly someone who knows that they are doing, I said Beverly is not here but I am the person who rooms you with Dr. Melendez and Gurpreet, he then said \"I am not trying to be mean but come on I am not a doctor,\" I said I just needed to know what pharmacy you would be willing to try but you let me know anyone in [place] so I will let Gurperet know. Tried to call [facility] and [facility] they were not open. Believe this will need to go through compunding pharmacy.

## 2024-01-30 ENCOUNTER — OFFICE VISIT (OUTPATIENT)
Dept: PRIMARY CARE CLINIC | Age: 54
End: 2024-01-30
Payer: COMMERCIAL

## 2024-01-30 VITALS
SYSTOLIC BLOOD PRESSURE: 138 MMHG | OXYGEN SATURATION: 94 % | BODY MASS INDEX: 23.16 KG/M2 | HEART RATE: 82 BPM | HEIGHT: 68 IN | DIASTOLIC BLOOD PRESSURE: 80 MMHG | WEIGHT: 152.8 LBS

## 2024-01-30 DIAGNOSIS — F11.23 OPIOID DEPENDENCE WITH WITHDRAWAL (HCC): Primary | ICD-10-CM

## 2024-01-30 DIAGNOSIS — F41.8 DEPRESSION WITH ANXIETY: ICD-10-CM

## 2024-01-30 DIAGNOSIS — R05.1 ACUTE COUGH: ICD-10-CM

## 2024-01-30 PROCEDURE — 3075F SYST BP GE 130 - 139MM HG: CPT | Performed by: NURSE PRACTITIONER

## 2024-01-30 PROCEDURE — G8427 DOCREV CUR MEDS BY ELIG CLIN: HCPCS | Performed by: NURSE PRACTITIONER

## 2024-01-30 PROCEDURE — 80305 DRUG TEST PRSMV DIR OPT OBS: CPT | Performed by: NURSE PRACTITIONER

## 2024-01-30 PROCEDURE — G8482 FLU IMMUNIZE ORDER/ADMIN: HCPCS | Performed by: NURSE PRACTITIONER

## 2024-01-30 PROCEDURE — 3079F DIAST BP 80-89 MM HG: CPT | Performed by: NURSE PRACTITIONER

## 2024-01-30 PROCEDURE — 4004F PT TOBACCO SCREEN RCVD TLK: CPT | Performed by: NURSE PRACTITIONER

## 2024-01-30 PROCEDURE — 3017F COLORECTAL CA SCREEN DOC REV: CPT | Performed by: NURSE PRACTITIONER

## 2024-01-30 PROCEDURE — G8420 CALC BMI NORM PARAMETERS: HCPCS | Performed by: NURSE PRACTITIONER

## 2024-01-30 PROCEDURE — 99214 OFFICE O/P EST MOD 30 MIN: CPT | Performed by: NURSE PRACTITIONER

## 2024-01-30 RX ORDER — BUPRENORPHINE AND NALOXONE 8; 2 MG/1; MG/1
1 FILM, SOLUBLE BUCCAL; SUBLINGUAL 3 TIMES DAILY
Qty: 63 FILM | Refills: 0 | Status: SHIPPED | OUTPATIENT
Start: 2024-01-30 | End: 2024-02-20

## 2024-01-30 RX ORDER — LORAZEPAM 0.5 MG/1
0.5 TABLET ORAL EVERY 8 HOURS PRN
Qty: 90 TABLET | Refills: 0 | Status: SHIPPED | OUTPATIENT
Start: 2024-01-30 | End: 2024-04-29

## 2024-01-30 RX ORDER — GUAIFENESIN 600 MG/1
600 TABLET, EXTENDED RELEASE ORAL 2 TIMES DAILY
Qty: 30 TABLET | Refills: 0 | Status: SHIPPED | OUTPATIENT
Start: 2024-01-30 | End: 2024-02-14

## 2024-01-30 ASSESSMENT — ENCOUNTER SYMPTOMS
COUGH: 1
SHORTNESS OF BREATH: 0
DIARRHEA: 0
CONSTIPATION: 0
ABDOMINAL PAIN: 0

## 2024-01-30 NOTE — PATIENT INSTRUCTIONS
Encouraged not to use marijuana  Encouraged to decrease cigarette smoking.  Complete CXR  Start mucinex 600 mg 2x/day  Continue suboxone up to 3x/day  Continue lorazepam 0.5 mg tablet up to 3 x/day as needed

## 2024-01-30 NOTE — PROGRESS NOTES
[facility]  [facility] PRIMARY CARE  [address] [facility] [address]  Dept: [phone]    Nam Abreu is a 53 y.o. male Established patient, who presents today for his medical conditions/complaints as noted below.      Chief Complaint   Patient presents with    Medication Check       HPI:     HPI   Takes Suboxone 3x/day.  He has tried to take 2 per day, however it was not effective.  He states it helps with the pain but not as much as he would like.  States he is having no side effects, no GI upset. Bowels moving okay    Blood sugars have been running around 300  Blood sugars have been as low as 40's since his last visit.   He states he will eat something or drink mountain dew and this will bring it up.    He has had an ongoing cough since Christmas.  He occasionally will cough yellow or white sputum up.  No fevers      Reviewed prior notes: None   Reviewed previous:        LDL Calculated (mg/dL)   Date Value   04/05/2023 59   04/16/2019 97   05/21/2018 66       (goal LDL is <100)   AST (U/L)   Date Value   02/12/2023 18     ALT (U/L)   Date Value   02/12/2023 10     BUN (mg/dL)   Date Value   02/14/2023 10     Hemoglobin A1C (%)   Date Value   03/07/2023 9.9     TSH (uIU/mL)   Date Value   02/12/2023 1.50     BP Readings from Last 3 Encounters:   01/30/24 138/80   01/02/24 (!) 148/92   12/11/23 (!) 152/90          (goal 120/80)    Past Medical History:   Diagnosis Date    Anxiety     Arthritis     CAD (coronary artery disease)     Calcaneal apophysitis     Corns     Depression     Diabetes type I (HCC)     Gastroparesis     GERD (gastroesophageal reflux disease)     Headache(784.0)     Hearing loss     Hyperglycemia     Hyperlipidemia     Hypertension     Intussusception (HCC)     MI (myocardial infarction) (HCC) jan 2014    Neuropathy     Osteoarthritis     Panic attacks     Temporomandibular joint disorder       Past Surgical History:   Procedure Laterality Date

## 2024-02-08 ENCOUNTER — TELEPHONE (OUTPATIENT)
Dept: PRIMARY CARE CLINIC | Age: 54
End: 2024-02-08

## 2024-02-08 DIAGNOSIS — J18.9 PNEUMONIA OF BOTH LOWER LOBES DUE TO INFECTIOUS ORGANISM: ICD-10-CM

## 2024-02-08 DIAGNOSIS — J18.9 PNEUMONIA OF BOTH LOWER LOBES DUE TO INFECTIOUS ORGANISM: Primary | ICD-10-CM

## 2024-02-08 RX ORDER — ALBUTEROL SULFATE 90 UG/1
2 AEROSOL, METERED RESPIRATORY (INHALATION) 4 TIMES DAILY PRN
Qty: 54 G | Refills: 1 | Status: SHIPPED | OUTPATIENT
Start: 2024-02-08

## 2024-02-08 RX ORDER — AMOXICILLIN AND CLAVULANATE POTASSIUM 875; 125 MG/1; MG/1
1 TABLET, FILM COATED ORAL 2 TIMES DAILY
Qty: 20 TABLET | Refills: 0 | Status: SHIPPED | OUTPATIENT
Start: 2024-02-08 | End: 2024-02-18

## 2024-02-08 NOTE — TELEPHONE ENCOUNTER
Dr. Menjivar from [facility] is calling to report positive findings on patients recent chest x-ray.    Dr. Menjivar states patient has pneumonia. I attempted to pull the report from [facility] with no luck.

## 2024-02-08 NOTE — TELEPHONE ENCOUNTER
X-ray results reviewed in care everywhere.  Please update patient.  Patient has bibasilar pneumonia.  Antibiotic Augmentin and albuterol inhaler sent to [facility].  Repeat chest x-ray in 10 days.

## 2024-02-09 DIAGNOSIS — J18.9 PNEUMONIA DUE TO INFECTIOUS ORGANISM, UNSPECIFIED LATERALITY, UNSPECIFIED PART OF LUNG: Primary | ICD-10-CM

## 2024-02-19 DIAGNOSIS — F11.23 OPIOID DEPENDENCE WITH WITHDRAWAL (HCC): ICD-10-CM

## 2024-02-19 RX ORDER — BUPRENORPHINE AND NALOXONE 8; 2 MG/1; MG/1
1 FILM, SOLUBLE BUCCAL; SUBLINGUAL 3 TIMES DAILY
Qty: 63 FILM | Refills: 0 | Status: SHIPPED | OUTPATIENT
Start: 2024-02-19 | End: 2024-03-11

## 2024-02-20 ENCOUNTER — OFFICE VISIT (OUTPATIENT)
Dept: PRIMARY CARE CLINIC | Age: 54
End: 2024-02-20
Payer: COMMERCIAL

## 2024-02-20 VITALS
SYSTOLIC BLOOD PRESSURE: 132 MMHG | HEART RATE: 79 BPM | WEIGHT: 145.2 LBS | DIASTOLIC BLOOD PRESSURE: 70 MMHG | HEIGHT: 69 IN | OXYGEN SATURATION: 99 % | BODY MASS INDEX: 21.51 KG/M2

## 2024-02-20 DIAGNOSIS — M48.02 STENOSIS OF CERVICAL SPINE WITH MYELOPATHY (HCC): ICD-10-CM

## 2024-02-20 DIAGNOSIS — G99.2 STENOSIS OF CERVICAL SPINE WITH MYELOPATHY (HCC): ICD-10-CM

## 2024-02-20 DIAGNOSIS — E11.42 DIABETIC PERIPHERAL NEUROPATHY (HCC): ICD-10-CM

## 2024-02-20 DIAGNOSIS — Z79.899 HIGH RISK MEDICATION USE: ICD-10-CM

## 2024-02-20 DIAGNOSIS — J18.9 PNEUMONIA OF BOTH LOWER LOBES DUE TO INFECTIOUS ORGANISM: ICD-10-CM

## 2024-02-20 DIAGNOSIS — M46.92 UNSPECIFIED INFLAMMATORY SPONDYLOPATHY, CERVICAL REGION (HCC): ICD-10-CM

## 2024-02-20 DIAGNOSIS — Z00.00 INITIAL MEDICARE ANNUAL WELLNESS VISIT: Primary | ICD-10-CM

## 2024-02-20 DIAGNOSIS — I25.119 ATHEROSCLEROSIS OF NATIVE CORONARY ARTERY OF NATIVE HEART WITH ANGINA PECTORIS (HCC): ICD-10-CM

## 2024-02-20 DIAGNOSIS — E10.65 TYPE 1 DIABETES MELLITUS WITH HYPERGLYCEMIA (HCC): ICD-10-CM

## 2024-02-20 DIAGNOSIS — F33.9 MAJOR DEPRESSIVE DISORDER, RECURRENT EPISODE WITH ANXIOUS DISTRESS (HCC): ICD-10-CM

## 2024-02-20 DIAGNOSIS — F41.8 DEPRESSION WITH ANXIETY: ICD-10-CM

## 2024-02-20 DIAGNOSIS — Z23 NEED FOR VIRAL IMMUNIZATION: ICD-10-CM

## 2024-02-20 PROCEDURE — 3078F DIAST BP <80 MM HG: CPT | Performed by: FAMILY MEDICINE

## 2024-02-20 PROCEDURE — 3075F SYST BP GE 130 - 139MM HG: CPT | Performed by: FAMILY MEDICINE

## 2024-02-20 PROCEDURE — 90471 IMMUNIZATION ADMIN: CPT | Performed by: FAMILY MEDICINE

## 2024-02-20 PROCEDURE — 3017F COLORECTAL CA SCREEN DOC REV: CPT | Performed by: FAMILY MEDICINE

## 2024-02-20 PROCEDURE — 90677 PCV20 VACCINE IM: CPT | Performed by: FAMILY MEDICINE

## 2024-02-20 PROCEDURE — G0438 PPPS, INITIAL VISIT: HCPCS | Performed by: FAMILY MEDICINE

## 2024-02-20 PROCEDURE — G8482 FLU IMMUNIZE ORDER/ADMIN: HCPCS | Performed by: FAMILY MEDICINE

## 2024-02-20 PROCEDURE — 3046F HEMOGLOBIN A1C LEVEL >9.0%: CPT | Performed by: FAMILY MEDICINE

## 2024-02-20 RX ORDER — LORAZEPAM 0.5 MG/1
1 TABLET ORAL 2 TIMES DAILY PRN
Qty: 180 TABLET | Refills: 1 | Status: SHIPPED | OUTPATIENT
Start: 2024-02-20 | End: 2024-02-22

## 2024-02-20 RX ORDER — AMOXICILLIN AND CLAVULANATE POTASSIUM 875; 125 MG/1; MG/1
1 TABLET, FILM COATED ORAL 2 TIMES DAILY
Qty: 20 TABLET | Refills: 0 | Status: SHIPPED | OUTPATIENT
Start: 2024-02-20 | End: 2024-03-01

## 2024-02-20 RX ORDER — CLOPIDOGREL BISULFATE 75 MG/1
75 TABLET ORAL DAILY
Qty: 90 TABLET | Refills: 3 | Status: SHIPPED | OUTPATIENT
Start: 2024-02-20

## 2024-02-20 RX ORDER — ISOSORBIDE MONONITRATE 30 MG/1
30 TABLET, EXTENDED RELEASE ORAL DAILY
Qty: 90 TABLET | Refills: 3 | Status: SHIPPED | OUTPATIENT
Start: 2024-02-20

## 2024-02-20 RX ORDER — ATORVASTATIN CALCIUM 80 MG/1
80 TABLET, FILM COATED ORAL NIGHTLY
Qty: 90 TABLET | Refills: 3 | Status: SHIPPED | OUTPATIENT
Start: 2024-02-20

## 2024-02-20 SDOH — ECONOMIC STABILITY: INCOME INSECURITY: HOW HARD IS IT FOR YOU TO PAY FOR THE VERY BASICS LIKE FOOD, HOUSING, MEDICAL CARE, AND HEATING?: NOT HARD AT ALL

## 2024-02-20 SDOH — ECONOMIC STABILITY: FOOD INSECURITY: WITHIN THE PAST 12 MONTHS, THE FOOD YOU BOUGHT JUST DIDN'T LAST AND YOU DIDN'T HAVE MONEY TO GET MORE.: NEVER TRUE

## 2024-02-20 SDOH — ECONOMIC STABILITY: FOOD INSECURITY: WITHIN THE PAST 12 MONTHS, YOU WORRIED THAT YOUR FOOD WOULD RUN OUT BEFORE YOU GOT MONEY TO BUY MORE.: NEVER TRUE

## 2024-02-20 ASSESSMENT — PATIENT HEALTH QUESTIONNAIRE - PHQ9
SUM OF ALL RESPONSES TO PHQ QUESTIONS 1-9: 12
3. TROUBLE FALLING OR STAYING ASLEEP: 2
1. LITTLE INTEREST OR PLEASURE IN DOING THINGS: 3
7. TROUBLE CONCENTRATING ON THINGS, SUCH AS READING THE NEWSPAPER OR WATCHING TELEVISION: 1
2. FEELING DOWN, DEPRESSED OR HOPELESS: 3
8. MOVING OR SPEAKING SO SLOWLY THAT OTHER PEOPLE COULD HAVE NOTICED. OR THE OPPOSITE, BEING SO FIGETY OR RESTLESS THAT YOU HAVE BEEN MOVING AROUND A LOT MORE THAN USUAL: 0
9. THOUGHTS THAT YOU WOULD BE BETTER OFF DEAD, OR OF HURTING YOURSELF: 0
10. IF YOU CHECKED OFF ANY PROBLEMS, HOW DIFFICULT HAVE THESE PROBLEMS MADE IT FOR YOU TO DO YOUR WORK, TAKE CARE OF THINGS AT HOME, OR GET ALONG WITH OTHER PEOPLE: 0
SUM OF ALL RESPONSES TO PHQ QUESTIONS 1-9: 12
5. POOR APPETITE OR OVEREATING: 0
SUM OF ALL RESPONSES TO PHQ QUESTIONS 1-9: 12
SUM OF ALL RESPONSES TO PHQ9 QUESTIONS 1 & 2: 6
6. FEELING BAD ABOUT YOURSELF - OR THAT YOU ARE A FAILURE OR HAVE LET YOURSELF OR YOUR FAMILY DOWN: 3
4. FEELING TIRED OR HAVING LITTLE ENERGY: 0
SUM OF ALL RESPONSES TO PHQ QUESTIONS 1-9: 12

## 2024-02-20 NOTE — PATIENT INSTRUCTIONS
alcohol or use drugs again, seek help right away. Call a trusted friend or family member. Some people get support from organizations such as Narcotics Anonymous or [employer] or from treatment facilities.  If you relapse, get help as soon as you can. Some people make a plan with another person that outlines what they want that person to do for them if they relapse. The plan usually includes how to handle the relapse and who to notify in case of relapse.  Don't give up. Remember that a relapse doesn't mean that you have failed. Use the experience to learn the triggers that lead you to drink or use drugs. Then quit again. Recovery is a lifelong process. Many people have several relapses before they are able to quit for good.  Follow-up care is a key part of your treatment and safety. Be sure to make and go to all appointments, and call your doctor if you are having problems. It's also a good idea to know your test results and keep a list of the medicines you take.  When should you call for help?   Call 911  anytime you think you may need emergency care. For example, call if you or someone else:    Has overdosed or has withdrawal signs. Be sure to tell the emergency workers that you are or someone else is using or trying to quit using drugs. Overdose or withdrawal signs may include:  Losing consciousness.  Seizure.  Seeing or hearing things that aren't there (hallucinations).     Is thinking or talking about suicide or harming others.   Where to get help 24 hours a day, 7 days a week   If you or someone you know talks about suicide, self-harm, a mental health crisis, a substance use crisis, or any other kind of emotional distress, get help right away. You can:    Call the Suicide and Crisis Lifeline at 204.     Call 0-734-019-TALK (1-655.259.1957).     Text HOME to 572817 to access the Crisis Text Line.   Consider saving these numbers in your phone.  Go to [employer] for more information or to chat

## 2024-02-22 ENCOUNTER — TELEPHONE (OUTPATIENT)
Dept: PRIMARY CARE CLINIC | Age: 54
End: 2024-02-22

## 2024-02-22 DIAGNOSIS — F41.8 DEPRESSION WITH ANXIETY: ICD-10-CM

## 2024-02-22 RX ORDER — LORAZEPAM 1 MG/1
1 TABLET ORAL 2 TIMES DAILY PRN
Qty: 180 TABLET | Refills: 0 | Status: SHIPPED | OUTPATIENT
Start: 2024-02-22 | End: 2024-05-22

## 2024-02-22 NOTE — TELEPHONE ENCOUNTER
Pt asking for ativan to be sent in as 1 mg bid. Pt does not want to take 2 tabs bid of the .5mg. Original rx canceled

## 2024-03-12 DIAGNOSIS — I10 ESSENTIAL HYPERTENSION: ICD-10-CM

## 2024-03-12 DIAGNOSIS — F11.23 OPIOID DEPENDENCE WITH WITHDRAWAL (HCC): ICD-10-CM

## 2024-03-12 RX ORDER — BUPRENORPHINE AND NALOXONE 8; 2 MG/1; MG/1
1 FILM, SOLUBLE BUCCAL; SUBLINGUAL 3 TIMES DAILY
Qty: 63 FILM | Refills: 0 | Status: SHIPPED | OUTPATIENT
Start: 2024-03-12 | End: 2024-04-02

## 2024-03-12 RX ORDER — PROPRANOLOL HYDROCHLORIDE 60 MG/1
TABLET ORAL
Qty: 60 TABLET | Refills: 1 | Status: SHIPPED | OUTPATIENT
Start: 2024-03-12

## 2024-03-12 NOTE — TELEPHONE ENCOUNTER
----- Message from Kae Ray sent at 3/8/2024  9:32 AM EST -----  Subject: Refill Request    QUESTIONS  Name of Medication? buprenorphine-naloxone (SUBOXONE) 8-2 MG FILM SL film  Patient-reported dosage and instructions? Place 1 Film under the tongue 3   times daily for 21 days. Max Daily Amount? 3 Film  How many days do you have left? 0  Preferred [facility]? [id number]  Pharmacy phone number (if available)? [phone]  ---------------------------------------------------------------------------  --------------  CALL BACK INFO  What is the best way for the office to contact you? OK to leave message on   voicemail  Preferred Call Back Phone Number? [phone]  ---------------------------------------------------------------------------  --------------  SCRIPT ANSWERS  Relationship to Patient? Self

## 2024-03-28 ENCOUNTER — OFFICE VISIT (OUTPATIENT)
Dept: PRIMARY CARE CLINIC | Age: 54
End: 2024-03-28
Payer: COMMERCIAL

## 2024-03-28 VITALS
OXYGEN SATURATION: 96 % | BODY MASS INDEX: 22.51 KG/M2 | HEIGHT: 69 IN | WEIGHT: 152 LBS | DIASTOLIC BLOOD PRESSURE: 90 MMHG | SYSTOLIC BLOOD PRESSURE: 136 MMHG | HEART RATE: 80 BPM

## 2024-03-28 DIAGNOSIS — F11.23 OPIOID DEPENDENCE WITH WITHDRAWAL (HCC): Primary | ICD-10-CM

## 2024-03-28 PROCEDURE — G8420 CALC BMI NORM PARAMETERS: HCPCS | Performed by: NURSE PRACTITIONER

## 2024-03-28 PROCEDURE — 99213 OFFICE O/P EST LOW 20 MIN: CPT | Performed by: NURSE PRACTITIONER

## 2024-03-28 PROCEDURE — 3080F DIAST BP >= 90 MM HG: CPT | Performed by: NURSE PRACTITIONER

## 2024-03-28 PROCEDURE — G8482 FLU IMMUNIZE ORDER/ADMIN: HCPCS | Performed by: NURSE PRACTITIONER

## 2024-03-28 PROCEDURE — 80305 DRUG TEST PRSMV DIR OPT OBS: CPT | Performed by: NURSE PRACTITIONER

## 2024-03-28 PROCEDURE — 3017F COLORECTAL CA SCREEN DOC REV: CPT | Performed by: NURSE PRACTITIONER

## 2024-03-28 PROCEDURE — 4004F PT TOBACCO SCREEN RCVD TLK: CPT | Performed by: NURSE PRACTITIONER

## 2024-03-28 PROCEDURE — 3075F SYST BP GE 130 - 139MM HG: CPT | Performed by: NURSE PRACTITIONER

## 2024-03-28 PROCEDURE — G8428 CUR MEDS NOT DOCUMENT: HCPCS | Performed by: NURSE PRACTITIONER

## 2024-03-28 RX ORDER — BUPRENORPHINE AND NALOXONE 8; 2 MG/1; MG/1
1 FILM, SOLUBLE BUCCAL; SUBLINGUAL 3 TIMES DAILY
Qty: 90 FILM | Refills: 0 | Status: SHIPPED | OUTPATIENT
Start: 2024-03-28 | End: 2024-04-27

## 2024-03-28 ASSESSMENT — ENCOUNTER SYMPTOMS
DIARRHEA: 0
WHEEZING: 0
EYE REDNESS: 0
VOMITING: 0
SORE THROAT: 0
EYE DISCHARGE: 0
ABDOMINAL PAIN: 0
NAUSEA: 0
COUGH: 1
CONSTIPATION: 0
RHINORRHEA: 0
SHORTNESS OF BREATH: 0

## 2024-03-28 NOTE — PROGRESS NOTES
[facility]  [facility] PRIMARY CARE  [address] [address]  Dept: [phone]    Nam Abreu is a 53 y.o. male Established patient, who presents today for his medical conditions/complaints as noted below.      No chief complaint on file.      HPI:     HPI   States some days he has managed to take just 2 Suboxone. He estimates he has taken 2 Suboxone about 6 days in the past two months. The other days he takes 3 Suboxone.  He states he was able to do OK when he did take two. He states frequently something happens in the triggers him into withdrawal symptoms. He gets \"jiggy\" and has nausea and is upset.    Pain continues but he is dealing with it.   He has been having significant shoulder pain.  No constipation.    He has smoked a little marijuana but not on  a regular basis.  He is smoking cigarettes about  1 ppd.    Blood sugars elevated.  He sees Dr. Pack.    States his cough is better than it was a couple weeks ago. He does not feel congested. He feels alright.    Denies depression.  He is sleeping OK.      Reviewed prior notes: Previous PCP   Reviewed previous:  Labs    LDL Calculated (mg/dL)   Date Value   04/05/2023 59   04/16/2019 97   05/21/2018 66       (goal LDL is <100)   AST (U/L)   Date Value   02/12/2023 18     ALT (U/L)   Date Value   02/12/2023 10     BUN (mg/dL)   Date Value   02/14/2023 10     Hemoglobin A1C (%)   Date Value   02/23/2024 11.6     TSH (uIU/mL)   Date Value   02/12/2023 1.50     BP Readings from Last 3 Encounters:   03/28/24 (!) 136/90   02/20/24 132/70   01/30/24 138/80          (goal 120/80)    Past Medical History:   Diagnosis Date    Anxiety     Arthritis     CAD (coronary artery disease)     Calcaneal apophysitis     Corns     Depression     Diabetes type I (HCC)     Gastroparesis     GERD (gastroesophageal reflux disease)     Headache(784.0)     Hearing loss     Hyperglycemia     Hyperlipidemia     Hypertension

## 2024-04-12 ENCOUNTER — TELEPHONE (OUTPATIENT)
Dept: PRIMARY CARE CLINIC | Age: 54
End: 2024-04-12

## 2024-04-12 RX ORDER — PROCHLORPERAZINE MALEATE 10 MG
10 TABLET ORAL EVERY 6 HOURS PRN
Qty: 60 TABLET | Refills: 0 | Status: SHIPPED | OUTPATIENT
Start: 2024-04-12

## 2024-04-25 ENCOUNTER — OFFICE VISIT (OUTPATIENT)
Dept: PRIMARY CARE CLINIC | Age: 54
End: 2024-04-25
Payer: COMMERCIAL

## 2024-04-25 VITALS
HEART RATE: 75 BPM | DIASTOLIC BLOOD PRESSURE: 90 MMHG | WEIGHT: 152 LBS | SYSTOLIC BLOOD PRESSURE: 130 MMHG | OXYGEN SATURATION: 97 % | BODY MASS INDEX: 22.51 KG/M2 | HEIGHT: 69 IN

## 2024-04-25 DIAGNOSIS — F11.23 OPIOID DEPENDENCE WITH WITHDRAWAL (HCC): Primary | ICD-10-CM

## 2024-04-25 PROCEDURE — 99213 OFFICE O/P EST LOW 20 MIN: CPT | Performed by: NURSE PRACTITIONER

## 2024-04-25 PROCEDURE — G8427 DOCREV CUR MEDS BY ELIG CLIN: HCPCS | Performed by: NURSE PRACTITIONER

## 2024-04-25 PROCEDURE — 3017F COLORECTAL CA SCREEN DOC REV: CPT | Performed by: NURSE PRACTITIONER

## 2024-04-25 PROCEDURE — G8420 CALC BMI NORM PARAMETERS: HCPCS | Performed by: NURSE PRACTITIONER

## 2024-04-25 PROCEDURE — 80305 DRUG TEST PRSMV DIR OPT OBS: CPT | Performed by: NURSE PRACTITIONER

## 2024-04-25 PROCEDURE — 4004F PT TOBACCO SCREEN RCVD TLK: CPT | Performed by: NURSE PRACTITIONER

## 2024-04-25 PROCEDURE — 3080F DIAST BP >= 90 MM HG: CPT | Performed by: NURSE PRACTITIONER

## 2024-04-25 PROCEDURE — 3075F SYST BP GE 130 - 139MM HG: CPT | Performed by: NURSE PRACTITIONER

## 2024-04-25 RX ORDER — BUPRENORPHINE AND NALOXONE 8; 2 MG/1; MG/1
1 FILM, SOLUBLE BUCCAL; SUBLINGUAL 2 TIMES DAILY
Qty: 60 FILM | Refills: 0
Start: 2024-04-25 | End: 2024-05-25

## 2024-04-25 ASSESSMENT — ENCOUNTER SYMPTOMS
SHORTNESS OF BREATH: 0
BACK PAIN: 1
RHINORRHEA: 0
COUGH: 0
EYE DISCHARGE: 0
ABDOMINAL PAIN: 0
WHEEZING: 0
NAUSEA: 0
EYE REDNESS: 0
SORE THROAT: 0
DIARRHEA: 0
VOMITING: 0

## 2024-04-25 NOTE — PROGRESS NOTES
Last Rate Last Admin    lactated ringers infusion  75 mL/hr IntraVENous Continuous Mikal Vale MD         Allergies   Allergen Reactions    Avelox [Moxifloxacin]     Ciprofloxacin Other (See Comments)    Levofloxacin     Lyrica [Pregabalin]     Metoclopramide Other (See Comments)     Severe confusion and paranoid activity    Neurontin [Gabapentin]     Other      Can not have epidural injections, steroids, nerve blocks, or burning of nerves due to being diabetic    Tetanus Toxoids     Tramadol     Tramadol Hcl        Health Maintenance   Topic Date Due    Hepatitis B vaccine (1 of 3 - 3-dose series) Never done    COVID-19 Vaccine (1) Never done    HIV screen  Never done    Hepatitis C screen  Never done    Diabetic Alb to Cr ratio (uACR) test  05/04/2021    Diabetic foot exam  10/19/2022    Shingles vaccine (2 of 2) 11/02/2023    GFR test (Diabetes, CKD 3-4, OR last GFR 15-59)  02/14/2024    Lipids  04/05/2024    A1C test (Diabetic or Prediabetic)  05/23/2024    Low dose CT lung screening &/or counseling  05/15/2024    Diabetic retinal exam  10/13/2024    Depression Monitoring  02/20/2025    Annual Wellness Visit (Medicare)  02/20/2025    Colorectal Cancer Screen  11/14/2025    Flu vaccine  Completed    Pneumococcal 0-64 years Vaccine  Completed    Hepatitis A vaccine  Aged Out    Hib vaccine  Aged Out    Polio vaccine  Aged Out    Meningococcal (ACWY) vaccine  Aged Out       Subjective:      Review of Systems   Constitutional:  Negative for chills and fever.   HENT:  Negative for rhinorrhea and sore throat.    Eyes:  Negative for discharge and redness.   Respiratory:  Negative for cough, shortness of breath and wheezing.    Cardiovascular:  Negative for chest pain, palpitations and leg swelling.   Gastrointestinal:  Negative for abdominal pain, diarrhea, nausea and vomiting.   Genitourinary:  Negative for dysuria and frequency.   Musculoskeletal:  Positive for arthralgias, back pain and neck pain.

## 2024-04-29 NOTE — TELEPHONE ENCOUNTER
[facility] requesting new prescription.    Pt requesting on going orders sent to [facility] but would like a prescription sent to [facility] temporarily due to only having 2 pills left.

## 2024-04-30 NOTE — TELEPHONE ENCOUNTER
Patient is asking for a weeks supply to be sent to his local pharmacy while he waits for the mail order pharmacy to deliver his meds.    Patient will call the mail order pharmacy to see about expediting the shipment.    Patient would like to be notified when sent.

## 2024-05-01 RX ORDER — VORTIOXETINE 20 MG/1
TABLET, FILM COATED ORAL
Qty: 7 TABLET | Refills: 0 | OUTPATIENT
Start: 2024-05-01

## 2024-05-02 DIAGNOSIS — F11.23 OPIOID DEPENDENCE WITH WITHDRAWAL (HCC): ICD-10-CM

## 2024-05-02 RX ORDER — BUPRENORPHINE AND NALOXONE 8; 2 MG/1; MG/1
1 FILM, SOLUBLE BUCCAL; SUBLINGUAL 2 TIMES DAILY
Qty: 60 FILM | Refills: 0 | Status: SHIPPED | OUTPATIENT
Start: 2024-05-02 | End: 2024-06-01

## 2024-05-06 ENCOUNTER — TELEPHONE (OUTPATIENT)
Dept: GASTROENTEROLOGY | Age: 54
End: 2024-05-06

## 2024-05-06 NOTE — TELEPHONE ENCOUNTER
Est. 05/2017/Bleibel/Nausea and vomiting/Medicare  1st attempt- Called pt and LVM to call the office to elliot an appt.  2nd attempt- Sent NP letter.

## 2024-05-28 DIAGNOSIS — F11.23 OPIOID DEPENDENCE WITH WITHDRAWAL (HCC): ICD-10-CM

## 2024-05-28 RX ORDER — BUPRENORPHINE AND NALOXONE 8; 2 MG/1; MG/1
1 FILM, SOLUBLE BUCCAL; SUBLINGUAL 2 TIMES DAILY
Qty: 60 FILM | Refills: 0 | Status: SHIPPED | OUTPATIENT
Start: 2024-05-28 | End: 2024-06-27

## 2024-05-28 NOTE — TELEPHONE ENCOUNTER
LAST VISIT:   2/20/2024     Future Appointments   Date Time Provider Department Center   5/29/2024 10:15 AM Dakota Melendez MD [facility]   6/19/2024  9:45 AM Gurpreet Mills APRN - CNP [facility]   7/19/2024 11:00 [facility]    Ph[facility]

## 2024-06-10 DIAGNOSIS — F41.8 DEPRESSION WITH ANXIETY: ICD-10-CM

## 2024-06-10 RX ORDER — LORAZEPAM 1 MG/1
1 TABLET ORAL 2 TIMES DAILY PRN
Qty: 180 TABLET | Refills: 0 | Status: SHIPPED | OUTPATIENT
Start: 2024-06-10 | End: 2024-09-08

## 2024-06-10 NOTE — TELEPHONE ENCOUNTER
LAST VISIT:   2/20/2024     Future Appointments   Date Time Provider Department Center   6/19/2024  9:45 AM Gurpreet Mills APR[facility] - C[facility]   7/19/2024 11:00 [facility]

## 2024-07-03 DIAGNOSIS — F11.23 OPIOID DEPENDENCE WITH WITHDRAWAL (HCC): ICD-10-CM

## 2024-07-03 RX ORDER — BUPRENORPHINE AND NALOXONE 8; 2 MG/1; MG/1
1 FILM, SOLUBLE BUCCAL; SUBLINGUAL 2 TIMES DAILY
Qty: 60 FILM | Refills: 0 | Status: SHIPPED | OUTPATIENT
Start: 2024-07-03 | End: 2024-07-05 | Stop reason: SDUPTHER

## 2024-07-03 NOTE — TELEPHONE ENCOUNTER
LAST VISIT:   2/20/2024     Future Appointments   Date Time Provider Department Center   7/9/2024  2:00 PM [facility]   7/19/2024 11:00 AM [facility]   7/24/2024 11:00 AM Gurpreet Mills, [facility]    [facility] [address].

## 2024-07-05 DIAGNOSIS — F11.23 OPIOID DEPENDENCE WITH WITHDRAWAL (HCC): ICD-10-CM

## 2024-07-05 RX ORDER — BUPRENORPHINE AND NALOXONE 8; 2 MG/1; MG/1
1 FILM, SOLUBLE BUCCAL; SUBLINGUAL 2 TIMES DAILY
Qty: 60 FILM | Refills: 0 | Status: SHIPPED | OUTPATIENT
Start: 2024-07-05 | End: 2024-08-04

## 2024-07-05 NOTE — TELEPHONE ENCOUNTER
Patient needs suboxone sent to [facility] in [place]. It was sent to the wrong pharmacy.

## 2024-07-08 DIAGNOSIS — F11.23 OPIOID DEPENDENCE WITH WITHDRAWAL (HCC): ICD-10-CM

## 2024-07-08 RX ORDER — BUPRENORPHINE AND NALOXONE 8; 2 MG/1; MG/1
1 FILM, SOLUBLE BUCCAL; SUBLINGUAL 2 TIMES DAILY
Qty: 60 FILM | Refills: 0 | OUTPATIENT
Start: 2024-07-08 | End: 2024-08-07

## 2024-07-09 ENCOUNTER — OFFICE VISIT (OUTPATIENT)
Dept: PRIMARY CARE CLINIC | Age: 54
End: 2024-07-09
Payer: COMMERCIAL

## 2024-07-09 VITALS
HEART RATE: 62 BPM | BODY MASS INDEX: 21.42 KG/M2 | HEIGHT: 69 IN | WEIGHT: 144.6 LBS | DIASTOLIC BLOOD PRESSURE: 90 MMHG | OXYGEN SATURATION: 99 % | SYSTOLIC BLOOD PRESSURE: 158 MMHG

## 2024-07-09 DIAGNOSIS — F41.8 DEPRESSION WITH ANXIETY: ICD-10-CM

## 2024-07-09 DIAGNOSIS — I10 ESSENTIAL HYPERTENSION: Primary | ICD-10-CM

## 2024-07-09 DIAGNOSIS — Z13.6 ENCOUNTER FOR LIPID SCREENING FOR CARDIOVASCULAR DISEASE: ICD-10-CM

## 2024-07-09 DIAGNOSIS — Z13.220 ENCOUNTER FOR LIPID SCREENING FOR CARDIOVASCULAR DISEASE: ICD-10-CM

## 2024-07-09 DIAGNOSIS — Z00.00 ANNUAL PHYSICAL EXAM: ICD-10-CM

## 2024-07-09 DIAGNOSIS — Z12.5 SCREENING PSA (PROSTATE SPECIFIC ANTIGEN): ICD-10-CM

## 2024-07-09 PROCEDURE — 3077F SYST BP >= 140 MM HG: CPT | Performed by: FAMILY MEDICINE

## 2024-07-09 PROCEDURE — G8427 DOCREV CUR MEDS BY ELIG CLIN: HCPCS | Performed by: FAMILY MEDICINE

## 2024-07-09 PROCEDURE — 3017F COLORECTAL CA SCREEN DOC REV: CPT | Performed by: FAMILY MEDICINE

## 2024-07-09 PROCEDURE — 3080F DIAST BP >= 90 MM HG: CPT | Performed by: FAMILY MEDICINE

## 2024-07-09 PROCEDURE — 4004F PT TOBACCO SCREEN RCVD TLK: CPT | Performed by: FAMILY MEDICINE

## 2024-07-09 PROCEDURE — G8420 CALC BMI NORM PARAMETERS: HCPCS | Performed by: FAMILY MEDICINE

## 2024-07-09 PROCEDURE — 99214 OFFICE O/P EST MOD 30 MIN: CPT | Performed by: FAMILY MEDICINE

## 2024-07-09 RX ORDER — URINE ACETONE TEST STRIPS
STRIP MISCELLANEOUS
COMMUNITY
Start: 2024-05-02

## 2024-07-09 RX ORDER — GLUCAGON INJECTION, SOLUTION 1 MG/.2ML
INJECTION, SOLUTION SUBCUTANEOUS
COMMUNITY
Start: 2024-05-02 | End: 2024-07-09 | Stop reason: ALTCHOICE

## 2024-07-09 RX ORDER — CLONAZEPAM 1 MG/1
1 TABLET ORAL 2 TIMES DAILY
Qty: 60 TABLET | Refills: 5 | Status: SHIPPED | OUTPATIENT
Start: 2024-07-09 | End: 2025-01-05

## 2024-07-09 ASSESSMENT — PATIENT HEALTH QUESTIONNAIRE - PHQ9
2. FEELING DOWN, DEPRESSED OR HOPELESS: SEVERAL DAYS
SUM OF ALL RESPONSES TO PHQ QUESTIONS 1-9: 2
10. IF YOU CHECKED OFF ANY PROBLEMS, HOW DIFFICULT HAVE THESE PROBLEMS MADE IT FOR YOU TO DO YOUR WORK, TAKE CARE OF THINGS AT HOME, OR GET ALONG WITH OTHER PEOPLE: NOT DIFFICULT AT ALL
1. LITTLE INTEREST OR PLEASURE IN DOING THINGS: SEVERAL DAYS
SUM OF ALL RESPONSES TO PHQ QUESTIONS 1-9: 2
4. FEELING TIRED OR HAVING LITTLE ENERGY: NOT AT ALL
SUM OF ALL RESPONSES TO PHQ QUESTIONS 1-9: 2
6. FEELING BAD ABOUT YOURSELF - OR THAT YOU ARE A FAILURE OR HAVE LET YOURSELF OR YOUR FAMILY DOWN: NOT AT ALL
7. TROUBLE CONCENTRATING ON THINGS, SUCH AS READING THE NEWSPAPER OR WATCHING TELEVISION: NOT AT ALL
9. THOUGHTS THAT YOU WOULD BE BETTER OFF DEAD, OR OF HURTING YOURSELF: NOT AT ALL
SUM OF ALL RESPONSES TO PHQ9 QUESTIONS 1 & 2: 2
SUM OF ALL RESPONSES TO PHQ QUESTIONS 1-9: 2
3. TROUBLE FALLING OR STAYING ASLEEP: NOT AT ALL
8. MOVING OR SPEAKING SO SLOWLY THAT OTHER PEOPLE COULD HAVE NOTICED. OR THE OPPOSITE, BEING SO FIGETY OR RESTLESS THAT YOU HAVE BEEN MOVING AROUND A LOT MORE THAN USUAL: NOT AT ALL
5. POOR APPETITE OR OVEREATING: NOT AT ALL

## 2024-07-09 ASSESSMENT — ENCOUNTER SYMPTOMS
RHINORRHEA: 0
VOMITING: 0
DIARRHEA: 0
NAUSEA: 0
WHEEZING: 0
SHORTNESS OF BREATH: 0
EYE DISCHARGE: 0
EYE REDNESS: 0
COUGH: 0
SORE THROAT: 0
ABDOMINAL PAIN: 0

## 2024-07-09 NOTE — PROGRESS NOTES
(144 lb 9.6 oz)   SpO2 99%   BMI 21.32 kg/m²   Physical Exam  Vitals and nursing note reviewed.   Constitutional:       General: He is not in acute distress.     Appearance: He is well-developed. He is not ill-appearing.   HENT:      Head: Normocephalic and atraumatic.      Right Ear: External ear normal.      Left Ear: External ear normal.   Eyes:      General: No scleral icterus.        Right eye: No discharge.         Left eye: No discharge.      Conjunctiva/sclera: Conjunctivae normal.   Neck:      Thyroid: No thyromegaly.      Trachea: No tracheal deviation.   Cardiovascular:      Rate and Rhythm: Normal rate and regular rhythm.      Heart sounds: Normal heart sounds.   Pulmonary:      Effort: Pulmonary effort is normal. No respiratory distress.      Breath sounds: Normal breath sounds. No wheezing.   Lymphadenopathy:      Cervical: No cervical adenopathy.   Skin:     General: Skin is warm.      Findings: No rash.   Neurological:      Mental Status: He is alert and oriented to person, place, and time.   Psychiatric:         Mood and Affect: Mood normal.         Behavior: Behavior normal.         Thought Content: Thought content normal.         Assessment:       Diagnosis Orders   1. Essential hypertension        2. Depression with anxiety  clonazePAM (KLONOPIN) 1 MG tablet      3. Encounter for lipid screening for cardiovascular disease  Lipid, Fasting      4. Annual physical exam  Basic Metabolic Panel, Fasting    Hepatic Function Panel      5. Screening PSA (prostate specific antigen)  PSA Screening           Plan:   Assessment & Plan   Will change Ativan to Klonopin.  Believe patient has developed tolerance to the Ativan  Advised patient cannot use Xanax with him being on Suboxone  Suboxone has been effective.  Patient not having withdrawals.  States has been helping him with the pain.  To continue follow-up with nurse practitioner for Suboxone  Staff to get progress note from endocrinology  Patient had

## 2024-07-11 ENCOUNTER — TELEPHONE (OUTPATIENT)
Dept: PRIMARY CARE CLINIC | Age: 54
End: 2024-07-11

## 2024-07-11 NOTE — TELEPHONE ENCOUNTER
Per Dr Melendez, check and see if shingrix will be covered here.    I spoke with Ida and she stated that it would go through [employer] insurance first and does not need ran through [facility].    Patient will get 2nd vaccine at appt with Gurpreet

## 2024-07-11 NOTE — TELEPHONE ENCOUNTER
----- Message from Cesar Winter MA sent at 7/9/2024  2:27 PM EDT -----  Dr chisholm A1c/micro/note

## 2024-07-12 DIAGNOSIS — F11.23 OPIOID DEPENDENCE WITH WITHDRAWAL (HCC): ICD-10-CM

## 2024-07-12 NOTE — TELEPHONE ENCOUNTER
Patient requested refill on Suboxone.  It was refilled July 5.  I checked OARRS report and the OARRS reports states 60 films were picked up on July 6.

## 2024-07-15 RX ORDER — BUPRENORPHINE AND NALOXONE 8; 2 MG/1; MG/1
1 FILM, SOLUBLE BUCCAL; SUBLINGUAL 2 TIMES DAILY
Qty: 60 FILM | Refills: 0 | OUTPATIENT
Start: 2024-07-15 | End: 2024-08-14

## 2024-07-16 DIAGNOSIS — I10 ESSENTIAL HYPERTENSION: ICD-10-CM

## 2024-07-17 RX ORDER — PROPRANOLOL HYDROCHLORIDE 60 MG/1
60 TABLET ORAL 2 TIMES DAILY
Qty: 60 TABLET | Refills: 3 | Status: SHIPPED | OUTPATIENT
Start: 2024-07-17

## 2024-07-24 ENCOUNTER — OFFICE VISIT (OUTPATIENT)
Dept: PRIMARY CARE CLINIC | Age: 54
End: 2024-07-24
Payer: COMMERCIAL

## 2024-07-24 VITALS
WEIGHT: 145.7 LBS | BODY MASS INDEX: 21.58 KG/M2 | HEART RATE: 85 BPM | OXYGEN SATURATION: 96 % | HEIGHT: 69 IN | DIASTOLIC BLOOD PRESSURE: 88 MMHG | SYSTOLIC BLOOD PRESSURE: 140 MMHG

## 2024-07-24 DIAGNOSIS — F11.23 OPIOID DEPENDENCE WITH WITHDRAWAL (HCC): Primary | ICD-10-CM

## 2024-07-24 LAB

## 2024-07-24 PROCEDURE — 3017F COLORECTAL CA SCREEN DOC REV: CPT | Performed by: NURSE PRACTITIONER

## 2024-07-24 PROCEDURE — 80305 DRUG TEST PRSMV DIR OPT OBS: CPT | Performed by: NURSE PRACTITIONER

## 2024-07-24 PROCEDURE — G8427 DOCREV CUR MEDS BY ELIG CLIN: HCPCS | Performed by: NURSE PRACTITIONER

## 2024-07-24 PROCEDURE — 3079F DIAST BP 80-89 MM HG: CPT | Performed by: NURSE PRACTITIONER

## 2024-07-24 PROCEDURE — 3077F SYST BP >= 140 MM HG: CPT | Performed by: NURSE PRACTITIONER

## 2024-07-24 PROCEDURE — 99213 OFFICE O/P EST LOW 20 MIN: CPT | Performed by: NURSE PRACTITIONER

## 2024-07-24 PROCEDURE — G8420 CALC BMI NORM PARAMETERS: HCPCS | Performed by: NURSE PRACTITIONER

## 2024-07-24 PROCEDURE — 4004F PT TOBACCO SCREEN RCVD TLK: CPT | Performed by: NURSE PRACTITIONER

## 2024-07-24 ASSESSMENT — ENCOUNTER SYMPTOMS
DIARRHEA: 0
CONSTIPATION: 0
WHEEZING: 0
BACK PAIN: 1
SHORTNESS OF BREATH: 0

## 2024-07-24 NOTE — PROGRESS NOTES
for Chest pain Indications: Disease of the Arteries of the Heart Take 1 tablet every 5 minutes times three as needed for chest pain 25 tablet 2     No current facility-administered medications for this visit.     Facility-Administered Medications Ordered in Other Visits   Medication Dose Route Frequency Provider Last Rate Last Admin    lactated ringers infusion  75 mL/hr IntraVENous Continuous Mikal Vale MD         Allergies   Allergen Reactions    Avelox [Moxifloxacin]     Ciprofloxacin Other (See Comments)    Levofloxacin     Lyrica [Pregabalin]     Metoclopramide Other (See Comments)     Severe confusion and paranoid activity    Neurontin [Gabapentin]     Other      Can not have epidural injections, steroids, nerve blocks, or burning of nerves due to being diabetic    Tetanus Antitoxin     Tetanus Toxoids     Tramadol     Tramadol Hcl        Health Maintenance   Topic Date Due    Hepatitis B vaccine (1 of 3 - 3-dose series) Never done    COVID-19 Vaccine (1) Never done    HIV screen  Never done    Hepatitis C screen  Never done    Diabetic foot exam  10/19/2022    Shingles vaccine (2 of 2) 11/02/2023    Lipids  04/05/2024    A1C test (Diabetic or Prediabetic)  05/23/2024    Flu vaccine (1) 08/01/2024    Lung Cancer Screening &/or Counseling  08/30/2024    Diabetic retinal exam  10/13/2024    Diabetic Alb to Cr ratio (uACR) test  10/25/2024    GFR test (Diabetes, CKD 3-4, OR last GFR 15-59)  02/08/2025    Annual Wellness Visit (Medicare)  02/20/2025    Depression Monitoring  07/09/2025    Colorectal Cancer Screen  11/14/2025    Pneumococcal 0-64 years Vaccine  Completed    Hepatitis A vaccine  Aged Out    Hib vaccine  Aged Out    Polio vaccine  Aged Out    Meningococcal (ACWY) vaccine  Aged Out       Subjective:      Review of Systems   Constitutional:  Negative for chills and fever.   HENT:  Negative for congestion.    Respiratory:  Negative for shortness of breath and wheezing.    Cardiovascular:

## 2024-07-25 DIAGNOSIS — I25.119 ATHEROSCLEROSIS OF NATIVE CORONARY ARTERY OF NATIVE HEART WITH ANGINA PECTORIS (HCC): ICD-10-CM

## 2024-07-25 PROCEDURE — 90471 IMMUNIZATION ADMIN: CPT | Performed by: NURSE PRACTITIONER

## 2024-07-25 PROCEDURE — 90750 HZV VACC RECOMBINANT IM: CPT | Performed by: NURSE PRACTITIONER

## 2024-07-25 RX ORDER — CLOPIDOGREL BISULFATE 75 MG/1
75 TABLET ORAL DAILY
Qty: 90 TABLET | Refills: 3 | Status: SHIPPED | OUTPATIENT
Start: 2024-07-25

## 2024-07-25 RX ORDER — ATORVASTATIN CALCIUM 80 MG/1
80 TABLET, FILM COATED ORAL NIGHTLY
Qty: 90 TABLET | Refills: 3 | Status: SHIPPED | OUTPATIENT
Start: 2024-07-25

## 2024-08-08 DIAGNOSIS — F11.23 OPIOID DEPENDENCE WITH WITHDRAWAL (HCC): ICD-10-CM

## 2024-08-08 RX ORDER — BUPRENORPHINE AND NALOXONE 8; 2 MG/1; MG/1
1 FILM, SOLUBLE BUCCAL; SUBLINGUAL 2 TIMES DAILY
Qty: 60 FILM | Refills: 0 | Status: SHIPPED | OUTPATIENT
Start: 2024-08-08 | End: 2024-09-07

## 2024-08-10 ENCOUNTER — TELEPHONE (OUTPATIENT)
Dept: PRIMARY CARE CLINIC | Age: 54
End: 2024-08-10

## 2024-08-10 NOTE — TELEPHONE ENCOUNTER
Received a page from [facility] and called [employer] back. She states patient had glucose of 473 in his BMP and then a separate glucose ran for Dr. Pack he had a glucose of 510. Patient is a brittle uncontrolled diabetic who does not stick to a diabetic diet. I called patient. He feels fine. He relates his sugar can be anywhere from 40 to over 500 on the same day. He has no physical indication that is sugar is unusual for him. He is monitoring his sugar. He is watching the [employer] and will keep an eye on his blood sugar as usual and treat appropriately.

## 2024-08-12 DIAGNOSIS — Z12.5 SCREENING PSA (PROSTATE SPECIFIC ANTIGEN): ICD-10-CM

## 2024-08-12 DIAGNOSIS — Z13.220 ENCOUNTER FOR LIPID SCREENING FOR CARDIOVASCULAR DISEASE: ICD-10-CM

## 2024-08-12 DIAGNOSIS — Z00.00 ANNUAL PHYSICAL EXAM: ICD-10-CM

## 2024-08-12 DIAGNOSIS — Z13.6 ENCOUNTER FOR LIPID SCREENING FOR CARDIOVASCULAR DISEASE: ICD-10-CM

## 2024-08-12 LAB
A/G RATIO: NORMAL
ALBUMIN: NORMAL
ALP BLD-CCNC: 141 U/L
ALT SERPL-CCNC: 10 U/L
ANION GAP SERPL CALCULATED.3IONS-SCNC: NORMAL MMOL/L
AST SERPL-CCNC: 11 U/L
BILIRUB SERPL-MCNC: NORMAL MG/DL
BILIRUBIN DIRECT: NORMAL
BILIRUBIN, INDIRECT: NORMAL
BUN / CREAT RATIO: NORMAL
BUN BLDV-MCNC: NORMAL MG/DL
CALCIUM SERPL-MCNC: NORMAL MG/DL
CHLORIDE BLD-SCNC: NORMAL MMOL/L
CHOLESTEROL, FASTING: 160
CO2: NORMAL
CREAT SERPL-MCNC: NORMAL MG/DL
GFR AFRICAN AMERICAN: NORMAL
GFR NON-AFRICAN AMERICAN: >90
GLOBULIN: NORMAL
GLUCOSE FASTING: 473 MG/DL
HDLC SERPL-MCNC: 46 MG/DL (ref 35–70)
LDL CHOLESTEROL: 81
POTASSIUM SERPL-SCNC: 4 MMOL/L
PROSTATE SPECIFIC ANTIGEN: 0.42 NG/ML
SODIUM BLD-SCNC: 137 MMOL/L
TOTAL PROTEIN: NORMAL
TRIGLYCERIDE, FASTING: 164

## 2024-08-20 NOTE — TELEPHONE ENCOUNTER
LAST VISIT:   7/9/2024     Future Appointments   Date Time Provider Department Center   8/23/2024 10:00 AM Medhat Roman MD [facility]   10/24/2024 11:30 AM Gurpreet Mills APRN - CNP [facility]   1/8/2025  9:15 AM Dakota Melendez MD [facility]

## 2024-09-04 DIAGNOSIS — F11.23 OPIOID DEPENDENCE WITH WITHDRAWAL (HCC): ICD-10-CM

## 2024-09-04 RX ORDER — BUPRENORPHINE AND NALOXONE 8; 2 MG/1; MG/1
1 FILM, SOLUBLE BUCCAL; SUBLINGUAL 2 TIMES DAILY
Qty: 60 FILM | Refills: 0 | Status: SHIPPED | OUTPATIENT
Start: 2024-09-04 | End: 2024-10-04

## 2024-10-24 ENCOUNTER — OFFICE VISIT (OUTPATIENT)
Dept: PRIMARY CARE CLINIC | Age: 54
End: 2024-10-24

## 2024-10-24 VITALS
DIASTOLIC BLOOD PRESSURE: 86 MMHG | BODY MASS INDEX: 21.46 KG/M2 | HEART RATE: 78 BPM | HEIGHT: 69 IN | OXYGEN SATURATION: 96 % | WEIGHT: 144.9 LBS | SYSTOLIC BLOOD PRESSURE: 136 MMHG

## 2024-10-24 DIAGNOSIS — Z23 NEED FOR VACCINATION: Primary | ICD-10-CM

## 2024-10-24 DIAGNOSIS — F11.23 OPIOID DEPENDENCE WITH WITHDRAWAL (HCC): ICD-10-CM

## 2024-10-24 RX ORDER — BUPRENORPHINE AND NALOXONE 8; 2 MG/1; MG/1
1 FILM, SOLUBLE BUCCAL; SUBLINGUAL 2 TIMES DAILY
Qty: 60 FILM | Refills: 0 | Status: SHIPPED | OUTPATIENT
Start: 2024-10-24 | End: 2024-11-23

## 2024-10-24 ASSESSMENT — ENCOUNTER SYMPTOMS
SHORTNESS OF BREATH: 0
VOMITING: 0
ABDOMINAL PAIN: 0
RHINORRHEA: 0
DIARRHEA: 0
NAUSEA: 0
EYE DISCHARGE: 0
COUGH: 0
EYE REDNESS: 0
SORE THROAT: 0
WHEEZING: 0
BACK PAIN: 1

## 2024-10-24 NOTE — PROGRESS NOTES
[facility]  [facility]  [address] [facility] [address]  Dept: [phone]    Nam Abreu is a 53 y.o. male Established patient, who presents today for his medical conditions/complaints as noted below.      Chief Complaint   Patient presents with    Diabetes    Flu Vaccine       HPI:     Diabetes  Hypoglycemia symptoms include nervousness/anxiousness. Pertinent negatives for hypoglycemia include no dizziness or headaches. Pertinent negatives for diabetes include no chest pain.      He is still trying to get off Suboxone  He takes one in the morning after breakfast between 7-8 am and the he tries not to take one the rest of the day. However frequently in the evening his legs will start getting \"jiggy\" and he will not be able to sleep if does not take one in the afternoon.   He thinks if he has a very busy and active day he is able to sleep better and less likely to need second Suboxone.  He has more days when he only takes one Suboxone.  A couple days he did not take one in the morning and then needs one about 4 in the afternoon.     He does smoke marijuana. He smokes about a joint a day.  Marijuana helps with his pain and with his appetite    Last HgA1C = 9.3 which is improved.      Reviewed prior notes: None   Reviewed previous:  Labs    No components found for: \"LDLCHOLESTEROL\", \"LDLCALC\"    (goal LDL is <100)   AST (U/L)   Date Value   08/10/2024 11     ALT (U/L)   Date Value   08/10/2024 10     BUN (mg/dL)   Date Value   02/14/2023 10     Hemoglobin A1C (%)   Date Value   02/23/2024 11.6     TSH (uIU/mL)   Date Value   02/12/2023 1.50     BP Readings from Last 3 Encounters:   10/24/24 136/86   07/24/24 (!) 140/88   07/09/24 (!) 158/90          (goal 120/80)    Past Medical History:   Diagnosis Date    Anxiety     Arthritis     CAD (coronary artery disease)     Calcaneal apophysitis     Corns     Depression     Diabetes type I (HCC)     Gastroparesis     GERD

## 2024-10-24 NOTE — PATIENT INSTRUCTIONS
Continue taking Suboxone up to 2 films per day. Continue to decrease as tolerated. May try taking 1/2 film in afternoon.    Flu vaccine given today.

## 2024-12-09 DIAGNOSIS — F11.23 OPIOID DEPENDENCE WITH WITHDRAWAL (HCC): ICD-10-CM

## 2024-12-09 RX ORDER — BUPRENORPHINE AND NALOXONE 8; 2 MG/1; MG/1
1 FILM, SOLUBLE BUCCAL; SUBLINGUAL 2 TIMES DAILY
Qty: 60 FILM | Refills: 0 | Status: SHIPPED | OUTPATIENT
Start: 2024-12-09 | End: 2025-01-08

## 2024-12-27 DIAGNOSIS — F41.8 DEPRESSION WITH ANXIETY: ICD-10-CM

## 2024-12-27 RX ORDER — CLONAZEPAM 1 MG/1
1 TABLET ORAL 2 TIMES DAILY
Qty: 60 TABLET | Refills: 5 | Status: SHIPPED | OUTPATIENT
Start: 2024-12-27 | End: 2025-06-25

## 2025-01-02 ENCOUNTER — TELEPHONE (OUTPATIENT)
Dept: PRIMARY CARE CLINIC | Age: 55
End: 2025-01-02

## 2025-01-02 NOTE — TELEPHONE ENCOUNTER
----- Message from Ma. R sent at 1/2/2025  9:16 AM EST -----  Regarding: ECC Message to Provider  ECC Message to Provider    Relationship to Patient: Spouse/Partner, Akila Abreu - Wife     Additional Information: Patient's wife called in to let the practice know that her  passed away yesterday, January 1, 2025.   --------------------------------------------------------------------------------------------------------------------------    Call Back Information: OK to leave message on voicemail  Preferred Call Back Number: Phone [phone] (home)

## 2025-01-02 NOTE — TELEPHONE ENCOUNTER
Akila called to notify Dr. Melendez that the patient has passed away and to cancel all of his appointments

## 2025-01-02 NOTE — TELEPHONE ENCOUNTER
Patient was hospitalized on 10/31/23 and 11/02/23 due to methadone withdrawal.   He had nausea and vomiting, cold feeling, runny nose, \"Jiggy legs\", fidgety , can not get comfortable.  He could not sleep.  Suboxone increased to 3 strips per day on 11/8/2023

## 2025-01-02 NOTE — TELEPHONE ENCOUNTER
Dr. Allen' staff called to obtain more information on why patient was on Suboxone.    The return number is [phone].

## 2025-01-02 NOTE — TELEPHONE ENCOUNTER
Spoke to Teresa at Dr. Allen's office.  She states Dr. Melendez called her and spoke to her about why Jay Jay was on suboxone.  She has all the information she needed and is aware the patient is .

## 2025-01-21 NOTE — PROGRESS NOTES
SURGERY      insulin pump    ABDOMINAL EXPLORATION SURGERY  05/28/2016    bowel resection Rt. colon &terminal ileum, intussuseption.      CARDIAC CATHETERIZATION  2014    CARPAL TUNNEL RELEASE      bilateral    COLONOSCOPY  05/23/2017    poor prep; diverticulosis    COLONOSCOPY N/A 7/10/2018    COLONOSCOPY POLYPECTOMY HOT BIOPSY performed by Fitz Ann MD at [address] COLONOSCOPY N/A 8/9/2021    COLONOSCOPY DIAGNOSTIC performed by Fitz Ann MD at [address]      right    HERNIA REPAIR      inguinal    NE COLON CA SCRN NOT  [address] IND N/A 5/25/2017    COLONOSCOPY performed by Gordo Omer MD at [address] ESOPHAGOGASTRODUODENOSCOPY TRANSORAL DIAGNOSTIC N/A 5/24/2017    EGD ESOPHAGOGASTRODUODENOSCOPY performed by Gordo Omer MD at [address] [facility] [place]    [place]      fracture     TONSILLECTOMY      x2    TOOTH EXTRACTION      x4    ULNAR TUNNEL RELEASE      UPPER GASTROINTESTINAL ENDOSCOPY  05/23/2017    mild gastritis    UPPER GASTROINTESTINAL ENDOSCOPY N/A 7/10/2018    EGD BIOPSY performed by Fitz Ann MD at [address] [place] N/A 8/10/2021    HERNIA INCISIONAL REPAIR OPEN W/MESH performed by Fitz Ann MD at [address] EXTRACTION      x4       Family History   Problem Relation Age of Onset    Diabetes Mother     High Blood Pressure Mother     Heart Disease Mother     Migraines Mother     Other Mother         diabetic neuropathy    High Blood Pressure Father        Social History     Tobacco Use    Smoking status: Current Every Day Smoker     Packs/day: 1.00     Years: 30.00     Pack years: 30.00    Smokeless tobacco: Never Used   Substance Use Topics    Alcohol use: No     Comment: previous heavy ETOH use; pt stopped around 2010      Current Outpatient Medications   Medication Sig Dispense Refill    buprenorphine-naloxone (SUBOXONE) 8-2 MG FILM SL film Place 1 Film under the tongue 2 times daily for 30 days. (Patient taking differently take 1 film in the morning and 1/2 film in the evening and  1/2 film daily as needed for pain or withdrawals.) 60 Film 0    ondansetron (ZOFRAN) 4 MG tablet Take every six hours as needed 20 tablet 0    butalbital-acetaminophen-caffeine (FIORICET, ESGIC) -40 MG per tablet Take 1 tablet by mouth every 4 hours as needed for Headaches 180 tablet 3    VORTIoxetine HBr (TRINTELLIX) 20 MG TABS tablet Take 1 tablet by mouth daily 30 tablet 0    brexpiprazole (REXULTI) 0.5 MG TABS tablet Take 0.5 mg by mouth daily      cephALEXin (KEFLEX) 500 MG capsule 500 mgTake three times daily 21 capsule 0    naloxone 4 MG/0.1ML LIQD nasal spray 1 spray by Nasal route as needed for Opioid Reversal 1 each 5    propranolol (INDERAL) 20 MG tablet Take 20 mg by mouth as needed Takes for migraines      b complex vitamins capsule Take 1 capsule by mouth daily      pantoprazole (PROTONIX) 40 MG tablet TAKE ONE TABLET BY MOUTH TWICE DAILY 60 tablet 3    lisinopril (PRINIVIL;ZESTRIL) 30 MG tablet Take 1 tablet by mouth daily (Patient taking differently: Take 5 mg by mouth daily ) 90 tablet 3    isosorbide mononitrate (IMDUR) 30 MG extended release tablet Take 1 tablet by mouth daily 90 tablet 3    atorvastatin (LIPITOR) 80 MG tablet Take 1 tablet by mouth nightly 90 tablet 3    clopidogrel (PLAVIX) 75 MG tablet Take 1 tablet by mouth daily 90 tablet 3    CONTOUR NEXT TEST strip use four times a day      Insulin Aspart (NOVOLOG SC) Inject into the skin Insulin pump       nitroGLYCERIN (NITROSTAT) 0.4 MG SL tablet Place 1 tablet under the tongue every 5 minutes as needed for Chest pain Indications: Disease of the Arteries of the Heart Take 1 tablet every 5 minutes times three as needed for chest pain (Patient not taking: Reported on 11/22/2021) 25 tablet 2     No current facility-administered medications for this visit.      Facility-Administered Medications Ordered in Other Visits   Medication Dose Route Frequency Provider Last Rate Last Admin    lactated ringers infusion  75 mL/hr IntraVENous Continuous Mikal Juarez MD         Allergies   Allergen Reactions    Avelox [Moxifloxacin]     Ciprofloxacin Other (See Comments)    Levofloxacin     Lyrica [Pregabalin]     Metoclopramide Other (See Comments)     Severe confusion and paranoid activity    Neurontin [Gabapentin]     Other      Can not have epidural injections, steroids, nerve blocks, or burning of nerves due to being diabetic    Tetanus Toxoids     Tramadol     Tramadol Hcl        Health Maintenance   Topic Date Due    Hepatitis C screen  Never done    COVID-19 Vaccine (1) Never done    HIV screen  Never done    Hepatitis B vaccine (1 of 3 - Risk 3-dose series) Never done    Diabetic retinal exam  11/28/2019    Lipid screen  04/16/2020    Shingles Vaccine (1 of 2) Never done    Low dose CT lung screening  Never done    Diabetic microalbuminuria test  05/04/2021    A1C test (Diabetic or Prediabetic)  01/19/2022    Colon cancer screen colonoscopy  02/09/2022    Potassium monitoring  08/20/2022    Creatinine monitoring  08/20/2022    Diabetic foot exam  10/19/2022    Pneumococcal 0-64 years Vaccine (2 of 2 - PPSV23) 12/02/2035    Flu vaccine  Completed    Hepatitis A vaccine  Aged Out    Hib vaccine  Aged Out    Meningococcal (ACWY) vaccine  Aged Out       Subjective:      Review of Systems   Constitutional: Negative for appetite change, chills, fatigue and fever. HENT: Negative for congestion, sinus pressure and sinus pain. Eyes: Negative for pain and redness. Respiratory: Negative for cough and shortness of breath. Cardiovascular: Negative for chest pain, palpitations and leg swelling. Gastrointestinal: Negative for constipation, diarrhea and nausea. Genitourinary: Negative for difficulty urinating and dysuria.    Musculoskeletal: Positive for arthralgias, myalgias, neck pain and neck stiffness. Skin: Negative for rash and wound. Neurological: Negative for dizziness, facial asymmetry and headaches. Psychiatric/Behavioral: Negative for dysphoric mood and sleep disturbance. The patient is not nervous/anxious. Objective:     BP (!) 158/98   Pulse 101   Wt 132 lb 8 oz (60.1 kg)   SpO2 94%   BMI 20.12 kg/m²   Physical Exam  Vitals and nursing note reviewed. Constitutional:       Appearance: Normal appearance. HENT:      Head: Normocephalic and atraumatic. Right Ear: External ear normal.      Left Ear: External ear normal.   Eyes:      Extraocular Movements: Extraocular movements intact. Pupils: Pupils are equal, round, and reactive to light. Neck:      Thyroid: No thyromegaly. Cardiovascular:      Rate and Rhythm: Normal rate. Heart sounds: Normal heart sounds. Pulmonary:      Effort: Pulmonary effort is normal.      Breath sounds: Normal breath sounds. Abdominal:      General: Bowel sounds are normal.      Palpations: Abdomen is soft. Musculoskeletal:      Cervical back: Tenderness present. Muscular tenderness present. Decreased range of motion. Right lower leg: No edema. Left lower leg: No edema. Lymphadenopathy:      Cervical:      Right cervical: No superficial cervical adenopathy. Left cervical: No superficial cervical adenopathy. Skin:     General: Skin is warm and dry. Neurological:      Mental Status: He is alert and oriented to person, place, and time. Motor: No weakness. Psychiatric:         Mood and Affect: Mood normal.         Thought Content: Thought content normal.         Judgment: Judgment normal.       Controlled substances monitoring: possible medication side effects, risk of tolerance and/or dependence, and alternative treatments discussed, no signs of potential drug abuse or diversion identified and OARRS report reviewed today- activity consistent with treatment plan.  Urine checked today.    Assessment/Plan:   1. Medication management  -     POCT Rapid Drug Screen  -     buprenorphine-naloxone (SUBOXONE) 8-2 MG FILM SL film; Place 1 Film under the tongue 2 times daily for 30 days. (Patient taking differently take 1 film in the morning and 1/2 film in the evening and  1/2 film daily as needed for pain or withdrawals.), Disp-60 Film, R-0Normal  2. Uncomplicated opioid dependence (HCC)  -     POCT Rapid Drug Screen  -     buprenorphine-naloxone (SUBOXONE) 8-2 MG FILM SL film; Place 1 Film under the tongue 2 times daily for 30 days. (Patient taking differently take 1 film in the morning and 1/2 film in the evening and  1/2 film daily as needed for pain or withdrawals.), Disp-60 Film, R-0Normal  3. High risk medication use       Return for Appointment with Dr. Roel Barnes in December,  and with Gurpreet 4 weeks later. .    Orders Placed This Encounter   Procedures    POCT Rapid Drug Screen     Orders Placed This Encounter   Medications    buprenorphine-naloxone (SUBOXONE) 8-2 MG FILM SL film     Sig: Place 1 Film under the tongue 2 times daily for 30 days. (Patient taking differently take 1 film in the morning and 1/2 film in the evening and  1/2 film daily as needed for pain or withdrawals.)     Dispense:  60 Film     Refill:  0     NADEAN:  [id number]       Patient given educational materials - see patient instructions. Discussed use, benefit, and side effects of prescribed medications. All patient questions answered. Pt voiced understanding. Reviewed health maintenance. Instructed to continue current medications, diet and exercise. Patient agreed with treatment plan. Follow up as directed.      Electronically signed by CLYDE Parsons CNP on 11/22/2021 at 10:19 AM NULL

## (undated) DEVICE — MARKER,SKIN,WI/RULER AND LABELS: Brand: MEDLINE

## (undated) DEVICE — SOLUTION IRRIG 1000ML 0.9% SOD CHL USP POUR PLAS BTL

## (undated) DEVICE — GLOVE ORTHO 7 1/2   MSG9475

## (undated) DEVICE — FORCEPS BX L240CM WRK CHN 2.8MM STD CAP W/ NDL MIC MESH

## (undated) DEVICE — GOWN,POLY REINFORCED,LG: Brand: MEDLINE

## (undated) DEVICE — SNARE ENDOSCP L240CM LOOP W13MM DIA2.4MM SHT THROW SM OVL

## (undated) DEVICE — HYPODERMIC SAFETY NEEDLE: Brand: MAGELLAN

## (undated) DEVICE — SEALER ENDOSCP NANO COAT OPN DIV CRV L JAW LIGASURE IMPACT

## (undated) DEVICE — SINGLE PORT MANIFOLD: Brand: NEPTUNE 2

## (undated) DEVICE — BITEBLOCK 54FR W/ DENT RIM BLOX

## (undated) DEVICE — GLOVE ORANGE PI 7 1/2   MSG9075

## (undated) DEVICE — GOWN,SIRUS,NONRNF,SETINSLV,XL,20/CS: Brand: MEDLINE

## (undated) DEVICE — DRESSING TRNSPAR W5XL4.5IN FLM SHT SEMIPERMEABLE WIND

## (undated) DEVICE — GOWN,AURORA,NONREINFORCED,LARGE: Brand: MEDLINE

## (undated) DEVICE — AIRLIFE™ NASAL OXYGEN CANNULA CURVED, FLARED TIP, WITH 7 FEET (2.1 M) CRUSH RESISTANT TUBING, OVER-THE-EAR STYLE: Brand: AIRLIFE™

## (undated) DEVICE — SYRINGE MED 50ML LUERSLIP TIP

## (undated) DEVICE — MERCY HEALTH ST CHARLES: Brand: MEDLINE INDUSTRIES, INC.

## (undated) DEVICE — SOLUTION IRRIG 1000ML STRL H2O USP PLAS POUR BTL

## (undated) DEVICE — TOTAL TRAY, 16FR 10ML SIL FOLEY, URN: Brand: MEDLINE

## (undated) DEVICE — SPONGE DRN W4XL4IN RAYON/POLYESTER 6 PLY NONWOVEN PRECUT

## (undated) DEVICE — DEFENDO AIR WATER SUCTION AND BIOPSY VALVE KIT FOR  OLYMPUS: Brand: DEFENDO AIR/WATER/SUCTION AND BIOPSY VALVE

## (undated) DEVICE — SUTURE PERMAHAND SZ 0 L30IN NONABSORBABLE BLK FSL L30MM 3/8 680H

## (undated) DEVICE — SYRINGE 20ML LL S/C 50

## (undated) DEVICE — FORCEPS BX L240CM JAW DIA2.4MM ORNG L CAP W/ NDL DISP RAD

## (undated) DEVICE — SUTURE ETHBND EXCEL SZ 0 L18IN NONABSORBABLE GRN L26MM MO-6 CX45D

## (undated) DEVICE — JELLY,LUBE,STERILE,FLIP TOP,TUBE,2-OZ: Brand: MEDLINE

## (undated) DEVICE — PAD,NON-ADHERENT,3X8,STERILE,LF,1/PK: Brand: MEDLINE

## (undated) DEVICE — ENDO KIT W/SYRINGE: Brand: MEDLINE INDUSTRIES, INC.

## (undated) DEVICE — CANNULA NSL AD TBNG L7FT PVC STR NONFLARED PRNG O2 DEL W STD

## (undated) DEVICE — ST CHARLES MAJOR ABDOMINAL PK: Brand: MEDLINE INDUSTRIES, INC.

## (undated) DEVICE — KIT DRN FLAT W/ 100CC EVAC 7MM FULL PERF

## (undated) DEVICE — SUTURE VCRL + SZ 2-0 L27IN ABSRB UD CT-2 L26MM 1/2 CIR TAPR VCP269H

## (undated) DEVICE — Device

## (undated) DEVICE — ERBE NESSY® OMEGA PLATE USA (85+23)CM² , WITH CABLE 3 M: Brand: ERBE

## (undated) DEVICE — POLYP TRAP: Brand: TRAPEASE®